# Patient Record
Sex: FEMALE | Race: WHITE | NOT HISPANIC OR LATINO | Employment: OTHER | URBAN - METROPOLITAN AREA
[De-identification: names, ages, dates, MRNs, and addresses within clinical notes are randomized per-mention and may not be internally consistent; named-entity substitution may affect disease eponyms.]

---

## 2017-04-11 ENCOUNTER — CONVERSION ENCOUNTER (OUTPATIENT)
Dept: GENERAL RADIOLOGY | Facility: HOSPITAL | Age: 52
End: 2017-04-11

## 2018-04-13 ENCOUNTER — CONVERSION ENCOUNTER (OUTPATIENT)
Dept: GENERAL RADIOLOGY | Facility: HOSPITAL | Age: 53
End: 2018-04-13

## 2019-06-04 ENCOUNTER — OFFICE VISIT CONVERTED (OUTPATIENT)
Dept: SURGERY | Facility: CLINIC | Age: 54
End: 2019-06-04
Attending: NURSE PRACTITIONER

## 2019-07-01 ENCOUNTER — HOSPITAL ENCOUNTER (OUTPATIENT)
Dept: GENERAL RADIOLOGY | Facility: HOSPITAL | Age: 54
Discharge: HOME | End: 2019-07-01
Attending: OBSTETRICS & GYNECOLOGY

## 2019-09-13 ENCOUNTER — HOSPITAL ENCOUNTER (OUTPATIENT)
Dept: SURGERY | Facility: HOSPITAL | Age: 54
Setting detail: HOSPITAL OUTPATIENT SURGERY
Discharge: HOME | End: 2019-09-13
Attending: SURGERY

## 2019-10-03 ENCOUNTER — OFFICE VISIT CONVERTED (OUTPATIENT)
Dept: FAMILY MEDICINE CLINIC | Facility: CLINIC | Age: 54
End: 2019-10-03
Attending: NURSE PRACTITIONER

## 2019-10-03 ENCOUNTER — HOSPITAL ENCOUNTER (OUTPATIENT)
Dept: LAB | Facility: HOSPITAL | Age: 54
Discharge: HOME | End: 2019-10-03
Attending: NURSE PRACTITIONER

## 2019-10-03 LAB
25(OH)D3 SERPL-MCNC: 103.6 NG/ML (ref 30–100)
ALBUMIN SERPL-MCNC: 4.6 G/DL (ref 3.5–5)
ALBUMIN/GLOB SERPL: 1.7 {RATIO} (ref 1.4–2.6)
ALP SERPL-CCNC: 70 U/L (ref 53–141)
ALT SERPL-CCNC: 16 U/L (ref 10–40)
ANION GAP SERPL CALC-SCNC: 20 MMOL/L (ref 8–19)
AST SERPL-CCNC: 21 U/L (ref 15–50)
BASOPHILS # BLD AUTO: 0.06 10*3/UL (ref 0–0.2)
BASOPHILS NFR BLD AUTO: 1 % (ref 0–3)
BILIRUB SERPL-MCNC: 0.6 MG/DL (ref 0.2–1.3)
BUN SERPL-MCNC: 13 MG/DL (ref 5–25)
BUN/CREAT SERPL: 17 {RATIO} (ref 6–20)
CALCIUM SERPL-MCNC: 9.8 MG/DL (ref 8.7–10.4)
CHLORIDE SERPL-SCNC: 102 MMOL/L (ref 99–111)
CHOLEST SERPL-MCNC: 227 MG/DL (ref 107–200)
CHOLEST/HDLC SERPL: 4.1 {RATIO} (ref 3–6)
CONV ABS IMM GRAN: 0.01 10*3/UL (ref 0–0.2)
CONV CO2: 24 MMOL/L (ref 22–32)
CONV IMMATURE GRAN: 0.2 % (ref 0–1.8)
CONV TOTAL PROTEIN: 7.3 G/DL (ref 6.3–8.2)
CREAT UR-MCNC: 0.75 MG/DL (ref 0.5–0.9)
DEPRECATED RDW RBC AUTO: 46.5 FL (ref 36.4–46.3)
EOSINOPHIL # BLD AUTO: 0.16 10*3/UL (ref 0–0.7)
EOSINOPHIL # BLD AUTO: 2.8 % (ref 0–7)
ERYTHROCYTE [DISTWIDTH] IN BLOOD BY AUTOMATED COUNT: 12.7 % (ref 11.7–14.4)
GFR SERPLBLD BASED ON 1.73 SQ M-ARVRAT: >60 ML/MIN/{1.73_M2}
GLOBULIN UR ELPH-MCNC: 2.7 G/DL (ref 2–3.5)
GLUCOSE SERPL-MCNC: 104 MG/DL (ref 65–99)
HCT VFR BLD AUTO: 47.3 % (ref 37–47)
HDLC SERPL-MCNC: 56 MG/DL (ref 40–60)
HGB BLD-MCNC: 14.9 G/DL (ref 12–16)
LDLC SERPL CALC-MCNC: 149 MG/DL (ref 70–100)
LYMPHOCYTES # BLD AUTO: 1.45 10*3/UL (ref 1–5)
LYMPHOCYTES NFR BLD AUTO: 25.2 % (ref 20–45)
MCH RBC QN AUTO: 31.2 PG (ref 27–31)
MCHC RBC AUTO-ENTMCNC: 31.5 G/DL (ref 33–37)
MCV RBC AUTO: 99 FL (ref 81–99)
MONOCYTES # BLD AUTO: 0.57 10*3/UL (ref 0.2–1.2)
MONOCYTES NFR BLD AUTO: 9.9 % (ref 3–10)
NEUTROPHILS # BLD AUTO: 3.51 10*3/UL (ref 2–8)
NEUTROPHILS NFR BLD AUTO: 60.9 % (ref 30–85)
NRBC CBCN: 0 % (ref 0–0.7)
OSMOLALITY SERPL CALC.SUM OF ELEC: 294 MOSM/KG (ref 273–304)
PLATELET # BLD AUTO: 250 10*3/UL (ref 130–400)
PMV BLD AUTO: 10.8 FL (ref 9.4–12.3)
POTASSIUM SERPL-SCNC: 4.2 MMOL/L (ref 3.5–5.3)
RBC # BLD AUTO: 4.78 10*6/UL (ref 4.2–5.4)
SODIUM SERPL-SCNC: 142 MMOL/L (ref 135–147)
T4 FREE SERPL-MCNC: 1.1 NG/DL (ref 0.9–1.8)
TRIGL SERPL-MCNC: 109 MG/DL (ref 40–150)
TSH SERPL-ACNC: 2.89 M[IU]/L (ref 0.27–4.2)
VLDLC SERPL-MCNC: 22 MG/DL (ref 5–37)
WBC # BLD AUTO: 5.76 10*3/UL (ref 4.8–10.8)

## 2020-04-16 ENCOUNTER — TELEMEDICINE CONVERTED (OUTPATIENT)
Dept: FAMILY MEDICINE CLINIC | Facility: CLINIC | Age: 55
End: 2020-04-16
Attending: NURSE PRACTITIONER

## 2020-05-12 ENCOUNTER — HOSPITAL ENCOUNTER (OUTPATIENT)
Dept: LAB | Facility: HOSPITAL | Age: 55
Discharge: HOME | End: 2020-05-12
Attending: NURSE PRACTITIONER

## 2020-05-12 LAB
25(OH)D3 SERPL-MCNC: 84.1 NG/ML (ref 30–100)
ALBUMIN SERPL-MCNC: 4.1 G/DL (ref 3.5–5)
ALBUMIN/GLOB SERPL: 1.6 {RATIO} (ref 1.4–2.6)
ALP SERPL-CCNC: 68 U/L (ref 53–141)
ALT SERPL-CCNC: 16 U/L (ref 10–40)
ANION GAP SERPL CALC-SCNC: 18 MMOL/L (ref 8–19)
AST SERPL-CCNC: 22 U/L (ref 15–50)
BASOPHILS # BLD AUTO: 0.06 10*3/UL (ref 0–0.2)
BASOPHILS NFR BLD AUTO: 1 % (ref 0–3)
BILIRUB SERPL-MCNC: 0.66 MG/DL (ref 0.2–1.3)
BUN SERPL-MCNC: 14 MG/DL (ref 5–25)
BUN/CREAT SERPL: 17 {RATIO} (ref 6–20)
CALCIUM SERPL-MCNC: 9.3 MG/DL (ref 8.7–10.4)
CHLORIDE SERPL-SCNC: 106 MMOL/L (ref 99–111)
CHOLEST SERPL-MCNC: 187 MG/DL (ref 107–200)
CHOLEST/HDLC SERPL: 2.9 {RATIO} (ref 3–6)
CONV ABS IMM GRAN: 0.01 10*3/UL (ref 0–0.2)
CONV CO2: 25 MMOL/L (ref 22–32)
CONV IMMATURE GRAN: 0.2 % (ref 0–1.8)
CONV TOTAL PROTEIN: 6.7 G/DL (ref 6.3–8.2)
CREAT UR-MCNC: 0.83 MG/DL (ref 0.5–0.9)
DEPRECATED RDW RBC AUTO: 46 FL (ref 36.4–46.3)
EOSINOPHIL # BLD AUTO: 0.32 10*3/UL (ref 0–0.7)
EOSINOPHIL # BLD AUTO: 5.6 % (ref 0–7)
ERYTHROCYTE [DISTWIDTH] IN BLOOD BY AUTOMATED COUNT: 12.8 % (ref 11.7–14.4)
GFR SERPLBLD BASED ON 1.73 SQ M-ARVRAT: >60 ML/MIN/{1.73_M2}
GLOBULIN UR ELPH-MCNC: 2.6 G/DL (ref 2–3.5)
GLUCOSE SERPL-MCNC: 98 MG/DL (ref 65–99)
HCT VFR BLD AUTO: 43.3 % (ref 37–47)
HDLC SERPL-MCNC: 64 MG/DL (ref 40–60)
HGB BLD-MCNC: 14.1 G/DL (ref 12–16)
LDLC SERPL CALC-MCNC: 107 MG/DL (ref 70–100)
LYMPHOCYTES # BLD AUTO: 1.76 10*3/UL (ref 1–5)
LYMPHOCYTES NFR BLD AUTO: 30.7 % (ref 20–45)
MCH RBC QN AUTO: 32.1 PG (ref 27–31)
MCHC RBC AUTO-ENTMCNC: 32.6 G/DL (ref 33–37)
MCV RBC AUTO: 98.6 FL (ref 81–99)
MONOCYTES # BLD AUTO: 0.57 10*3/UL (ref 0.2–1.2)
MONOCYTES NFR BLD AUTO: 9.9 % (ref 3–10)
NEUTROPHILS # BLD AUTO: 3.01 10*3/UL (ref 2–8)
NEUTROPHILS NFR BLD AUTO: 52.6 % (ref 30–85)
NRBC CBCN: 0 % (ref 0–0.7)
OSMOLALITY SERPL CALC.SUM OF ELEC: 300 MOSM/KG (ref 273–304)
PLATELET # BLD AUTO: 219 10*3/UL (ref 130–400)
PMV BLD AUTO: 10.3 FL (ref 9.4–12.3)
POTASSIUM SERPL-SCNC: 4.2 MMOL/L (ref 3.5–5.3)
RBC # BLD AUTO: 4.39 10*6/UL (ref 4.2–5.4)
SODIUM SERPL-SCNC: 145 MMOL/L (ref 135–147)
T4 FREE SERPL-MCNC: 1.1 NG/DL (ref 0.9–1.8)
TRIGL SERPL-MCNC: 79 MG/DL (ref 40–150)
TSH SERPL-ACNC: 4.35 M[IU]/L (ref 0.27–4.2)
VLDLC SERPL-MCNC: 16 MG/DL (ref 5–37)
WBC # BLD AUTO: 5.73 10*3/UL (ref 4.8–10.8)

## 2020-07-23 ENCOUNTER — HOSPITAL ENCOUNTER (OUTPATIENT)
Dept: LAB | Facility: HOSPITAL | Age: 55
Discharge: HOME | End: 2020-07-23
Attending: NURSE PRACTITIONER

## 2020-07-23 LAB
T4 FREE SERPL-MCNC: 1.2 NG/DL (ref 0.9–1.8)
TSH SERPL-ACNC: 2.49 M[IU]/L (ref 0.27–4.2)

## 2020-09-02 ENCOUNTER — TELEMEDICINE CONVERTED (OUTPATIENT)
Dept: FAMILY MEDICINE CLINIC | Facility: CLINIC | Age: 55
End: 2020-09-02
Attending: NURSE PRACTITIONER

## 2021-04-15 ENCOUNTER — TELEMEDICINE CONVERTED (OUTPATIENT)
Dept: FAMILY MEDICINE CLINIC | Facility: CLINIC | Age: 56
End: 2021-04-15
Attending: NURSE PRACTITIONER

## 2021-05-12 NOTE — PROGRESS NOTES
Progress Note      Patient Name: Jenn James   Patient ID: [id number]   Sex: Female   YOB: 1965    Primary Care Provider: Mariana GAGNON   Referring Provider: Mariana GAGNON    Visit Date: April 16, 2020    Provider: CHELSI Cruz   Location: [facility]   Location Address: [address] [place] [address], [address]  [place]  [id number]   Location Phone: [phone]          Chief Complaint  · rash      History Of Present Illness  Video Conferencing Visit  Jenn James is a 54 year old /White female who is presenting for evaluation via video conferencing. Verbal consent obtained before beginning visit.   The following staff were present during this visit: CHELSI Cruz and MIGEL Bryant   Jenn James is a 54 year old /White female who presents for evaluation and treatment of:      Pt is present via facetime. Pt has had a rash on left breast for 2 months. She states it was spotty at first. SHe has tried using Clobetasol pronate that Dr. Jeffery gave her. She says that didn't help much and stopped using it. The past 3-4 days the rash has become solid red with raised bumps and itches. Pt has been using Benadryl cream and states since using that, seems to have gotten worse.     She has been working out a lot and wearing a wet sports bra.    Pt due labs.    PAP in chart.       Past Medical History  Disease Name Date Onset Notes   Allergic rhinitis --  --    Allergic rhinitis, chronic --  --    Cervical cancer screening 5/30/2019 --    Hyperlipemia 03/15/2017 --    Hypothyroidism 03/15/2017 --    Kidney stones --  --    Limb Pain --  --    Limb Swelling --  --          Past Surgical History  Procedure Name Date Notes   *Metal Implant --  --    Colon --  --    Kidney --  --    Sling operation 2008 --    Tubal ligation 1999 --          Medication List  Name Date Started Instructions   D3 + K2 DOTS 1000 IU/200mcg oral tablet,disintegrating 03/14/2017 Take 3 Drops  PO QD   magnesium oral  650 mg qd   milk thistle 150 mg oral capsule 2017 take 2 capsules by oral route daily   Nasal Spray Bottle miscellaneous bottle  use as directed   Singulair 10 mg oral tablet  take 1 tablet (10 mg) by oral route once daily in the evening for 30 days   Zyrtec 10 mg oral tablet  take 1 tablet (10 mg) by oral route once daily         Allergy List  Allergen Name Date Reaction Notes   SULFA (SULFONAMIDES) --  --  --        Allergies Reconciled  Family Medical History  Disease Name Relative/Age Notes   DM Type II Mother/   --    Cancer, Unspecified Mother/   kidney   Diabetes, unspecified type Grandmother (paternal)/  Mother/   Mother; Grandmother (paternal)   Prostate Cancer Father/   Father   Renal Calculus Grandmother (maternal)/   Grandmother (maternal); Child   Family history of colon cancer Grandmother (maternal)/70s   Grandmother (maternal)/70s   Renal cancer Mother/70s   Mother/70s         Reproductive History  Menstrual   Age Menarche: 11   Pregnancy Summary   Total Pregnancies: 2 Full Term: 2 Premature: 0   Ab Induced: 0 Ab Spontaneous: 0 Ectopics: 0   Multiples: 0 Livin         Social History  Finding Status Start/Stop Quantity Notes   Alcohol Current every day --/-- --  drinks daily; wine, beer and liquor   Caffeine Current every day --/-- --  drinks regularly; coffee; 1-2 times per day    --  --/-- --  --    Minimal Amount of Exercise (Once weekly or less) --  --/-- --  --    No known infection risk --  --/-- --  --    Second hand smoke exposure Never --/-- --  no   Tobacco Never --/-- --  never a smoker         Immunizations  NameDate Admin Mfg Trade Name Lot Number Route Inj VIS Given VIS Publication   Hepatitis A1 SKB HAVRIX-ADULT [id number] IM LD 10/03/2019    Comments: Pt tolerated injection well   Rbzffayip04/2019 [facility] Fluzone Quadrivalent [id number] IM RD 10/03/2019    Comments: Pt tolerated injection well         Review of  Systems  · Constitutional  o Denies  o : fever, fatigue, weight loss, weight gain  · Cardiovascular  o Denies  o : lower extremity edema, claudication, chest pressure, palpitations  · Respiratory  o Denies  o : shortness of breath, wheezing, cough, hemoptysis, dyspnea on exertion  · Gastrointestinal  o Denies  o : nausea, vomiting, diarrhea, constipation, abdominal pain  · Integument  o Admits  o : rash, itching      Physical Examination  · Constitutional  o Appearance  o : well-nourished, well developed, alert, in no acute distress  · Skin and Subcutaneous Tissue  o General Inspection  o : erythematous raised rash to side of left breast, no pustules noted  · Neurologic  o Mental Status Examination  o :   § Orientation  § : grossly oriented to person, place and time  o Cranial Nerves  o : cranial nerves intact and symmetric throughout  · Psychiatric  o Mood and Affect  o : mood normal, affect appropriate, denies any SI/HI          Assessment  · Allergic rhinitis due to allergen     477.9/J30.9  · Dermatitis     692.9/L30.9  · Hyperlipidemia     272.4/E78.5  · Hypothyroidism     244.9/E03.9  · Vitamin D deficiency     268.9/E55.9  · Routine lab draw     V72.60/Z01.89      Plan  · Orders  o CBC with Auto Diff [facility] (55036) - - 04/16/2020  o CMP [facility] (46545) - - 04/16/2020  o Lipid Panel [facility] (34376) - - 04/16/2020  o Thyroid Profile (40157, 41291, THYII) - - 04/16/2020  o Vitamin D (25-Hydroxy) Level (75057) - - 04/16/2020  o ACO-39: Current medications updated and reviewed () - - 04/16/2020  o Cervical cancer screening (Pap) results IN CHART and reviewed [facility] (3015F) - - 04/16/2020  · Medications  o nystatin 100,000 unit/gram topical cream   SIG: apply to the affected area(s) by topical route 3 times per day   DISP: (1) 15 gm tube with 0 refills  Prescribed on 04/16/2020     o nystatin 100,000 unit/gram topical powder   SIG: apply to the affected area(s) by topical route 3 times per day   DISP: (1) 15 gm bottle with  0 refills  Discontinued on 04/16/2020     o Medications have been Reconciled  o Transition of Care or Provider Policy  · Instructions  o Advised that cheeses and other sources of dairy fats, animal fats, fast food, and the extras (candy, pastries, pies, doughnuts and cookies) all contain LDL raising nutrients. Advised to increase fruits, vegetables, whole grains, and to monitor portion sizes.   o Patient instructed to seek medical attention urgently for new or worsening symptoms.  o Call the office with any concerns or questions.  o if rash not improving with nystatin will need to come in for culture  o advised to keep area as dry as possible  · Disposition  o Return Visit Request in/on 6 months +/- 2 weeks (15298).            Electronically Signed by: CHELSI Cruz -Author on April 16, 2020 10:42:57 AM

## 2021-05-13 NOTE — PROGRESS NOTES
Progress Note      Patient Name: Jenn James   Patient ID: [id number]   Sex: Female   YOB: 1965    Primary Care Provider: Mariana GAGNON   Referring Provider: Mariana GAGNON    Visit Date: September 2, 2020    Provider: CHELSI Cruz   Location: [facility]   Location Address: [address] [place] [address], [address]  [place]  [id number]   Location Phone: [phone]          Chief Complaint  · dizzy  · off balance      History Of Present Illness  Video Conferencing Visit  Jenn James is a 55 year old /White female who is presenting for evaluation via video conferencing via [employer]. Verbal consent obtained before beginning visit.   The following staff were present during this visit: Geovanna Razo MA   Jenn James is a 55 year old /White female who presents for evaluation and treatment of:      Pt is C/O of dizziness, lightheaded, nausea, off balance since yesterday.  No chest pain, discomfort, fever, or brain fogginess. She just feels off and weird even while sitting down.  She states she feels like her body is pulling her to the right side.       Past Medical History  Disease Name Date Onset Notes   Allergic rhinitis --  --    Allergic rhinitis, chronic --  --    Cervical cancer screening 5/30/2019 --    Hyperlipemia 03/15/2017 --    Hypothyroidism 03/15/2017 --    Kidney stones --  --    Limb Pain --  --    Limb Swelling --  --          Past Surgical History  Procedure Name Date Notes   *Metal Implant --  --    Colon --  --    Kidney --  --    Sling operation 2008 --    Tubal ligation 1999 --          Medication List  Name Date Started Instructions   D3 + K2 DOTS 1000 IU/200mcg oral tablet,disintegrating 03/14/2017 Take 3 Drops PO QD   levothyroxine 25 mcg oral tablet 08/06/2020 TAKE 1 TABLET(25 MCG) BY MOUTH EVERY DAY   magnesium oral  650 mg qd   milk thistle 150 mg oral capsule 03/14/2017 take 2 capsules by oral route daily   Nasal Spray Bottle  miscellaneous bottle  use as directed   nystatin 100,000 unit/gram topical cream 2020 apply to the affected area(s) by topical route 3 times per day   Singulair 10 mg oral tablet  take 1 tablet (10 mg) by oral route once daily in the evening for 30 days   Synthroid 25 mcg oral tablet 2020 take 1 tablet (25 mcg) by oral route once daily for 30 days   Zyrtec 10 mg oral tablet  take 1 tablet (10 mg) by oral route once daily         Allergy List  Allergen Name Date Reaction Notes   SULFA (SULFONAMIDES) --  --  --          Family Medical History  Disease Name Relative/Age Notes   DM Type II Mother/   --    Cancer, Unspecified Mother/   kidney   Diabetes, unspecified type Grandmother (paternal)/  Mother/   Mother; Grandmother (paternal)   Prostate Cancer Father/   Father   Renal Calculus Grandmother (maternal)/   Grandmother (maternal); Child   Family history of colon cancer Grandmother (maternal)/70s   Grandmother (maternal)/70s   Renal Cancer Mother/70s   Mother/70s         Reproductive History  Menstrual   Age Menarche: 11   Pregnancy Summary   Total Pregnancies: 2 Full Term: 2 Premature: 0   Ab Induced: 0 Ab Spontaneous: 0 Ectopics: 0   Multiples: 0 Livin         Social History  Finding Status Start/Stop Quantity Notes   Alcohol Current every day --/-- --  drinks daily; wine, beer and liquor   Caffeine Current every day --/-- --  drinks regularly; coffee; 1-2 times per day    --  --/-- --  --    Minimal Amount of Exercise (Once weekly or less) --  --/-- --  --    No known infection risk --  --/-- --  --    Second hand smoke exposure Never --/-- --  no   Tobacco Never --/-- --  never a smoker         Immunizations  NameDate Admin Mfg Trade Name Lot Number Route Inj VIS Given VIS Publication   Hepatitis [id number] SKB HAVRIX-ADULT  IM LD 2020    Comments: tolerated well   Hepatitis A1 SKB HAVRIX-ADULT [id number] IM LD 10/03/2019    Comments: Pt tolerated injection well    Icjozobcm22/03/2019 [facility] Fluzone Quadrivalent [id number] IM RD 10/03/2019    Comments: Pt tolerated injection well         Review of Systems  · Constitutional  o Denies  o : fever, fatigue, weight loss, weight gain  · HENT  o Admits  o : vertigo  · Cardiovascular  o Denies  o : lower extremity edema, claudication, chest pressure, palpitations  · Respiratory  o Denies  o : shortness of breath, wheezing, cough, hemoptysis, dyspnea on exertion  · Gastrointestinal  o Admits  o : nausea  o Denies  o : vomiting, diarrhea, constipation, abdominal pain      Physical Examination  · Constitutional  o Appearance  o : well-nourished, well developed, alert, in no acute distress  · Neurologic  o Mental Status Examination  o :   § Orientation  § : grossly oriented to person, place and time  · Psychiatric  o Mood and Affect  o : mood normal, affect appropriate, denies any SI/HI          Assessment  · Hypothyroidism     244.9/E03.9  · Dizzy     780.4/R42  · Nausea     787.02/R11.0  · Lightheaded     780.4/R42  · Hyperlipemia     272.4/E78.5  · Allergic rhinitis     477.9/J30.9  · Vertigo     780.4/R42  · Balance disorder     781.99/R26.89      Plan  · Orders  o ACO-39: Current medications updated and reviewed () - - 09/02/2020  · Medications  o meclizine 25 mg oral tablet   SIG: take 1 tablet (25 mg) by oral route 3 times per day as needed   DISP: (30) tablets with 0 refills  Discontinued on 09/02/2020     o Medrol (Endy) 4 mg oral tablets,dose pack   SIG: take by oral route as directed per package instructions   DISP: (1) 21 ct dose-pack with 0 refills  Discontinued on 09/02/2020     o nystatin 100,000 unit/gram topical cream   SIG: apply to the affected area(s) by topical route 3 times per day   DISP: (1) 15 gm tube with 0 refills  Discontinued on 09/02/2020     o Synthroid 25 mcg oral tablet   SIG: take 1 tablet (25 mcg) by oral route once daily for 30 days   DISP: (30) tablets with 2 refills  Discontinued on 09/02/2020      o Medications have been Reconciled  o Transition of Care or Provider Policy  · Instructions  o Patient was educated/instructed on their diagnosis, treatment and medications prior to discharge from the clinic today.  o Patient instructed to seek medical attention urgently for new or worsening symptoms.  o Call the office with any concerns or questions.  o advised Epley Maneuvers to help with vertigo  o advised pt that due to symptoms she should go to the ER to be envaulted for TIA r/u vertigo, she states she would go now  · Disposition  o Call or Return if symptoms worsen or persist.            Electronically Signed by: CHELSI Cruz -Author on September 2, 2020 10:50:32 AM

## 2021-05-14 NOTE — PROGRESS NOTES
Progress Note      Patient Name: Jenn James   Patient ID: [id number]   Sex: Female   YOB: 1965    Primary Care Provider: Mariana GAGNON   Referring Provider: Mariana GAGNON    Visit Date: April 15, 2021    Provider: CHELSI Cruz   Location: [facility]   Location Address: [address] [place] [address], [address]  [place], [place]  [id number]   Location Phone: [phone]          Chief Complaint  · sinus pressure  · sinus drainage  · sore throat      History Of Present Illness  Video Conferencing Visit  Jenn James is a 55 year old /White female who is presenting for evaluation via video conferencing via [employer]. Verbal consent obtained before beginning visit.   The following staff were present during this visit: CHELSI Cruz and MIGEL Bryant   Jenn James is a 55 year old /White female who presents for evaluation and treatment of:      Pt has c/o possible sinus inf. Pt states she has a sore throat, nasal congestion, sinus pressure, sinus drainage and her ears hurt. S/S have been present since Monday. She has a productive cough, yellow in color. Pt denies any fever. Pt feels better during the day, but worse at night. Pt has taken Oscillococcinum, an OTC natural homeopathic medication. Pt is currently in [place], [place] with her daughter. Pt had her first Covid vaccine last Monday.       Past Medical History  Disease Name Date Onset Notes   Allergic rhinitis --  --    Allergic rhinitis, chronic --  --    Cervical cancer screening 5/30/2019 --    Hyperlipemia 03/15/2017 --    Hypothyroidism 03/15/2017 --    Kidney Stones --  --    Limb Pain --  --    Limb Swelling --  --          Past Surgical History  Procedure Name Date Notes   *Metal Implant --  --    Colon --  --    Kidney --  --    Sling operation 2008 --    Tubal ligation 1999 --          Medication List  Name Date Started Instructions   levothyroxine 25 mcg oral tablet 08/06/2020  TAKE 1 TABLET(25 MCG) BY MOUTH EVERY DAY   magnesium oral  650 mg qd   milk thistle 150 mg oral capsule 2017 take 2 capsules by oral route daily   Nasal Spray Bottle miscellaneous bottle  use as directed   Singulair 10 mg oral tablet  take 1 tablet (10 mg) by oral route once daily in the evening for 30 days   Zyrtec 10 mg oral tablet  take 1 tablet (10 mg) by oral route once daily         Allergy List  Allergen Name Date Reaction Notes   SULFA (SULFONAMIDES) --  --  --          Family Medical History  Disease Name Relative/Age Notes   DM Type II Mother/   --    Cancer, Unspecified Mother/   kidney   Diabetes, unspecified type Grandmother (paternal)/  Mother/   Mother; Grandmother (paternal)   Prostate Cancer Father/   Father   Renal Calculus Grandmother (maternal)/   Grandmother (maternal); Child   Family history of colon cancer Grandmother (maternal)/70s   Grandmother (maternal)/70s   Renal Cancer Mother/70s   Mother/70s         Reproductive History  Menstrual   Age Menarche: 11   Pregnancy Summary   Total Pregnancies: 2 Full Term: 2 Premature: 0   Ab Induced: 0 Ab Spontaneous: 0 Ectopics: 0   Multiples: 0 Livin         Social History  Finding Status Start/Stop Quantity Notes   Alcohol Current every day --/-- --  drinks daily; wine, beer and liquor   Caffeine Current every day --/-- --  drinks regularly; coffee; 1-2 times per day    --  --/-- --  --    Minimal Amount of Exercise (Once weekly or less) --  --/-- --  --    No known infection risk --  --/-- --  --    Second hand smoke exposure Never --/-- --  no   Tobacco Never --/-- --  never a smoker         Immunizations  NameDate Admin Mfg Trade Name Lot Number Route Inj VIS Given VIS Publication   Hepatitis A02020 SKB HAVRIX-ADULT  IM LD 2020    Comments: tolerated well   Hepatitis A1 SKB HAVRIX-ADULT [id number] IM LD 10/03/2019    Comments: Pt tolerated injection well   Jaoqjygcj84/2019 [facility] Fluzone Quadrivalent [id number] IM  RD 10/03/2019    Comments: Pt tolerated injection well         Review of Systems  · Constitutional  o Denies  o : fever, fatigue, weight loss, weight gain  · HENT  o Admits  o : sinus pain, nasal congestion, nasal discharge, sore throat, ear pain  · Cardiovascular  o Denies  o : lower extremity edema, claudication, chest pressure, palpitations  · Respiratory  o Admits  o : cough  o Denies  o : shortness of breath, wheezing, hemoptysis, dyspnea on exertion  · Gastrointestinal  o Denies  o : nausea, vomiting, diarrhea, constipation, abdominal pain      Physical Examination  · Constitutional  o Appearance  o : well-nourished, well developed, alert, in no acute distress  · Neurologic  o Mental Status Examination  o :   § Orientation  § : grossly oriented to person, place and time  · Psychiatric  o Mood and Affect  o : mood normal, affect appropriate          Assessment  · Sinusitis, acute     461.9/J01.90  · Sinus pressure     478.19/J34.89  · Sinus drainage     478.19/J34.89  · Productive cough     786.2/R05  · Sore throat     462/J02.9  · Ear pain, bilateral     388.70/H92.03      Plan  · Orders  o ACO-39: Current medications updated and reviewed (, 1159F) - - 04/15/2021  · Medications  o Augmentin 875-125 mg oral tablet   SIG: take 1 tablet by oral route every 12 hours for 10 days   DISP: (20) Tablet with 0 refills  Prescribed on 04/15/2021     o Diflucan 150 mg oral tablet   SIG: take 1 tablet (150 mg) by oral route once may repeat in 7 days   DISP: (2) Tablet with 0 refills  Prescribed on 04/15/2021     o Medications have been Reconciled  o Transition of Care or Provider Policy  · Instructions  o Patient was educated/instructed on their diagnosis, treatment and medications prior to discharge from the clinic today.  o Patient instructed to seek medical attention urgently for new or worsening symptoms.  o Call the office with any concerns or questions.  · Disposition  o Call or Return if symptoms worsen or  persist.            Electronically Signed by: CHELSI Cruz -Author on April 15, 2021 01:58:24 PM

## 2021-05-15 VITALS — HEART RATE: 74 BPM | HEIGHT: 67 IN | WEIGHT: 187.25 LBS | BODY MASS INDEX: 29.39 KG/M2 | OXYGEN SATURATION: 98 %

## 2021-05-15 VITALS
WEIGHT: 174.25 LBS | BODY MASS INDEX: 27.35 KG/M2 | SYSTOLIC BLOOD PRESSURE: 125 MMHG | HEART RATE: 67 BPM | OXYGEN SATURATION: 95 % | DIASTOLIC BLOOD PRESSURE: 80 MMHG | HEIGHT: 67 IN

## 2021-05-19 ENCOUNTER — OFFICE VISIT CONVERTED (OUTPATIENT)
Dept: FAMILY MEDICINE CLINIC | Facility: CLINIC | Age: 56
End: 2021-05-19
Attending: NURSE PRACTITIONER

## 2021-06-05 NOTE — PROGRESS NOTES
"   Progress Note      Patient Name: Jenn James   Patient ID: [id number]   Sex: Female   YOB: 1965    Primary Care Provider: Mariana GAGNON   Referring Provider: Mariana GAGNON    Visit Date: May 19, 2021    Provider: CHELSI Cruz   Location: [facility]   Location Address: [address] [place] [address], [address]  [place], [place]  [id number]   Location Phone: [phone]          Chief Complaint  · Vertigo      History Of Present Illness  Jenn James is a 56 year old /White female who presents for evaluation and treatment of:      Pt has c/o vertigo and ear pain. Pt states it sounds like \"pop rocks\" in her ears. Pt has been having vertigo since 5/5/21. Pt did a telehealth visit through her insurance. Pt was prescribed Ipratropium Bromide 0.03% nasal spray to try to help. Pt is still having vertigo and ear pain. Pt discussed with her pharmacist about the covid vaccine. He states this is a possible s/e from the shot. Pt has also been taking her meclizine and diazepam, that was given to her at the hospital 10/2020.  Pt states the meclizine helps and needs a refill.             Past Medical History  Disease Name Date Onset Notes   Allergic rhinitis --  --    Allergic rhinitis, chronic --  --    Cervical cancer screening 5/30/2019 --    Hyperlipemia 03/15/2017 --    Hypothyroidism 03/15/2017 --    Kidney Stones --  --    Limb Pain --  --    Limb Swelling --  --          Past Surgical History  Procedure Name Date Notes   *Metal Implant --  --    Colon --  --    Kidney --  --    Sling operation 2008 --    Tubal ligation 1999 --          Medication List  Name Date Started Instructions   levothyroxine 25 mcg oral tablet 08/06/2020 TAKE 1 TABLET(25 MCG) BY MOUTH EVERY DAY   magnesium oral  650 mg qd   milk thistle 150 mg oral capsule 03/14/2017 take 2 capsules by oral route daily   Nasal Spray Bottle miscellaneous bottle  use as directed   Singulair 10 mg oral tablet  take 1 " tablet (10 mg) by oral route once daily in the evening for 30 days   Zyrtec 10 mg oral tablet  take 1 tablet (10 mg) by oral route once daily         Allergy List  Allergen Name Date Reaction Notes   SULFA (SULFONAMIDES) --  --  --        Allergies Reconciled  Family Medical History  Disease Name Relative/Age Notes   DM Type II Mother/   --    Cancer, Unspecified Mother/   kidney   Diabetes, unspecified type Grandmother (paternal)/  Mother/   Mother; Grandmother (paternal)   Prostate Cancer Father/   Father   Renal Calculus Grandmother (maternal)/   Grandmother (maternal); Child   Family history of colon cancer Grandmother (maternal)/70s   Grandmother (maternal)/70s   Renal Cancer Mother/70s   Mother/70s         Reproductive History  Menstrual   Age Menarche: 11   Pregnancy Summary   Total Pregnancies: 2 Full Term: 2 Premature: 0   Ab Induced: 0 Ab Spontaneous: 0 Ectopics: 0   Multiples: 0 Livin         Social History  Finding Status Start/Stop Quantity Notes   Alcohol Current every day --/-- --  drinks daily; wine, beer and liquor   Caffeine Current every day --/-- --  drinks regularly; coffee; 1-2 times per day    --  --/-- --  --    Minimal Amount of Exercise (Once weekly or less) --  --/-- --  --    No known infection risk --  --/-- --  --    Second hand smoke exposure Never --/-- --  no   Tobacco Never --/-- --  never a smoker         Immunizations  NameDate Admin Mfg Trade Name Lot Number Route Inj VIS Given VIS Publication   Hepatitis [id number] SKB HAVRIX-ADULT  IM LD 2020    Comments: tolerated well   Hepatitis A1 SKB HAVRIX-ADULT [id number] IM LD 10/03/2019    Comments: Pt tolerated injection well   Ogsutfvak03/2019 [facility] Fluzone Quadrivalent [id number] IM RD 10/03/2019    Comments: Pt tolerated injection well         Review of Systems  · Constitutional  o Denies  o : fever, fatigue, weight loss, weight gain  · HENT  o Admits  o : vertigo, ear  "fullness  · Cardiovascular  o Denies  o : lower extremity edema, claudication, chest pressure, palpitations  · Respiratory  o Denies  o : shortness of breath, wheezing, cough, hemoptysis, dyspnea on exertion  · Gastrointestinal  o Denies  o : nausea, vomiting, diarrhea, constipation, abdominal pain      Vitals  Date Time BP Position Site L\R Cuff Size HR RR TEMP (F) WT  HT  BMI kg/m2 BSA m2 O2 Sat FR L/min FiO2 HC       06/04/2019 09:10 AM      74 - R   187lbs 4oz 5'  7\" 29.33 2 98 %      10/03/2019 09:50 /80 Sitting    67 - R   174lbs 4oz 5'  7\" 27.29 1.93 95 %      05/19/2021 07:54 /62 Sitting    72 - R   200lbs 0oz 5'  7\" 31.32 2.07 96 %  21%          Physical Examination  · Constitutional  o Appearance  o : well-nourished, well developed, alert, in no acute distress  · Ears, Nose, Mouth and Throat  o Ears  o :   § External Ears  § : appearance within normal limits, no lesions present  § Otoscopic Examination  § : tympanic membrane appearance within normal limits bilaterally without perforations, mobility normal, small amount of fluid noted on right TM  o Nose  o :   § External Nose  § : normal stucture noted.  § Intranasal Exam  § : no swelling, reddness, turbs normal song.  o Oral Cavity  o :   § Oral Mucosa  § : oral mucosa normal without pallor or cyanosis  § Lips  § : lip appearance normal  § Teeth  § : normal dentition for age  § Gums  § : gums pink, non-swollen, no bleeding present  § Tongue  § : tongue appearance normal  § Palate  § : hard palate normal, soft palate appearance normal  o Throat  o :   § Oropharynx  § : no inflammation or lesions present, tonsils within normal limits  · Respiratory  o Respiratory Effort  o : breathing unlabored  o Auscultation of Lungs  o : normal breath sounds throughout  · Cardiovascular  o Heart  o :   § Auscultation of Heart  § : regular rate and rhythm, no murmurs, gallops or rubs  § Palpation of Heart  § : normal apical impulse, no cardiac thrill " present  o Peripheral Vascular System  o :   § Extremities  § : no cyanosis, clubbing or edema; less than 2 second refill noted  · Skin and Subcutaneous Tissue  o General Inspection  o : no rashes or lesions present, no areas of discoloration  · Neurologic  o Mental Status Examination  o :   § Orientation  § : grossly oriented to person, place and time  o Cranial Nerves  o : cranial nerves intact and symmetric throughout  · Psychiatric  o Mood and Affect  o : mood normal, affect appropriate, denies any SI/HI          Assessment  · Allergic rhinitis due to allergen     477.9/J30.9  · Hyperlipidemia     272.4/E78.5  · Hypothyroidism     244.9/E03.9  · Screening for depression     V79.0/Z13.89  · Vertigo     780.4/R42      Plan  · Orders  o HTN/Lipid Panel (CMP, Lipid) [facility] (16466, 66618) - 272.4/E78.5 - 05/19/2021  o Thyroid Profile (95411, 65345, THYII) - 244.9/E03.9 - 05/19/2021  o ACO-18: Negative screen for clinical depression using a standardized tool () - V79.0/Z13.89 - 05/19/2021  o CBC with Auto Diff [facility] (21853) - 244.9/E03.9 - 05/19/2021  o ACO-39: Current medications updated and reviewed (1159F, ) - - 05/19/2021  o ENT CONSULTATION (ENTCO) - 780.4/R42 - 05/19/2021  · Medications  o meclizine 25 mg oral tablet   SIG: take 1 tablet (25 mg) by oral route 3 times per day as needed   DISP: (90) Tablet with 0 refills  Prescribed on 05/19/2021     o Augmentin 875-125 mg oral tablet   SIG: take 1 tablet by oral route every 12 hours for 10 days   DISP: (20) Tablet with 0 refills  Discontinued on 05/19/2021     o Diflucan 150 mg oral tablet   SIG: take 1 tablet (150 mg) by oral route once may repeat in 7 days   DISP: (2) Tablet with 0 refills  Discontinued on 05/19/2021     o Medications have been Reconciled  o Transition of Care or Provider Policy  · Instructions  o Advised that cheeses and other sources of dairy fats, animal fats, fast food, and the extras (candy, pastries, pies, doughnuts and cookies) all  contain LDL raising nutrients. Advised to increase fruits, vegetables, whole grains, and to monitor portion sizes.   o Depression Screen completed and scanned into the EMR under the designated folder within the patient's documents.  o Today's PHQ-9 result is _0__  o The provider screening met the required time of 15 minutes.  o Patient was educated/instructed on their diagnosis, treatment and medications prior to discharge from the clinic today.  o Patient instructed to seek medical attention urgently for new or worsening symptoms.  o Bring all medicines with their bottles to each office visit.  o pt's brother is a PT and she is going to see him to help with vertigo so declines a referral  o advised if not resolving will consult ENT  · Disposition  o Call or Return if symptoms worsen or persist.  o Return Visit Request in/on 3 months +/- 2 weeks ([id number]).            Electronically Signed by: CHELSI Cruz -Author on May 19, 2021 08:18:29 AM

## 2021-06-14 DIAGNOSIS — E03.9 HYPOTHYROIDISM, UNSPECIFIED TYPE: Primary | ICD-10-CM

## 2021-06-15 RX ORDER — MONTELUKAST SODIUM 10 MG/1
10 TABLET ORAL NIGHTLY
COMMUNITY

## 2021-06-15 RX ORDER — CETIRIZINE HYDROCHLORIDE 10 MG/1
10 TABLET ORAL DAILY
COMMUNITY
End: 2021-12-23 | Stop reason: SDUPTHER

## 2021-06-15 RX ORDER — MECLIZINE HYDROCHLORIDE 25 MG/1
25 TABLET ORAL 3 TIMES DAILY PRN
COMMUNITY
End: 2022-07-14

## 2021-06-15 RX ORDER — MAGNESIUM CARB/ALUMINUM HYDROX 105-160MG
TABLET,CHEWABLE ORAL ONCE
COMMUNITY
End: 2022-04-21

## 2021-06-15 RX ORDER — LEVOTHYROXINE SODIUM 0.03 MG/1
TABLET ORAL
Qty: 90 TABLET | Refills: 1 | Status: SHIPPED | OUTPATIENT
Start: 2021-06-15 | End: 2021-06-22 | Stop reason: SDUPTHER

## 2021-06-15 RX ORDER — MILK THISTLE 150 MG
CAPSULE ORAL
COMMUNITY

## 2021-06-15 RX ORDER — LEVOTHYROXINE SODIUM 0.03 MG/1
25 TABLET ORAL DAILY
COMMUNITY
End: 2021-06-24 | Stop reason: SDUPTHER

## 2021-06-22 ENCOUNTER — TRANSCRIBE ORDERS (OUTPATIENT)
Dept: LAB | Facility: HOSPITAL | Age: 56
End: 2021-06-22

## 2021-06-22 ENCOUNTER — LAB (OUTPATIENT)
Dept: LAB | Facility: HOSPITAL | Age: 56
End: 2021-06-22

## 2021-06-22 DIAGNOSIS — Z01.89 ROUTINE LAB DRAW: ICD-10-CM

## 2021-06-22 DIAGNOSIS — E03.9 HYPOTHYROIDISM, UNSPECIFIED TYPE: ICD-10-CM

## 2021-06-22 DIAGNOSIS — E78.5 HYPERLIPIDEMIA, UNSPECIFIED HYPERLIPIDEMIA TYPE: Primary | ICD-10-CM

## 2021-06-22 DIAGNOSIS — E78.5 HYPERLIPIDEMIA, UNSPECIFIED HYPERLIPIDEMIA TYPE: ICD-10-CM

## 2021-06-22 PROBLEM — Z96.641 STATUS POST RIGHT HIP REPLACEMENT: Status: ACTIVE | Noted: 2017-01-17

## 2021-06-22 LAB
ALBUMIN SERPL-MCNC: 4.3 G/DL (ref 3.5–5.2)
ALBUMIN/GLOB SERPL: 2 G/DL
ALP SERPL-CCNC: 69 U/L (ref 39–117)
ALT SERPL W P-5'-P-CCNC: 16 U/L (ref 1–33)
ANION GAP SERPL CALCULATED.3IONS-SCNC: 9.1 MMOL/L (ref 5–15)
AST SERPL-CCNC: 17 U/L (ref 1–32)
BASOPHILS # BLD AUTO: 0.05 10*3/MM3 (ref 0–0.2)
BASOPHILS NFR BLD AUTO: 0.8 % (ref 0–1.5)
BILIRUB SERPL-MCNC: 0.5 MG/DL (ref 0–1.2)
BUN SERPL-MCNC: 11 MG/DL (ref 6–20)
BUN/CREAT SERPL: 14.1 (ref 7–25)
CALCIUM SPEC-SCNC: 9.1 MG/DL (ref 8.6–10.5)
CHLORIDE SERPL-SCNC: 106 MMOL/L (ref 98–107)
CHOLEST SERPL-MCNC: 206 MG/DL (ref 0–200)
CO2 SERPL-SCNC: 26.9 MMOL/L (ref 22–29)
CREAT SERPL-MCNC: 0.78 MG/DL (ref 0.57–1)
DEPRECATED RDW RBC AUTO: 45.2 FL (ref 37–54)
EOSINOPHIL # BLD AUTO: 0.19 10*3/MM3 (ref 0–0.4)
EOSINOPHIL NFR BLD AUTO: 3.2 % (ref 0.3–6.2)
ERYTHROCYTE [DISTWIDTH] IN BLOOD BY AUTOMATED COUNT: 12.5 % (ref 12.3–15.4)
GFR SERPL CREATININE-BSD FRML MDRD: 76 ML/MIN/1.73
GLOBULIN UR ELPH-MCNC: 2.2 GM/DL
GLUCOSE SERPL-MCNC: 100 MG/DL (ref 65–99)
HCT VFR BLD AUTO: 46.2 % (ref 34–46.6)
HDLC SERPL-MCNC: 57 MG/DL (ref 40–60)
HGB BLD-MCNC: 15.3 G/DL (ref 12–15.9)
IMM GRANULOCYTES # BLD AUTO: 0.03 10*3/MM3 (ref 0–0.05)
IMM GRANULOCYTES NFR BLD AUTO: 0.5 % (ref 0–0.5)
LDLC SERPL CALC-MCNC: 131 MG/DL (ref 0–100)
LDLC/HDLC SERPL: 2.25 {RATIO}
LYMPHOCYTES # BLD AUTO: 1.56 10*3/MM3 (ref 0.7–3.1)
LYMPHOCYTES NFR BLD AUTO: 26 % (ref 19.6–45.3)
MCH RBC QN AUTO: 32.2 PG (ref 26.6–33)
MCHC RBC AUTO-ENTMCNC: 33.1 G/DL (ref 31.5–35.7)
MCV RBC AUTO: 97.3 FL (ref 79–97)
MONOCYTES # BLD AUTO: 0.57 10*3/MM3 (ref 0.1–0.9)
MONOCYTES NFR BLD AUTO: 9.5 % (ref 5–12)
NEUTROPHILS NFR BLD AUTO: 3.59 10*3/MM3 (ref 1.7–7)
NEUTROPHILS NFR BLD AUTO: 60 % (ref 42.7–76)
NRBC BLD AUTO-RTO: 0 /100 WBC (ref 0–0.2)
PLATELET # BLD AUTO: 236 10*3/MM3 (ref 140–450)
PMV BLD AUTO: 10.1 FL (ref 6–12)
POTASSIUM SERPL-SCNC: 4.2 MMOL/L (ref 3.5–5.2)
PROT SERPL-MCNC: 6.5 G/DL (ref 6–8.5)
RBC # BLD AUTO: 4.75 10*6/MM3 (ref 3.77–5.28)
SODIUM SERPL-SCNC: 142 MMOL/L (ref 136–145)
T4 FREE SERPL-MCNC: 0.98 NG/DL (ref 0.93–1.7)
TRIGL SERPL-MCNC: 103 MG/DL (ref 0–150)
TSH SERPL DL<=0.05 MIU/L-ACNC: 3.77 UIU/ML (ref 0.27–4.2)
VLDLC SERPL-MCNC: 18 MG/DL (ref 5–40)
WBC # BLD AUTO: 5.99 10*3/MM3 (ref 3.4–10.8)

## 2021-06-22 PROCEDURE — 84443 ASSAY THYROID STIM HORMONE: CPT

## 2021-06-22 PROCEDURE — 80061 LIPID PANEL: CPT

## 2021-06-22 PROCEDURE — 80053 COMPREHEN METABOLIC PANEL: CPT

## 2021-06-22 PROCEDURE — 36415 COLL VENOUS BLD VENIPUNCTURE: CPT

## 2021-06-22 PROCEDURE — 85025 COMPLETE CBC W/AUTO DIFF WBC: CPT

## 2021-06-22 PROCEDURE — 84439 ASSAY OF FREE THYROXINE: CPT

## 2021-06-22 RX ORDER — CETIRIZINE HYDROCHLORIDE 10 MG/1
10 TABLET ORAL DAILY
COMMUNITY

## 2021-06-22 RX ORDER — AZELASTINE 1 MG/ML
SPRAY, METERED NASAL
COMMUNITY
Start: 2021-04-08 | End: 2023-01-04

## 2021-06-23 ENCOUNTER — TELEPHONE (OUTPATIENT)
Dept: FAMILY MEDICINE CLINIC | Facility: CLINIC | Age: 56
End: 2021-06-23

## 2021-06-23 NOTE — TELEPHONE ENCOUNTER
----- Message from CHELSI Cruz sent at 6/23/2021  6:29 AM EDT -----  Bad chol has increased since last check encourage low chol diet and daily cardio recheck in 6 months

## 2021-06-23 NOTE — TELEPHONE ENCOUNTER
Pt aware. Pt had not been working out or dieting as she had in the past. Pt has started back recently and back on her diet.

## 2021-06-24 ENCOUNTER — OFFICE VISIT (OUTPATIENT)
Dept: FAMILY MEDICINE CLINIC | Facility: CLINIC | Age: 56
End: 2021-06-24

## 2021-06-24 VITALS
HEART RATE: 72 BPM | BODY MASS INDEX: 31.55 KG/M2 | HEIGHT: 67 IN | WEIGHT: 201 LBS | DIASTOLIC BLOOD PRESSURE: 59 MMHG | OXYGEN SATURATION: 97 % | SYSTOLIC BLOOD PRESSURE: 121 MMHG

## 2021-06-24 DIAGNOSIS — Z12.4 SCREENING FOR CERVICAL CANCER: ICD-10-CM

## 2021-06-24 DIAGNOSIS — Z13.820 SPECIAL SCREENING FOR OSTEOPOROSIS: ICD-10-CM

## 2021-06-24 DIAGNOSIS — E03.9 ACQUIRED HYPOTHYROIDISM: ICD-10-CM

## 2021-06-24 DIAGNOSIS — Z12.31 SCREENING MAMMOGRAM, ENCOUNTER FOR: ICD-10-CM

## 2021-06-24 DIAGNOSIS — Z00.00 ANNUAL PHYSICAL EXAM: Primary | ICD-10-CM

## 2021-06-24 DIAGNOSIS — Z78.0 POSTMENOPAUSAL: ICD-10-CM

## 2021-06-24 LAB
BILIRUB BLD-MCNC: NEGATIVE MG/DL
CLARITY, POC: CLEAR
COLOR UR: YELLOW
GLUCOSE UR STRIP-MCNC: NEGATIVE MG/DL
KETONES UR QL: NEGATIVE
LEUKOCYTE EST, POC: NEGATIVE
NITRITE UR-MCNC: NEGATIVE MG/ML
PH UR: 7 [PH] (ref 5–8)
PROT UR STRIP-MCNC: NEGATIVE MG/DL
RBC # UR STRIP: NEGATIVE /UL
SP GR UR: 1.02 (ref 1–1.03)
UROBILINOGEN UR QL: NORMAL

## 2021-06-24 PROCEDURE — 81003 URINALYSIS AUTO W/O SCOPE: CPT | Performed by: NURSE PRACTITIONER

## 2021-06-24 PROCEDURE — 88175 CYTOPATH C/V AUTO FLUID REDO: CPT | Performed by: NURSE PRACTITIONER

## 2021-06-24 PROCEDURE — 99396 PREV VISIT EST AGE 40-64: CPT | Performed by: NURSE PRACTITIONER

## 2021-06-24 RX ORDER — CEPHRADINE 500 MG
CAPSULE ORAL DAILY
COMMUNITY

## 2021-06-24 RX ORDER — LEVOTHYROXINE SODIUM 0.03 MG/1
25 TABLET ORAL DAILY
Qty: 90 TABLET | Refills: 1 | Status: SHIPPED | OUTPATIENT
Start: 2021-06-24 | End: 2022-03-18

## 2021-06-24 NOTE — PROGRESS NOTES
Chief Complaint  Annual Exam    Subjective            Jenn James presents to [facility]  Pt is here for a CPE/PAP. Pt would like for her ears to be looked at. She has been seeing her allergist and a visit with [facility]. Pt has been taking augemntin since Friday. This is her 3rd time taking an antibiotic for fluid/inflammation in her ears, since April. Pt thinks this is from her Covid vaccines. Pt states she has had complications since receiving the vaccine.             PMH  Past Medical History:   Diagnosis Date   • Cervical cancer screening 5/30/2109   • Chronic allergic rhinitis    • Hyperlipemia 03/15/2017   • Hypothyroidism 03/15/2017   • Kidney stones    • Limb pain    • Limb swelling        ALLERGY  Allergies   Allergen Reactions   • Sulfa Antibiotics Rash        SURGICALHX  Past Surgical History:   Procedure Laterality Date   • BLADDER SUSPENSION  2008   • COLON SURGERY     • ESSURE TUBAL LIGATION  1999   • KIDNEY SURGERY     • OTHER SURGICAL HISTORY      metal implant        SOCX  Social History     Tobacco Use   • Smoking status: Never Smoker   Substance Use Topics   • Alcohol use: Yes     Comment: drinks daily, wine, beer and lquor       FAMHX  Family History   Problem Relation Age of Onset   • Diabetes type II Mother    • Cancer Mother 70        renal   • Prostate cancer Father    • Other Maternal Grandmother         renal calculus   • Colon cancer Maternal Grandmother 70   • Diabetes Paternal Grandmother         MEDSIGONLY  Current Outpatient Medications on File Prior to Visit   Medication Sig   • azelastine (ASTELIN) 0.1 % nasal spray USE 1 TO 2 SPRAYS IN EACH NOSTRIL TWICE DAILY   • CBD (cannabidiol) oral oil Take  by mouth.   • cetirizine (zyrTEC) 10 MG tablet Take 10 mg by mouth Daily.   • magnesium citrate 1.745 GM/30ML solution solution Take  by mouth 1 (One) Time.   • Milk Thistle 150 MG capsule Take  by mouth.   • Misc. Devices (Nasal Spray Bottle)  "misc    • montelukast (SINGULAIR) 10 MG tablet Take 10 mg by mouth Every Night.   • Vitamin D-Vitamin K (K2 Plus D3) 100-1000 MCG-UNIT tablet Take  by mouth.   • [DISCONTINUED] levothyroxine (SYNTHROID, LEVOTHROID) 25 MCG tablet Take 25 mcg by mouth Daily.   • cetirizine (zyrTEC) 10 MG tablet Take 10 mg by mouth Daily.   • meclizine (ANTIVERT) 25 MG tablet Take 25 mg by mouth 3 (Three) Times a Day As Needed.     No current facility-administered medications on file prior to visit.       Health Maintenance Due   Topic Date Due   • COLORECTAL CANCER SCREENING  Never done   • ANNUAL PHYSICAL  Never done   • TDAP/TD VACCINES (1 - Tdap) Never done   • ZOSTER VACCINE (1 of 2) Never done   • HEPATITIS C SCREENING  Never done   • PAP SMEAR  Never done       Objective     /59   Pulse 72   Ht 170.2 cm (67\")   Wt 91.2 kg (201 lb)   SpO2 97%   BMI 31.48 kg/m²       Physical Exam  Constitutional:       General: She is awake. She is not in acute distress.     Appearance: Normal appearance. She is normal weight. She is not ill-appearing.   HENT:      Head: Normocephalic.      Right Ear: Tympanic membrane, ear canal and external ear normal.      Left Ear: Tympanic membrane, ear canal and external ear normal.      Nose: Nose normal.      Mouth/Throat:      Mouth: Mucous membranes are moist.      Pharynx: Oropharynx is clear.   Eyes:      Extraocular Movements: Extraocular movements intact.      Conjunctiva/sclera: Conjunctivae normal.      Pupils: Pupils are equal, round, and reactive to light.   Cardiovascular:      Rate and Rhythm: Normal rate and regular rhythm.      Pulses: Normal pulses.      Heart sounds: Normal heart sounds, S1 normal and S2 normal.   Pulmonary:      Effort: Pulmonary effort is normal.      Breath sounds: Normal breath sounds.   Chest:      Breasts:         Right: Normal. No swelling, mass, nipple discharge, skin change or tenderness.         Left: Normal. No swelling, mass, nipple discharge, skin " change or tenderness.   Abdominal:      General: Abdomen is flat. Bowel sounds are normal.      Palpations: Abdomen is soft.      Tenderness: There is no abdominal tenderness.   Genitourinary:     General: Normal vulva.      Pubic Area: No rash.       Labia:         Right: No rash, tenderness or lesion.         Left: No rash, tenderness or lesion.       Urethra: No urethral pain, urethral swelling or urethral lesion.      Vagina: Normal. No vaginal discharge.      Cervix: Normal.      Uterus: Normal.       Adnexa: Right adnexa normal.      Rectum: Normal.      Comments: Pap obtained via cytobrush  Musculoskeletal:         General: Normal range of motion.      Cervical back: Normal range of motion and neck supple.      Right lower leg: No edema.      Left lower leg: No edema.   Skin:     General: Skin is warm.      Findings: No lesion or rash.   Neurological:      General: No focal deficit present.      Mental Status: She is alert and oriented to person, place, and time. Mental status is at baseline.      Cranial Nerves: Cranial nerves are intact.      Motor: Motor function is intact.      Coordination: Coordination is intact.      Gait: Gait is intact.   Psychiatric:         Attention and Perception: Attention and perception normal.         Mood and Affect: Mood and affect normal.         Speech: Speech normal.         Behavior: Behavior normal. Behavior is cooperative.         Thought Content: Thought content normal.         Cognition and Memory: Cognition and memory normal.         Judgment: Judgment normal.           Result Review :                           Assessment and Plan        Diagnoses and all orders for this visit:    1. Annual physical exam (Primary)    2. Acquired hypothyroidism  -     levothyroxine (SYNTHROID, LEVOTHROID) 25 MCG tablet; Take 1 tablet by mouth Daily.  Dispense: 90 tablet; Refill: 1    3. Screening mammogram, encounter for  -     Mammo Screening Bilateral With CAD; Future    4.  Screening for cervical cancer  -     Liquid-based Pap Smear, Diagnostic              Follow Up     Return in about 6 months (around 12/24/2021).    Patient was given instructions and counseling regarding her condition or for health maintenance advice. Please see specific information pulled into the AVS if appropriate.     Jenn MATIAS James  reports that she has never smoked. She does not have any smokeless tobacco history on file.

## 2021-06-25 DIAGNOSIS — Z12.4 SCREENING FOR CERVICAL CANCER: Primary | ICD-10-CM

## 2021-06-28 LAB
CONV .: NORMAL
CYTOLOGIST CVX/VAG CYTO: NORMAL
CYTOLOGY CVX/VAG DOC CYTO: NORMAL
CYTOLOGY CVX/VAG DOC THIN PREP: NORMAL
DX ICD CODE: NORMAL
HIV 1 & 2 AB SER-IMP: NORMAL
OTHER STN SPEC: NORMAL
STAT OF ADQ CVX/VAG CYTO-IMP: NORMAL

## 2021-06-29 ENCOUNTER — TELEPHONE (OUTPATIENT)
Dept: FAMILY MEDICINE CLINIC | Facility: CLINIC | Age: 56
End: 2021-06-29

## 2021-07-13 ENCOUNTER — HOSPITAL ENCOUNTER (OUTPATIENT)
Dept: MAMMOGRAPHY | Facility: HOSPITAL | Age: 56
Discharge: HOME | End: 2021-07-13
Admitting: NURSE PRACTITIONER

## 2021-07-13 DIAGNOSIS — Z12.31 SCREENING MAMMOGRAM, ENCOUNTER FOR: ICD-10-CM

## 2021-07-13 PROCEDURE — 77067 SCR MAMMO BI INCL CAD: CPT

## 2021-07-13 PROCEDURE — 77063 BREAST TOMOSYNTHESIS BI: CPT

## 2021-07-14 ENCOUNTER — TELEPHONE (OUTPATIENT)
Dept: FAMILY MEDICINE CLINIC | Facility: CLINIC | Age: 56
End: 2021-07-14

## 2021-07-14 NOTE — TELEPHONE ENCOUNTER
----- Message from CHELSI Cruz sent at 7/13/2021  4:35 PM EDT -----  Normal mammo repeat in 1 year

## 2021-07-15 VITALS
BODY MASS INDEX: 31.39 KG/M2 | HEART RATE: 72 BPM | OXYGEN SATURATION: 96 % | HEIGHT: 67 IN | WEIGHT: 200 LBS | SYSTOLIC BLOOD PRESSURE: 113 MMHG | DIASTOLIC BLOOD PRESSURE: 62 MMHG

## 2021-10-01 ENCOUNTER — APPOINTMENT (OUTPATIENT)
Dept: BONE DENSITY | Facility: HOSPITAL | Age: 56
End: 2021-10-01

## 2021-10-01 ENCOUNTER — HOSPITAL ENCOUNTER (OUTPATIENT)
Dept: BONE DENSITY | Facility: HOSPITAL | Age: 56
Discharge: HOME | End: 2021-10-01
Admitting: NURSE PRACTITIONER

## 2021-10-01 ENCOUNTER — TELEPHONE (OUTPATIENT)
Dept: FAMILY MEDICINE CLINIC | Facility: CLINIC | Age: 56
End: 2021-10-01

## 2021-10-01 DIAGNOSIS — Z78.0 POSTMENOPAUSAL: ICD-10-CM

## 2021-10-01 PROCEDURE — 77080 DXA BONE DENSITY AXIAL: CPT

## 2022-03-18 DIAGNOSIS — E03.9 ACQUIRED HYPOTHYROIDISM: ICD-10-CM

## 2022-03-18 RX ORDER — LEVOTHYROXINE SODIUM 0.03 MG/1
25 TABLET ORAL DAILY
Qty: 90 TABLET | Refills: 1 | Status: SHIPPED | OUTPATIENT
Start: 2022-03-18 | End: 2022-06-29 | Stop reason: SDUPTHER

## 2022-04-21 ENCOUNTER — OFFICE VISIT (OUTPATIENT)
Dept: FAMILY MEDICINE CLINIC | Facility: CLINIC | Age: 57
End: 2022-04-21

## 2022-04-21 VITALS
DIASTOLIC BLOOD PRESSURE: 61 MMHG | SYSTOLIC BLOOD PRESSURE: 110 MMHG | WEIGHT: 197 LBS | HEIGHT: 67 IN | OXYGEN SATURATION: 99 % | BODY MASS INDEX: 30.92 KG/M2

## 2022-04-21 DIAGNOSIS — Z12.31 SCREENING MAMMOGRAM FOR BREAST CANCER: ICD-10-CM

## 2022-04-21 DIAGNOSIS — L98.9 SKIN PROBLEM: ICD-10-CM

## 2022-04-21 DIAGNOSIS — Z11.59 NEED FOR HEPATITIS C SCREENING TEST: ICD-10-CM

## 2022-04-21 DIAGNOSIS — I49.9 IRREGULAR HEART RATE: ICD-10-CM

## 2022-04-21 DIAGNOSIS — E55.9 VITAMIN D DEFICIENCY: ICD-10-CM

## 2022-04-21 DIAGNOSIS — E78.00 ELEVATED CHOLESTEROL: ICD-10-CM

## 2022-04-21 DIAGNOSIS — E03.9 HYPOTHYROIDISM, UNSPECIFIED TYPE: Primary | ICD-10-CM

## 2022-04-21 PROCEDURE — 99214 OFFICE O/P EST MOD 30 MIN: CPT | Performed by: NURSE PRACTITIONER

## 2022-04-21 NOTE — PROGRESS NOTES
Chief Complaint  Hypothyroidism and Rash (Bump on face), labwork, derm referral    Subjective            Jenn James presents to [facility]  Pt here today to follwol up on Hypothyroidism.     Labs due.     Mammo due after 07/13/22.   Dexa 10/01/21  Pap 06/24/21    Pt would like referral to Derm for red bumps on face and around her eye.    Pt reports on 4/16/2022 her apple watch read a-fib for about an hour she has never had this happen before and has no palpitations or other symptoms but wanted to see cardiology to get this worked up.  Ever since this her apple watch has read Normal sinus rhythm.        Past Medical History:   Diagnosis Date   • Allergic Whole life   • Cervical cancer screening 5/30/2109   • Chronic allergic rhinitis    • Deep vein thrombosis (HCC) 2017    Caused after my hip replacement.   • Hyperlipemia 03/15/2017   • Hypothyroidism 03/15/2017   • Kidney stones    • Limb pain    • Limb swelling        Allergies   Allergen Reactions   • Sulfa Antibiotics Rash        Past Surgical History:   Procedure Laterality Date   • BLADDER SUSPENSION  2008   • COLON SURGERY     • COLONOSCOPY     • ESSURE TUBAL LIGATION  1999   • JOINT REPLACEMENT  Jan 2017    Right hip replacement   • KIDNEY SURGERY     • OTHER SURGICAL HISTORY      metal implant   • TUBAL ABDOMINAL LIGATION          Social History     Tobacco Use   • Smoking status: Never Smoker   • Smokeless tobacco: Not on file   Substance Use Topics   • Alcohol use: Yes     Comment: drinks daily, wine, beer and lquor       Family History   Problem Relation Age of Onset   • Diabetes type II Mother    • Cancer Mother 70        Kidney cancer (left kidney removed) and endometrial cancer (hysterectomy)   • Prostate cancer Father    • Other Maternal Grandmother         Colon cancer   • Colon cancer Maternal Grandmother 70   • Diabetes Paternal Grandmother         Current Outpatient Medications on File Prior to Visit  "  Medication Sig   • azelastine (ASTELIN) 0.1 % nasal spray USE 1 TO 2 SPRAYS IN EACH NOSTRIL TWICE DAILY   • CBD (cannabidiol) oral oil Take  by mouth.   • cetirizine (zyrTEC) 10 MG tablet Take 10 mg by mouth Daily.   • levothyroxine (SYNTHROID, LEVOTHROID) 25 MCG tablet TAKE 1 TABLET BY MOUTH DAILY   • MAGNESIUM MALATE PO Take  by mouth.   • meclizine (ANTIVERT) 25 MG tablet Take 25 mg by mouth 3 (Three) Times a Day As Needed.   • Milk Thistle 150 MG capsule Take  by mouth.   • Misc. Devices (Nasal Spray Bottle) misc    • montelukast (SINGULAIR) 10 MG tablet Take 10 mg by mouth Every Night.   • Vitamin D-Vitamin K (K2 Plus D3) 100-1000 MCG-UNIT tablet Take  by mouth.   • [DISCONTINUED] magnesium citrate 1.745 GM/30ML solution solution Take  by mouth 1 (One) Time.     No current facility-administered medications on file prior to visit.       Health Maintenance Due   Topic Date Due   • TDAP/TD VACCINES (1 - Tdap) Never done   • ZOSTER VACCINE (1 of 2) Never done   • HEPATITIS C SCREENING  Never done   • COVID-19 Vaccine (3 - Booster for Moderna series) 10/03/2021       Objective     /61   Ht 170.2 cm (67\")   Wt 89.4 kg (197 lb)   SpO2 99%   BMI 30.85 kg/m²       Physical Exam  Constitutional:       General: She is not in acute distress.     Appearance: Normal appearance. She is not ill-appearing.   HENT:      Head: Normocephalic and atraumatic.   Cardiovascular:      Rate and Rhythm: Normal rate and regular rhythm.      Heart sounds: Normal heart sounds. No murmur heard.  Pulmonary:      Effort: Pulmonary effort is normal. No respiratory distress.      Breath sounds: Normal breath sounds.   Chest:      Chest wall: No tenderness.   Abdominal:      General: There is no distension.      Palpations: There is no mass.      Tenderness: There is no abdominal tenderness. There is no guarding.   Musculoskeletal:         General: No swelling or tenderness. Normal range of motion.      Cervical back: Normal range of " motion and neck supple.   Skin:     General: Skin is warm and dry.      Findings: No rash.   Neurological:      General: No focal deficit present.      Mental Status: She is alert and oriented to person, place, and time. Mental status is at baseline.      Gait: Gait normal.   Psychiatric:         Mood and Affect: Mood normal.         Behavior: Behavior normal.         Thought Content: Thought content normal.         Judgment: Judgment normal.           Result Review :                           Assessment and Plan        Diagnoses and all orders for this visit:    1. Hypothyroidism, unspecified type (Primary)  Comments:  stable on synthroid 20mcg, continue  Orders:  -     CBC w AUTO Differential; Future  -     TSH; Future  -     T4, free; Future    2. Screening mammogram for breast cancer  -     Mammo Screening Digital Tomosynthesis Bilateral With CAD; Future    3. Need for hepatitis C screening test  -     Hepatitis panel, acute; Future    4. Vitamin D deficiency  -     Vitamin D 25 hydroxy; Future    5. Skin problem  -     Ambulatory Referral to Dermatology    6. Elevated cholesterol  -     CBC w AUTO Differential; Future  -     Comprehensive metabolic panel; Future  -     Lipid panel; Future    7. Irregular heart rate  -     Ambulatory Referral to Cardiology              Follow Up     Return if symptoms worsen or fail to improve.    Patient was given instructions and counseling regarding her condition or for health maintenance advice. Please see specific information pulled into the AVS if appropriate.     Jenn MATIAS James  reports that she has never smoked. She does not have any smokeless tobacco history on file.

## 2022-05-02 ENCOUNTER — OFFICE VISIT (OUTPATIENT)
Dept: CARDIOLOGY | Facility: CLINIC | Age: 57
End: 2022-05-02

## 2022-05-02 VITALS
BODY MASS INDEX: 30.92 KG/M2 | SYSTOLIC BLOOD PRESSURE: 108 MMHG | DIASTOLIC BLOOD PRESSURE: 74 MMHG | HEIGHT: 67 IN | HEART RATE: 66 BPM | WEIGHT: 197 LBS

## 2022-05-02 DIAGNOSIS — I48.0 PAROXYSMAL ATRIAL FIBRILLATION: Primary | ICD-10-CM

## 2022-05-02 PROCEDURE — 93000 ELECTROCARDIOGRAM COMPLETE: CPT | Performed by: INTERNAL MEDICINE

## 2022-05-02 PROCEDURE — 99203 OFFICE O/P NEW LOW 30 MIN: CPT | Performed by: INTERNAL MEDICINE

## 2022-05-02 NOTE — PROGRESS NOTES
CARDIOLOGY INITIAL CONSULT       Chief Complaint  Other (Irregular heart rate)    Subjective            Jenn James presents to [facility] CARDIOLOGY  History of Present Illness    This is a 56-year-old female with hypothyroidism who was referred for cardiac evaluation because of irregular heart rate.  On April 16 she woke up in the middle of the night with heart racing and pounding she felt weak and tired.  Heart rate was 20s 110 on Apple Watch and the EKG from the Apple Watch was suggestive of atrial fibrillation.  The symptoms lasted for 1 whole hour and subsided and she went back to sleep. she has not had any similar symptoms since then.  She currently denies any dizziness, chest pain, shortness of breath.  No previous cardiac work-up available.       Past History:    Medical History:  Past Medical History:   Diagnosis Date   • Allergic Whole life   • Cervical cancer screening 5/30/2109   • Chronic allergic rhinitis    • Deep vein thrombosis (HCC) 2017    Caused after my hip replacement.   • Hyperlipemia 03/15/2017   • Hypothyroidism 03/15/2017   • Kidney stones    • Limb pain    • Limb swelling        Surgical History: has a past surgical history that includes Other surgical history; Colon surgery; Kidney surgery; Bladder suspension (2008); Essure tubal ligation (1999); Joint replacement (Jan 2017); Tubal ligation; and Colonoscopy.     Family History: family history includes Cancer (age of onset: 70) in her mother; Colon cancer (age of onset: 70) in her maternal grandmother; Diabetes in her paternal grandmother; Diabetes type II in her mother; Other in her maternal grandmother; Prostate cancer in her father.     Social History: reports that she has never smoked. She does not have any smokeless tobacco history on file. She reports current alcohol use. She reports that she does not use drugs.    Allergies: Sulfa antibiotics    Current Outpatient Medications on File Prior to Visit  "  Medication Sig   • azelastine (ASTELIN) 0.1 % nasal spray USE 1 TO 2 SPRAYS IN EACH NOSTRIL TWICE DAILY   • CBD (cannabidiol) oral oil Take  by mouth.   • cetirizine (zyrTEC) 10 MG tablet Take 10 mg by mouth Daily.   • levothyroxine (SYNTHROID, LEVOTHROID) 25 MCG tablet TAKE 1 TABLET BY MOUTH DAILY   • MAGNESIUM MALATE PO Take  by mouth Daily.   • meclizine (ANTIVERT) 25 MG tablet Take 25 mg by mouth 3 (Three) Times a Day As Needed.   • Milk Thistle 150 MG capsule Take  by mouth.   • Misc. Devices (Nasal Spray Bottle) misc    • montelukast (SINGULAIR) 10 MG tablet Take 10 mg by mouth Every Night.   • Vitamin D-Vitamin K (K2 Plus D3) 100-1000 MCG-UNIT tablet Take  by mouth Daily.     No current facility-administered medications on file prior to visit.          Review of Systems   Constitutional: Negative for fatigue, unexpected weight gain and unexpected weight loss.   Eyes: Negative for double vision.   Respiratory: Negative for cough, shortness of breath and wheezing.    Cardiovascular: Positive for palpitations. Negative for chest pain and leg swelling.   Gastrointestinal: Negative for abdominal pain, nausea and vomiting.   Endocrine: Negative for cold intolerance, heat intolerance, polydipsia and polyuria.   Musculoskeletal: Negative for arthralgias and back pain.   Skin: Negative for color change.   Neurological: Negative for dizziness, syncope, weakness and headache.   Hematological: Does not bruise/bleed easily.        Objective     /74   Pulse 66   Ht 170.2 cm (67\")   Wt 89.4 kg (197 lb)   BMI 30.85 kg/m²       Physical Exam  Constitutional:       General: She is awake. She is not in acute distress.     Appearance: Normal appearance.   Eyes:      Extraocular Movements: Extraocular movements intact.      Pupils: Pupils are equal, round, and reactive to light.   Neck:      Thyroid: No thyromegaly.      Vascular: No carotid bruit or JVD.   Cardiovascular:      Rate and Rhythm: Normal rate and regular " rhythm.      Chest Wall: PMI is not displaced.      Heart sounds: Normal heart sounds, S1 normal and S2 normal. No murmur heard.    No friction rub. No gallop. No S3 or S4 sounds.   Pulmonary:      Effort: Pulmonary effort is normal. No respiratory distress.      Breath sounds: Normal breath sounds. No wheezing, rhonchi or rales.   Abdominal:      General: Bowel sounds are normal.      Palpations: Abdomen is soft.      Tenderness: There is no abdominal tenderness.   Musculoskeletal:      Cervical back: Neck supple.      Right lower leg: No edema.      Left lower leg: No edema.   Skin:     Nails: There is no clubbing.   Neurological:      General: No focal deficit present.      Mental Status: She is alert and oriented to person, place, and time.           Result Review :     The following data was reviewed by: Sean Parks MD on 05/02/2022:    CMP    CMP 6/22/21   Glucose 100 (A)   BUN 11   Creatinine 0.78   eGFR Non African Am 76   Sodium 142   Potassium 4.2   Chloride 106   Calcium 9.1   Albumin 4.30   Total Bilirubin 0.5   Alkaline Phosphatase 69   AST (SGOT) 17   ALT (SGPT) 16   (A) Abnormal value            CBC    CBC 6/22/21   WBC 5.99   RBC 4.75   Hemoglobin 15.3   Hematocrit 46.2   MCV 97.3 (A)   MCH 32.2   MCHC 33.1   RDW 12.5   Platelets 236   (A) Abnormal value            TSH    TSH 6/22/21   TSH 3.770           Lipid Panel    Lipid Panel 6/22/21   Total Cholesterol 206 (A)   Triglycerides 103   HDL Cholesterol 57   VLDL Cholesterol 18   LDL Cholesterol  131 (A)   LDL/HDL Ratio 2.25   (A) Abnormal value               Data reviewed: Cardiology studies              ECG 12 Lead    Date/Time: 5/2/2022 10:02 AM  Performed by: Sean Parks MD  Authorized by: Sean Parks MD   Comparison: compared with previous ECG from 9/2/2020  Similar to previous ECG  Rhythm: sinus rhythm  Rate: normal  Conduction: conduction normal  ST Segments: ST segments normal  T Waves: T waves normal  QRS axis:  normal  Other: no other findings    Clinical impression: normal ECG                Assessment and Plan        Diagnoses and all orders for this visit:    1. Paroxysmal atrial fibrillation (HCC) (Primary)  Assessment & Plan:  EKG recorded by Apple Watch at the time of symptoms reviewed.  The EKG strip is highly suggestive of atrial fibrillation with a heart rate between 100 to 120.  She currently has no significant symptoms.  The nature of the condition, management options, and potential complications discussed in detail with the patient.  [id number]-[id number] 2 score is 1, hence anticoagulation is optional.  We will hold off on starting beta-blockers as well since she has no active symptoms now.  Recent labs showed normal TSH.  We will proceed with a 48-hour Holter monitor study to fully assess rhythm and to evaluate for any recurrent A. fib episodes.  Patient will be scheduled for an echocardiogram to assess the LV function and rule out any significant valvular abnormalities.    Orders:  -     Adult Transthoracic Echo Complete W/ Cont if Necessary Per Protocol; Future  -     Holter Monitor - 48 Hour; Future  -     ECG 12 Lead          I spent 25 minutes caring for Jenn on this date of service. This time includes time spent by me in the following activities:reviewing tests, obtaining and/or reviewing a separately obtained history, performing a medically appropriate examination and/or evaluation , ordering medications, tests, or procedures, and documenting information in the medical record    Follow Up     Return for Will schedule f/u after reviewing test results.    Patient was given instructions and counseling regarding her condition or for health maintenance advice. Please see specific information pulled into the AVS if appropriate.

## 2022-05-02 NOTE — ASSESSMENT & PLAN NOTE
EKG recorded by Apple Watch at the time of symptoms reviewed.  The EKG strip is highly suggestive of atrial fibrillation with a heart rate between 100 to 120.  She currently has no significant symptoms.  The nature of the condition, management options, and potential complications discussed in detail with the patient.  [id number] 2 score is 1, hence anticoagulation is optional.  We will hold off on starting beta-blockers as well since she has no active symptoms now.  Recent labs showed normal TSH.  We will proceed with a 48-hour Holter monitor study to fully assess rhythm and to evaluate for any recurrent A. fib episodes.  Patient will be scheduled for an echocardiogram to assess the LV function and rule out any significant valvular abnormalities.

## 2022-05-11 ENCOUNTER — TELEPHONE (OUTPATIENT)
Dept: FAMILY MEDICINE CLINIC | Facility: CLINIC | Age: 57
End: 2022-05-11

## 2022-05-18 ENCOUNTER — LAB (OUTPATIENT)
Dept: LAB | Facility: HOSPITAL | Age: 57
End: 2022-05-18

## 2022-05-18 DIAGNOSIS — Z11.59 NEED FOR HEPATITIS C SCREENING TEST: ICD-10-CM

## 2022-05-18 DIAGNOSIS — E03.9 HYPOTHYROIDISM, UNSPECIFIED TYPE: ICD-10-CM

## 2022-05-18 DIAGNOSIS — E55.9 VITAMIN D DEFICIENCY: ICD-10-CM

## 2022-05-18 DIAGNOSIS — E78.00 ELEVATED CHOLESTEROL: ICD-10-CM

## 2022-05-18 LAB
25(OH)D3 SERPL-MCNC: 88.3 NG/ML (ref 30–100)
ALBUMIN SERPL-MCNC: 4.4 G/DL (ref 3.5–5.2)
ALBUMIN/GLOB SERPL: 1.9 G/DL
ALP SERPL-CCNC: 60 U/L (ref 39–117)
ALT SERPL W P-5'-P-CCNC: 18 U/L (ref 1–33)
ANION GAP SERPL CALCULATED.3IONS-SCNC: 10.5 MMOL/L (ref 5–15)
AST SERPL-CCNC: 23 U/L (ref 1–32)
BASOPHILS # BLD AUTO: 0.06 10*3/MM3 (ref 0–0.2)
BASOPHILS NFR BLD AUTO: 1 % (ref 0–1.5)
BILIRUB SERPL-MCNC: 0.3 MG/DL (ref 0–1.2)
BUN SERPL-MCNC: 12 MG/DL (ref 6–20)
BUN/CREAT SERPL: 15.8 (ref 7–25)
CALCIUM SPEC-SCNC: 9.6 MG/DL (ref 8.6–10.5)
CHLORIDE SERPL-SCNC: 105 MMOL/L (ref 98–107)
CHOLEST SERPL-MCNC: 175 MG/DL (ref 0–200)
CO2 SERPL-SCNC: 24.5 MMOL/L (ref 22–29)
CREAT SERPL-MCNC: 0.76 MG/DL (ref 0.57–1)
DEPRECATED RDW RBC AUTO: 43.1 FL (ref 37–54)
EGFRCR SERPLBLD CKD-EPI 2021: 91 ML/MIN/1.73
EOSINOPHIL # BLD AUTO: 0.13 10*3/MM3 (ref 0–0.4)
EOSINOPHIL NFR BLD AUTO: 2.1 % (ref 0.3–6.2)
ERYTHROCYTE [DISTWIDTH] IN BLOOD BY AUTOMATED COUNT: 12.5 % (ref 12.3–15.4)
GLOBULIN UR ELPH-MCNC: 2.3 GM/DL
GLUCOSE SERPL-MCNC: 95 MG/DL (ref 65–99)
HAV IGM SERPL QL IA: NORMAL
HBV CORE IGM SERPL QL IA: NORMAL
HBV SURFACE AG SERPL QL IA: NORMAL
HCT VFR BLD AUTO: 44 % (ref 34–46.6)
HCV AB SER DONR QL: NORMAL
HDLC SERPL-MCNC: 49 MG/DL (ref 40–60)
HGB BLD-MCNC: 14.5 G/DL (ref 12–15.9)
IMM GRANULOCYTES # BLD AUTO: 0.02 10*3/MM3 (ref 0–0.05)
IMM GRANULOCYTES NFR BLD AUTO: 0.3 % (ref 0–0.5)
LDLC SERPL CALC-MCNC: 110 MG/DL (ref 0–100)
LDLC/HDLC SERPL: 2.23 {RATIO}
LYMPHOCYTES # BLD AUTO: 2.09 10*3/MM3 (ref 0.7–3.1)
LYMPHOCYTES NFR BLD AUTO: 33.9 % (ref 19.6–45.3)
MCH RBC QN AUTO: 31.1 PG (ref 26.6–33)
MCHC RBC AUTO-ENTMCNC: 33 G/DL (ref 31.5–35.7)
MCV RBC AUTO: 94.4 FL (ref 79–97)
MONOCYTES # BLD AUTO: 0.49 10*3/MM3 (ref 0.1–0.9)
MONOCYTES NFR BLD AUTO: 7.9 % (ref 5–12)
NEUTROPHILS NFR BLD AUTO: 3.38 10*3/MM3 (ref 1.7–7)
NEUTROPHILS NFR BLD AUTO: 54.8 % (ref 42.7–76)
NRBC BLD AUTO-RTO: 0 /100 WBC (ref 0–0.2)
PLATELET # BLD AUTO: 283 10*3/MM3 (ref 140–450)
PMV BLD AUTO: 9.9 FL (ref 6–12)
POTASSIUM SERPL-SCNC: 4.3 MMOL/L (ref 3.5–5.2)
PROT SERPL-MCNC: 6.7 G/DL (ref 6–8.5)
RBC # BLD AUTO: 4.66 10*6/MM3 (ref 3.77–5.28)
SODIUM SERPL-SCNC: 140 MMOL/L (ref 136–145)
T4 FREE SERPL-MCNC: 1.21 NG/DL (ref 0.93–1.7)
TRIGL SERPL-MCNC: 84 MG/DL (ref 0–150)
TSH SERPL DL<=0.05 MIU/L-ACNC: 3.74 UIU/ML (ref 0.27–4.2)
VLDLC SERPL-MCNC: 16 MG/DL (ref 5–40)
WBC NRBC COR # BLD: 6.17 10*3/MM3 (ref 3.4–10.8)

## 2022-05-18 PROCEDURE — 80074 ACUTE HEPATITIS PANEL: CPT

## 2022-05-18 PROCEDURE — 36415 COLL VENOUS BLD VENIPUNCTURE: CPT

## 2022-05-18 PROCEDURE — 80061 LIPID PANEL: CPT

## 2022-05-18 PROCEDURE — 84439 ASSAY OF FREE THYROXINE: CPT

## 2022-05-18 PROCEDURE — 80050 GENERAL HEALTH PANEL: CPT

## 2022-05-18 PROCEDURE — 82306 VITAMIN D 25 HYDROXY: CPT

## 2022-05-19 ENCOUNTER — TELEPHONE (OUTPATIENT)
Dept: FAMILY MEDICINE CLINIC | Facility: CLINIC | Age: 57
End: 2022-05-19

## 2022-06-29 ENCOUNTER — TELEPHONE (OUTPATIENT)
Dept: FAMILY MEDICINE CLINIC | Facility: CLINIC | Age: 57
End: 2022-06-29

## 2022-06-29 DIAGNOSIS — E03.9 ACQUIRED HYPOTHYROIDISM: ICD-10-CM

## 2022-06-29 DIAGNOSIS — R41.840 CONCENTRATION DEFICIT: Primary | ICD-10-CM

## 2022-06-29 RX ORDER — LEVOTHYROXINE SODIUM 0.03 MG/1
25 TABLET ORAL DAILY
Qty: 90 TABLET | Refills: 1 | Status: SHIPPED | OUTPATIENT
Start: 2022-06-29 | End: 2023-01-10 | Stop reason: SDUPTHER

## 2022-07-07 ENCOUNTER — TELEPHONE (OUTPATIENT)
Dept: CARDIOLOGY | Facility: CLINIC | Age: 57
End: 2022-07-07

## 2022-07-07 NOTE — TELEPHONE ENCOUNTER
CONCHITA patient regarding results. Patient states at that time on 6/24 she was doing outdoor walking patient walked 4 miles.     Patient denies any further symptoms. 6M F/U made.

## 2022-07-07 NOTE — TELEPHONE ENCOUNTER
----- Message from Sean Parks MD sent at 7/6/2022  9:07 AM EDT -----  The Holter monitor study reviewed.  There were no episodes of atrial fibrillation during the study.    There were few episodes of high heart rate in normal rhythm.  This can happen with exercise or activity.  Was she doing any exercise between 6 30-7 00 a.m. on 6/24/2022 ?     Previous echocardiogram was noted to be normal as well.  If she has no significant symptoms, no further testing is needed at this time.    Recommend routine 6-month follow-up visit.  Please call us back for any recurrent or worsening symptoms in between      Electronically signed by Sean Parks MD, 07/06/22, 9:07 AM EDT.

## 2022-07-14 ENCOUNTER — TELEMEDICINE (OUTPATIENT)
Dept: BEHAVIORAL HEALTH | Facility: CLINIC | Age: 57
End: 2022-07-14

## 2022-07-14 DIAGNOSIS — R41.840 ATTENTION AND CONCENTRATION DEFICIT: Primary | ICD-10-CM

## 2022-07-14 PROCEDURE — 90792 PSYCH DIAG EVAL W/MED SRVCS: CPT | Performed by: NURSE PRACTITIONER

## 2022-07-14 RX ORDER — LOTEPREDNOL ETABONATE 5 MG/G
OINTMENT OPHTHALMIC
COMMUNITY
Start: 2022-06-27 | End: 2022-07-14

## 2022-07-14 NOTE — PATIENT INSTRUCTIONS
1.  Please return to clinic at your next scheduled visit.  Contact the clinic ([phone]) at least 24 hours prior in the event you need to cancel.  2.  Do no harm to yourself or others.    3.  Avoid alcohol and drugs.    4.  Take all medications as prescribed.  Please contact the clinic with any concerns. If you are in need of medication refills, please call the clinic at [phone].    5. Should you want to get in touch with your provider, CHELSI Burnett, please utilize [employer] or contact the office ([phone]), and staff will be able to page the provider on call directly.  6.  In the event you have personal crisis, contact the following crisis numbers: Suicide Prevention Hotline 1-803.790.1861; [facility] Helpline 6-364-441-BONB; [facility] [phone]; text HELLO to 146459; or 553.  7.  Please feel free to contact my Medical Assistant, Hanh, directly at [phone], please leave a voice mail if you do not get an answer, and she will return your call within 24 hrs.      SPECIFIC RECOMMENDATIONS:     1.      Medications discussed at this encounter:                   - none     2.      Psychotherapy recommendations: n/a     3.     Return to clinic: 4 weeks    4.   Referral for ADHD testing    Call and let me know     Dr. Greg Bennett, Psychologist, MS, LPP  [address], [address]  [place] [address] [phone]   Currently only accepting referrals for psychological testing services.  Accepts Most insurance plans except Medicare Plans     Please arrive at least 15 minutes before your scheduled appointment time to complete check in process.

## 2022-07-14 NOTE — PROGRESS NOTES
"This provider is located at [address] [place] [address]. [address], [place] [address]. The Patient is seen remotely using [facility]. Patient is being seen via telehealth and confirm that they are in a secure environment for this session. The patient's condition being diagnosed/treated is appropriate for telemedicine. The provider identified himself/herself: herself as well as her credentials.   The patient gave consent to be seen remotely, and when consent is given they understand that the consent allows for patient identifiable information to be sent to a third party as needed.   They may refuse to be seen remotely at any time. The electronic data is encrypted and password protected, and the patient has been advised of the potential risks to privacy not withstanding such measures.    You have chosen to receive care through a telehealth visit.  Do you consent to use a video/audio connection for your medical care today? Yes     Subjective   Jenn James is a 57 y.o. female who presents today for initial evaluation     Referring Provider:  Mariana Jalloh, CHELSI  [address]  [place] [id number]    Chief Complaint:  ADHD evaluation    History of Present Illness: Patient presents today via MyChart Video visit from home with a history of depression and anxiety for which treatment was started in late 1995 with therapy, and medications from 4252-7908 due to divorce.     Patient is currently not taking any psychotropic medications nor has had any since 2005.     \"My issue is I can't concentrate, I start something and cant finish something, forgets things often\".  Patient recalls while in school always \"fidgety\" crossing legs, moving often in seat.  Admits to interrupting people, gets distracted easily, daughter noticing.    Admits to over the past 2 yrs began to feel ADHD may be a possibility, \"I am tired of being like this, forgetting stuff, going in and out of the house, tired of interrupting people, it seems " "to be bothering me more.\"  Patient uses reminders, lists which was started while kids in high school and has continued to have  self add items needed to List,\" I walk out without my list from the fridge, and I have to have him take a picture of it and send it to me\".  Difficulty sitting still when you should be sitting still in [employer] or meetings. Impulsive with shopping tends to purchase items that may be on sale or those that she may not need with the idea of \"I can always return it\".     Symptoms include fidgeting, difficulty remaining seated, easy distractability, blurting out answers prematurely, inability to complete tasks, difficulty sustaining attention, shifting from one uncompleted activity to another, talking excessively, interrupting others, ineffective listening, frequently losing items, and impulsivity.    1. ADHD:   a. Elementary school:   i. Grades:A's and B's  ii. Special classes or failures:no  iii. Got in trouble:no  iv. Referral for ADHD testing:no  b. Fhx:son, diagnosed officially in 7th grade, took medications for 1-2 yrs, restarted while in college only, \"he probably should be taking something, I have suggested it to him\"  c. Presently:  i. Problems with attention to detail:yes  ii. Problems with sustained attention:Yes  iii. Problems listening when spoken to directly:Yes, unable to concentrate on what others are saying, \"I have to get my point across, I know I have to wait for the break, but I can't wait for the break.\"  iv. Failure to finish tasks:yes, \"I will lay my husbandsTshirts on couch intending to hang up and they will be there for 2-3 days\" house hold tasks-dusting, mopping, find need to focus on floor boards  v. Avoids tasks that require sustained mental effort:yes, \"I excelled at work,  I could multi-task, 5-6 projects at desk, however, would wait until the last week to update credentials, I put off stuff and procrastinate all the time\"  vi. Easily " "distracted:yes  vii. Forgetting things:yes  viii. Losing things:yes  ix. Hard to organize:yes  x. Talks a lot and cutting people off:yes  xi. Drifts off during conversations:yes, \"I have to concentrate what I need to say to not forget what I have say, then I blurt it out, I am trying really hard, it's even hard with you to not stop and say stuff\"  xii. Difficulty with Reading:yes, \"I used to read a lot, has been using Audio books to listen in car or while out with friends or walking, has to rewind at times because my mind drifts off.\"  xiii. Difficulty watching TV/Movies:\"not really, we hardly ever have the TV on anymore.\"       PHQ-9 Depression Screening  PHQ-9 Total Score:   7/14/22 0 , reassess /2022    Little interest or pleasure in doing things?     Feeling down, depressed, or hopeless?     Trouble falling or staying asleep, or sleeping too much?     Feeling tired or having little energy?     Poor appetite or overeating?     Feeling bad about yourself - or that you are a failure or have let yourself or your family down?     Trouble concentrating on things, such as reading the newspaper or watching television?     Moving or speaking so slowly that other people could have noticed? Or the opposite - being so fidgety or restless that you have been moving around a lot more than usual?     Thoughts that you would be better off dead, or of hurting yourself in some way?     PHQ-9 Total Score       SUZANNE-7  Feeling nervous, anxious or on edge: (P) Not at all  Not being able to stop or control worrying: (P) Not at all  Worrying too much about different things: (P) Not at all  Trouble Relaxing: (P) Not at all  Being so restless that it is hard to sit still: (P) Not at all  Feeling afraid as if something awful might happen: (P) Not at all  Becoming easily annoyed or irritable: (P) Not at all  SUZANNE 7 Total Score: (P) 0  If you checked any problems, how difficult have these problems made it for you to do your work, take care " of things at home, or get along with other people: (P) Not difficult at all    Past Surgical History:  Past Surgical History:   Procedure Laterality Date   • BLADDER SUSPENSION  2008   • COLON SURGERY     • COLONOSCOPY     • ESSURE TUBAL LIGATION  1999   • JOINT REPLACEMENT  Jan 2017    Right hip replacement   • KIDNEY SURGERY     • OTHER SURGICAL HISTORY      metal implant   • TUBAL ABDOMINAL LIGATION         Problem List:  Patient Active Problem List   Diagnosis   • Primary osteoarthritis of right hip   • Status post right hip replacement   • Hypothyroidism (acquired)   • Paroxysmal atrial fibrillation (HCC)       Allergy:   Allergies   Allergen Reactions   • Sulfa Antibiotics Rash        Discontinued Medications:  Medications Discontinued During This Encounter   Medication Reason   • Lotemax 0.5 % ointment *Therapy completed       Current Medications:   Current Outpatient Medications   Medication Sig Dispense Refill   • Auvi-Q 0.3 MG/0.3ML solution auto-injector injection INJECT AS NEEDED FOR SEVERE ALLERGIC REACTION INCLUDING ANAPHYLAXIS AS DIRECTED     • azelastine (ASTELIN) 0.1 % nasal spray USE 1 TO 2 SPRAYS IN EACH NOSTRIL TWICE DAILY     • CBD (cannabidiol) oral oil Take  by mouth.     • cetirizine (zyrTEC) 10 MG tablet Take 10 mg by mouth Daily.     • levothyroxine (SYNTHROID, LEVOTHROID) 25 MCG tablet Take 1 tablet by mouth Daily. 90 tablet 1   • loteprednol etabonate (LOTEMAX) 0.5 % gel ophthalmic gel      • MAGNESIUM MALATE PO Take  by mouth Daily.     • Milk Thistle 150 MG capsule Take  by mouth.     • Misc. Devices (Nasal Spray Bottle) misc      • montelukast (SINGULAIR) 10 MG tablet Take 10 mg by mouth Every Night.     • Vitamin D-Vitamin K (K2 Plus D3) 100-1000 MCG-UNIT tablet Take  by mouth Daily. Patient takes drops (not tablets) due to GI upset with tablet       No current facility-administered medications for this visit.       Past Medical History:  Past Medical History:   Diagnosis Date   •  "Allergic Whole life   • Anxiety    • Cervical cancer screening 2109   • Chronic allergic rhinitis    • Deep vein thrombosis (HCC) 2017    Caused after my hip replacement.   • Depression    • Hyperlipemia 03/15/2017   • Hypothyroidism 03/15/2017   • Kidney stones    • Limb pain    • Limb swelling        Past Psychiatric History:  Began Treatment:started in  with therapy due to spouse having an affair  Diagnoses:Depression and Anxiety  Psychiatrist:last seen from 9220-8825  Therapist:last seen from 9043-2363  Admission History:Denies  Medication Trials:Prozac--for one year \"felt like i was floating;\" Zoloft for 1 yr -, then started Effexor with divorce in  for1 yr-during time having increased stress with divorce as pt had lost hair and stomach problems and asked to be placed on meds; tolerated well  Self Harm: Denies  Suicide Attempts:Denies   Psychosis, Anxiety, Depression: Denies    Substance Abuse History:   Types:Alcohol daily in the evening to relax, 1-2 glasses of wine, or 1 beer or gin and tonic  Withdrawal Symptoms:Denies  Longest Period Sober:Not Applicable   AA: Not applicable     Social History:  Martial Status:  Employed:No 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  Kids:Yes or If so, how many 2 children and 2 step children  House:Lives in a house   History: Denies  Access to Guns: no    Social History     Socioeconomic History   • Marital status:    Tobacco Use   • Smoking status: Never Smoker   • Smokeless tobacco: Never Used   Vaping Use   • Vaping Use: Never used   Substance and Sexual Activity   • Alcohol use: Yes     Comment: drinks daily, wine, beer and lquor   • Drug use: Not Currently     Types: Marijuana     Comment: In teenage years   • Sexual activity: Yes       Family History:   Suicide Attempts: Denies  Suicide Completions:Denies      Family History   Problem Relation Age of Onset   • Dementia Mother    • Diabetes type II " Mother    • Cancer Mother 70        Kidney cancer (left kidney removed) and endometrial cancer (hysterectomy)   • Prostate cancer Father    • Other Maternal Grandmother         Colon cancer   • Colon cancer Maternal Grandmother 70   • Diabetes Paternal Grandmother    • ADD / ADHD Son        Developmental History:   Born: [place], lived in [place] since age 1  Siblings:1 sister, 1 brother-both younger  Childhood: Denies Abuse  High School:Completed  College:2 yrs, no degree    Mental Status Exam:   Hygiene:   good  Cooperation:  Cooperative  Eye Contact:  Good  Psychomotor Behavior:  Appropriate  Affect:  Appropriate  Mood: euthymic  Speech:  Normal  Thought Process:  Goal directed and Tangential  Thought Content:  Mood congruent  Suicidal:  None  Homicidal:  None  Hallucinations:  None  Delusion:  None  Memory:  Intact  Orientation:  Grossly intact  Reliability:  good  Insight:  Good  Judgement:  Good  Impulse Control:  Good  Physical/Medical Issues:  Yes Hypothyroidism,OA rt hip,paroxysmal afib, HLD     Review of Systems:  Review of Systems   Constitutional: Negative for diaphoresis and fatigue.   HENT: Negative for drooling.    Eyes: Negative for visual disturbance.   Respiratory: Negative for cough and shortness of breath.    Cardiovascular: Negative for chest pain, palpitations and leg swelling.   Gastrointestinal: Negative for nausea and vomiting.   Endocrine: Negative for cold intolerance and heat intolerance.   Genitourinary: Negative for difficulty urinating.   Musculoskeletal: Negative for joint swelling.   Allergic/Immunologic: Negative for immunocompromised state.   Neurological: Negative for dizziness, seizures, speech difficulty and numbness.         Physical Exam:  Physical Exam    Vital Signs:   There were no vitals taken for this visit.     Lab Results:   Ancillary Procedure on 06/23/2022   Component Date Value Ref Range Status   • Target HR (85%) 06/23/2022 137  bpm Final   • Max. Pred. HR (100%) 06/23/2022  161  bpm Final   Ancillary Procedure on 06/23/2022   Component Date Value Ref Range Status   • Target HR (85%) 06/23/2022 137  bpm Final   • Max. Pred. HR (100%) 06/23/2022 161  bpm Final   • AI Jet height 06/23/2022 0.30  cm Final   • AI Jet index 06/23/2022 14.00  % Final   • IVRT 06/23/2022 92.0  msec Final   • LA ESV Index (BP) 06/23/2022 22.0  ml/m2 Final   • Avg E/e' ratio 06/23/2022 6.64   Final   • Ao root diam 06/23/2022 3.5  cm Final   • EF(MOD-bp) 06/23/2022 59.0  % Final   • Lat Peak E' [place]aud 06/23/2022 13.0  cm/sec Final   • LVPWd 06/23/2022 0.9  cm Final   • Med Peak E[place]aud 06/23/2022 9.00  cm/sec Final   • MV dec time 06/23/2022 173  msec Final   • IVSd 06/23/2022 1.1  cm Final   • LA dimension (2D)  06/23/2022 2.9  cm Final   • LVIDd 06/23/2022 4.7  cm Final   • LVIDs 06/23/2022 3.1  cm Final   • MV E/A 06/23/2022 1.2   Final   • MV E max saud 06/23/2022 73.0  cm/sec Final   • TAPSE (>1.6) 06/23/2022 2.00  cm Final   Lab on 05/18/2022   Component Date Value Ref Range Status   • Glucose 05/18/2022 95  65 - 99 mg/dL Final   • BUN 05/18/2022 12  6 - 20 mg/dL Final   • Creatinine 05/18/2022 0.76  0.57 - 1.00 mg/dL Final   • Sodium 05/18/2022 140  136 - 145 mmol/L Final   • Potassium 05/18/2022 4.3  3.5 - 5.2 mmol/L Final   • Chloride 05/18/2022 105  98 - 107 mmol/L Final   • CO2 05/18/2022 24.5  22.0 - 29.0 mmol/L Final   • Calcium 05/18/2022 9.6  8.6 - 10.5 mg/dL Final   • Total Protein 05/18/2022 6.7  6.0 - 8.5 g/dL Final   • Albumin 05/18/2022 4.40  3.50 - 5.20 g/dL Final   • ALT (SGPT) 05/18/2022 18  1 - 33 U/L Final   • AST (SGOT) 05/18/2022 23  1 - 32 U/L Final   • Alkaline Phosphatase 05/18/2022 60  39 - 117 U/L Final   • Total Bilirubin 05/18/2022 0.3  0.0 - 1.2 mg/dL Final   • Globulin 05/18/2022 2.3  gm/dL Final   • A/G Ratio 05/18/2022 1.9  g/dL Final   • BUN/Creatinine Ratio 05/18/2022 15.8  7.0 - 25.0 Final   • Anion Gap 05/18/2022 10.5  5.0 - 15.0 mmol/L Final   • eGFR 05/18/2022 91.0  >60.0  mL/min/1.73 Final    National Kidney Foundation and American Society of Nephrology (ASN) Task Force recommended calculation based on the Chronic Kidney Disease Epidemiology Collaboration (CKD-EPI) equation refit without adjustment for race.   • Total Cholesterol 05/18/2022 175  0 - 200 mg/dL Final   • Triglycerides 05/18/2022 84  0 - 150 mg/dL Final   • HDL Cholesterol 05/18/2022 49  40 - 60 mg/dL Final   • LDL Cholesterol  05/18/2022 110 (A) 0 - 100 mg/dL Final   • VLDL Cholesterol 05/18/2022 16  5 - 40 mg/dL Final   • LDL/HDL Ratio 05/18/2022 2.23   Final   • TSH 05/18/2022 3.740  0.270 - 4.200 uIU/mL Final   • Free T4 05/18/2022 1.21  0.93 - 1.70 ng/dL Final   • Hepatitis B Surface Ag 05/18/2022 Non-Reactive  Non-Reactive Final   • Hep A IgM 05/18/2022 Non-Reactive  Non-Reactive Final   • Hep B C IgM 05/18/2022 Non-Reactive  Non-Reactive Final   • Hepatitis C Ab 05/18/2022 Non-Reactive  Non-Reactive Final   • 25 Hydroxy, Vitamin D 05/18/2022 88.3  30.0 - 100.0 ng/ml Final   • WBC 05/18/2022 6.17  3.40 - 10.80 10*3/mm3 Final   • RBC 05/18/2022 4.66  3.77 - 5.28 10*6/mm3 Final   • Hemoglobin 05/18/2022 14.5  12.0 - 15.9 g/dL Final   • Hematocrit 05/18/2022 44.0  34.0 - 46.6 % Final   • MCV 05/18/2022 94.4  79.0 - 97.0 fL Final   • MCH 05/18/2022 31.1  26.6 - 33.0 pg Final   • MCHC 05/18/2022 33.0  31.5 - 35.7 g/dL Final   • RDW 05/18/2022 12.5  12.3 - 15.4 % Final   • RDW-SD 05/18/2022 43.1  37.0 - 54.0 fl Final   • MPV 05/18/2022 9.9  6.0 - 12.0 fL Final   • Platelets 05/18/2022 283  140 - 450 10*3/mm3 Final   • Neutrophil % 05/18/2022 54.8  42.7 - 76.0 % Final   • Lymphocyte % 05/18/2022 33.9  19.6 - 45.3 % Final   • Monocyte % 05/18/2022 7.9  5.0 - 12.0 % Final   • Eosinophil % 05/18/2022 2.1  0.3 - 6.2 % Final   • Basophil % 05/18/2022 1.0  0.0 - 1.5 % Final   • Immature Grans % 05/18/2022 0.3  0.0 - 0.5 % Final   • Neutrophils, Absolute 05/18/2022 3.38  1.70 - 7.00 10*3/mm3 Final   • Lymphocytes, Absolute  05/18/2022 2.09  0.70 - 3.10 10*3/mm3 Final   • Monocytes, Absolute 05/18/2022 0.49  0.10 - 0.90 10*3/mm3 Final   • Eosinophils, Absolute 05/18/2022 0.13  0.00 - 0.40 10*3/mm3 Final   • Basophils, Absolute 05/18/2022 0.06  0.00 - 0.20 10*3/mm3 Final   • Immature Grans, Absolute 05/18/2022 0.02  0.00 - 0.05 10*3/mm3 Final   • nRBC 05/18/2022 0.0  0.0 - 0.2 /100 WBC Final       EKG Results:  No orders to display       Imaging Results:  No Images in the past 120 days found..      Assessment & Plan   Diagnoses and all orders for this visit:    1. Attention and concentration deficit (Primary)  -     Ambulatory Referral to Neuropsychology        Visit Diagnoses:    ICD-10-CM ICD-9-CM   1. Attention and concentration deficit  R41.840 799.51       PLAN:  1.   Safety: No acute safety concerns  2. Therapy: None  3. Risk Assessment: Risk of self-harm acutely is low.  Risk factors include anxiety disorder, mood disorder, and recent psychosocial stressors (pandemic). Protective factors include no family history, denies access to guns/weapons, no present SI, no history of suicide attempts or self-harm in the past, minimal AODA, healthcare seeking, future orientation, willingness to engage in care.  Risk of self-harm chronically is also low, but could be further elevated in the event of treatment noncompliance and/or AODA.  4. Meds:  5. Labs: n/a  6.  Referral for ADHD testing with   Dr. Greg Bennett, Psychologist, MS, LPP  [address], [address]  [place] [id number] [phone]   Currently only accepting referrals for psychological testing services.  Patient to contact provider and set up appointment.  And to contact office if unable to get an appointment scheduled.   -Attached list of several other sites for ADHD testing. Patient instructed to contact providers on list to determine availability of appointments, instructed patient to take notes while calling places indicating first available appointment, and to  ask to be placed on waiting list.  If an office is chosen and requests the referral order please contact my Medical Assistant, Hanh, directly at [phone] informing of chosen office and the information will be sent.    Patient with high suspicion of having ADHD, will send for formal testing and have patient return in 2 weeks to discuss medication options as if patient is able to be tested in the next 1-2 months, may wait on starting a non-stimulant medication.     Patient screened positive for depression based on a PHQ-9 score of 0 on 7/14/22 . Follow-up recommendations include: Suicide Risk Assessment performed.             TREATMENT PLAN/GOALS: Continue supportive psychotherapy efforts and medications as indicated. Treatment and medication options discussed during today's visit. Patient ackowledged and verbally consented to continue with current treatment plan and was educated on the importance of compliance with treatment and follow-up appointments.    MEDICATION ISSUES:  MARLYS reviewed as expected.  Discussed medication options and treatment plan of prescribed medication as well as the risks, benefits, and side effects including potential falls, possible impaired driving and metabolic adversities among others. Patient is agreeable to call the office with any worsening of symptoms or onset of side effects. Patient is agreeable to call 911 or go to the nearest ER should he/she begin having SI/HI. No medication side effects or related complaints today.     MEDS ORDERED DURING VISIT:  No orders of the defined types were placed in this encounter.      Return in about 2 weeks (around 7/28/2022) for Video visit.         I spent 74 minutes caring for Jenn on this date of service. This time includes time spent by me in the following activities: preparing for the visit, reviewing tests, obtaining and/or reviewing a separately obtained history, performing a medically appropriate examination and/or evaluation,  counseling and educating the patient/family/caregiver, referring and communicating with other health care professionals, documenting information in the medical record and care coordination.      This document has been electronically signed by CHELSI Burnett  July 14, 2022 12:51 EDT      Part of this note may be an electronic transcription/translation of spoken language to printed text using the Dragon Dictation System.

## 2022-07-15 ENCOUNTER — HOSPITAL ENCOUNTER (OUTPATIENT)
Dept: MAMMOGRAPHY | Facility: HOSPITAL | Age: 57
Discharge: HOME | End: 2022-07-15
Admitting: NURSE PRACTITIONER

## 2022-07-15 DIAGNOSIS — Z12.31 SCREENING MAMMOGRAM FOR BREAST CANCER: ICD-10-CM

## 2022-07-15 PROCEDURE — 77063 BREAST TOMOSYNTHESIS BI: CPT

## 2022-07-15 PROCEDURE — 77067 SCR MAMMO BI INCL CAD: CPT

## 2022-07-19 ENCOUNTER — TELEPHONE (OUTPATIENT)
Dept: BEHAVIORAL HEALTH | Facility: CLINIC | Age: 57
End: 2022-07-19

## 2022-07-19 NOTE — TELEPHONE ENCOUNTER
Patient called to let us know that she now has an appointment with Greg Bennett in [place] for September 1,2022.  Wanted to let you know

## 2022-07-25 ENCOUNTER — TELEPHONE (OUTPATIENT)
Dept: BEHAVIORAL HEALTH | Facility: CLINIC | Age: 57
End: 2022-07-25

## 2022-07-25 NOTE — TELEPHONE ENCOUNTER
Patient called to tell us that she has discovered she has a Dentist appt 1 hour prior to her appt with you on Thursday 08/27/22 Patient is afraid it may run over.  Patient does have an appt for ADHD testing in September is this appt Thursday really necessary or should she wait until testing is completed.  Please advise.

## 2022-07-25 NOTE — TELEPHONE ENCOUNTER
She may schedule with me approximately 2 weeks after scheduled testing for ADHD as she is not on any medications currently.

## 2022-08-16 ENCOUNTER — TELEPHONE (OUTPATIENT)
Dept: FAMILY MEDICINE CLINIC | Facility: CLINIC | Age: 57
End: 2022-08-16

## 2022-08-16 DIAGNOSIS — B37.9 YEAST INFECTION: Primary | ICD-10-CM

## 2022-08-16 RX ORDER — FLUCONAZOLE 150 MG/1
150 TABLET ORAL ONCE
Qty: 1 TABLET | Refills: 0 | Status: SHIPPED | OUTPATIENT
Start: 2022-08-16 | End: 2022-08-16

## 2022-08-16 NOTE — TELEPHONE ENCOUNTER
Pt called requesting medication for yeast infection. Pt had dental work and took amoxicillin.     Please advise.

## 2022-09-15 ENCOUNTER — TELEMEDICINE (OUTPATIENT)
Dept: BEHAVIORAL HEALTH | Facility: CLINIC | Age: 57
End: 2022-09-15

## 2022-09-15 DIAGNOSIS — F90.0 ADHD (ATTENTION DEFICIT HYPERACTIVITY DISORDER), INATTENTIVE TYPE: Primary | ICD-10-CM

## 2022-09-15 PROCEDURE — 99214 OFFICE O/P EST MOD 30 MIN: CPT | Performed by: NURSE PRACTITIONER

## 2022-09-15 NOTE — PATIENT INSTRUCTIONS
"1.  Please return to clinic at your next scheduled visit.  Contact the [facility] ([phone]) or **Hanh, Medical Assistant at [place] Office directly at [phone] at least 24 hours prior in the event you need to cancel.**  2.  Do no harm to yourself or others.    3.  Avoid alcohol and drugs.    4.  Take all medications as prescribed.  Please contact the clinic with any concerns. **If you are in need of medication refills, please contact your pharmacy.**     5. Should you want to get in touch with your provider, CHELSI Burnett, please contact the office ([phone]), and staff will be able to page the provider on call directly.  (After hours & weekends only- otherwise please contact my Medical Assistant, Hanh, directly at [phone], please leave a voice mail if you do not get an answer and she will return your call within 24 hrs.  Recommend saving Hanh's direct number in phone as this is the PREFERRED & EASIEST way to get in contact with your provider.   [phone])    6.  In the event you have personal crisis, contact the following crisis numbers: Suicide Prevention Hotline 1-474.324.3111 or *988, [facility] Helpline 1-805-340-TEIW; [facility] Emergency Room [phone]; text HELLO to 532441; or 883.    7.  You will NOT be able to contact provider [facility], as Behavioral Health Providers are restricted. YOU MUST CALL [phone]    8.  We would appreciate your feedback, please scan the QRS code on the back of your appointment card (or see below) and complete a brief survey.  [place] location is still not available, so please click \"[place]\" location.  Thank you      SPECIFIC RECOMMENDATIONS:     1.      Medications discussed at this encounter:                   - n/a     2.      Psychotherapy recommendations: Declined     3.     Return to clinic: 1 day, Friday morning 8 am     Please arrive at least 15 minutes before your scheduled appointment time to complete check in " "process.      If you would like to log on to [employer] and complete the \"E-Check IN\" prior to your visit, please do so, this will speed up the check in process.  If you are due for questionnaires, you will find those on [employer] as well, please try to complete prior to your scheduled appointment.          "

## 2022-09-15 NOTE — PROGRESS NOTES
This provider is located at [address] [place] [address]. [address], [place] [address]. The Patient is seen remotely using [employer]. Patient is being seen via telehealth and confirm that they are in a secure environment for this session. The patient's condition being diagnosed/treated is appropriate for telemedicine. The provider identified himself/herself: herself as well as her credentials.   The patient gave consent to be seen remotely, and when consent is given they understand that the consent allows for patient identifiable information to be sent to a third party as needed.   They may refuse to be seen remotely at any time. The electronic data is encrypted and password protected, and the patient has been advised of the potential risks to privacy not withstanding such measures.    You have chosen to receive care through a telehealth visit.  Do you consent to use a video/audio connection for your medical care today? Yes     Subjective   Jenn James is a 57 y.o. female who presents today for follow up    Referring Provider:  Mariana Jalloh, CHELSI  [address] [place] [address]    Chief Complaint:  Review ADHD evaluation and treatment options    History of Present Illness:   9/15/22:  Patient presents today via MyChart Video visit from home, reports received ADHD test results though has not discussed.  Patient continues to have symptoms of inttentiveness, concentration difficulty, difficulty completing tasks, and switching from one uncompleted task to another.     Patient does take 3 drops of CBD oil, hemp based, at night for sleep, relaxes muscles as patient started taking while going through menopause.   Patient reports  is allergic to Wellbutrin and daughter tried Wellbutrin and did not do well, as patient would prefer to try Adderall first rather than a non-stimulant medication.  Patient will be out of town for 2 weeks in October visiting daughter in [place], likely early October.  "      7/14/22:  INITIAL EVAL  Patient presents today via MyChart Video visit from home with a history of depression and anxiety for which treatment was started in late 1995 with therapy, and medications from 4625-4257 due to divorce.     Patient is currently not taking any psychotropic medications nor has had any since 2005.     \"My issue is I can't concentrate, I start something and cant finish something, forgets things often\".  Patient recalls while in school always \"fidgety\" crossing legs, moving often in seat.  Admits to interrupting people, gets distracted easily, daughter noticing.    Admits to over the past 2 yrs began to feel ADHD may be a possibility, \"I am tired of being like this, forgetting stuff, going in and out of the house, tired of interrupting people, it seems to be bothering me more.\"  Patient uses reminders, lists which was started while kids in high school and has continued to have  self add items needed to List,\" I walk out without my list from the fridge, and I have to have him take a picture of it and send it to me\".  Difficulty sitting still when you should be sitting still in Restoration[employer] or meetings. Impulsive with shopping tends to purchase items that may be on sale or those that she may not need with the idea of \"I can always return it\".     Symptoms include fidgeting, difficulty remaining seated, easy distractability, blurting out answers prematurely, inability to complete tasks, difficulty sustaining attention, shifting from one uncompleted activity to another, talking excessively, interrupting others, ineffective listening, frequently losing items, and impulsivity.    1. ADHD:   a. Elementary school:   i. Grades:A's and B's  ii. Special classes or failures:no  iii. Got in trouble:no  iv. Referral for ADHD testing:no  b. Fhx:son, diagnosed officially in 7th grade, took medications for 1-2 yrs, restarted while in college only, \"he probably should be taking something, I have suggested " "it to him\"  c. Presently:  i. Problems with attention to detail:yes  ii. Problems with sustained attention:Yes  iii. Problems listening when spoken to directly:Yes, unable to concentrate on what others are saying, \"I have to get my point across, I know I have to wait for the break, but I can't wait for the break.\"  iv. Failure to finish tasks:yes, \"I will lay my husbandsTshirts on couch intending to hang up and they will be there for 2-3 days\" house hold tasks-dusting, mopping, find need to focus on floor boards  v. Avoids tasks that require sustained mental effort:yes, \"I excelled at work,  I could multi-task, 5-6 projects at desk, however, would wait until the last week to update credentials, I put off stuff and procrastinate all the time\"  vi. Easily distracted:yes  vii. Forgetting things:yes  viii. Losing things:yes  ix. Hard to organize:yes  x. Talks a lot and cutting people off:yes  xi. Drifts off during conversations:yes, \"I have to concentrate what I need to say to not forget what I have say, then I blurt it out, I am trying really hard, it's even hard with you to not stop and say stuff\"  xii. Difficulty with Reading:yes, \"I used to read a lot, has been using Audio books to listen in car or while out with friends or walking, has to rewind at times because my mind drifts off.\"  xiii. Difficulty watching TV/Movies:\"not really, we hardly ever have the TV on anymore.\"       PHQ-9 Depression Screening  PHQ-9 Total Score:   7/14/22 0 , reassess  10/2022    Little interest or pleasure in doing things?     Feeling down, depressed, or hopeless?     Trouble falling or staying asleep, or sleeping too much?     Feeling tired or having little energy?     Poor appetite or overeating?     Feeling bad about yourself - or that you are a failure or have let yourself or your family down?     Trouble concentrating on things, such as reading the newspaper or watching television?     Moving or speaking so slowly that other people " could have noticed? Or the opposite - being so fidgety or restless that you have been moving around a lot more than usual?     Thoughts that you would be better off dead, or of hurting yourself in some way?     PHQ-9 Total Score       SUZANNE-7    7/14/22 0, reassess 10/2022    Past Surgical History:  Past Surgical History:   Procedure Laterality Date   • BLADDER SUSPENSION  2008   • COLON SURGERY     • COLONOSCOPY     • ESSURE TUBAL LIGATION  1999   • JOINT REPLACEMENT  Jan 2017    Right hip replacement   • KIDNEY SURGERY     • OTHER SURGICAL HISTORY      metal implant   • TUBAL ABDOMINAL LIGATION         Problem List:  Patient Active Problem List   Diagnosis   • Primary osteoarthritis of right hip   • Status post right hip replacement   • Hypothyroidism (acquired)   • Paroxysmal atrial fibrillation (HCC)       Allergy:   Allergies   Allergen Reactions   • Sulfa Antibiotics Rash        Discontinued Medications:  There are no discontinued medications.    Current Medications:   Current Outpatient Medications   Medication Sig Dispense Refill   • Auvi-Q 0.3 MG/0.3ML solution auto-injector injection INJECT AS NEEDED FOR SEVERE ALLERGIC REACTION INCLUDING ANAPHYLAXIS AS DIRECTED     • azelastine (ASTELIN) 0.1 % nasal spray USE 1 TO 2 SPRAYS IN EACH NOSTRIL TWICE DAILY     • CBD (cannabidiol) oral oil Take  by mouth.     • cetirizine (zyrTEC) 10 MG tablet Take 10 mg by mouth Daily.     • levothyroxine (SYNTHROID, LEVOTHROID) 25 MCG tablet Take 1 tablet by mouth Daily. 90 tablet 1   • MAGNESIUM MALATE PO Take  by mouth Daily.     • Milk Thistle 150 MG capsule Take  by mouth.     • Misc. Devices (Nasal Spray Bottle) misc      • montelukast (SINGULAIR) 10 MG tablet Take 10 mg by mouth Every Night.     • Vitamin D-Vitamin K (K2 Plus D3) 100-1000 MCG-UNIT tablet Take  by mouth Daily. Patient takes drops (not tablets) due to GI upset with tablet     • loteprednol etabonate (LOTEMAX) 0.5 % gel ophthalmic gel      • triamcinolone  "(KENALOG) 0.1 % ointment APPLY TOPICALLY TO INSECT BITES TWICE DAILY AS NEEDED FOR ITCHING       No current facility-administered medications for this visit.       Past Medical History:  Past Medical History:   Diagnosis Date   • Allergic Whole life   • Anxiety    • Cervical cancer screening 2109   • Chronic allergic rhinitis    • Deep vein thrombosis (HCC) 2017    Caused after my hip replacement.   • Depression    • Hyperlipemia 03/15/2017   • Hypothyroidism 03/15/2017   • Kidney stones    • Limb pain    • Limb swelling        Past Psychiatric History:  Began Treatment:started in  with therapy due to spouse having an affair  Diagnoses:Depression and Anxiety  Psychiatrist:last seen from [id number]  Therapist:last seen from [id number]  Admission History:Denies  Medication Trials:Prozac--for one year \"felt like i was floating;\" Zoloft for 1 yr -, then started Effexor with divorce in  for1 yr-during time having increased stress with divorce as pt had lost hair and stomach problems and asked to be placed on meds; tolerated well  Self Harm: Denies  Suicide Attempts:Denies   Psychosis, Anxiety, Depression: Denies    Substance Abuse History:   Types:Alcohol daily in the evening to relax, 1-2 glasses of wine, or 1 beer or gin and tonic  Withdrawal Symptoms:Denies  Longest Period Sober:Not Applicable   AA: Not applicable     Social History:  Martial Status:  Employed:No 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  Kids:Yes or If so, how many 2 children and 2 step children  House:Lives in a house   History: Denies  Access to Guns: no    Social History     Socioeconomic History   • Marital status:    Tobacco Use   • Smoking status: Never Smoker   • Smokeless tobacco: Never Used   Vaping Use   • Vaping Use: Never used   Substance and Sexual Activity   • Alcohol use: Yes     Comment: drinks daily, wine, beer and lquor   • Drug use: Not Currently     Types: " Marijuana     Comment: In teenage years   • Sexual activity: Yes       Family History:   Suicide Attempts: Denies  Suicide Completions:Denies      Family History   Problem Relation Age of Onset   • Dementia Mother    • Diabetes type II Mother    • Cancer Mother 70        Kidney cancer (left kidney removed) and endometrial cancer (hysterectomy)   • Prostate cancer Father    • Other Maternal Grandmother         Colon cancer   • Colon cancer Maternal Grandmother 70   • Diabetes Paternal Grandmother    • ADD / ADHD Son        Developmental History:   Born: [place], lived in [place] since age 1  Siblings:1 sister, 1 brother-both younger  Childhood: Denies Abuse  High School:Completed  College:2 yrs, no degree    Mental Status Exam:   Hygiene:   good  Cooperation:  Cooperative  Eye Contact:  Good  Psychomotor Behavior:  Appropriate  Affect:  Appropriate  Mood: euthymic  Speech:  Normal  Thought Process:  Goal directed  Thought Content:  Mood congruent  Suicidal:  None  Homicidal:  None  Hallucinations:  None  Delusion:  None  Memory:  Intact  Orientation:  Grossly intact  Reliability:  good  Insight:  Good  Judgement:  Good  Impulse Control:  Good  Physical/Medical Issues:  Yes Hypothyroidism,OA rt hip,paroxysmal afib, HLD     Review of Systems:  Review of Systems   Constitutional: Negative for diaphoresis and fatigue.   HENT: Negative for drooling.    Eyes: Negative for visual disturbance.   Respiratory: Negative for cough and shortness of breath.    Cardiovascular: Negative for chest pain, palpitations and leg swelling.   Gastrointestinal: Negative for nausea and vomiting.   Endocrine: Negative for cold intolerance and heat intolerance.   Genitourinary: Negative for difficulty urinating.   Musculoskeletal: Negative for joint swelling.   Allergic/Immunologic: Negative for immunocompromised state.   Neurological: Negative for dizziness, seizures, speech difficulty and numbness.   Psychiatric/Behavioral: Positive for decreased  concentration. Negative for self-injury, sleep disturbance and suicidal ideas. The patient is not nervous/anxious and is not hyperactive.          Physical Exam:  Physical Exam  Psychiatric:         Attention and Perception: Attention and perception normal.         Mood and Affect: Mood and affect normal.         Speech: Speech normal.         Behavior: Behavior normal. Behavior is cooperative.         Thought Content: Thought content normal. Thought content does not include suicidal ideation. Thought content does not include suicidal plan.         Cognition and Memory: Cognition and memory normal.         Judgment: Judgment normal.         Vital Signs:   There were no vitals taken for this visit.     Lab Results:   Ancillary Procedure on 06/23/2022   Component Date Value Ref Range Status   • Target HR (85%) 06/23/2022 137  bpm Final   • Max. Pred. HR (100%) 06/23/2022 161  bpm Final   Ancillary Procedure on 06/23/2022   Component Date Value Ref Range Status   • Target HR (85%) 06/23/2022 137  bpm Final   • Max. Pred. HR (100%) 06/23/2022 161  bpm Final   • AI Jet height 06/23/2022 0.30  cm Final   • AI Jet index 06/23/2022 14.00  % Final   • IVRT 06/23/2022 92.0  msec Final   • LA ESV Index (BP) 06/23/2022 22.0  ml/m2 Final   • Avg E/e' ratio 06/23/2022 6.64   Final   • Ao root diam 06/23/2022 3.5  cm Final   • EF(MOD-bp) 06/23/2022 59.0  % Final   • Lat Peak [place] 06/23/2022 13.0  cm/sec Final   • LVPWd 06/23/2022 0.9  cm Final   • Med Peak E[place]aud 06/23/2022 9.00  cm/sec Final   • MV dec time 06/23/2022 173  msec Final   • IVSd 06/23/2022 1.1  cm Final   • LA dimension (2D)  06/23/2022 2.9  cm Final   • LVIDd 06/23/2022 4.7  cm Final   • LVIDs 06/23/2022 3.1  cm Final   • MV E/A 06/23/2022 1.2   Final   • MV E max saud 06/23/2022 73.0  cm/sec Final   • TAPSE (>1.6) 06/23/2022 2.00  cm Final   Lab on 05/18/2022   Component Date Value Ref Range Status   • Glucose 05/18/2022 95  65 - 99 mg/dL Final   • BUN 05/18/2022  12  6 - 20 mg/dL Final   • Creatinine 05/18/2022 0.76  0.57 - 1.00 mg/dL Final   • Sodium 05/18/2022 140  136 - 145 mmol/L Final   • Potassium 05/18/2022 4.3  3.5 - 5.2 mmol/L Final   • Chloride 05/18/2022 105  98 - 107 mmol/L Final   • CO2 05/18/2022 24.5  22.0 - 29.0 mmol/L Final   • Calcium 05/18/2022 9.6  8.6 - 10.5 mg/dL Final   • Total Protein 05/18/2022 6.7  6.0 - 8.5 g/dL Final   • Albumin 05/18/2022 4.40  3.50 - 5.20 g/dL Final   • ALT (SGPT) 05/18/2022 18  1 - 33 U/L Final   • AST (SGOT) 05/18/2022 23  1 - 32 U/L Final   • Alkaline Phosphatase 05/18/2022 60  39 - 117 U/L Final   • Total Bilirubin 05/18/2022 0.3  0.0 - 1.2 mg/dL Final   • Globulin 05/18/2022 2.3  gm/dL Final   • A/G Ratio 05/18/2022 1.9  g/dL Final   • BUN/Creatinine Ratio 05/18/2022 15.8  7.0 - 25.0 Final   • Anion Gap 05/18/2022 10.5  5.0 - 15.0 mmol/L Final   • eGFR 05/18/2022 91.0  >60.0 mL/min/1.73 Final    National Kidney Foundation and American Society of Nephrology (ASN) Task Force recommended calculation based on the Chronic Kidney Disease Epidemiology Collaboration (CKD-EPI) equation refit without adjustment for race.   • Total Cholesterol 05/18/2022 175  0 - 200 mg/dL Final   • Triglycerides 05/18/2022 84  0 - 150 mg/dL Final   • HDL Cholesterol 05/18/2022 49  40 - 60 mg/dL Final   • LDL Cholesterol  05/18/2022 110 (A) 0 - 100 mg/dL Final   • VLDL Cholesterol 05/18/2022 16  5 - 40 mg/dL Final   • LDL/HDL Ratio 05/18/2022 2.23   Final   • TSH 05/18/2022 3.740  0.270 - 4.200 uIU/mL Final   • Free T4 05/18/2022 1.21  0.93 - 1.70 ng/dL Final   • Hepatitis B Surface Ag 05/18/2022 Non-Reactive  Non-Reactive Final   • Hep A IgM 05/18/2022 Non-Reactive  Non-Reactive Final   • Hep B C IgM 05/18/2022 Non-Reactive  Non-Reactive Final   • Hepatitis C Ab 05/18/2022 Non-Reactive  Non-Reactive Final   • 25 Hydroxy, Vitamin D 05/18/2022 88.3  30.0 - 100.0 ng/ml Final   • WBC 05/18/2022 6.17  3.40 - 10.80 10*3/mm3 Final   • RBC 05/18/2022 4.66   3.77 - 5.28 10*6/mm3 Final   • Hemoglobin 05/18/2022 14.5  12.0 - 15.9 g/dL Final   • Hematocrit 05/18/2022 44.0  34.0 - 46.6 % Final   • MCV 05/18/2022 94.4  79.0 - 97.0 fL Final   • MCH 05/18/2022 31.1  26.6 - 33.0 pg Final   • MCHC 05/18/2022 33.0  31.5 - 35.7 g/dL Final   • RDW 05/18/2022 12.5  12.3 - 15.4 % Final   • RDW-SD 05/18/2022 43.1  37.0 - 54.0 fl Final   • MPV 05/18/2022 9.9  6.0 - 12.0 fL Final   • Platelets 05/18/2022 283  140 - 450 10*3/mm3 Final   • Neutrophil % 05/18/2022 54.8  42.7 - 76.0 % Final   • Lymphocyte % 05/18/2022 33.9  19.6 - 45.3 % Final   • Monocyte % 05/18/2022 7.9  5.0 - 12.0 % Final   • Eosinophil % 05/18/2022 2.1  0.3 - 6.2 % Final   • Basophil % 05/18/2022 1.0  0.0 - 1.5 % Final   • Immature Grans % 05/18/2022 0.3  0.0 - 0.5 % Final   • Neutrophils, Absolute 05/18/2022 3.38  1.70 - 7.00 10*3/mm3 Final   • Lymphocytes, Absolute 05/18/2022 2.09  0.70 - 3.10 10*3/mm3 Final   • Monocytes, Absolute 05/18/2022 0.49  0.10 - 0.90 10*3/mm3 Final   • Eosinophils, Absolute 05/18/2022 0.13  0.00 - 0.40 10*3/mm3 Final   • Basophils, Absolute 05/18/2022 0.06  0.00 - 0.20 10*3/mm3 Final   • Immature Grans, Absolute 05/18/2022 0.02  0.00 - 0.05 10*3/mm3 Final   • nRBC 05/18/2022 0.0  0.0 - 0.2 /100 WBC Final       EKG Results:  No orders to display       Imaging Results:  No Images in the past 120 days found..      Assessment & Plan   Diagnoses and all orders for this visit:    1. ADHD (attention deficit hyperactivity disorder), inattentive type (Primary)        Visit Diagnoses:    ICD-10-CM ICD-9-CM   1. ADHD (attention deficit hyperactivity disorder), inattentive type  F90.0 314.00       PLAN:  1.   Safety: No acute safety concerns  2. Therapy: None  3. Risk Assessment: Risk of self-harm acutely is low.  Risk factors include anxiety disorder, mood disorder, and recent psychosocial stressors (pandemic). Protective factors include no family history, denies access to guns/weapons, no present SI,  no history of suicide attempts or self-harm in the past, minimal AODA, healthcare seeking, future orientation, willingness to engage in care.  Risk of self-harm chronically is also low, but could be further elevated in the event of treatment noncompliance and/or AODA.  4. Meds: n/a  5. Labs: n/a  6. ADHD testing completed by Dr. Greg Bennett on 9/1/22 confirming a diagnosis of ADHD. (total time of review 9 mins)    Discussed ADHD treatment options of non-stimulants vs stimulant medications, after discussion patient wishes to proceed with Adderall.  Informed patient of policy requirements prior to starting Adderall, patient will plan on coming in tomorrow due to available appointment at 8 am.  Discussed current CBD oil which patient reports is hemp based as the UDS may show positive for THC. Patient takes CBD oil for sleep and muscle aches, which has been effective.  IF UDS positive patient was informed Adderall would not be prescribed, and will have to send for a confirmation.  Patient verbalized understanding.       7/14/22:   No Medications, Declines therapy at this time.  Referral for ADHD testing with   Dr. Greg Bennett, Psychologist, MS, LPP  [address] [facility], [address]  [place] [id number] [phone]   Currently only accepting referrals for psychological testing services.  Patient to contact provider and set up appointment.  And to contact office if unable to get an appointment scheduled.   -Attached list of several other sites for ADHD testing. Patient instructed to contact providers on list to determine availability of appointments, instructed patient to take notes while calling places indicating first available appointment, and to ask to be placed on waiting list.  If an office is chosen and requests the referral order please contact my Medical Assistant, Hanh, directly at [phone] informing of chosen office and the information will be sent.    Patient with high suspicion of having ADHD,  will send for formal testing and have patient return in 2 weeks to discuss medication options as if patient is able to be tested in the next 1-2 months, may wait on starting a non-stimulant medication.     Patient screened positive for depression based on a PHQ-9 score of 0 on 7/14/22 . Follow-up recommendations include: Suicide Risk Assessment performed.             TREATMENT PLAN/GOALS: Continue supportive psychotherapy efforts and medications as indicated. Treatment and medication options discussed during today's visit. Patient ackowledged and verbally consented to continue with current treatment plan and was educated on the importance of compliance with treatment and follow-up appointments.    MEDICATION ISSUES:  MARLYS reviewed as expected.  Discussed medication options and treatment plan of prescribed medication as well as the risks, benefits, and side effects including potential falls, possible impaired driving and metabolic adversities among others. Patient is agreeable to call the office with any worsening of symptoms or onset of side effects. Patient is agreeable to call 911 or go to the nearest ER should he/she begin having SI/HI. No medication side effects or related complaints today.     MEDS ORDERED DURING VISIT:  No orders of the defined types were placed in this encounter.      Return in about 1 day (around 9/16/2022) for Next scheduled follow up.         I spent 34 minutes caring for Jenn on this date of service. This time includes time spent by me in the following activities: preparing for the visit, obtaining and/or reviewing a separately obtained history, performing a medically appropriate examination and/or evaluation, counseling and educating the patient/family/caregiver, referring and communicating with other health care professionals, documenting information in the medical record and care coordination.      This document has been electronically signed by CHELSI Burnett  September 15, 2022  09:08 EDT      Part of this note may be an electronic transcription/translation of spoken language to printed text using the Dragon Dictation System.

## 2022-09-16 ENCOUNTER — DOCUMENTATION (OUTPATIENT)
Dept: BEHAVIORAL HEALTH | Facility: CLINIC | Age: 57
End: 2022-09-16

## 2022-09-16 ENCOUNTER — OFFICE VISIT (OUTPATIENT)
Dept: BEHAVIORAL HEALTH | Facility: CLINIC | Age: 57
End: 2022-09-16

## 2022-09-16 ENCOUNTER — CLINICAL SUPPORT (OUTPATIENT)
Dept: FAMILY MEDICINE CLINIC | Age: 57
End: 2022-09-16

## 2022-09-16 VITALS
HEIGHT: 67 IN | WEIGHT: 201.4 LBS | SYSTOLIC BLOOD PRESSURE: 116 MMHG | HEART RATE: 65 BPM | DIASTOLIC BLOOD PRESSURE: 68 MMHG | BODY MASS INDEX: 31.61 KG/M2

## 2022-09-16 DIAGNOSIS — Z79.899 HIGH RISK MEDICATION USE: ICD-10-CM

## 2022-09-16 DIAGNOSIS — F90.0 ADHD (ATTENTION DEFICIT HYPERACTIVITY DISORDER), INATTENTIVE TYPE: Primary | ICD-10-CM

## 2022-09-16 DIAGNOSIS — F90.0 ADHD (ATTENTION DEFICIT HYPERACTIVITY DISORDER), INATTENTIVE TYPE: ICD-10-CM

## 2022-09-16 DIAGNOSIS — Z02.83 ENCOUNTER FOR DRUG SCREENING: Primary | ICD-10-CM

## 2022-09-16 LAB
AMPHET+METHAMPHET UR QL: NEGATIVE
AMPHETAMINES UR QL: NEGATIVE
BARBITURATES UR QL SCN: NEGATIVE
BENZODIAZ UR QL SCN: NEGATIVE
BUPRENORPHINE SERPL-MCNC: NEGATIVE NG/ML
CANNABINOIDS SERPL QL: NEGATIVE
COCAINE UR QL: NEGATIVE
EXPIRATION DATE: NORMAL
Lab: NORMAL
MDMA UR QL SCN: NEGATIVE
METHADONE UR QL SCN: NEGATIVE
OPIATES UR QL: NEGATIVE
OXYCODONE UR QL SCN: NEGATIVE
PCP UR QL SCN: NEGATIVE
THC INTERNAL CONTROL: NORMAL

## 2022-09-16 PROCEDURE — 99215 OFFICE O/P EST HI 40 MIN: CPT | Performed by: NURSE PRACTITIONER

## 2022-09-16 RX ORDER — DEXTROAMPHETAMINE SACCHARATE, AMPHETAMINE ASPARTATE MONOHYDRATE, DEXTROAMPHETAMINE SULFATE AND AMPHETAMINE SULFATE 2.5; 2.5; 2.5; 2.5 MG/1; MG/1; MG/1; MG/1
10 CAPSULE, EXTENDED RELEASE ORAL EVERY MORNING
Qty: 42 CAPSULE | Refills: 0 | Status: SHIPPED | OUTPATIENT
Start: 2022-09-16 | End: 2022-10-24 | Stop reason: SDUPTHER

## 2022-09-16 NOTE — PROGRESS NOTES
Submitted Drug PA for Adderall XR 10mg through [employer].  APPROVED until 09/16/2023.  (scanned in chart)

## 2022-09-16 NOTE — PATIENT INSTRUCTIONS
"1.  Please return to clinic at your next scheduled visit.  Contact the [facility] ([phone]) or **Hanh, Medical Assistant at [place] Office directly at [phone] at least 24 hours prior in the event you need to cancel.**    2. Should you want to get in touch with your provider, CHELSI Burnett, please contact MY Medical Assistant, Hanh, directly at [phone].  Recommend saving Hanh's direct number in phone as this is the PREFERRED & EASIEST way to get in contact with your provider.  Please leave a voice mail if you do not get an answer and she will return your call within 24 hrs. You will NOT be able to contact provider on [facility], as Behavioral Health Providers are restricted. YOU MUST CALL [phone]    If you need to speak with the on call provider after hours or on weekends, please Contact the [facility] ([phone]) and staff will be able to page the provider on call directly.        3, MEDICATION REFILLS:  PLEASE CALL THE PHARMACY TO REQUEST ALL MEDICATION REFILLS TO ENSURE YOU ARE RECEIVING YOUR MEDICATIONS IN A TIMELY MANNER.    IF YOU USE AN AUTOMATED SERVICE AT THE PHARMACY FOR REFILLS AND ARE TOLD THERE ARE \"NO REFILLS REMAINING\"   PLEASE CALL THE PHARMACY & SPEAK TO A LIVE PERSON TO VERIFY IT IS THE MOST UP TO DATE PRESCRIPTION ON FILE.    All new prescriptions will have a different number, therefore, if you were given refills for a medication today or at last visit it will not have the same number as the previous prescription.       4.  In the event you have personal crisis, contact the following crisis numbers: Suicide Prevention Hotline 1-145.373.7142 or *988, [facility] Helpline 2-719-034-ISHMAEL; [facility] Emergency Room [phone]; text HELLO to 497247; or 627.      5. We would appreciate your feedback, please scan the QRS code on the back of your appointment card (or see below) and complete a brief survey.  [place] location is still not available, so " "please click \"[place]\" location.  Thank you      SPECIFIC RECOMMENDATIONS:     1.      Medications discussed at this encounter:                   - Will potentially start Adderall XR 10 mg by mouth daily in the morning      2.      Psychotherapy recommendations: Declined    [employer] and [facility] [employer] channel- look for the pink and blue cartoon brain, she usually starts her intro as \"Hello Brains\" very helpful      3.     Return to clinic: 4 weeks    Please arrive at least 15 minutes before your scheduled appointment time to complete check in process.      IF you are scheduled for a [facility] visit, PLEASE ANSWER YOUR PHONE WHEN OFFICE CALLS PRIOR TO VISIT TO COMPLETE THE CHECK IN PROCESS, EVEN IF THE E-CHECK IN WAS COMPLETED.     If you would like to log on to [facility] and complete the \"E-Check IN\" prior to your visit, please do so, this will speed up the check in process.  If you are due for questionnaires, you will find those on [facility] as well, please try to complete prior to your scheduled appointment.          "

## 2022-09-16 NOTE — PROGRESS NOTES
"Answers for HPI/ROS submitted by the patient on 9/15/2022  Please describe your symptoms.: ADHD  checkup to take controlled med  Have you had these symptoms before?: Yes  How long have you been having these symptoms?: Greater than 2 weeks  Please list any medications you are currently taking for this condition.: Na  What is the primary reason for your visit?: Other      Subjective   Jenn James is a 57 y.o. female who presents today for follow up    Referring Provider:  No referring provider defined for this encounter.    Chief Complaint:  ADHD treatment     History of Present Illness:   9/16/22:  Patient presents today in office today to further discuss ADHD treatment with a controlled substance and to complete CSA and obtain POC UDS today per policy.     Patient brought in bottle of Hemp CBD oil 3200mg/2 oz, 1-3 drops 3 times daily on bottle, however, patient only takes in the evening for post menopausal symptoms, muscle aches, and vocal cord dysfunction, \"my neck tenses up , had to do physical therapy in past, and oil helps, I found out I am Allergic to mold.\"    9/15/22:  Patient presents today via MyChart Video visit from home, reports received ADHD test results though has not discussed.  Patient continues to have symptoms of inttentiveness, concentration difficulty, difficulty completing tasks, and switching from one uncompleted task to another.     Patient does take 3 drops of CBD oil, hemp based, at night for sleep, relaxes muscles as patient started taking while going through menopause.   Patient reports  is allergic to Wellbutrin and daughter tried Wellbutrin and did not do well, as patient would prefer to try Adderall first rather than a non-stimulant medication.  Patient will be out of town for 2 weeks in October visiting daughter in [place], likely early October.       7/14/22:  INITIAL EVAL  Patient presents today via MyChart Video visit from home with a history of depression and anxiety " "for which treatment was started in late 1995 with therapy, and medications from [id number] due to divorce.     Patient is currently not taking any psychotropic medications nor has had any since 2005.     \"My issue is I can't concentrate, I start something and cant finish something, forgets things often\".  Patient recalls while in school always \"fidgety\" crossing legs, moving often in seat.  Admits to interrupting people, gets distracted easily, daughter noticing.    Admits to over the past 2 yrs began to feel ADHD may be a possibility, \"I am tired of being like this, forgetting stuff, going in and out of the house, tired of interrupting people, it seems to be bothering me more.\"  Patient uses reminders, lists which was started while kids in high school and has continued to have  self add items needed to List,\" I walk out without my list from the fridge, and I have to have him take a picture of it and send it to me\".  Difficulty sitting still when you should be sitting still in Mosque or meetings. Impulsive with shopping tends to purchase items that may be on sale or those that she may not need with the idea of \"I can always return it\".     Symptoms include fidgeting, difficulty remaining seated, easy distractability, blurting out answers prematurely, inability to complete tasks, difficulty sustaining attention, shifting from one uncompleted activity to another, talking excessively, interrupting others, ineffective listening, frequently losing items, and impulsivity.    1. ADHD:   a. Elementary school:   i. Grades:A's and B's  ii. Special classes or failures:no  iii. Got in trouble:no  iv. Referral for ADHD testing:no  b. Fhx:son, diagnosed officially in 7th grade, took medications for 1-2 yrs, restarted while in college only, \"he probably should be taking something, I have suggested it to him\"  c. Presently:  i. Problems with attention to detail:yes  ii. Problems with sustained attention:Yes  iii. Problems " "listening when spoken to directly:Yes, unable to concentrate on what others are saying, \"I have to get my point across, I know I have to wait for the break, but I can't wait for the break.\"  iv. Failure to finish tasks:yes, \"I will lay my husbandsTshirts on couch intending to hang up and they will be there for 2-3 days\" house hold tasks-dusting, mopping, find need to focus on floor boards  v. Avoids tasks that require sustained mental effort:yes, \"I excelled at work,  I could multi-task, 5-6 projects at desk, however, would wait until the last week to update credentials, I put off stuff and procrastinate all the time\"  vi. Easily distracted:yes  vii. Forgetting things:yes  viii. Losing things:yes  ix. Hard to organize:yes  x. Talks a lot and cutting people off:yes  xi. Drifts off during conversations:yes, \"I have to concentrate what I need to say to not forget what I have say, then I blurt it out, I am trying really hard, it's even hard with you to not stop and say stuff\"  xii. Difficulty with Reading:yes, \"I used to read a lot, has been using Audio books to listen in car or while out with friends or walking, has to rewind at times because my mind drifts off.\"  xiii. Difficulty watching TV/Movies:\"not really, we hardly ever have the TV on anymore.\"       PHQ-9 Depression Screening  PHQ-9 Total Score:   7/14/22 0 , reassess  10/2022    Little interest or pleasure in doing things?     Feeling down, depressed, or hopeless?     Trouble falling or staying asleep, or sleeping too much?     Feeling tired or having little energy?     Poor appetite or overeating?     Feeling bad about yourself - or that you are a failure or have let yourself or your family down?     Trouble concentrating on things, such as reading the newspaper or watching television?     Moving or speaking so slowly that other people could have noticed? Or the opposite - being so fidgety or restless that you have been moving around a lot more than usual?   "   Thoughts that you would be better off dead, or of hurting yourself in some way?     PHQ-9 Total Score       SUZANNE-7    7/14/22 0, reassess 10/2022    Past Surgical History:  Past Surgical History:   Procedure Laterality Date   • BLADDER SUSPENSION  2008   • COLON SURGERY     • COLONOSCOPY     • ESSURE TUBAL LIGATION  1999   • JOINT REPLACEMENT  Jan 2017    Right hip replacement   • KIDNEY SURGERY     • OTHER SURGICAL HISTORY      metal implant   • TUBAL ABDOMINAL LIGATION         Problem List:  Patient Active Problem List   Diagnosis   • Primary osteoarthritis of right hip   • Status post right hip replacement   • Hypothyroidism (acquired)   • Paroxysmal atrial fibrillation (HCC)       Allergy:   Allergies   Allergen Reactions   • Sulfa Antibiotics Rash        Discontinued Medications:  There are no discontinued medications.    Current Medications:   Current Outpatient Medications   Medication Sig Dispense Refill   • Auvi-Q 0.3 MG/0.3ML solution auto-injector injection INJECT AS NEEDED FOR SEVERE ALLERGIC REACTION INCLUDING ANAPHYLAXIS AS DIRECTED     • azelastine (ASTELIN) 0.1 % nasal spray USE 1 TO 2 SPRAYS IN EACH NOSTRIL TWICE DAILY     • CBD (cannabidiol) oral oil Take  by mouth.     • cetirizine (zyrTEC) 10 MG tablet Take 10 mg by mouth Daily.     • levothyroxine (SYNTHROID, LEVOTHROID) 25 MCG tablet Take 1 tablet by mouth Daily. 90 tablet 1   • loteprednol etabonate (LOTEMAX) 0.5 % gel ophthalmic gel      • MAGNESIUM MALATE PO Take  by mouth Daily.     • Milk Thistle 150 MG capsule Take  by mouth.     • Misc. Devices (Nasal Spray Bottle) misc      • montelukast (SINGULAIR) 10 MG tablet Take 10 mg by mouth Every Night.     • triamcinolone (KENALOG) 0.1 % ointment APPLY TOPICALLY TO INSECT BITES TWICE DAILY AS NEEDED FOR ITCHING     • Vitamin D-Vitamin K (K2 Plus D3) 100-1000 MCG-UNIT tablet Take  by mouth Daily. Patient takes drops (not tablets) due to GI upset with tablet       No current  "facility-administered medications for this visit.       Past Medical History:  Past Medical History:   Diagnosis Date   • Allergic Whole life   • Anxiety    • Cervical cancer screening 2109   • Chronic allergic rhinitis    • Deep vein thrombosis (HCC) 2017    Caused after my hip replacement.   • Depression    • Hyperlipemia 03/15/2017   • Hypothyroidism 03/15/2017   • Kidney stones    • Limb pain    • Limb swelling        Past Psychiatric History:  Began Treatment:started in  with therapy due to spouse having an affair  Diagnoses:Depression and Anxiety  Psychiatrist:last seen from [id number]  Therapist:last seen from 7546-3964  Admission History:Denies  Medication Trials:Prozac--for one year \"felt like i was floating;\" Zoloft for 1 yr -, then started Effexor with divorce in  for1 yr-during time having increased stress with divorce as pt had lost hair and stomach problems and asked to be placed on meds; tolerated well  Self Harm: Denies  Suicide Attempts:Denies   Psychosis, Anxiety, Depression: Denies    Substance Abuse History:   Types:Alcohol daily in the evening to relax, 1-2 glasses of wine, or 1 beer or gin and tonic  Withdrawal Symptoms:Denies  Longest Period Sober:Not Applicable   AA: Not applicable     Social History:  Martial Status:  Employed:No 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  Kids:Yes or If so, how many 2 children and 2 step children  House:Lives in a house   History: Denies  Access to Guns: no    Social History     Socioeconomic History   • Marital status:    Tobacco Use   • Smoking status: Never Smoker   • Smokeless tobacco: Never Used   Vaping Use   • Vaping Use: Never used   Substance and Sexual Activity   • Alcohol use: Yes     Comment: drinks daily, wine, beer and lquor   • Drug use: Not Currently     Types: Marijuana     Comment: In teenage years   • Sexual activity: Yes       Family History:   Suicide Attempts: " Denies  Suicide Completions:Denies      Family History   Problem Relation Age of Onset   • Dementia Mother    • Diabetes type II Mother    • Cancer Mother 70        Kidney cancer (left kidney removed) and endometrial cancer (hysterectomy)   • Prostate cancer Father    • Other Maternal Grandmother         Colon cancer   • Colon cancer Maternal Grandmother 70   • Diabetes Paternal Grandmother    • ADD / ADHD Son        Developmental History:   Born: [place], lived in [place] since age 1  Siblings:1 sister, 1 brother-both younger  Childhood: Denies Abuse  High School:Completed  College:2 yrs, no degree    Mental Status Exam:   Hygiene:   good  Cooperation:  Cooperative  Eye Contact:  Good  Psychomotor Behavior:  Appropriate  Affect:  Appropriate  Mood: euthymic  Speech:  Normal  Thought Process:  Goal directed  Thought Content:  Mood congruent  Suicidal:  None  Homicidal:  None  Hallucinations:  None  Delusion:  None  Memory:  Intact  Orientation:  Grossly intact  Reliability:  good  Insight:  Good  Judgement:  Good  Impulse Control:  Good  Physical/Medical Issues:  Yes Hypothyroidism,OA rt hip,paroxysmal afib, HLD     Review of Systems:  Review of Systems   Constitutional: Negative for diaphoresis and fatigue.   HENT: Negative for drooling.    Eyes: Negative for visual disturbance.   Respiratory: Negative for cough and shortness of breath.    Cardiovascular: Negative for chest pain, palpitations and leg swelling.   Gastrointestinal: Negative for nausea and vomiting.   Endocrine: Negative for cold intolerance and heat intolerance.   Genitourinary: Negative for difficulty urinating.   Musculoskeletal: Negative for joint swelling.   Allergic/Immunologic: Negative for immunocompromised state.   Neurological: Negative for dizziness, seizures, speech difficulty and numbness.   Psychiatric/Behavioral: Positive for decreased concentration. Negative for self-injury, sleep disturbance and suicidal ideas. The patient is not  "nervous/anxious and is not hyperactive.          Physical Exam:  Physical Exam  Psychiatric:         Attention and Perception: Attention and perception normal.         Mood and Affect: Mood and affect normal.         Speech: Speech normal.         Behavior: Behavior normal. Behavior is cooperative.         Thought Content: Thought content normal. Thought content does not include suicidal ideation. Thought content does not include suicidal plan.         Cognition and Memory: Cognition and memory normal.         Judgment: Judgment normal.         Vital Signs:   /68   Pulse 65   Ht 170.2 cm (67\")   Wt 91.4 kg (201 lb 6.4 oz)   BMI 31.54 kg/m²      Lab Results:   Ancillary Procedure on 06/23/2022   Component Date Value Ref Range Status   • Target HR (85%) 06/23/2022 137  bpm Final   • Max. Pred. HR (100%) 06/23/2022 161  bpm Final   Ancillary Procedure on 06/23/2022   Component Date Value Ref Range Status   • Target HR (85%) 06/23/2022 137  bpm Final   • Max. Pred. HR (100%) 06/23/2022 161  bpm Final   • AI Jet height 06/23/2022 0.30  cm Final   • AI Jet index 06/23/2022 14.00  % Final   • IVRT 06/23/2022 92.0  msec Final   • LA ESV Index (BP) 06/23/2022 22.0  ml/m2 Final   • Avg E/e' ratio 06/23/2022 6.64   Final   • Ao root diam 06/23/2022 3.5  cm Final   • EF(MOD-bp) 06/23/2022 59.0  % Final   • Lat Peak [place] 06/23/2022 13.0  cm/sec Final   • LVPWd 06/23/2022 0.9  cm Final   • Med Peak [place] 06/23/2022 9.00  cm/sec Final   • MV dec time 06/23/2022 173  msec Final   • IVSd 06/23/2022 1.1  cm Final   • LA dimension (2D)  06/23/2022 2.9  cm Final   • LVIDd 06/23/2022 4.7  cm Final   • LVIDs 06/23/2022 3.1  cm Final   • MV E/A 06/23/2022 1.2   Final   • MV E max saud 06/23/2022 73.0  cm/sec Final   • TAPSE (>1.6) 06/23/2022 2.00  cm Final   Lab on 05/18/2022   Component Date Value Ref Range Status   • Glucose 05/18/2022 95  65 - 99 mg/dL Final   • BUN 05/18/2022 12  6 - 20 mg/dL Final   • Creatinine " 05/18/2022 0.76  0.57 - 1.00 mg/dL Final   • Sodium 05/18/2022 140  136 - 145 mmol/L Final   • Potassium 05/18/2022 4.3  3.5 - 5.2 mmol/L Final   • Chloride 05/18/2022 105  98 - 107 mmol/L Final   • CO2 05/18/2022 24.5  22.0 - 29.0 mmol/L Final   • Calcium 05/18/2022 9.6  8.6 - 10.5 mg/dL Final   • Total Protein 05/18/2022 6.7  6.0 - 8.5 g/dL Final   • Albumin 05/18/2022 4.40  3.50 - 5.20 g/dL Final   • ALT (SGPT) 05/18/2022 18  1 - 33 U/L Final   • AST (SGOT) 05/18/2022 23  1 - 32 U/L Final   • Alkaline Phosphatase 05/18/2022 60  39 - 117 U/L Final   • Total Bilirubin 05/18/2022 0.3  0.0 - 1.2 mg/dL Final   • Globulin 05/18/2022 2.3  gm/dL Final   • A/G Ratio 05/18/2022 1.9  g/dL Final   • BUN/Creatinine Ratio 05/18/2022 15.8  7.0 - 25.0 Final   • Anion Gap 05/18/2022 10.5  5.0 - 15.0 mmol/L Final   • eGFR 05/18/2022 91.0  >60.0 mL/min/1.73 Final    National Kidney Foundation and American Society of Nephrology (ASN) Task Force recommended calculation based on the Chronic Kidney Disease Epidemiology Collaboration (CKD-EPI) equation refit without adjustment for race.   • Total Cholesterol 05/18/2022 175  0 - 200 mg/dL Final   • Triglycerides 05/18/2022 84  0 - 150 mg/dL Final   • HDL Cholesterol 05/18/2022 49  40 - 60 mg/dL Final   • LDL Cholesterol  05/18/2022 110 (A) 0 - 100 mg/dL Final   • VLDL Cholesterol 05/18/2022 16  5 - 40 mg/dL Final   • LDL/HDL Ratio 05/18/2022 2.23   Final   • TSH 05/18/2022 3.740  0.270 - 4.200 uIU/mL Final   • Free T4 05/18/2022 1.21  0.93 - 1.70 ng/dL Final   • Hepatitis B Surface Ag 05/18/2022 Non-Reactive  Non-Reactive Final   • Hep A IgM 05/18/2022 Non-Reactive  Non-Reactive Final   • Hep B C IgM 05/18/2022 Non-Reactive  Non-Reactive Final   • Hepatitis C Ab 05/18/2022 Non-Reactive  Non-Reactive Final   • 25 Hydroxy, Vitamin D 05/18/2022 88.3  30.0 - 100.0 ng/ml Final   • WBC 05/18/2022 6.17  3.40 - 10.80 10*3/mm3 Final   • RBC 05/18/2022 4.66  3.77 - 5.28 10*6/mm3 Final   •  Hemoglobin 05/18/2022 14.5  12.0 - 15.9 g/dL Final   • Hematocrit 05/18/2022 44.0  34.0 - 46.6 % Final   • MCV 05/18/2022 94.4  79.0 - 97.0 fL Final   • MCH 05/18/2022 31.1  26.6 - 33.0 pg Final   • MCHC 05/18/2022 33.0  31.5 - 35.7 g/dL Final   • RDW 05/18/2022 12.5  12.3 - 15.4 % Final   • RDW-SD 05/18/2022 43.1  37.0 - 54.0 fl Final   • MPV 05/18/2022 9.9  6.0 - 12.0 fL Final   • Platelets 05/18/2022 283  140 - 450 10*3/mm3 Final   • Neutrophil % 05/18/2022 54.8  42.7 - 76.0 % Final   • Lymphocyte % 05/18/2022 33.9  19.6 - 45.3 % Final   • Monocyte % 05/18/2022 7.9  5.0 - 12.0 % Final   • Eosinophil % 05/18/2022 2.1  0.3 - 6.2 % Final   • Basophil % 05/18/2022 1.0  0.0 - 1.5 % Final   • Immature Grans % 05/18/2022 0.3  0.0 - 0.5 % Final   • Neutrophils, Absolute 05/18/2022 3.38  1.70 - 7.00 10*3/mm3 Final   • Lymphocytes, Absolute 05/18/2022 2.09  0.70 - 3.10 10*3/mm3 Final   • Monocytes, Absolute 05/18/2022 0.49  0.10 - 0.90 10*3/mm3 Final   • Eosinophils, Absolute 05/18/2022 0.13  0.00 - 0.40 10*3/mm3 Final   • Basophils, Absolute 05/18/2022 0.06  0.00 - 0.20 10*3/mm3 Final   • Immature Grans, Absolute 05/18/2022 0.02  0.00 - 0.05 10*3/mm3 Final   • nRBC 05/18/2022 0.0  0.0 - 0.2 /100 WBC Final       EKG Results:  No orders to display       Imaging Results:  No Images in the past 120 days found..      Assessment & Plan   Diagnoses and all orders for this visit:    1. ADHD (attention deficit hyperactivity disorder), inattentive type    2. High risk medication use  -     POC Urine Drug Screen [facility]; Future        Visit Diagnoses:    ICD-10-CM ICD-9-CM   1. ADHD (attention deficit hyperactivity disorder), inattentive type  F90.0 314.00   2. High risk medication use  Z79.899 V58.69       PLAN:  1.   Safety: No acute safety concerns  2. Therapy: None  3. Risk Assessment: Risk of self-harm acutely is low.  Risk factors include anxiety disorder, mood disorder, and recent psychosocial stressors (pandemic).  Protective factors include no family history, denies access to guns/weapons, no present SI, no history of suicide attempts or self-harm in the past, minimal AODA, healthcare seeking, future orientation, willingness to engage in care.  Risk of self-harm chronically is also low, but could be further elevated in the event of treatment noncompliance and/or AODA.  4. Meds: Will potentially start Adderall XR 10 mg by mouth daily in the morning to target symptoms of ADHD including:  Inattentiveness & impaired concentration.  Risks, benefits, side effects discussed with patient including elevated heart rate, elevated blood pressure, irritability, insomnia, sexual dysfunction, appetite suppressing properties, psychosis.  After discussion of these risks and benefits, the patient voiced understanding and agreed to proceed. [place] reviewed, UDS ordered, and controlled substance agreement signed & witnessed. Patient informed this medication would not be ordered until UDS resulted and was negative, at that time a Prescription will be sent to  for signature.  5. Labs: POC UDS today in clinic    Lengthy discussion held with patient regarding CSA and medication education.   Bottle of Hemp oil had a QRS code to scan, which was done per patient request, which indicated percentages of ingredients as each bottle varies.  Certificate of Analysis, [facility], Cannabinoid Results: Total THC 0.231%, Total CBD 6.101%, Total Cannabinoids 6.534%. Will scan certificate to EHR.   Patient will be in [place] in October, will check with  in regards to prescribing CS sending to [place].   Will contact patient if UDS needs to be sent for confirmation. Otherwise will proceed with ordering Adderall XR.         9/15/22:   ADHD testing completed by Dr. Greg Bennett on 9/1/22 confirming a diagnosis of ADHD. (total time of review 9 mins)    Discussed ADHD treatment options of non-stimulants vs stimulant medications, after discussion patient  wishes to proceed with Adderall.  Informed patient of policy requirements prior to starting Adderall, patient will plan on coming in tomorrow due to available appointment at 8 am.  Discussed current CBD oil which patient reports is hemp based as the UDS may show positive for THC. Patient takes CBD oil for sleep and muscle aches, which has been effective.  IF UDS positive patient was informed Adderall would not be prescribed, and will have to send for a confirmation.  Patient verbalized understanding.       7/14/22:   No Medications, Declines therapy at this time.  Referral for ADHD testing with   Dr. Greg Bennett, Psychologist, MS, LPP  [address], [address]  [place] [id number] [phone]   Currently only accepting referrals for psychological testing services.  Patient to contact provider and set up appointment.  And to contact office if unable to get an appointment scheduled.   -Attached list of several other sites for ADHD testing. Patient instructed to contact providers on list to determine availability of appointments, instructed patient to take notes while calling places indicating first available appointment, and to ask to be placed on waiting list.  If an office is chosen and requests the referral order please contact my Medical Assistant, Hanh, directly at [phone] informing of chosen office and the information will be sent.    Patient with high suspicion of having ADHD, will send for formal testing and have patient return in 2 weeks to discuss medication options as if patient is able to be tested in the next 1-2 months, may wait on starting a non-stimulant medication.     Patient screened positive for depression based on a PHQ-9 score of 0 on 7/14/22 . Follow-up recommendations include: Suicide Risk Assessment performed.         TREATMENT PLAN/GOALS: Continue supportive psychotherapy efforts and medications as indicated. Treatment and medication options discussed during today's  visit. Patient ackowledged and verbally consented to continue with current treatment plan and was educated on the importance of compliance with treatment and follow-up appointments.    MEDICATION ISSUES:  MARLYS reviewed as expected.  Discussed medication options and treatment plan of prescribed medication as well as the risks, benefits, and side effects including potential falls, possible impaired driving and metabolic adversities among others. Patient is agreeable to call the office with any worsening of symptoms or onset of side effects. Patient is agreeable to call 911 or go to the nearest ER should he/she begin having SI/HI. No medication side effects or related complaints today.     MEDS ORDERED DURING VISIT:  No orders of the defined types were placed in this encounter.      Return in about 4 weeks (around 10/14/2022) for Next scheduled follow up.         I spent 43 minutes caring for Jenn on this date of service. This time includes time spent by me in the following activities: preparing for the visit, obtaining and/or reviewing a separately obtained history, performing a medically appropriate examination and/or evaluation, counseling and educating the patient/family/caregiver, ordering medications, tests, or procedures, referring and communicating with other health care professionals, documenting information in the medical record and care coordination.      This document has been electronically signed by CHELSI Burnett  September 16, 2022 08:35 EDT      Part of this note may be an electronic transcription/translation of spoken language to printed text using the Dragon Dictation System.

## 2022-10-20 ENCOUNTER — TELEMEDICINE (OUTPATIENT)
Dept: BEHAVIORAL HEALTH | Facility: CLINIC | Age: 57
End: 2022-10-20

## 2022-10-20 DIAGNOSIS — F90.0 ADHD (ATTENTION DEFICIT HYPERACTIVITY DISORDER), INATTENTIVE TYPE: Primary | ICD-10-CM

## 2022-10-20 RX ORDER — AZELASTINE HYDROCHLORIDE, FLUTICASONE PROPIONATE 137; 50 UG/1; UG/1
1 SPRAY, METERED NASAL 2 TIMES DAILY
COMMUNITY
Start: 2022-09-19

## 2022-10-20 NOTE — PATIENT INSTRUCTIONS
"1.  Please return to clinic at your next scheduled visit.  Contact the [facility] ([phone]) or **Hanh, Medical Assistant at [place] [facility] directly at [phone] at least 24 hours prior in the event you need to cancel.**    2. Should you want to get in touch with your provider, CHELSI Burnett, please contact MY Medical Assistant, Hanh, directly at [phone].  Recommend saving Hanh's direct number in phone as this is the PREFERRED & EASIEST way to get in contact with your provider.  Please leave a voice mail if you do not get an answer and she will return your call within 24 hrs. You will NOT be able to contact provider on [facility], as Behavioral Health Providers are restricted. YOU MUST CALL [phone]    If you need to speak with the on call provider after hours or on weekends, please Contact the [facility] ([phone]) and staff will be able to page the provider on call directly.        3, MEDICATION REFILLS:  PLEASE CALL THE PHARMACY TO REQUEST ALL MEDICATION REFILLS TO ENSURE YOU ARE RECEIVING YOUR MEDICATIONS IN A TIMELY MANNER.    IF YOU USE AN AUTOMATED SERVICE AT THE PHARMACY FOR REFILLS AND ARE TOLD THERE ARE \"NO REFILLS REMAINING\"   PLEASE CALL THE PHARMACY & SPEAK TO A LIVE PERSON TO VERIFY IT IS THE MOST UP TO DATE PRESCRIPTION ON FILE.    All new prescriptions will have a different number, therefore, if you were given refills for a medication today or at last visit it will not have the same number as the previous prescription.       4.  In the event you have personal crisis, contact the following crisis numbers: Suicide Prevention Hotline 1-523.775.3826 or *988, [facility] Helpline 8-644-102-ISHMAEL; [facility] [phone]; text HELLO to 878587; or 809.      5. We would appreciate your feedback, please scan the QRS code on the back of your appointment card (or see below) and complete a brief survey.  [place] location is still not available, so " "please click \"[place]\" location.  Thank you      SPECIFIC RECOMMENDATIONS:     1.      Medications discussed at this encounter:                   - no changes, request refill 10/25/22      2.      Psychotherapy recommendations:  Declined     3.     Return to clinic: Tues 11/22/22 at 840 am     Please arrive at least 15 minutes before your scheduled appointment time to complete check in process.      IF you are scheduled for a [facility] visit, PLEASE ANSWER YOUR PHONE WHEN OFFICE CALLS PRIOR TO VISIT TO COMPLETE THE CHECK IN PROCESS, EVEN IF THE E-CHECK IN WAS COMPLETED.     If you would like to log on to [facility] and complete the \"E-Check IN\" prior to your visit, please do so, this will speed up the check in process.  If you are due for questionnaires, you will find those on [facility] as well, please try to complete prior to your scheduled appointment.          "

## 2022-10-20 NOTE — PROGRESS NOTES
"This provider is located at [address] [place] [address], [address], [place] [address]. The Patient is seen remotely using [employer]. Patient is being seen via telehealth and confirm that they are in a secure environment for this session. The patient's condition being diagnosed/treated is appropriate for telemedicine. The provider identified himself/herself: herself as well as her credentials.   The patient gave consent to be seen remotely, and when consent is given they understand that the consent allows for patient identifiable information to be sent to a third party as needed.   They may refuse to be seen remotely at any time. The electronic data is encrypted and password protected, and the patient has been advised of the potential risks to privacy not withstanding such measures.    You have chosen to receive care through a telehealth visit.  Do you consent to use a video/audio connection for your medical care today? Yes      Subjective   Jenn James is a 57 y.o. female who presents today for follow up    Referring Provider:  Mariana Jalloh, CHELSI  [id number]  [place] [id number]    Chief Complaint:  ADHD medication check    History of Present Illness:   10/20/22:  Patient presents today via MyChart Video visit from daughter's home in [place].   Patient was started on Adderall XR 10 mg at last visit for which patient reports the first few days had increased energy, which had company, had difficulty sleeping the first 2 nights, however, no longer having difficulty.  Patient reports taking medication at 7 am, one day she took it later at 930 am and had difficulty sleeping that night. Patient has been taking thyroid medication upon awakening to use the bathroom around 5-530 am and upon waking up around 7 takes the Adderall.      \"I think it's a lot better, my daughter said she seems to notice, I still have a tendency to interrupt but that's better, I don't seem to repeat questions, as it was an issue " "before because I was not really listening. My mind was somewhere else.\"   Denies side effects, patient was surprised she was able to sit still after a few days, now able to complete tasks as less distraction, able to continue with task at hand before switching to another task.     Patient will be returning from [place] 10/25/22.     9/16/22:  Patient presents today in office today to further discuss ADHD treatment with a controlled substance and to complete CSA and obtain POC UDS today per policy.     Patient brought in bottle of Hemp CBD oil 3200mg/2 oz, 1-3 drops 3 times daily on bottle, however, patient only takes in the evening for post menopausal symptoms, muscle aches, and vocal cord dysfunction, \"my neck tenses up , had to do physical therapy in past, and oil helps, I found out I am Allergic to mold.\"    9/15/22:  Patient presents today via [employer] Video visit from home, reports received ADHD test results though has not discussed.  Patient continues to have symptoms of inttentiveness, concentration difficulty, difficulty completing tasks, and switching from one uncompleted task to another.     Patient does take 3 drops of CBD oil, hemp based, at night for sleep, relaxes muscles as patient started taking while going through menopause.   Patient reports  is allergic to Wellbutrin and daughter tried Wellbutrin and did not do well, as patient would prefer to try Adderall first rather than a non-stimulant medication.  Patient will be out of town for 2 weeks in October visiting daughter in [place], likely early October.       7/14/22:  INITIAL EVAL  Patient presents today via MyChart Video visit from home with a history of depression and anxiety for which treatment was started in late 1995 with therapy, and medications from [id number] due to divorce.     Patient is currently not taking any psychotropic medications nor has had any since 2005.     \"My issue is I can't concentrate, I start something and " "cant finish something, forgets things often\".  Patient recalls while in school always \"fidgety\" crossing legs, moving often in seat.  Admits to interrupting people, gets distracted easily, daughter noticing.    Admits to over the past 2 yrs began to feel ADHD may be a possibility, \"I am tired of being like this, forgetting stuff, going in and out of the house, tired of interrupting people, it seems to be bothering me more.\"  Patient uses reminders, lists which was started while kids in high school and has continued to have  self add items needed to List,\" I walk out without my list from the fridge, and I have to have him take a picture of it and send it to me\".  Difficulty sitting still when you should be sitting still in Yazidi or meetings. Impulsive with shopping tends to purchase items that may be on sale or those that she may not need with the idea of \"I can always return it\".     Symptoms include fidgeting, difficulty remaining seated, easy distractability, blurting out answers prematurely, inability to complete tasks, difficulty sustaining attention, shifting from one uncompleted activity to another, talking excessively, interrupting others, ineffective listening, frequently losing items, and impulsivity.    1. ADHD:   a. Elementary school:   i. Grades:A's and B's  ii. Special classes or failures:no  iii. Got in trouble:no  iv. Referral for ADHD testing:no  b. Fhx:son, diagnosed officially in 7th grade, took medications for 1-2 yrs, restarted while in college only, \"he probably should be taking something, I have suggested it to him\"  c. Presently:  i. Problems with attention to detail:yes  ii. Problems with sustained attention:Yes  iii. Problems listening when spoken to directly:Yes, unable to concentrate on what others are saying, \"I have to get my point across, I know I have to wait for the break, but I can't wait for the break.\"  iv. Failure to finish tasks:yes, \"I will lay my husbandsTshirts on " "couch intending to hang up and they will be there for 2-3 days\" house hold tasks-dusting, mopping, find need to focus on floor boards  v. Avoids tasks that require sustained mental effort:yes, \"I excelled at work,  I could multi-task, 5-6 projects at desk, however, would wait until the last week to update credentials, I put off stuff and procrastinate all the time\"  vi. Easily distracted:yes  vii. Forgetting things:yes  viii. Losing things:yes  ix. Hard to organize:yes  x. Talks a lot and cutting people off:yes  xi. Drifts off during conversations:yes, \"I have to concentrate what I need to say to not forget what I have say, then I blurt it out, I am trying really hard, it's even hard with you to not stop and say stuff\"  xii. Difficulty with Reading:yes, \"I used to read a lot, has been using Audio books to listen in car or while out with friends or walking, has to rewind at times because my mind drifts off.\"  xiii. Difficulty watching TV/Movies:\"not really, we hardly ever have the TV on anymore.\"       PHQ-9 Depression Screening  PHQ-9 Total Score:   7/14/22 0 , reassess 12/2022    Little interest or pleasure in doing things?     Feeling down, depressed, or hopeless?     Trouble falling or staying asleep, or sleeping too much?     Feeling tired or having little energy?     Poor appetite or overeating?     Feeling bad about yourself - or that you are a failure or have let yourself or your family down?     Trouble concentrating on things, such as reading the newspaper or watching television?     Moving or speaking so slowly that other people could have noticed? Or the opposite - being so fidgety or restless that you have been moving around a lot more than usual?     Thoughts that you would be better off dead, or of hurting yourself in some way?     PHQ-9 Total Score       SUZANNE-7    7/14/22 0, reassess 12/2022    Past Surgical History:  Past Surgical History:   Procedure Laterality Date   • BLADDER SUSPENSION  2008   • " COLON SURGERY     • COLONOSCOPY     • ESSURE TUBAL LIGATION  1999   • JOINT REPLACEMENT  Jan 2017    Right hip replacement   • KIDNEY SURGERY     • OTHER SURGICAL HISTORY      metal implant   • TUBAL ABDOMINAL LIGATION         Problem List:  Patient Active Problem List   Diagnosis   • Primary osteoarthritis of right hip   • Status post right hip replacement   • Hypothyroidism (acquired)   • Paroxysmal atrial fibrillation (HCC)       Allergy:   Allergies   Allergen Reactions   • Sulfa Antibiotics Rash        Discontinued Medications:  Medications Discontinued During This Encounter   Medication Reason   • loteprednol etabonate (LOTEMAX) 0.5 % gel ophthalmic gel *Therapy completed   • triamcinolone (KENALOG) 0.1 % ointment *Therapy completed       Current Medications:   Current Outpatient Medications   Medication Sig Dispense Refill   • amphetamine-dextroamphetamine XR (Adderall XR) 10 MG 24 hr capsule Take 1 capsule by mouth Every Morning for 42 days Indications: Attention Deficit Hyperactivity Disorder 42 capsule 0   • Auvi-Q 0.3 MG/0.3ML solution auto-injector injection INJECT AS NEEDED FOR SEVERE ALLERGIC REACTION INCLUDING ANAPHYLAXIS AS DIRECTED     • azelastine (ASTELIN) 0.1 % nasal spray USE 1 TO 2 SPRAYS IN EACH NOSTRIL TWICE DAILY     • Azelastine-Fluticasone 137-50 MCG/ACT suspension 1 spray by Alternating Nares route 2 (Two) Times a Day. shake liquid     • CBD (cannabidiol) oral oil Take  by mouth.     • cetirizine (zyrTEC) 10 MG tablet Take 10 mg by mouth Daily.     • ciclopirox (PENLAC) 8 % solution APPLY UNDER AND ON AFFECTED NAILS EVERY DAY AS DIRECTED     • levothyroxine (SYNTHROID, LEVOTHROID) 25 MCG tablet Take 1 tablet by mouth Daily. 90 tablet 1   • MAGNESIUM MALATE PO Take  by mouth Daily.     • Milk Thistle 150 MG capsule Take  by mouth.     • Misc. Devices (Nasal Spray Bottle) misc      • montelukast (SINGULAIR) 10 MG tablet Take 10 mg by mouth Every Night.     • Vitamin D-Vitamin K (K2 Plus D3)  "100-1000 MCG-UNIT tablet Take  by mouth Daily. Patient takes drops (not tablets) due to GI upset with tablet       No current facility-administered medications for this visit.       Past Medical History:  Past Medical History:   Diagnosis Date   • Allergic Whole life   • Anxiety    • Cervical cancer screening 2109   • Chronic allergic rhinitis    • Deep vein thrombosis (HCC) 2017    Caused after my hip replacement.   • Depression    • Hyperlipemia 03/15/2017   • Hypothyroidism 03/15/2017   • Kidney stones    • Limb pain    • Limb swelling        Past Psychiatric History:  Began Treatment:started in  with therapy due to spouse having an affair  Diagnoses:Depression and Anxiety  Psychiatrist:last seen from [id number]  Therapist:last seen from [id number]  Admission History:Denies  Medication Trials:Prozac--for one year \"felt like i was floating;\" Zoloft for 1 yr -, then started Effexor with divorce in  for1 yr-during time having increased stress with divorce as pt had lost hair and stomach problems and asked to be placed on meds; tolerated well  Self Harm: Denies  Suicide Attempts:Denies   Psychosis, Anxiety, Depression: Denies    Substance Abuse History:   Types:Alcohol daily in the evening to relax, 1-2 glasses of wine, or 1 beer or gin and tonic  Withdrawal Symptoms:Denies  Longest Period Sober:Not Applicable   AA: Not applicable     Social History:  Martial Status:  Employed:No 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  Kids:Yes or If so, how many 2 children and 2 step children  House:Lives in a house   History: Denies  Access to Guns: no    Social History     Socioeconomic History   • Marital status:    Tobacco Use   • Smoking status: Never   • Smokeless tobacco: Never   Vaping Use   • Vaping Use: Never used   Substance and Sexual Activity   • Alcohol use: Yes     Comment: drinks daily, wine, beer and lquor   • Drug use: Not Currently     Types: " Marijuana     Comment: In teenage years   • Sexual activity: Yes       Family History:   Suicide Attempts: Denies  Suicide Completions:Denies      Family History   Problem Relation Age of Onset   • Dementia Mother    • Diabetes type II Mother    • Cancer Mother 70        Kidney cancer (left kidney removed) and endometrial cancer (hysterectomy)   • Prostate cancer Father    • Other Maternal Grandmother         Colon cancer   • Colon cancer Maternal Grandmother 70   • Diabetes Paternal Grandmother    • ADD / ADHD Son        Developmental History:   Born: [place], lived in [place] since age 1  Siblings:1 sister, 1 brother-both younger  Childhood: Denies Abuse  High School:Completed  College:2 yrs, no degree    Mental Status Exam:   Hygiene:   good  Cooperation:  Cooperative  Eye Contact:  Good  Psychomotor Behavior:  Appropriate  Affect:  Appropriate  Mood: euthymic  Speech:  Normal  Thought Process:  Goal directed  Thought Content:  Mood congruent  Suicidal:  None  Homicidal:  None  Hallucinations:  None  Delusion:  None  Memory:  Intact  Orientation:  Grossly intact  Reliability:  good  Insight:  Good  Judgement:  Good  Impulse Control:  Good  Physical/Medical Issues:  Yes Hypothyroidism,OA rt hip,paroxysmal afib, HLD     Review of Systems:  Review of Systems   Constitutional: Negative for diaphoresis and fatigue.   HENT: Negative for drooling.    Eyes: Negative for visual disturbance.   Respiratory: Negative for cough and shortness of breath.    Cardiovascular: Negative for chest pain, palpitations and leg swelling.   Gastrointestinal: Negative for nausea and vomiting.   Endocrine: Negative for cold intolerance and heat intolerance.   Genitourinary: Negative for difficulty urinating.   Musculoskeletal: Negative for joint swelling.   Allergic/Immunologic: Negative for immunocompromised state.   Neurological: Negative for dizziness, seizures, speech difficulty and numbness.   Psychiatric/Behavioral: Negative for decreased  concentration, self-injury, sleep disturbance and suicidal ideas. The patient is not nervous/anxious and is not hyperactive.          Physical Exam:  Physical Exam  Psychiatric:         Attention and Perception: Attention and perception normal.         Mood and Affect: Mood and affect normal.         Speech: Speech normal.         Behavior: Behavior normal. Behavior is cooperative.         Thought Content: Thought content normal. Thought content does not include suicidal ideation. Thought content does not include suicidal plan.         Cognition and Memory: Cognition and memory normal.         Judgment: Judgment normal.         Vital Signs:   There were no vitals taken for this visit.     Lab Results:   Clinical Support on 09/16/2022   Component Date Value Ref Range Status   • Amphetamine Screen, Urine 09/16/2022 Negative  Negative Final   • Barbiturates Screen, Urine 09/16/2022 Negative  Negative Final   • Buprenorphine, Screen, Urine 09/16/2022 Negative  Negative Final   • Benzodiazepine Screen, Urine 09/16/2022 Negative  Negative Final   • Cocaine Screen, Urine 09/16/2022 Negative  Negative Final   • MDMA (ECSTASY) 09/16/2022 Negative  Negative Final   • Methamphetamine, Ur 09/16/2022 Negative  Negative Final   • Methadone Screen, Urine 09/16/2022 Negative  Negative Final   • Opiate Screen 09/16/2022 Negative  Negative Final   • Oxycodone Screen, Urine 09/16/2022 Negative  Negative Final   • Phencyclidine (PCP), Urine 09/16/2022 Negative  Negative Final   • THC, Screen, Urine 09/16/2022 Negative  Negative Final   • THC INTERNAL CONTROL 09/16/2022 Passed  Passed Final   • Lot Number 09/16/2022 [id number]   Final   • Expiration Date 09/16/2022 02/29/24   Final   Ancillary Procedure on 06/23/2022   Component Date Value Ref Range Status   • Target HR (85%) 06/23/2022 137  bpm Final   • Max. Pred. HR (100%) 06/23/2022 161  bpm Final   Ancillary Procedure on 06/23/2022   Component Date Value Ref Range Status   • Target  HR (85%) 06/23/2022 137  bpm Final   • Max. Pred. HR (100%) 06/23/2022 161  bpm Final   • AI Jet height 06/23/2022 0.30  cm Final   • AI Jet index 06/23/2022 14.00  % Final   • IVRT 06/23/2022 92.0  msec Final   • LA ESV Index (BP) 06/23/2022 22.0  ml/m2 Final   • Avg E/e' ratio 06/23/2022 6.64   Final   • Ao root diam 06/23/2022 3.5  cm Final   • EF(MOD-bp) 06/23/2022 59.0  % Final   • Lat Peak [place]aud 06/23/2022 13.0  cm/sec Final   • LVPWd 06/23/2022 0.9  cm Final   • Med Peak E[place]aud 06/23/2022 9.00  cm/sec Final   • MV dec time 06/23/2022 173  msec Final   • IVSd 06/23/2022 1.1  cm Final   • LA dimension (2D)  06/23/2022 2.9  cm Final   • LVIDd 06/23/2022 4.7  cm Final   • LVIDs 06/23/2022 3.1  cm Final   • MV E/A 06/23/2022 1.2   Final   • MV E max saud 06/23/2022 73.0  cm/sec Final   • TAPSE (>1.6) 06/23/2022 2.00  cm Final   Lab on 05/18/2022   Component Date Value Ref Range Status   • Glucose 05/18/2022 95  65 - 99 mg/dL Final   • BUN 05/18/2022 12  6 - 20 mg/dL Final   • Creatinine 05/18/2022 0.76  0.57 - 1.00 mg/dL Final   • Sodium 05/18/2022 140  136 - 145 mmol/L Final   • Potassium 05/18/2022 4.3  3.5 - 5.2 mmol/L Final   • Chloride 05/18/2022 105  98 - 107 mmol/L Final   • CO2 05/18/2022 24.5  22.0 - 29.0 mmol/L Final   • Calcium 05/18/2022 9.6  8.6 - 10.5 mg/dL Final   • Total Protein 05/18/2022 6.7  6.0 - 8.5 g/dL Final   • Albumin 05/18/2022 4.40  3.50 - 5.20 g/dL Final   • ALT (SGPT) 05/18/2022 18  1 - 33 U/L Final   • AST (SGOT) 05/18/2022 23  1 - 32 U/L Final   • Alkaline Phosphatase 05/18/2022 60  39 - 117 U/L Final   • Total Bilirubin 05/18/2022 0.3  0.0 - 1.2 mg/dL Final   • Globulin 05/18/2022 2.3  gm/dL Final   • A/G Ratio 05/18/2022 1.9  g/dL Final   • BUN/Creatinine Ratio 05/18/2022 15.8  7.0 - 25.0 Final   • Anion Gap 05/18/2022 10.5  5.0 - 15.0 mmol/L Final   • eGFR 05/18/2022 91.0  >60.0 mL/min/1.73 Final    National Kidney Foundation and American Society of Nephrology (ASN) Task Force  recommended calculation based on the Chronic Kidney Disease Epidemiology Collaboration (CKD-EPI) equation refit without adjustment for race.   • Total Cholesterol 05/18/2022 175  0 - 200 mg/dL Final   • Triglycerides 05/18/2022 84  0 - 150 mg/dL Final   • HDL Cholesterol 05/18/2022 49  40 - 60 mg/dL Final   • LDL Cholesterol  05/18/2022 110 (H)  0 - 100 mg/dL Final   • VLDL Cholesterol 05/18/2022 16  5 - 40 mg/dL Final   • LDL/HDL Ratio 05/18/2022 2.23   Final   • TSH 05/18/2022 3.740  0.270 - 4.200 uIU/mL Final   • Free T4 05/18/2022 1.21  0.93 - 1.70 ng/dL Final   • Hepatitis B Surface Ag 05/18/2022 Non-Reactive  Non-Reactive Final   • Hep A IgM 05/18/2022 Non-Reactive  Non-Reactive Final   • Hep B C IgM 05/18/2022 Non-Reactive  Non-Reactive Final   • Hepatitis C Ab 05/18/2022 Non-Reactive  Non-Reactive Final   • 25 Hydroxy, Vitamin D 05/18/2022 88.3  30.0 - 100.0 ng/ml Final   • WBC 05/18/2022 6.17  3.40 - 10.80 10*3/mm3 Final   • RBC 05/18/2022 4.66  3.77 - 5.28 10*6/mm3 Final   • Hemoglobin 05/18/2022 14.5  12.0 - 15.9 g/dL Final   • Hematocrit 05/18/2022 44.0  34.0 - 46.6 % Final   • MCV 05/18/2022 94.4  79.0 - 97.0 fL Final   • MCH 05/18/2022 31.1  26.6 - 33.0 pg Final   • MCHC 05/18/2022 33.0  31.5 - 35.7 g/dL Final   • RDW 05/18/2022 12.5  12.3 - 15.4 % Final   • RDW-SD 05/18/2022 43.1  37.0 - 54.0 fl Final   • MPV 05/18/2022 9.9  6.0 - 12.0 fL Final   • Platelets 05/18/2022 283  140 - 450 10*3/mm3 Final   • Neutrophil % 05/18/2022 54.8  42.7 - 76.0 % Final   • Lymphocyte % 05/18/2022 33.9  19.6 - 45.3 % Final   • Monocyte % 05/18/2022 7.9  5.0 - 12.0 % Final   • Eosinophil % 05/18/2022 2.1  0.3 - 6.2 % Final   • Basophil % 05/18/2022 1.0  0.0 - 1.5 % Final   • Immature Grans % 05/18/2022 0.3  0.0 - 0.5 % Final   • Neutrophils, Absolute 05/18/2022 3.38  1.70 - 7.00 10*3/mm3 Final   • Lymphocytes, Absolute 05/18/2022 2.09  0.70 - 3.10 10*3/mm3 Final   • Monocytes, Absolute 05/18/2022 0.49  0.10 - 0.90  10*3/mm3 Final   • Eosinophils, Absolute 05/18/2022 0.13  0.00 - 0.40 10*3/mm3 Final   • Basophils, Absolute 05/18/2022 0.06  0.00 - 0.20 10*3/mm3 Final   • Immature Grans, Absolute 05/18/2022 0.02  0.00 - 0.05 10*3/mm3 Final   • nRBC 05/18/2022 0.0  0.0 - 0.2 /100 WBC Final       EKG Results:  No orders to display       Imaging Results:  No Images in the past 120 days found..      Assessment & Plan   Diagnoses and all orders for this visit:    1. ADHD (attention deficit hyperactivity disorder), inattentive type (Primary)        Visit Diagnoses:    ICD-10-CM ICD-9-CM   1. ADHD (attention deficit hyperactivity disorder), inattentive type  F90.0 314.00       PLAN:  1.   Safety: No acute safety concerns  2. Therapy: None  3. Risk Assessment: Risk of self-harm acutely is low.  Risk factors include anxiety disorder, mood disorder, and recent psychosocial stressors (pandemic). Protective factors include no family history, denies access to guns/weapons, no present SI, no history of suicide attempts or self-harm in the past, minimal AODA, healthcare seeking, future orientation, willingness to engage in care.  Risk of self-harm chronically is also low, but could be further elevated in the event of treatment noncompliance and/or AODA.  4. Meds:  Continue Adderall XR 10 mg by mouth daily in the morning to target symptoms of ADHD including:  Inattentiveness & impaired concentration.  Risks, benefits, side effects discussed with patient including elevated heart rate, elevated blood pressure, irritability, insomnia, sexual dysfunction, appetite suppressing properties, psychosis.  After discussion of these risks and benefits, the patient voiced understanding and agreed to proceed. [place] reviewed, LAST DISPENSED 9/16/22 #42/42 DAYS  Patient instructed to request refill 10/25/22 upon return from [place]. Explained process for refills.   Controlled substance documentation: [place] reviewed; prior urine drug screen consistent obtained  9/16/22; consent is up to date, signed, witnessed and in EHR, dated 9/16/22, which will be updated annually per policy. Patient is aware of risk of addiction on this medication, understands need to follow up for a review every 3 months and medications will be adjusted or decreased as deemed appropriate at each visit.  No history of drug or alcohol abuse.  No concerns about diversion or abuse. Patient denies side effects related to the medication.  Patient is aware of random urine drug screens and pill counts. The dosing of this medication will be reviewed on a regular basis and reduced if possible..  Ongoing use of a controlled substance is necessary for this patient to have a normal quality of life.    5. Labs: n/a    Symptoms of ADHD are managed well with current dose of Adderall XR 10 mg without side effects.      9/16/22:   Declines therapy  Will potentially start Adderall XR 10 mg by mouth daily in the morning to target symptoms of ADHD including:  Inattentiveness & impaired concentration.  Risks, benefits, side effects discussed with patient including elevated heart rate, elevated blood pressure, irritability, insomnia, sexual dysfunction, appetite suppressing properties, psychosis.  After discussion of these risks and benefits, the patient voiced understanding and agreed to proceed. [place] reviewed, UDS ordered, and controlled substance agreement signed & witnessed. Patient informed this medication would not be ordered until UDS resulted and was negative, at that time a Prescription will be sent to  for signature.  Labs: POC UDS today in clinic    Lengthy discussion held with patient regarding CSA and medication education.   Bottle of Hemp oil had a QRS code to scan, which was done per patient request, which indicated percentages of ingredients as each bottle varies.  Certificate of Analysis, [facility], Cannabinoid Results: Total THC 0.231%, Total CBD 6.101%, Total Cannabinoids 6.534%. Will scan  certificate to EHR.   Patient will be in [place] in October, will check with  in regards to prescribing CS sending to [place].   Will contact patient if UDS needs to be sent for confirmation. Otherwise will proceed with ordering Adderall XR.         9/15/22:   ADHD testing completed by Dr. Greg Bennett on 9/1/22 confirming a diagnosis of ADHD. (total time of review 9 mins)    Discussed ADHD treatment options of non-stimulants vs stimulant medications, after discussion patient wishes to proceed with Adderall.  Informed patient of policy requirements prior to starting Adderall, patient will plan on coming in tomorrow due to available appointment at 8 am.  Discussed current CBD oil which patient reports is hemp based as the UDS may show positive for THC. Patient takes CBD oil for sleep and muscle aches, which has been effective.  IF UDS positive patient was informed Adderall would not be prescribed, and will have to send for a confirmation.  Patient verbalized understanding.       7/14/22:   No Medications, Declines therapy at this time.  Referral for ADHD testing with   Dr. Greg Bennett, Psychologist, MS, LPP  [address], [address]  [place] [id number] [phone]   Currently only accepting referrals for psychological testing services.  Patient to contact provider and set up appointment.  And to contact office if unable to get an appointment scheduled.   -Attached list of several other sites for ADHD testing. Patient instructed to contact providers on list to determine availability of appointments, instructed patient to take notes while calling places indicating first available appointment, and to ask to be placed on waiting list.  If an office is chosen and requests the referral order please contact my Medical Assistant, Hanh, directly at [phone] informing of chosen office and the information will be sent.    Patient with high suspicion of having ADHD, will send for formal testing and  have patient return in 2 weeks to discuss medication options as if patient is able to be tested in the next 1-2 months, may wait on starting a non-stimulant medication.     Patient screened positive for depression based on a PHQ-9 score of 0 on 7/14/22 . Follow-up recommendations include: Suicide Risk Assessment performed.       TREATMENT PLAN/GOALS: Continue supportive psychotherapy efforts and medications as indicated. Treatment and medication options discussed during today's visit. Patient ackowledged and verbally consented to continue with current treatment plan and was educated on the importance of compliance with treatment and follow-up appointments.    MEDICATION ISSUES:  MARLYS reviewed as expected.  Discussed medication options and treatment plan of prescribed medication as well as the risks, benefits, and side effects including potential falls, possible impaired driving and metabolic adversities among others. Patient is agreeable to call the office with any worsening of symptoms or onset of side effects. Patient is agreeable to call 911 or go to the nearest ER should he/she begin having SI/HI. No medication side effects or related complaints today.     MEDS ORDERED DURING VISIT:  No orders of the defined types were placed in this encounter.      Return in about 1 month (around 11/22/2022) for Video visit.         I spent 32 minutes caring for Jenn on this date of service. This time includes time spent by me in the following activities: preparing for the visit, performing a medically appropriate examination and/or evaluation, counseling and educating the patient/family/caregiver, referring and communicating with other health care professionals, documenting information in the medical record and care coordination.      This document has been electronically signed by CHELSI Burnett  October 20, 2022 12:12 EDT      Part of this note may be an electronic transcription/translation of spoken language to  printed text using the Dragon Dictation System.

## 2022-10-24 DIAGNOSIS — F90.0 ADHD (ATTENTION DEFICIT HYPERACTIVITY DISORDER), INATTENTIVE TYPE: ICD-10-CM

## 2022-10-24 RX ORDER — DEXTROAMPHETAMINE SACCHARATE, AMPHETAMINE ASPARTATE MONOHYDRATE, DEXTROAMPHETAMINE SULFATE AND AMPHETAMINE SULFATE 2.5; 2.5; 2.5; 2.5 MG/1; MG/1; MG/1; MG/1
10 CAPSULE, EXTENDED RELEASE ORAL EVERY MORNING
Qty: 42 CAPSULE | Refills: 0 | Status: SHIPPED | OUTPATIENT
Start: 2022-10-24 | End: 2022-12-05

## 2022-10-24 NOTE — TELEPHONE ENCOUNTER
PATIENT IS CURRENTLY IN [place], VISITING A  DAUGHTER AND THE TRIP HAS BEEN UNEXPECTEDLY EXTENDED.  THE PATIENT'S CURRENT ADDERALL PRESCRIPTION WILL BE OUT ON Friday THE 28TH.  THE PATIENT ISN'T COMING BACK UNTIL Wednesday 11/2.  SHE WANTS TO KNOW IF SHE COULD HAVE HER   MEDS REFILLED AT THE [facility] IN [facility].  IF NOT SHE WANTS TO KNOW THE BEST TACTIC TO STRETCH HER MEDICATION UNTIL WEDNESDAY

## 2022-10-28 ENCOUNTER — TELEPHONE (OUTPATIENT)
Dept: BEHAVIORAL HEALTH | Facility: CLINIC | Age: 57
End: 2022-10-28

## 2022-10-28 NOTE — TELEPHONE ENCOUNTER
I will forward message to  so he is updated on status, Since she will be returning next Wed. 11/2/22, a new order will not be sent into local pharmacy until confirmed dispense from [place] pharmacy, as [place] was not an option to add with MARLYS report attempted today.  We will have to contact the pharmacy to verify dispense, and patient needs to be reminded to not wait until down to 1 pill remaining to request refill or problem with refill, as this refill was sent in per patient request Monday 10/24/22, and this information was relayed to office today.

## 2022-10-28 NOTE — TELEPHONE ENCOUNTER
"Patient called to report still not home from [place] and when Dr. Harkins sent last Rx to [facility] in [place] #42 tablets on 10/24/2022 patient reports that the [facility] in [place] is temp Closed.  Patient says the [facility] in [place] will not transfer prescription from [place].  Patient would like for her refill to be sent to the [facility] in [place] on [address] and patient says \"My  can send me the 4 pills I will need until I can get back home. Please advise  "

## 2022-10-28 NOTE — TELEPHONE ENCOUNTER
Please determine when she will return to [place], as she was supposed to return 10/25/22 and was originally given adequate supply through 10/28/22.

## 2022-10-28 NOTE — TELEPHONE ENCOUNTER
Called and spoke with patient, she states that she will be back on Wednesday Nov.2,2022 and will take her last pill tomorrow.  Patient does say that the [facility] in [place] has now reopened but they say they are very backed up and someone even told her that they will probably not fill it because Dr. Harkins is not licensed in [place].  Patient says she will wait to see if they do fill it over the weekend and she will call me back on Monday and let me know.  If they don't fill it she will just have it sent to the [facility] in [place], [place].

## 2022-10-31 NOTE — TELEPHONE ENCOUNTER
"Patient called and [facility] that the [facility] in [place] did fill the Rx for her Adderall XR 10mg.  Patient just wanted to let Adelaida know.  Patient said she was \"happy about it\"  "

## 2022-11-22 ENCOUNTER — TELEMEDICINE (OUTPATIENT)
Dept: BEHAVIORAL HEALTH | Facility: CLINIC | Age: 57
End: 2022-11-22

## 2022-11-22 DIAGNOSIS — F90.0 ADHD (ATTENTION DEFICIT HYPERACTIVITY DISORDER), INATTENTIVE TYPE: Primary | ICD-10-CM

## 2022-11-22 PROCEDURE — 99214 OFFICE O/P EST MOD 30 MIN: CPT | Performed by: NURSE PRACTITIONER

## 2022-11-22 RX ORDER — DEXTROAMPHETAMINE SACCHARATE, AMPHETAMINE ASPARTATE MONOHYDRATE, DEXTROAMPHETAMINE SULFATE AND AMPHETAMINE SULFATE 1.25; 1.25; 1.25; 1.25 MG/1; MG/1; MG/1; MG/1
5 CAPSULE, EXTENDED RELEASE ORAL EVERY MORNING
Qty: 20 CAPSULE | Refills: 0 | Status: SHIPPED | OUTPATIENT
Start: 2022-11-22 | End: 2022-12-08 | Stop reason: DRUGHIGH

## 2022-11-22 NOTE — PATIENT INSTRUCTIONS
"1.  Please return to clinic at your next scheduled visit.  Contact the [facility] ([phone]) or **Hanh, Medical Assistant at [place] Office directly at [phone] at least 24 hours prior in the event you need to cancel.**    2. Should you want to get in touch with your provider, CHELSI Burnett, please contact MY Medical Assistant, Hanh, directly at [phone].  Recommend saving Hanh's direct number in phone as this is the PREFERRED & EASIEST way to get in contact with your provider.  Please leave a voice mail if you do not get an answer and she will return your call within 24 hrs. You will NOT be able to contact provider on [facility], as Behavioral Health Providers are restricted. YOU MUST CALL [phone]    If you need to speak with the on call provider after hours or on weekends, please Contact the [facility] ([phone]) and staff will be able to page the provider on call directly.        3, MEDICATION REFILLS:  PLEASE CALL THE PHARMACY TO REQUEST ALL MEDICATION REFILLS TO ENSURE YOU ARE RECEIVING YOUR MEDICATIONS IN A TIMELY MANNER.    IF YOU USE AN AUTOMATED SERVICE AT THE PHARMACY FOR REFILLS AND ARE TOLD THERE ARE \"NO REFILLS REMAINING\"   PLEASE CALL THE PHARMACY & SPEAK TO A LIVE PERSON TO VERIFY IT IS THE MOST UP TO DATE PRESCRIPTION ON FILE.    All new prescriptions will have a different number, therefore, if you were given refills for a medication today or at last visit it will not have the same number as the previous prescription.       4.  In the event you have personal crisis, contact the following crisis numbers: Suicide Prevention Hotline 1-276.936.2845 or *988, [facility] Helpline 0-529-829-ISHMAEL; [facility] Emergency Room [phone]; text HELLO to 033738; or 729.      5. We would appreciate your feedback, please scan the QRS code on the back of your appointment card (or see below) and complete a brief survey.  [place] location is still not available, so " "please click \"[place]\" location.  Thank you      SPECIFIC RECOMMENDATIONS:     1.      Medications discussed at this encounter:                   - Add Adderall XR 5 mg to 10 mg XR you have on hand, I will send in order for #20 of the 5 mg today.  Call provider office on 12/8/22 to request refill for 15 mg dose     2.      Psychotherapy recommendations:  Declined     3.     Return to clinic: 6 weeks    Please arrive at least 15 minutes before your scheduled appointment time to complete check in process.      IF you are scheduled for a [employer] VIDEO visit, PLEASE ANSWER YOUR PHONE WHEN OFFICE CALLS PRIOR TO VISIT TO COMPLETE THE CHECK IN PROCESS, EVEN IF THE E-CHECK IN WAS COMPLETED.     If you would like to log on to [employer] and complete the \"E-Check IN\" prior to your visit, please do so, this will speed up the check in process.  If you are due for questionnaires, you will find those on [employer] as well, please try to complete prior to your scheduled appointment.          "

## 2022-11-22 NOTE — PROGRESS NOTES
"This provider is located at [address] [place] [address], [address], [place] [address]. The Patient is seen remotely using MTPVhart. Patient is being seen via telehealth and confirm that they are in a secure environment for this session. The patient's condition being diagnosed/treated is appropriate for telemedicine. The provider identified himself/herself: herself as well as her credentials.   The patient gave consent to be seen remotely, and when consent is given they understand that the consent allows for patient identifiable information to be sent to a third party as needed.   They may refuse to be seen remotely at any time. The electronic data is encrypted and password protected, and the patient has been advised of the potential risks to privacy not withstanding such measures.    You have chosen to receive care through a telehealth visit.  Do you consent to use a video/audio connection for your medical care today? Yes      Subjective   Jenn James is a 57 y.o. female who presents today for follow up    Referring Provider:  Mariana Jalloh, CHELSI  [address]  [place] [id number]    Chief Complaint:  ADHD medication check    History of Present Illness:   11/22/22:  Patient presents today via MTPVhart Video visit from patient home.  Patient reports would like to increase dose possibly, reports spouse notices \"I am drifting some.\" Patient further explains will frequently jump to other topics during conversation, though improved with Adderall XR 10 mg.  Patient reports daughter was able to notice improvement, though now that patient has returned to home from travels, she is noticing elevation in symptoms.      Patient reports some difficulty with sleeping, as last night had minimal sleep as patient is worried about mother whom has dementia and father is caring for mother, has had  involved.  Believes temporary, situational anxiety is reason for sleep difficulty.  Patient reports mother's " "dementia symptoms are worsening and difficult to deal with, though hopeful as  has set up for assistant to come to home several days a week to help with house cleaning, meal prep, and care.         10/20/22:  Patient presents today via MyChart Video visit from daughter's home in [place].   Patient was started on Adderall XR 10 mg at last visit for which patient reports the first few days had increased energy, which had company, had difficulty sleeping the first 2 nights, however, no longer having difficulty.  Patient reports taking medication at 7 am, one day she took it later at 930 am and had difficulty sleeping that night. Patient has been taking thyroid medication upon awakening to use the bathroom around 5-530 am and upon waking up around 7 takes the Adderall.      \"I think it's a lot better, my daughter said she seems to notice, I still have a tendency to interrupt but that's better, I don't seem to repeat questions, as it was an issue before because I was not really listening. My mind was somewhere else.\"   Denies side effects, patient was surprised she was able to sit still after a few days, now able to complete tasks as less distraction, able to continue with task at hand before switching to another task.     Patient will be returning from [place] 10/25/22.     9/16/22:  Patient presents today in office today to further discuss ADHD treatment with a controlled substance and to complete CSA and obtain POC UDS today per policy.     Patient brought in bottle of Hemp CBD oil 3200mg/2 oz, 1-3 drops 3 times daily on bottle, however, patient only takes in the evening for post menopausal symptoms, muscle aches, and vocal cord dysfunction, \"my neck tenses up , had to do physical therapy in past, and oil helps, I found out I am Allergic to mold.\"    9/15/22:  Patient presents today via MyChart Video visit from home, reports received ADHD test results though has not discussed.  Patient continues to " "have symptoms of inttentiveness, concentration difficulty, difficulty completing tasks, and switching from one uncompleted task to another.     Patient does take 3 drops of CBD oil, hemp based, at night for sleep, relaxes muscles as patient started taking while going through menopause.   Patient reports  is allergic to Wellbutrin and daughter tried Wellbutrin and did not do well, as patient would prefer to try Adderall first rather than a non-stimulant medication.  Patient will be out of town for 2 weeks in October visiting daughter in [place], likely early October.       7/14/22:  INITIAL EVAL  Patient presents today via MyChart Video visit from home with a history of depression and anxiety for which treatment was started in late 1995 with therapy, and medications from [id number] due to divorce.     Patient is currently not taking any psychotropic medications nor has had any since 2005.     \"My issue is I can't concentrate, I start something and cant finish something, forgets things often\".  Patient recalls while in school always \"fidgety\" crossing legs, moving often in seat.  Admits to interrupting people, gets distracted easily, daughter noticing.    Admits to over the past 2 yrs began to feel ADHD may be a possibility, \"I am tired of being like this, forgetting stuff, going in and out of the house, tired of interrupting people, it seems to be bothering me more.\"  Patient uses reminders, lists which was started while kids in high school and has continued to have  self add items needed to List,\" I walk out without my list from the fridge, and I have to have him take a picture of it and send it to me\".  Difficulty sitting still when you should be sitting still in [place] or meetings. Impulsive with shopping tends to purchase items that may be on sale or those that she may not need with the idea of \"I can always return it\".     Symptoms include fidgeting, difficulty remaining seated, easy " "distractability, blurting out answers prematurely, inability to complete tasks, difficulty sustaining attention, shifting from one uncompleted activity to another, talking excessively, interrupting others, ineffective listening, frequently losing items, and impulsivity.    1. ADHD:   a. Elementary school:   i. Grades:A's and B's  ii. Special classes or failures:no  iii. Got in trouble:no  iv. Referral for ADHD testing:no  b. Fhx:son, diagnosed officially in 7th grade, took medications for 1-2 yrs, restarted while in college only, \"he probably should be taking something, I have suggested it to him\"  c. Presently:  i. Problems with attention to detail:yes  ii. Problems with sustained attention:Yes  iii. Problems listening when spoken to directly:Yes, unable to concentrate on what others are saying, \"I have to get my point across, I know I have to wait for the break, but I can't wait for the break.\"  iv. Failure to finish tasks:yes, \"I will lay my husbandsTshirts on couch intending to hang up and they will be there for 2-3 days\" house hold tasks-dusting, mopping, find need to focus on floor boards  v. Avoids tasks that require sustained mental effort:yes, \"I excelled at work,  I could multi-task, 5-6 projects at desk, however, would wait until the last week to update credentials, I put off stuff and procrastinate all the time\"  vi. Easily distracted:yes  vii. Forgetting things:yes  viii. Losing things:yes  ix. Hard to organize:yes  x. Talks a lot and cutting people off:yes  xi. Drifts off during conversations:yes, \"I have to concentrate what I need to say to not forget what I have say, then I blurt it out, I am trying really hard, it's even hard with you to not stop and say stuff\"  xii. Difficulty with Reading:yes, \"I used to read a lot, has been using Audio books to listen in car or while out with friends or walking, has to rewind at times because my mind drifts off.\"  xiii. Difficulty watching TV/Movies:\"not really, " "we hardly ever have the TV on anymore.\"       PHQ-9 Depression Screening  PHQ-9 Total Score:   11/22/22-1    Little interest or pleasure in doing things?     Feeling down, depressed, or hopeless?     Trouble falling or staying asleep, or sleeping too much?  1   Feeling tired or having little energy?     Poor appetite or overeating?     Feeling bad about yourself - or that you are a failure or have let yourself or your family down?     Trouble concentrating on things, such as reading the newspaper or watching television?     Moving or speaking so slowly that other people could have noticed? Or the opposite - being so fidgety or restless that you have been moving around a lot more than usual?     Thoughts that you would be better off dead, or of hurting yourself in some way?     PHQ-9 Total Score       SUZANNE-7  Feeling nervous, anxious or on edge: (P) Not at all  Not being able to stop or control worrying: (P) Several days  Worrying too much about different things: (P) Several days  Trouble Relaxing: (P) Not at all  Being so restless that it is hard to sit still: (P) Not at all  Feeling afraid as if something awful might happen: (P) Not at all  Becoming easily annoyed or irritable: (P) Not at all  SUZANNE 7 Total Score: (P) 2  If you checked any problems, how difficult have these problems made it for you to do your work, take care of things at home, or get along with other people: (P) Not difficult at all     Past Surgical History:  Past Surgical History:   Procedure Laterality Date   • BLADDER SUSPENSION  2008   • COLON SURGERY     • COLONOSCOPY     • ESSURE TUBAL LIGATION  1999   • JOINT REPLACEMENT  Jan 2017    Right hip replacement   • KIDNEY SURGERY     • OTHER SURGICAL HISTORY      metal implant   • TUBAL ABDOMINAL LIGATION         Problem List:  Patient Active Problem List   Diagnosis   • Primary osteoarthritis of right hip   • Status post right hip replacement   • Hypothyroidism (acquired)   • Paroxysmal atrial " fibrillation (HCC)       Allergy:   Allergies   Allergen Reactions   • Sulfa Antibiotics Rash        Discontinued Medications:  There are no discontinued medications.    Current Medications:   Current Outpatient Medications   Medication Sig Dispense Refill   • amphetamine-dextroamphetamine XR (Adderall XR) 10 MG 24 hr capsule Take 1 capsule by mouth Every Morning for 42 days Indications: Attention Deficit Hyperactivity Disorder 42 capsule 0   • Auvi-Q 0.3 MG/0.3ML solution auto-injector injection INJECT AS NEEDED FOR SEVERE ALLERGIC REACTION INCLUDING ANAPHYLAXIS AS DIRECTED     • azelastine (ASTELIN) 0.1 % nasal spray USE 1 TO 2 SPRAYS IN EACH NOSTRIL TWICE DAILY     • Azelastine-Fluticasone 137-50 MCG/ACT suspension 1 spray by Alternating Nares route 2 (Two) Times a Day. shake liquid     • CBD (cannabidiol) oral oil Take  by mouth.     • cetirizine (zyrTEC) 10 MG tablet Take 10 mg by mouth Daily.     • ciclopirox (PENLAC) 8 % solution APPLY UNDER AND ON AFFECTED NAILS EVERY DAY AS DIRECTED     • levothyroxine (SYNTHROID, LEVOTHROID) 25 MCG tablet Take 1 tablet by mouth Daily. 90 tablet 1   • MAGNESIUM MALATE PO Take  by mouth Daily.     • Milk Thistle 150 MG capsule Take  by mouth.     • Misc. Devices (Nasal Spray Bottle) misc      • montelukast (SINGULAIR) 10 MG tablet Take 10 mg by mouth Every Night.     • Vitamin D-Vitamin K (K2 Plus D3) 100-1000 MCG-UNIT tablet Take  by mouth Daily. Patient takes drops (not tablets) due to GI upset with tablet       No current facility-administered medications for this visit.       Past Medical History:  Past Medical History:   Diagnosis Date   • Allergic Whole life   • Anxiety    • Cervical cancer screening 5/30/2109   • Chronic allergic rhinitis    • Deep vein thrombosis (HCC) 2017    Caused after my hip replacement.   • Depression    • Hyperlipemia 03/15/2017   • Hypothyroidism 03/15/2017   • Kidney stones    • Limb pain    • Limb swelling        Past Psychiatric  "History:  Began Treatment:started in  with therapy due to spouse having an affair  Diagnoses:Depression and Anxiety  Psychiatrist:last seen from [id number]  Therapist:last seen from [id number]  Admission History:Denies  Medication Trials:Prozac--for one year \"felt like i was floating;\" Zoloft for 1 yr 95-, then started Effexor with divorce in  for1 yr-during time having increased stress with divorce as pt had lost hair and stomach problems and asked to be placed on meds; tolerated well  Self Harm: Denies  Suicide Attempts:Denies   Psychosis, Anxiety, Depression: Denies    Substance Abuse History:   Types:Alcohol daily in the evening to relax, 1-2 glasses of wine, or 1 beer or gin and tonic  Withdrawal Symptoms:Denies  Longest Period Sober:Not Applicable   AA: Not applicable     Social History:  Martial Status:  Employed:No 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  Kids:Yes or If so, how many 2 children and 2 step children  House:Lives in a house   History: Denies  Access to Guns: no    Social History     Socioeconomic History   • Marital status:    Tobacco Use   • Smoking status: Never   • Smokeless tobacco: Never   Vaping Use   • Vaping Use: Never used   Substance and Sexual Activity   • Alcohol use: Yes     Comment: drinks daily, wine, beer and lquor   • Drug use: Not Currently     Types: Marijuana     Comment: In teenage years   • Sexual activity: Yes       Family History:   Suicide Attempts: Denies  Suicide Completions:Denies      Family History   Problem Relation Age of Onset   • Dementia Mother    • Diabetes type II Mother    • Cancer Mother 70        Kidney cancer (left kidney removed) and endometrial cancer (hysterectomy)   • Prostate cancer Father    • Other Maternal Grandmother         Colon cancer   • Colon cancer Maternal Grandmother 70   • Diabetes Paternal Grandmother    • ADD / ADHD Son        Developmental History:   Born: [place], lived in [place] " since age 1  Siblings:1 sister, 1 brother-both younger  Childhood: Denies Abuse  High School:Completed  College:2 yrs, no degree    Mental Status Exam:   Hygiene:   good  Cooperation:  Cooperative  Eye Contact:  Good  Psychomotor Behavior:  Appropriate  Affect:  Appropriate  Mood: euthymic  Speech:  Normal  Thought Process:  Goal directed  Thought Content:  Mood congruent  Suicidal:  None  Homicidal:  None  Hallucinations:  None  Delusion:  None  Memory:  Intact  Orientation:  Grossly intact  Reliability:  good  Insight:  Good  Judgement:  Good  Impulse Control:  Good  Physical/Medical Issues:  Yes Hypothyroidism,OA rt hip,paroxysmal afib, HLD     Review of Systems:  Review of Systems   Constitutional: Negative for diaphoresis and fatigue.   HENT: Negative for drooling.    Eyes: Negative for visual disturbance.   Respiratory: Negative for cough and shortness of breath.    Cardiovascular: Negative for chest pain, palpitations and leg swelling.   Gastrointestinal: Negative for nausea and vomiting.   Endocrine: Negative for cold intolerance and heat intolerance.   Genitourinary: Negative for difficulty urinating.   Musculoskeletal: Negative for joint swelling.   Allergic/Immunologic: Negative for immunocompromised state.   Neurological: Negative for dizziness, seizures, speech difficulty and numbness.   Psychiatric/Behavioral: Positive for sleep disturbance. Negative for decreased concentration, self-injury and suicidal ideas. The patient is not nervous/anxious and is not hyperactive.         New due to stressors about mother's decline in dementia and care needs         Physical Exam:  Physical Exam  Psychiatric:         Attention and Perception: Attention and perception normal.         Mood and Affect: Mood and affect normal.         Speech: Speech normal.         Behavior: Behavior normal. Behavior is cooperative.         Thought Content: Thought content normal. Thought content does not include suicidal ideation.  Thought content does not include suicidal plan.         Cognition and Memory: Cognition and memory normal.         Judgment: Judgment normal.         Vital Signs:   There were no vitals taken for this visit.     Lab Results:   Clinical Support on 09/16/2022   Component Date Value Ref Range Status   • Amphetamine Screen, Urine 09/16/2022 Negative  Negative Final   • Barbiturates Screen, Urine 09/16/2022 Negative  Negative Final   • Buprenorphine, Screen, Urine 09/16/2022 Negative  Negative Final   • Benzodiazepine Screen, Urine 09/16/2022 Negative  Negative Final   • Cocaine Screen, Urine 09/16/2022 Negative  Negative Final   • MDMA (ECSTASY) 09/16/2022 Negative  Negative Final   • Methamphetamine, Ur 09/16/2022 Negative  Negative Final   • Methadone Screen, Urine 09/16/2022 Negative  Negative Final   • Opiate Screen 09/16/2022 Negative  Negative Final   • Oxycodone Screen, Urine 09/16/2022 Negative  Negative Final   • Phencyclidine (PCP), Urine 09/16/2022 Negative  Negative Final   • THC, Screen, Urine 09/16/2022 Negative  Negative Final   • THC INTERNAL CONTROL 09/16/2022 Passed  Passed Final   • Lot Number 09/16/2022 [id number]   Final   • Expiration Date 09/16/2022 02/29/24   Final   Ancillary Procedure on 06/23/2022   Component Date Value Ref Range Status   • Target HR (85%) 06/23/2022 137  bpm Final   • Max. Pred. HR (100%) 06/23/2022 161  bpm Final   Ancillary Procedure on 06/23/2022   Component Date Value Ref Range Status   • Target HR (85%) 06/23/2022 137  bpm Final   • Max. Pred. HR (100%) 06/23/2022 161  bpm Final   • AI Jet height 06/23/2022 0.30  cm Final   • AI Jet index 06/23/2022 14.00  % Final   • IVRT 06/23/2022 92.0  msec Final   • LA ESV Index (BP) 06/23/2022 22.0  ml/m2 Final   • Avg E/e' ratio 06/23/2022 6.64   Final   • Ao root diam 06/23/2022 3.5  cm Final   • EF(MOD-bp) 06/23/2022 59.0  % Final   • Lat Peak E' Saud 06/23/2022 13.0  cm/sec Final   • LVPWd 06/23/2022 0.9  cm Final   • Med Peak E'  Saud 06/23/2022 9.00  cm/sec Final   • MV dec time 06/23/2022 173  msec Final   • IVSd 06/23/2022 1.1  cm Final   • LA dimension (2D)  06/23/2022 2.9  cm Final   • LVIDd 06/23/2022 4.7  cm Final   • LVIDs 06/23/2022 3.1  cm Final   • MV E/A 06/23/2022 1.2   Final   • MV E max saud 06/23/2022 73.0  cm/sec Final   • TAPSE (>1.6) 06/23/2022 2.00  cm Final       EKG Results:  No orders to display       Imaging Results:  No Images in the past 120 days found..      Assessment & Plan   Diagnoses and all orders for this visit:    1. ADHD (attention deficit hyperactivity disorder), inattentive type (Primary)        Visit Diagnoses:    ICD-10-CM ICD-9-CM   1. ADHD (attention deficit hyperactivity disorder), inattentive type  F90.0 314.00       PLAN:  1.   Safety: No acute safety concerns  2. Therapy: None  3. Risk Assessment: Risk of self-harm acutely is low.  Risk factors include anxiety disorder, mood disorder, and recent psychosocial stressors (pandemic). Protective factors include no family history, denies access to guns/weapons, no present SI, no history of suicide attempts or self-harm in the past, minimal AODA, healthcare seeking, future orientation, willingness to engage in care.  Risk of self-harm chronically is also low, but could be further elevated in the event of treatment noncompliance and/or AODA.  4. Meds:  Continue Adderall XR 10 mg by mouth daily in the morning to target symptoms of ADHD including:  Inattentiveness & impaired concentration.  Risks, benefits, side effects discussed with patient including elevated heart rate, elevated blood pressure, irritability, insomnia, sexual dysfunction, appetite suppressing properties, psychosis.  After discussion of these risks and benefits, the patient voiced understanding and agreed to proceed. [place]i reviewed, LAST DISPENSED 10/28/22 #42/42 DAYS, reported #20 remain in bottle.    Will send in order for Adderall XR 5 mg by mouth daily in the morning for 20 days  "(through 12/12/22), patient instructed to add the 5 mg dose to the 10 mg to equal 15 mg total daily in the morning.  Patient instructed to contact provider in office when down to 3-5 days remaining of supply. Call 12/8/22.  Informed patient order would be sent to Dr. Harkins in separate entry for signature due to EMR system, and will not see as refilled on AVS today.    Controlled substance documentation: [place] reviewed; prior urine drug screen consistent obtained 9/16/22; consent is up to date, signed, witnessed and in EHR, dated 9/16/22, which will be updated annually per policy. Patient is aware of risk of addiction on this medication, understands need to follow up for a review every 3 months and medications will be adjusted or decreased as deemed appropriate at each visit.  No history of drug or alcohol abuse.  No concerns about diversion or abuse. Patient denies side effects related to the medication.  Patient is aware of random urine drug screens and pill counts. The dosing of this medication will be reviewed on a regular basis and reduced if possible..  Ongoing use of a controlled substance is necessary for this patient to have a normal quality of life.    5. Labs: n/a    ADHD symptoms are under fair control with Adderall XR 10 mg, will increase to 15 mg.   Patient to contact provider if symptoms worsen or fail to improve.       10/31/22:  TELEPHONE  Patient called and LMVM that the [facility] in [place] did fill the Rx for her Adderall XR 10mg.  Patient just wanted to let Adelaida know.  Patient said she was \"happy about it\"   Prescription was verified as dispensed with pharmacy in [place].     10/28/22:  TELEPHONE  Please determine when she will return to [place], as she was supposed to return 10/25/22 and was originally given adequate supply through 10/28/22.    Called and spoke with patient, she states that she will be back on Wednesday Nov.2,2022 and will take her last pill tomorrow.  Patient does say that " "the [facility] in [place] has now reopened but they say they are very backed up and someone even told her that they will probably not fill it because Dr. Harkins is not licensed in [place].  Patient says she will wait to see if they do fill it over the weekend and she will call me back on Monday and let me know.  If they don't fill it she will just have it sent to the [facility] in [place], [place].   I will forward message to  so he is updated on status, Since she will be returning next Wed. 11/2/22, a new order will not be sent into local pharmacy until confirmed dispense from [place] pharmacy, as [place] was not an option to add with [place]I report attempted today.  We will have to contact the pharmacy to verify dispense, and patient needs to be reminded to not wait until down to 1 pill remaining to request refill or problem with refill, as this refill was sent in per patient request Monday 10/24/22, and this information was relayed to office today.   Patient called and LMVM that the [facility] in [place] did fill the Rx for her Adderall XR 10mg.  Patient just wanted to let Adelaida know.  Patient said she was \"happy about it\"       10/20/22:   Continue Adderall XR 10 mg by mouth daily in the morning to target symptoms of ADHD including:  Inattentiveness & impaired concentration.  Risks, benefits, side effects discussed with patient including elevated heart rate, elevated blood pressure, irritability, insomnia, sexual dysfunction, appetite suppressing properties, psychosis.  After discussion of these risks and benefits, the patient voiced understanding and agreed to proceed. [place]i reviewed, LAST DISPENSED 9/16/22 #42/42 DAYS  Patient instructed to request refill 10/25/22 upon return from [place]. Explained process for refills.   Controlled substance documentation: [place] reviewed; prior urine drug screen consistent obtained 9/16/22; consent is up to date, signed, witnessed and in EHR, dated 9/16/22, which " will be updated annually per policy. Patient is aware of risk of addiction on this medication, understands need to follow up for a review every 3 months and medications will be adjusted or decreased as deemed appropriate at each visit.  No history of drug or alcohol abuse.  No concerns about diversion or abuse. Patient denies side effects related to the medication.  Patient is aware of random urine drug screens and pill counts. The dosing of this medication will be reviewed on a regular basis and reduced if possible..  Ongoing use of a controlled substance is necessary for this patient to have a normal quality of life.  Symptoms of ADHD are managed well with current dose of Adderall XR 10 mg without side effects.      9/16/22:   Declines therapy  Will potentially start Adderall XR 10 mg by mouth daily in the morning to target symptoms of ADHD including:  Inattentiveness & impaired concentration.  Risks, benefits, side effects discussed with patient including elevated heart rate, elevated blood pressure, irritability, insomnia, sexual dysfunction, appetite suppressing properties, psychosis.  After discussion of these risks and benefits, the patient voiced understanding and agreed to proceed. [place] reviewed, UDS ordered, and controlled substance agreement signed & witnessed. Patient informed this medication would not be ordered until UDS resulted and was negative, at that time a Prescription will be sent to  for signature.  Labs: POC UDS today in clinic    Lengthy discussion held with patient regarding CSA and medication education.   Bottle of Hemp oil had a QRS code to scan, which was done per patient request, which indicated percentages of ingredients as each bottle varies.  Certificate of Analysis, [facility], Cannabinoid Results: Total THC 0.231%, Total CBD 6.101%, Total Cannabinoids 6.534%. Will scan certificate to EHR.   Patient will be in [place] in October, will check with  in regards to  prescribing CS sending to [place].   Will contact patient if UDS needs to be sent for confirmation. Otherwise will proceed with ordering Adderall XR.         9/15/22:   ADHD testing completed by Dr. Greg Bennett on 9/1/22 confirming a diagnosis of ADHD. (total time of review 9 mins)    Discussed ADHD treatment options of non-stimulants vs stimulant medications, after discussion patient wishes to proceed with Adderall.  Informed patient of policy requirements prior to starting Adderall, patient will plan on coming in tomorrow due to available appointment at 8 am.  Discussed current CBD oil which patient reports is hemp based as the UDS may show positive for THC. Patient takes CBD oil for sleep and muscle aches, which has been effective.  IF UDS positive patient was informed Adderall would not be prescribed, and will have to send for a confirmation.  Patient verbalized understanding.       7/14/22:   No Medications, Declines therapy at this time.  Referral for ADHD testing with   Dr. Greg Bennett, Psychologist, MS, LPP  [address], [address]  [place] [id number] [phone]   Currently only accepting referrals for psychological testing services.  Patient to contact provider and set up appointment.  And to contact office if unable to get an appointment scheduled.   -Attached list of several other sites for ADHD testing. Patient instructed to contact providers on list to determine availability of appointments, instructed patient to take notes while calling places indicating first available appointment, and to ask to be placed on waiting list.  If an office is chosen and requests the referral order please contact my Medical Assistant, Hanh, directly at [phone] informing of chosen office and the information will be sent.    Patient with high suspicion of having ADHD, will send for formal testing and have patient return in 2 weeks to discuss medication options as if patient is able to be tested in the  next 1-2 months, may wait on starting a non-stimulant medication.       Patient screened positive for depression based on a PHQ-9 score of 1 on 11/22/22 . Follow-up recommendations include: Suicide Risk Assessment performed.       TREATMENT PLAN/GOALS: Continue supportive psychotherapy efforts and medications as indicated. Treatment and medication options discussed during today's visit. Patient ackowledged and verbally consented to continue with current treatment plan and was educated on the importance of compliance with treatment and follow-up appointments.    MEDICATION ISSUES:  MARLYS reviewed as expected.  MANUAL MARLYS 11/22/22 AT 0652 #[id number], UNABLE TO INCLUDE [place] WHERE PATIENT HAD LAST DISPENSE OF ADDERALL XR.  Discussed medication options and treatment plan of prescribed medication as well as the risks, benefits, and side effects including potential falls, possible impaired driving and metabolic adversities among others. Patient is agreeable to call the office with any worsening of symptoms or onset of side effects. Patient is agreeable to call 911 or go to the nearest ER should he/she begin having SI/HI. No medication side effects or related complaints today.     MEDS ORDERED DURING VISIT:  No orders of the defined types were placed in this encounter.      Return in about 6 weeks (around 1/3/2023) for medication check, Video visit.         I spent 31 minutes caring for Jenn on this date of service. This time includes time spent by me in the following activities: preparing for the visit, performing a medically appropriate examination and/or evaluation, counseling and educating the patient/family/caregiver, referring and communicating with other health care professionals, documenting information in the medical record and care coordination.      This document has been electronically signed by CHELSI Burnett  November 22, 2022 09:03 EST      Part of this note may be an electronic  transcription/translation of spoken language to printed text using the Dragon Dictation System.

## 2022-12-08 DIAGNOSIS — F90.0 ADHD (ATTENTION DEFICIT HYPERACTIVITY DISORDER), INATTENTIVE TYPE: ICD-10-CM

## 2022-12-08 RX ORDER — DEXTROAMPHETAMINE SACCHARATE, AMPHETAMINE ASPARTATE MONOHYDRATE, DEXTROAMPHETAMINE SULFATE AND AMPHETAMINE SULFATE 3.75; 3.75; 3.75; 3.75 MG/1; MG/1; MG/1; MG/1
15 CAPSULE, EXTENDED RELEASE ORAL EVERY MORNING
Qty: 30 CAPSULE | Refills: 0 | Status: SHIPPED | OUTPATIENT
Start: 2022-12-12 | End: 2023-01-09 | Stop reason: SDUPTHER

## 2022-12-08 NOTE — TELEPHONE ENCOUNTER
Patient called to request the new RX for Adderall XR 15mg.  Patient has checked around in [place] and found some of that strength at [facility] on [address],  Please submit new Rx there please.

## 2022-12-08 NOTE — TELEPHONE ENCOUNTER
Due to nation wide shortage of Adderall including XR formulation, patient pharmacy changed to [facility] from [facility].

## 2023-01-04 ENCOUNTER — TELEMEDICINE (OUTPATIENT)
Dept: PSYCHIATRY | Facility: CLINIC | Age: 58
End: 2023-01-04
Payer: COMMERCIAL

## 2023-01-04 DIAGNOSIS — F90.0 ADHD (ATTENTION DEFICIT HYPERACTIVITY DISORDER), INATTENTIVE TYPE: Primary | ICD-10-CM

## 2023-01-04 DIAGNOSIS — R63.4 WEIGHT LOSS: ICD-10-CM

## 2023-01-04 PROBLEM — K21.9 LARYNGITIS DUE TO GASTROESOPHAGEAL REFLUX: Status: ACTIVE | Noted: 2017-07-14

## 2023-01-04 PROBLEM — I83.90 VARICOSE VEINS OF LOWER EXTREMITY: Status: ACTIVE | Noted: 2018-04-26

## 2023-01-04 PROBLEM — M25.551 PAIN OF RIGHT HIP JOINT: Status: ACTIVE | Noted: 2017-07-14

## 2023-01-04 PROBLEM — F41.9 ANXIETY: Status: ACTIVE | Noted: 2017-07-14

## 2023-01-04 PROBLEM — R60.0 EDEMA OF LOWER EXTREMITY: Status: ACTIVE | Noted: 2018-04-26

## 2023-01-04 PROBLEM — E05.90 HYPERTHYROIDISM: Status: ACTIVE | Noted: 2017-07-14

## 2023-01-04 PROBLEM — B35.1 ONYCHOMYCOSIS: Status: ACTIVE | Noted: 2018-02-21

## 2023-01-04 PROBLEM — E56.9 VITAMIN DEFICIENCY: Status: ACTIVE | Noted: 2017-07-14

## 2023-01-04 PROBLEM — E78.5 HYPERLIPIDEMIA: Status: ACTIVE | Noted: 2017-07-14

## 2023-01-04 PROBLEM — J04.0 LARYNGITIS DUE TO GASTROESOPHAGEAL REFLUX: Status: ACTIVE | Noted: 2017-07-14

## 2023-01-04 PROBLEM — I82.409 DEEP VENOUS THROMBOSIS: Status: ACTIVE | Noted: 2017-07-14

## 2023-01-04 PROBLEM — R29.898: Status: ACTIVE | Noted: 2018-04-26

## 2023-01-04 PROBLEM — J38.3 DISORDER OF VOCAL CORD: Status: ACTIVE | Noted: 2017-07-14

## 2023-01-04 PROCEDURE — 99215 OFFICE O/P EST HI 40 MIN: CPT | Performed by: NURSE PRACTITIONER

## 2023-01-04 NOTE — PROGRESS NOTES
This provider is located [place] [address]. The Patient is seen remotely using [employer]. Patient is being seen via telehealth and confirm that they are in a secure environment for this session. The patient's condition being diagnosed/treated is appropriate for telemedicine. The provider identified himself/herself: herself as well as her credentials.   The patient gave consent to be seen remotely, and when consent is given they understand that the consent allows for patient identifiable information to be sent to a third party as needed.   They may refuse to be seen remotely at any time. The electronic data is encrypted and password protected, and the patient has been advised of the potential risks to privacy not withstanding such measures.    You have chosen to receive care through a telehealth visit.  Do you consent to use a video/audio connection for your medical care today? Yes      Subjective   Jenn James is a 57 y.o. female who presents today for follow up    Referring Provider:  Mariana Jalloh, CHELSI  [address] [facility] [address]  [place] [id number]    Chief Complaint:  ADHD medication check    History of Present Illness:   1/4/23:  Patient presents today via MyChart Video visit from home, Adderall XR was increased from 10 to 15 mg at last visit, for which patient reports the first few days of the increase felt \"I don't know if I can handle this, just a little spaced out, I don't know, but it was fine in a few days.\"  Denies increased heart rate, difficulty sleeping.   Patient feels she may be decreased weight of 15 lb, though patient has been busy with mother in the hospital and getting things together for the holidays.  Reports mother was admitted to [facility] and had medication adjustments and plan to get mother into an adult day care.  Tomorrow patient is taking father to see a specialist at [facility] to help him cope with mother's mental illness- \"alzheimers and delusional  dementia\" per the neurologist.  Reports mother can get argumentative and paranoid.      Wt Readings from Last 3 Encounters:   09/16/22 91.4 kg (201 lb 6.4 oz)   05/24/22 89.4 kg (197 lb)   05/02/22 89.4 kg (197 lb)     Reports home scale weight on 1/1/3 of 183.6 lbs.  Patient reports increased walking with friends, not eating as much in the morning, \"constantly on the go.\"  Had wanted to do some fasting and weight loss.  Denies having to purchase new clothing, though noticed face was slimmer and clothes were looser.  Patient had been on weight watchers in the past before COVID and was down to 167 lbs, as patient was working out in the gym, and gym's were closed during COVID and weight returned.  Reports eating at 1130-12 noon for lunch, and 7 pm for dinner as spouse likes to eat at 8 pm.  Will also snack during the day on skinny pop popcorn.  Reports at last office visit recalled telling MA to not tell her what the weight was, however, felt \"good\" seeing the scale weight a few days ago.  Plans to start going back to the gym with friend as they would go every morning, \"that was my life.\"     ADHD symptoms are improving, as patient reports ability to listen more effectively, more patient with others is noticed the most since dose increase.  Upon feeling organized with holiday decorations, noticed was able to stay on task, only bring up one box at a time to decorate instead of having all boxes out and not being able to organize.    Patient reports having enough supply of Adderall XR 15 mg through next Wed. 1/11/23.  Reports after speaking with pharmacist at [facility] was told all medications would have to be transferred from [facility] and [facility] will not only fill the Adderall prescription. Patient had used [facility] due to [facility] not having Adderall XR available.  Patient is also concerned due to frequent travels of having adequate supply and ability to fill medication at outlying pharmacies.       11/22/22:   Patient presents today via MyChart Video visit from patient home.  Patient reports would like to increase dose possibly, reports spouse notices \"I am drifting some.\" Patient further explains will frequently jump to other topics during conversation, though improved with Adderall XR 10 mg.  Patient reports daughter was able to notice improvement, though now that patient has returned to home from travels, she is noticing elevation in symptoms.      Patient reports some difficulty with sleeping, as last night had minimal sleep as patient is worried about mother whom has dementia and father is caring for mother, has had  involved.  Believes temporary, situational anxiety is reason for sleep difficulty.  Patient reports mother's dementia symptoms are worsening and difficult to deal with, though hopeful as  has set up for assistant to come to home several days a week to help with house cleaning, meal prep, and care.         10/20/22:  Patient presents today via MyChart Video visit from daughter's home in [place].   Patient was started on Adderall XR 10 mg at last visit for which patient reports the first few days had increased energy, which had company, had difficulty sleeping the first 2 nights, however, no longer having difficulty.  Patient reports taking medication at 7 am, one day she took it later at 930 am and had difficulty sleeping that night. Patient has been taking thyroid medication upon awakening to use the bathroom around 5-530 am and upon waking up around 7 takes the Adderall.      \"I think it's a lot better, my daughter said she seems to notice, I still have a tendency to interrupt but that's better, I don't seem to repeat questions, as it was an issue before because I was not really listening. My mind was somewhere else.\"   Denies side effects, patient was surprised she was able to sit still after a few days, now able to complete tasks as less distraction, able to continue with  task at hand before switching to another task.     Patient will be returning from [place] 10/25/22.     9/16/22:  Patient presents today in office today to further discuss ADHD treatment with a controlled substance and to complete CSA and obtain POC UDS today per policy.     Patient brought in bottle of Hemp CBD oil 3200mg/2 oz, 1-3 drops 3 times daily on bottle, however, patient only takes in the evening for post menopausal symptoms, muscle aches, and vocal cord dysfunction, \"my neck tenses up , had to do physical therapy in past, and oil helps, I found out I am Allergic to mold.\"    9/15/22:  Patient presents today via MyChart Video visit from home, reports received ADHD test results though has not discussed.  Patient continues to have symptoms of inttentiveness, concentration difficulty, difficulty completing tasks, and switching from one uncompleted task to another.     Patient does take 3 drops of CBD oil, hemp based, at night for sleep, relaxes muscles as patient started taking while going through menopause.   Patient reports  is allergic to Wellbutrin and daughter tried Wellbutrin and did not do well, as patient would prefer to try Adderall first rather than a non-stimulant medication.  Patient will be out of town for 2 weeks in October visiting daughter in [place], likely early October.       7/14/22:  INITIAL EVAL  Patient presents today via MyChart Video visit from home with a history of depression and anxiety for which treatment was started in late 1995 with therapy, and medications from [id number] due to divorce.     Patient is currently not taking any psychotropic medications nor has had any since 2005.     \"My issue is I can't concentrate, I start something and cant finish something, forgets things often\".  Patient recalls while in school always \"fidgety\" crossing legs, moving often in seat.  Admits to interrupting people, gets distracted easily, daughter noticing.    Admits to over the  past 2 yrs began to feel ADHD may be a possibility, \"I am tired of being like this, forgetting stuff, going in and out of the house, tired of interrupting people, it seems to be bothering me more.\"  Patient uses reminders, lists which was started while kids in high school and has continued to have  self add items needed to List,\" I walk out without my list from the fridge, and I have to have him take a picture of it and send it to me\".  Difficulty sitting still when you should be sitting still in [employer] or meetings. Impulsive with shopping tends to purchase items that may be on sale or those that she may not need with the idea of \"I can always return it\".     Symptoms include fidgeting, difficulty remaining seated, easy distractability, blurting out answers prematurely, inability to complete tasks, difficulty sustaining attention, shifting from one uncompleted activity to another, talking excessively, interrupting others, ineffective listening, frequently losing items, and impulsivity.    1. ADHD:   a. Elementary school:   i. Grades:A's and B's  ii. Special classes or failures:no  iii. Got in trouble:no  iv. Referral for ADHD testing:no  b. Fhx:son, diagnosed officially in 7th grade, took medications for 1-2 yrs, restarted while in college only, \"he probably should be taking something, I have suggested it to him\"  c. Presently:  i. Problems with attention to detail:yes  ii. Problems with sustained attention:Yes  iii. Problems listening when spoken to directly:Yes, unable to concentrate on what others are saying, \"I have to get my point across, I know I have to wait for the break, but I can't wait for the break.\"  iv. Failure to finish tasks:yes, \"I will lay my husbandsTshirts on couch intending to hang up and they will be there for 2-3 days\" house hold tasks-dusting, mopping, find need to focus on floor boards  v. Avoids tasks that require sustained mental effort:yes, \"I excelled at work,  I could multi-task,  5-6 projects at desk, however, would wait until the last week to update credentials, I put off stuff and procrastinate all the time\"  vi. Easily distracted:yes  vii. Forgetting things:yes  viii. Losing things:yes  ix. Hard to organize:yes  x. Talks a lot and cutting people off:yes  xi. Drifts off during conversations:yes, \"I have to concentrate what I need to say to not forget what I have say, then I blurt it out, I am trying really hard, it's even hard with you to not stop and say stuff\"  xii. Difficulty with Reading:yes, \"I used to read a lot, has been using Audio books to listen in car or while out with friends or walking, has to rewind at times because my mind drifts off.\"  xiii. Difficulty watching TV/Movies:\"not really, we hardly ever have the TV on anymore.\"       PHQ-9 Depression Screening  PHQ-9 Total Score:   11/22/22 1, reassess 3/2023    Little interest or pleasure in doing things?     Feeling down, depressed, or hopeless?     Trouble falling or staying asleep, or sleeping too much?  1   Feeling tired or having little energy?     Poor appetite or overeating?     Feeling bad about yourself - or that you are a failure or have let yourself or your family down?     Trouble concentrating on things, such as reading the newspaper or watching television?     Moving or speaking so slowly that other people could have noticed? Or the opposite - being so fidgety or restless that you have been moving around a lot more than usual?     Thoughts that you would be better off dead, or of hurting yourself in some way?     PHQ-9 Total Score       SUZANNE-7    11/22/22 2, reassess 3/2023    Past Surgical History:  Past Surgical History:   Procedure Laterality Date   • BLADDER SUSPENSION  2008   • COLON SURGERY     • COLONOSCOPY     • ESSURE TUBAL LIGATION  1999   • JOINT REPLACEMENT  Jan 2017    Right hip replacement   • KIDNEY SURGERY     • OTHER SURGICAL HISTORY      metal implant   • TUBAL ABDOMINAL LIGATION         Problem  List:  Patient Active Problem List   Diagnosis   • Primary osteoarthritis of right hip   • Status post right hip replacement   • Hypothyroidism (acquired)   • Paroxysmal atrial fibrillation (HCC)   • Anxiety   • Deep venous thrombosis (HCC)   • Disorder of vocal cord   • Edema of lower extremity   • Hyperlipidemia   • Hyperthyroidism   • Onychomycosis   • Pain of right hip joint   • Laryngitis due to gastroesophageal reflux   • Sensation of heaviness in extremities   • Varicose veins of lower extremity   • Vitamin deficiency       Allergy:   Allergies   Allergen Reactions   • Sulfa Antibiotics Rash        Discontinued Medications:  Medications Discontinued During This Encounter   Medication Reason   • azelastine (ASTELIN) 0.1 % nasal spray *Therapy completed       Current Medications:   Current Outpatient Medications   Medication Sig Dispense Refill   • amphetamine-dextroamphetamine XR (Adderall XR) 15 MG 24 hr capsule Take 1 capsule by mouth Every Morning for 30 days Indications: Attention Deficit Hyperactivity Disorder 30 capsule 0   • Auvi-Q 0.3 MG/0.3ML solution auto-injector injection INJECT AS NEEDED FOR SEVERE ALLERGIC REACTION INCLUDING ANAPHYLAXIS AS DIRECTED     • Azelastine-Fluticasone 137-50 MCG/ACT suspension 1 spray by Alternating Nares route 2 (Two) Times a Day. shake liquid     • CBD (cannabidiol) oral oil Take  by mouth.     • cetirizine (zyrTEC) 10 MG tablet Take 10 mg by mouth Daily.     • ciclopirox (PENLAC) 8 % solution APPLY UNDER AND ON AFFECTED NAILS EVERY DAY AS DIRECTED     • levothyroxine (SYNTHROID, LEVOTHROID) 25 MCG tablet Take 1 tablet by mouth Daily. 90 tablet 1   • MAGNESIUM MALATE PO Take  by mouth Daily.     • Milk Thistle 150 MG capsule Take  by mouth.     • Misc. Devices (Nasal Spray Bottle) misc      • montelukast (SINGULAIR) 10 MG tablet Take 10 mg by mouth Every Night.     • Vitamin D-Vitamin K (K2 Plus D3) 100-1000 MCG-UNIT tablet Take  by mouth Daily. Patient takes drops  (not tablets) due to GI upset with tablet       No current facility-administered medications for this visit.       Past Medical History:  Past Medical History:   Diagnosis Date   • Allergic Whole life   • Anxiety    • Cervical cancer screening 2109   • Chronic allergic rhinitis    • Deep vein thrombosis (HCC) 2017    Caused after my hip replacement.   • Depression    • Hyperlipemia 03/15/2017   • Hypothyroidism 03/15/2017   • Kidney stones    • Limb pain    • Limb swelling        Past Psychiatric History:  Began Treatment:started in  with therapy due to spouse having an affair  Diagnoses:Depression and Anxiety  Psychiatrist:last seen from [id number]  Therapist:last seen from [id number]  Admission History:Denies  Medication Trials:Prozac--for one year \"felt like i was floating;\" Zoloft for 1 yr -, then started Effexor with divorce in  for1 yr-during time having increased stress with divorce as pt had lost hair and stomach problems and asked to be placed on meds; tolerated well  Self Harm: Denies  Suicide Attempts:Denies   Psychosis, Anxiety, Depression: Denies    Substance Abuse History:   Types:Alcohol daily in the evening to relax, 1-2 glasses of wine, or 1 beer or gin and tonic  Withdrawal Symptoms:Denies  Longest Period Sober:Not Applicable   AA: Not applicable     Social History:  Martial Status:  Employed:No 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  Kids:Yes or If so, how many 2 children and 2 step children  House:Lives in a house   History: Denies  Access to Guns: no    Social History     Socioeconomic History   • Marital status:    Tobacco Use   • Smoking status: Never   • Smokeless tobacco: Never   Vaping Use   • Vaping Use: Never used   Substance and Sexual Activity   • Alcohol use: Yes     Comment: drinks daily, wine, beer and lquor   • Drug use: Not Currently     Types: Marijuana     Comment: In teenage years   • Sexual activity: Yes        Family History:   Suicide Attempts: Denies  Suicide Completions:Denies      Family History   Problem Relation Age of Onset   • Dementia Mother    • Diabetes type II Mother    • Cancer Mother 70        Kidney cancer (left kidney removed) and endometrial cancer (hysterectomy)   • Prostate cancer Father    • Other Maternal Grandmother         Colon cancer   • Colon cancer Maternal Grandmother 70   • Diabetes Paternal Grandmother    • ADD / ADHD Son        Developmental History:   Born: [place], lived in [place] since age 1  Siblings:1 sister, 1 brother-both younger  Childhood: Denies Abuse  High School:Completed  College:2 yrs, no degree    Mental Status Exam:   Hygiene:   good  Cooperation:  Cooperative  Eye Contact:  Good  Psychomotor Behavior:  Appropriate  Affect:  Appropriate  Mood: euthymic  Speech:  Normal  Thought Process:  Goal directed  Thought Content:  Mood congruent  Suicidal:  None  Homicidal:  None  Hallucinations:  None  Delusion:  None  Memory:  Intact  Orientation:  Grossly intact  Reliability:  good  Insight:  Good  Judgement:  Good  Impulse Control:  Good  Physical/Medical Issues:  Yes Hypothyroidism,OA rt hip,paroxysmal afib, HLD     Review of Systems:  Review of Systems   Constitutional: Positive for appetite change. Negative for diaphoresis and fatigue.   HENT: Negative for drooling.    Eyes: Negative for visual disturbance.   Respiratory: Negative for cough and shortness of breath.    Cardiovascular: Negative for chest pain, palpitations and leg swelling.   Gastrointestinal: Negative for nausea and vomiting.   Endocrine: Negative for cold intolerance and heat intolerance.   Genitourinary: Negative for difficulty urinating.   Musculoskeletal: Negative for joint swelling.   Allergic/Immunologic: Negative for immunocompromised state.   Neurological: Negative for dizziness, seizures, speech difficulty and numbness.   Psychiatric/Behavioral: Negative for decreased concentration, self-injury, sleep  disturbance and suicidal ideas. The patient is not nervous/anxious and is not hyperactive.          Physical Exam:  Physical Exam  Psychiatric:         Attention and Perception: Attention and perception normal.         Mood and Affect: Mood and affect normal.         Speech: Speech normal.         Behavior: Behavior normal. Behavior is cooperative.         Thought Content: Thought content normal. Thought content does not include suicidal ideation. Thought content does not include suicidal plan.         Cognition and Memory: Cognition and memory normal.         Judgment: Judgment normal.         Vital Signs:   There were no vitals taken for this visit.     Lab Results:   Clinical Support on 09/16/2022   Component Date Value Ref Range Status   • Amphetamine Screen, Urine 09/16/2022 Negative  Negative Final   • Barbiturates Screen, Urine 09/16/2022 Negative  Negative Final   • Buprenorphine, Screen, Urine 09/16/2022 Negative  Negative Final   • Benzodiazepine Screen, Urine 09/16/2022 Negative  Negative Final   • Cocaine Screen, Urine 09/16/2022 Negative  Negative Final   • MDMA (ECSTASY) 09/16/2022 Negative  Negative Final   • Methamphetamine, Ur 09/16/2022 Negative  Negative Final   • Methadone Screen, Urine 09/16/2022 Negative  Negative Final   • Opiate Screen 09/16/2022 Negative  Negative Final   • Oxycodone Screen, Urine 09/16/2022 Negative  Negative Final   • Phencyclidine (PCP), Urine 09/16/2022 Negative  Negative Final   • THC, Screen, Urine 09/16/2022 Negative  Negative Final   • THC INTERNAL CONTROL 09/16/2022 Passed  Passed Final   • Lot Number 09/16/2022 [id number]   Final   • Expiration Date 09/16/2022 02/29/24   Final       EKG Results:  No orders to display       Imaging Results:  No Images in the past 120 days found..      Assessment & Plan   Diagnoses and all orders for this visit:    1. ADHD (attention deficit hyperactivity disorder), inattentive type (Primary)    2. Weight loss        Visit Diagnoses:     ICD-10-CM ICD-9-CM   1. ADHD (attention deficit hyperactivity disorder), inattentive type  F90.0 314.00   2. Weight loss  R63.4 783.21       PLAN:  1.   Safety: No acute safety concerns  2. Therapy: None  3. Risk Assessment: Risk of self-harm acutely is low.  Risk factors include anxiety disorder, mood disorder, and recent psychosocial stressors (pandemic). Protective factors include no family history, denies access to guns/weapons, no present SI, no history of suicide attempts or self-harm in the past, minimal AODA, healthcare seeking, future orientation, willingness to engage in care.  Risk of self-harm chronically is also low, but could be further elevated in the event of treatment noncompliance and/or AODA.  4. Meds:  Continue Adderall XR 15 mg by mouth daily in the morning to target symptoms of ADHD including:  Inattentiveness & impaired concentration.  Risks, benefits, side effects discussed with patient including elevated heart rate, elevated blood pressure, irritability, insomnia, sexual dysfunction, appetite suppressing properties, psychosis.  After discussion of these risks and benefits, the patient voiced understanding and agreed to proceed. Cali reviewed, LAST DISPENSED 12/12/22 #30/30 DAYS  Patient instructed to call [facility] Monday 1/9/23 to determine medication availability due to nationwide shortage of Adderall, and to then contact provider MA directly to inform as patient will have to transfer all medications to [facility] if continued to use that pharmacy.  Patient also instructed to inquire with pharmacy of earliest dispense date as most pharmacies are 24-48 hrs for controlled substances.     Controlled substance documentation: Cali reviewed; prior urine drug screen consistent obtained 9/16/22; consent is up to date, signed, witnessed and in EHR, dated 9/16/22, which will be updated annually per policy. Patient is aware of risk of addiction on this medication, understands need to  follow up for a review every 3 months and medications will be adjusted or decreased as deemed appropriate at each visit.  No history of drug or alcohol abuse.  No concerns about diversion or abuse. Patient denies side effects related to the medication.  Patient is aware of random urine drug screens and pill counts. The dosing of this medication will be reviewed on a regular basis and reduced if possible..  Ongoing use of a controlled substance is necessary for this patient to have a normal quality of life.    5. Labs: n/a    Symptoms of ADHD are under good control with Adderall XR 15 mg, denies side effects, though has noted decreased appetite with weight loss, which patient has also increased daily activity, exercising, and has been under some increased stress with parents care.  Will continue to check in with patient regarding weight loss.  Lengthy discussion with patient today regarding telehealth services as patient and provider must both be located in the same state [facility], and would not be able to provide telehealth services while visiting daughter in [place].  Patient verbalized understanding.  Patient to contact provider if symptoms worsen or fail to improve.         11/22/22:  Continue Adderall XR 10 mg by mouth daily in the morning to target symptoms of ADHD including:  Inattentiveness & impaired concentration.  Risks, benefits, side effects discussed with patient including elevated heart rate, elevated blood pressure, irritability, insomnia, sexual dysfunction, appetite suppressing properties, psychosis.  After discussion of these risks and benefits, the patient voiced understanding and agreed to proceed. Cali reviewed, LAST DISPENSED 10/28/22 #42/42 DAYS, reported #20 remain in bottle.    Will send in order for Adderall XR 5 mg by mouth daily in the morning for 20 days (through 12/12/22), patient instructed to add the 5 mg dose to the 10 mg to equal 15 mg total daily in the morning.  Patient instructed to  contact provider in office when down to 3-5 days remaining of supply. Call 12/8/22.  Informed patient order would be sent to Dr. Harkins in separate entry for signature due to EMR system, and will not see as refilled on AVS today.    Controlled substance documentation: Cali reviewed; prior urine drug screen consistent obtained 9/16/22; consent is up to date, signed, witnessed and in EHR, dated 9/16/22, which will be updated annually per policy. Patient is aware of risk of addiction on this medication, understands need to follow up for a review every 3 months and medications will be adjusted or decreased as deemed appropriate at each visit.  No history of drug or alcohol abuse.  No concerns about diversion or abuse. Patient denies side effects related to the medication.  Patient is aware of random urine drug screens and pill counts. The dosing of this medication will be reviewed on a regular basis and reduced if possible..  Ongoing use of a controlled substance is necessary for this patient to have a normal quality of life.    ADHD symptoms are under fair control with Adderall XR 10 mg, will increase to 15 mg.   Patient to contact provider if symptoms worsen or fail to improve.       10/31/22:  TELEPHONE  Patient called and LMVM that the [facility] in [place] did fill the Rx for her Adderall XR 10mg.  Patient just wanted to let Adelaida know.  Patient said she was \"happy about it\"   Prescription was verified as dispensed with pharmacy in [place].     10/28/22:  TELEPHONE  Please determine when she will return to [place], as she was supposed to return 10/25/22 and was originally given adequate supply through 10/28/22.    Called and spoke with patient, she states that she will be back on Wednesday Nov.2,2022 and will take her last pill tomorrow.  Patient does say that the [facility] in [place] has now reopened but they say they are very backed up and someone even told her that they will probably not fill it because   [facility] is not licensed in [place].  Patient says she will wait to see if they do fill it over the weekend and she will call me back on Monday and let me know.  If they don't fill it she will just have it sent to the [facility] in [place], [place].   I will forward message to  so he is updated on status, Since she will be returning next Wed. 11/2/22, a new order will not be sent into local pharmacy until confirmed dispense from [place] pharmacy, as [place] was not an option to [place] with [place]I report attempted today.  We will have to contact the pharmacy to verify dispense, and patient needs to be reminded to not wait until down to 1 pill remaining to request refill or problem with refill, as this refill was sent in per patient request Monday 10/24/22, and this information was relayed to office today.   Patient called and LMVM that the [facility] in [place] did fill the Rx for her Adderall XR 10mg.  Patient just wanted to let Adelaida know.  Patient said she was \"happy about it\"       10/20/22:   Continue Adderall XR 10 mg by mouth daily in the morning to target symptoms of ADHD including:  Inattentiveness & impaired concentration.  Risks, benefits, side effects discussed with patient including elevated heart rate, elevated blood pressure, irritability, insomnia, sexual dysfunction, appetite suppressing properties, psychosis.  After discussion of these risks and benefits, the patient voiced understanding and agreed to proceed. Cali reviewed, LAST DISPENSED 9/16/22 #42/42 DAYS  Patient instructed to request refill 10/25/22 upon return from [place]. Explained process for refills.   Controlled substance documentation: Cali reviewed; prior urine drug screen consistent obtained 9/16/22; consent is up to date, signed, witnessed and in EHR, dated 9/16/22, which will be updated annually per policy. Patient is aware of risk of addiction on this medication, understands need to follow up for a review every 3 months and  medications will be adjusted or decreased as deemed appropriate at each visit.  No history of drug or alcohol abuse.  No concerns about diversion or abuse. Patient denies side effects related to the medication.  Patient is aware of random urine drug screens and pill counts. The dosing of this medication will be reviewed on a regular basis and reduced if possible..  Ongoing use of a controlled substance is necessary for this patient to have a normal quality of life.  Symptoms of ADHD are managed well with current dose of Adderall XR 10 mg without side effects.      9/16/22:   Declines therapy  Will potentially start Adderall XR 10 mg by mouth daily in the morning to target symptoms of ADHD including:  Inattentiveness & impaired concentration.  Risks, benefits, side effects discussed with patient including elevated heart rate, elevated blood pressure, irritability, insomnia, sexual dysfunction, appetite suppressing properties, psychosis.  After discussion of these risks and benefits, the patient voiced understanding and agreed to proceed. Cali reviewed, UDS ordered, and controlled substance agreement signed & witnessed. Patient informed this medication would not be ordered until UDS resulted and was negative, at that time a Prescription will be sent to  for signature.  Labs: POC UDS today in clinic    Lengthy discussion held with patient regarding CSA and medication education.   Bottle of Hemp oil had a QRS code to scan, which was done per patient request, which indicated percentages of ingredients as each bottle varies.  Certificate of Analysis, [facility], Cannabinoid Results: Total THC 0.231%, Total CBD 6.101%, Total Cannabinoids 6.534%. Will scan certificate to EHR.   Patient will be in [place] in October, will check with  in regards to prescribing CS sending to [place].   Will contact patient if UDS needs to be sent for confirmation. Otherwise will proceed with ordering Adderall XR.          9/15/22:   ADHD testing completed by Dr. Greg Bennett on 9/1/22 confirming a diagnosis of ADHD. (total time of review 9 mins)    Discussed ADHD treatment options of non-stimulants vs stimulant medications, after discussion patient wishes to proceed with Adderall.  Informed patient of policy requirements prior to starting Adderall, patient will plan on coming in tomorrow due to available appointment at 8 am.  Discussed current CBD oil which patient reports is hemp based as the UDS may show positive for THC. Patient takes CBD oil for sleep and muscle aches, which has been effective.  IF UDS positive patient was informed Adderall would not be prescribed, and will have to send for a confirmation.  Patient verbalized understanding.       7/14/22:   No Medications, Declines therapy at this time.  Referral for ADHD testing with   Dr. Greg Bennett, Psychologist, MS, LPP  [address], [address]  [place] [id number] [phone]   Currently only accepting referrals for psychological testing services.  Patient to contact provider and set up appointment.  And to contact office if unable to get an appointment scheduled.   -Attached list of several other sites for ADHD testing. Patient instructed to contact providers on list to determine availability of appointments, instructed patient to take notes while calling places indicating first available appointment, and to ask to be placed on waiting list.  If an office is chosen and requests the referral order please contact my Medical Assistant, Hanh, directly at [phone] informing of chosen office and the information will be sent.    Patient with high suspicion of having ADHD, will send for formal testing and have patient return in 2 weeks to discuss medication options as if patient is able to be tested in the next 1-2 months, may wait on starting a non-stimulant medication.       Patient screened positive for depression based on a PHQ-9 score of 1 on  11/22/22 . Follow-up recommendations include: Suicide Risk Assessment performed.       TREATMENT PLAN/GOALS: Continue supportive psychotherapy efforts and medications as indicated. Treatment and medication options discussed during today's visit. Patient ackowledged and verbally consented to continue with current treatment plan and was educated on the importance of compliance with treatment and follow-up appointments.    MEDICATION ISSUES:  MARLYS reviewed as expected.    Discussed medication options and treatment plan of prescribed medication as well as the risks, benefits, and side effects including potential falls, possible impaired driving and metabolic adversities among others. Patient is agreeable to call the office with any worsening of symptoms or onset of side effects. Patient is agreeable to call 911 or go to the nearest ER should he/she begin having SI/HI. No medication side effects or related complaints today.     MEDS ORDERED DURING VISIT:  No orders of the defined types were placed in this encounter.      Return in about 5 weeks (around 2/8/2023) for Video visit, medication check.         I spent 47 minutes caring for Jenn on this date of service. This time includes time spent by me in the following activities: preparing for the visit, performing a medically appropriate examination and/or evaluation, counseling and educating the patient/family/caregiver, referring and communicating with other health care professionals, documenting information in the medical record, care coordination and discussing telehealth guidelines and pharmacy coordination in regards to controlled substance. .      This document has been electronically signed by CHELSI Burnett  January 4, 2023 09:21 EST      Part of this note may be an electronic transcription/translation of spoken language to printed text using the Dragon Dictation System.

## 2023-01-04 NOTE — PATIENT INSTRUCTIONS
1.  Please return to clinic at your next scheduled visit.  Contact the [facility] ([phone]) or **Hanh, Medical Assistant at [place] Office directly at [phone] at least 24 hours prior in the event you need to cancel.**    2. Should you want to get in touch with your provider, CHELSI Burnett, please contact MY Medical Assistant, Hanh, directly at [phone].  Recommend saving Hanh's direct number in phone as this is the PREFERRED & EASIEST way to get in contact with your provider.  Please leave a voice mail if you do not get an answer and she will return your call within 24 hrs. You will NOT be able to contact provider on [facility], as Behavioral Health Providers are restricted. YOU MUST CALL [phone]    If you need to speak with the on call provider after hours or on weekends, please Contact the [facility] ([phone]) and staff will be able to page the provider on call directly.        3, MEDICATION REFILLS:  PLEASE CALL THE PHARMACY TO REQUEST ALL MEDICATION REFILLS TO ENSURE YOU ARE RECEIVING YOUR MEDICATIONS IN A TIMELY MANNER.    IF YOU USE AN AUTOMATED SERVICE AT THE PHARMACY FOR REFILLS AND ARE TOLD THERE ARE \"NO REFILLS REMAINING\"   PLEASE CALL THE PHARMACY & SPEAK TO A LIVE PERSON TO VERIFY IT IS THE MOST UP TO DATE PRESCRIPTION ON FILE.    All new prescriptions will have a different number, therefore, if you were given refills for a medication today or at last visit it will not have the same number as the previous prescription.       4.  In the event you have personal crisis, contact the following crisis numbers: Suicide Prevention Hotline [phone] or *988, [facility] Helpline 7-570-353-ISHMAEL; [facility] Emergency Room [phone]; text HELLO to 817253; or 926.      5. We would appreciate your feedback, please scan the QRS code on the back of your appointment card (or see below) and complete a brief survey.  [place] location is still not available, so  please click \"[place]\" location.  Thank you      SPECIFIC RECOMMENDATIONS:     1.      Medications discussed at this encounter:                   - call [facility] Monday morning and make sure they have Adderall XR 15 mg in stock, also ask how many days early can you fill is it 24 hrs or 48 hrs.  Then call Hanh to request refill and if still able to fill at [facility].     2.      Psychotherapy recommendations: Declined     3.     Return to clinic: 5 weeks    Please arrive at least 15 minutes before your scheduled appointment time to complete check in process.      IF you are scheduled for a [facility] VIDEO visit, PLEASE ANSWER YOUR PHONE WHEN OFFICE CALLS PRIOR TO VISIT TO COMPLETE THE CHECK IN PROCESS, EVEN IF THE E-CHECK IN WAS COMPLETED.     If you would like to log on to [facility] and complete the \"E-Check IN\" prior to your visit, please do so, this will speed up the check in process.  If you are due for questionnaires, you will find those on [facility] as well, please try to complete prior to your scheduled appointment.

## 2023-01-09 ENCOUNTER — LAB (OUTPATIENT)
Dept: LAB | Facility: HOSPITAL | Age: 58
End: 2023-01-09
Payer: COMMERCIAL

## 2023-01-09 ENCOUNTER — OFFICE VISIT (OUTPATIENT)
Dept: FAMILY MEDICINE CLINIC | Facility: CLINIC | Age: 58
End: 2023-01-09
Payer: COMMERCIAL

## 2023-01-09 VITALS
HEIGHT: 67 IN | DIASTOLIC BLOOD PRESSURE: 71 MMHG | WEIGHT: 186 LBS | HEART RATE: 85 BPM | BODY MASS INDEX: 29.19 KG/M2 | OXYGEN SATURATION: 95 % | SYSTOLIC BLOOD PRESSURE: 115 MMHG

## 2023-01-09 DIAGNOSIS — F90.0 ADHD (ATTENTION DEFICIT HYPERACTIVITY DISORDER), INATTENTIVE TYPE: ICD-10-CM

## 2023-01-09 DIAGNOSIS — R09.81 NASAL CONGESTION: ICD-10-CM

## 2023-01-09 DIAGNOSIS — E03.9 HYPOTHYROIDISM, UNSPECIFIED TYPE: Primary | ICD-10-CM

## 2023-01-09 DIAGNOSIS — E03.9 HYPOTHYROIDISM, UNSPECIFIED TYPE: ICD-10-CM

## 2023-01-09 PROCEDURE — 84439 ASSAY OF FREE THYROXINE: CPT

## 2023-01-09 PROCEDURE — 99213 OFFICE O/P EST LOW 20 MIN: CPT | Performed by: NURSE PRACTITIONER

## 2023-01-09 PROCEDURE — 84443 ASSAY THYROID STIM HORMONE: CPT

## 2023-01-09 PROCEDURE — 36415 COLL VENOUS BLD VENIPUNCTURE: CPT

## 2023-01-09 RX ORDER — DEXTROAMPHETAMINE SACCHARATE, AMPHETAMINE ASPARTATE MONOHYDRATE, DEXTROAMPHETAMINE SULFATE AND AMPHETAMINE SULFATE 3.75; 3.75; 3.75; 3.75 MG/1; MG/1; MG/1; MG/1
15 CAPSULE, EXTENDED RELEASE ORAL EVERY MORNING
Qty: 30 CAPSULE | Refills: 0 | Status: SHIPPED | OUTPATIENT
Start: 2023-01-10 | End: 2023-02-10 | Stop reason: SDUPTHER

## 2023-01-09 RX ORDER — ASCORBIC ACID
2500 CRYSTALS ORAL
COMMUNITY

## 2023-01-09 NOTE — TELEPHONE ENCOUNTER
Patient LMVM that the [facility] on [address] in [place] they currently have her medication Adderall XR 15mg (right now).she said you had told her to check around and call.

## 2023-01-09 NOTE — PROGRESS NOTES
Chief Complaint  Hypothyroidism, Cough, and Nasal Congestion    Subjective            Jenn James presents to [facility]  History of Present Illness  Pt is a 6 mo f/u for hypothyroidism. Pt has c/o possible sinus infection. Pt has been having productive cough and nasal congestion since 12/23. Pt states she has not taken anything OTC for this. Pt has been using a neti-pot and humidifier and states this helps some.     Pt is due TSH, T4 labs.     Pt would like to schedule CPE/PAP 6/2023        Past Medical History:   Diagnosis Date   • ADHD (attention deficit hyperactivity disorder) Sep/22    Now on adderrall   • Allergic Whole life   • Anxiety    • Cervical cancer screening 5/30/2109   • Chronic allergic rhinitis    • Deep vein thrombosis (HCC) 2017    Caused after my hip replacement.   • Depression    • Hyperlipemia 03/15/2017   • Hypothyroidism 03/15/2017   • Kidney stones    • Limb pain    • Limb swelling        Allergies   Allergen Reactions   • Sulfa Antibiotics Rash        Past Surgical History:   Procedure Laterality Date   • BLADDER SUSPENSION  2008   • COLON SURGERY     • COLONOSCOPY     • ESSURE TUBAL LIGATION  1999   • JOINT REPLACEMENT  Jan 2017    Right hip replacement   • KIDNEY SURGERY     • OTHER SURGICAL HISTORY      metal implant   • TUBAL ABDOMINAL LIGATION          Social History     Tobacco Use   • Smoking status: Never   • Smokeless tobacco: Never   Substance Use Topics   • Alcohol use: Yes     Alcohol/week: 3.0 - 4.0 standard drinks     Types: 3 - 4 Glasses of wine per week     Comment: drinks daily, wine, beer and lquor       Family History   Problem Relation Age of Onset   • Dementia Mother    • Diabetes type II Mother    • Cancer Mother         Kidney cancer (left kidney removed) and endometrial cancer (hysterectomy)   • Diabetes Mother         Type 2   • Prostate cancer Father    • Other Maternal Grandmother         Colon cancer   • Colon cancer Maternal  Grandmother 70   • Diabetes Paternal Grandmother         Type 2   • ADD / ADHD Son         Current Outpatient Medications on File Prior to Visit   Medication Sig   • amphetamine-dextroamphetamine XR (Adderall XR) 15 MG 24 hr capsule Take 1 capsule by mouth Every Morning for 30 days Indications: Attention Deficit Hyperactivity Disorder   • Auvi-Q 0.3 MG/0.3ML solution auto-injector injection INJECT AS NEEDED FOR SEVERE ALLERGIC REACTION INCLUDING ANAPHYLAXIS AS DIRECTED   • Azelastine-Fluticasone 137-50 MCG/ACT suspension 1 spray by Alternating Nares route 2 (Two) Times a Day. shake liquid   • CBD (cannabidiol) oral oil Take  by mouth.   • cetirizine (zyrTEC) 10 MG tablet Take 10 mg by mouth Daily.   • ciclopirox (PENLAC) 8 % solution APPLY UNDER AND ON AFFECTED NAILS EVERY DAY AS DIRECTED   • Cyanocobalamin (Vitamin B 12) 250 MCG lozenge Take 2,500 mcg by mouth.   • levothyroxine (SYNTHROID, LEVOTHROID) 25 MCG tablet Take 1 tablet by mouth Daily.   • MAGNESIUM MALATE PO Take  by mouth Daily.   • Milk Thistle 150 MG capsule Take  by mouth.   • Misc. Devices (Nasal Spray Bottle) misc    • montelukast (SINGULAIR) 10 MG tablet Take 10 mg by mouth Every Night.   • Vitamin D-Vitamin K (K2 Plus D3) 100-1000 MCG-UNIT tablet Take  by mouth Daily. Patient takes drops (not tablets) due to GI upset with tablet     No current facility-administered medications on file prior to visit.       There are no preventive care reminders to display for this patient.    Objective     /71   Pulse 85   Ht 170.2 cm (67\")   Wt 84.4 kg (186 lb)   SpO2 95%   BMI 29.13 kg/m²       Physical Exam  Constitutional:       General: She is not in acute distress.     Appearance: Normal appearance. She is not ill-appearing.   HENT:      Head: Normocephalic and atraumatic.      Right Ear: Tympanic membrane, ear canal and external ear normal.      Left Ear: Tympanic membrane, ear canal and external ear normal.      Nose: Nose normal.   Neck:       Thyroid: No thyroid mass, thyromegaly or thyroid tenderness.   Cardiovascular:      Rate and Rhythm: Normal rate and regular rhythm.      Heart sounds: Normal heart sounds. No murmur heard.  Pulmonary:      Effort: Pulmonary effort is normal. No respiratory distress.      Breath sounds: Normal breath sounds.   Chest:      Chest wall: No tenderness.   Abdominal:      General: There is no distension.      Palpations: There is no mass.      Tenderness: There is no abdominal tenderness. There is no guarding.   Musculoskeletal:         General: No swelling or tenderness. Normal range of motion.      Cervical back: Normal range of motion and neck supple.   Skin:     General: Skin is warm and dry.      Findings: No rash.   Neurological:      General: No focal deficit present.      Mental Status: She is alert and oriented to person, place, and time. Mental status is at baseline.      Gait: Gait normal.   Psychiatric:         Mood and Affect: Mood normal.         Behavior: Behavior normal.         Thought Content: Thought content normal.         Judgment: Judgment normal.           Result Review :                           Assessment and Plan        Diagnoses and all orders for this visit:    1. Hypothyroidism, unspecified type (Primary)  Comments:  stable on synthroid 25mcg, continue  Orders:  -     TSH; Future  -     T4, free; Future    2. Nasal congestion  Comments:  continue daily antihistamines, singlair and neti pot, f/u if symtpoms persisits              Follow Up     Return in about 6 months (around 7/9/2023) for PAP.    Patient was given instructions and counseling regarding her condition or for health maintenance advice. Please see specific information pulled into the AVS if appropriate.     Jenn MATIAS James  reports that she has never smoked. She has never used smokeless tobacco..

## 2023-01-10 ENCOUNTER — TELEPHONE (OUTPATIENT)
Dept: FAMILY MEDICINE CLINIC | Facility: CLINIC | Age: 58
End: 2023-01-10
Payer: COMMERCIAL

## 2023-01-10 DIAGNOSIS — E05.90 HYPERTHYROIDISM: Primary | ICD-10-CM

## 2023-01-10 DIAGNOSIS — E03.9 ACQUIRED HYPOTHYROIDISM: ICD-10-CM

## 2023-01-10 LAB
T4 FREE SERPL-MCNC: 1.33 NG/DL (ref 0.93–1.7)
TSH SERPL DL<=0.05 MIU/L-ACNC: 1.88 UIU/ML (ref 0.27–4.2)

## 2023-01-10 RX ORDER — LEVOTHYROXINE SODIUM 0.03 MG/1
25 TABLET ORAL DAILY
Qty: 90 TABLET | Refills: 1 | Status: SHIPPED | OUTPATIENT
Start: 2023-01-10 | End: 2023-03-13

## 2023-01-10 NOTE — TELEPHONE ENCOUNTER
Pt requested a levothryoxine refll    ----- Message from CHELSI Cruz sent at 1/10/2023  6:34 AM EST -----  chrisl

## 2023-02-08 ENCOUNTER — TELEPHONE (OUTPATIENT)
Dept: PSYCHIATRY | Facility: CLINIC | Age: 58
End: 2023-02-08
Payer: COMMERCIAL

## 2023-02-08 ENCOUNTER — TELEMEDICINE (OUTPATIENT)
Dept: PSYCHIATRY | Facility: CLINIC | Age: 58
End: 2023-02-08
Payer: COMMERCIAL

## 2023-02-08 DIAGNOSIS — F90.0 ADHD (ATTENTION DEFICIT HYPERACTIVITY DISORDER), INATTENTIVE TYPE: Primary | ICD-10-CM

## 2023-02-08 PROCEDURE — 99213 OFFICE O/P EST LOW 20 MIN: CPT | Performed by: NURSE PRACTITIONER

## 2023-02-08 NOTE — PATIENT INSTRUCTIONS
"1.  Please return to clinic at your next scheduled visit.  Contact the [facility] ([phone]) or **Hanh, Medical Assistant at [place] Office directly at [phone] at least 24 hours prior in the event you need to cancel.**    2. Should you want to get in touch with your provider, CHELSI Burnett, please contact MY Medical Assistant, Hanh, directly at [phone].  Recommend saving Hanh's direct number in phone as this is the PREFERRED & EASIEST way to get in contact with your provider.  Please leave a voice mail if you do not get an answer and she will return your call within 24 hrs. You will NOT be able to contact provider on [facility], as Behavioral Health Providers are restricted. YOU MUST CALL [phone]    If you need to speak with the on call provider after hours or on weekends, please Contact the [facility] ([phone]) and staff will be able to page the provider on call directly.        3, MEDICATION REFILLS:  PLEASE CALL THE PHARMACY TO REQUEST ALL MEDICATION REFILLS TO ENSURE YOU ARE RECEIVING YOUR MEDICATIONS IN A TIMELY MANNER.    IF YOU USE AN AUTOMATED SERVICE AT THE PHARMACY FOR REFILLS AND ARE TOLD THERE ARE \"NO REFILLS REMAINING\"   PLEASE CALL THE PHARMACY & SPEAK TO A LIVE PERSON TO VERIFY IT IS THE MOST UP TO DATE PRESCRIPTION ON FILE.    All new prescriptions will have a different number, therefore, if you were given refills for a medication today or at last visit it will not have the same number as the previous prescription.       4.  In the event you have personal crisis, contact the following crisis numbers: Suicide Prevention Hotline 1-237.989.6657 or *988, [facility] Helpline 0-236-073-ISHMAEL; [facility] Emergency Room [phone]; text HELLO to 496833; or 268.      5. We would appreciate your feedback, please scan the QRS code on the back of your appointment card (or see below) and complete a brief survey.  [place] location is still not available, so " "please click \"[place]\" location.  Thank you      SPECIFIC RECOMMENDATIONS:     1.      Medications discussed at this encounter:                   - no changes, Adderall XR 15 mg will be sent to Dr. Harkins in separate entry for signature due to EMR system, and will not see as refilled on AVS today.  Refill when appropriate through your pharmacy      2.      Psychotherapy recommendations: Declined     3.     Return to clinic: 2 months    Please arrive at least 15 minutes before your scheduled appointment time to complete check in process.      IF you are scheduled for a [facility] VIDEO visit, PLEASE ANSWER YOUR PHONE WHEN OFFICE CALLS PRIOR TO VISIT TO COMPLETE THE CHECK IN PROCESS, EVEN IF THE E-CHECK IN WAS COMPLETED.     If you would like to log on to [facility] and complete the \"E-Check IN\" prior to your visit, please do so, this will speed up the check in process.  If you are due for questionnaires, you will find those on [facility] as well, please try to complete prior to your scheduled appointment.          "

## 2023-02-08 NOTE — PROGRESS NOTES
This provider [place] [address]. The Patient is seen remotely using [employer]. Patient is being seen via telehealth and confirm that they are in a secure environment for this session. The patient's condition being diagnosed/treated is appropriate for telemedicine. The provider identified himself/herself: herself as well as her credentials.   The patient gave consent to be seen remotely, and when consent is given they understand that the consent allows for patient identifiable information to be sent to a third party as needed.   They may refuse to be seen remotely at any time. The electronic data is encrypted and password protected, and the patient has been advised of the potential risks to privacy not withstanding such measures.    You have chosen to receive care through a telehealth visit.  Do you consent to use a video/audio connection for your medical care today? Yes      Subjective   Jenn James is a 57 y.o. female who presents today for follow up    Referring Provider:  Mariana Jalloh, CHELSI  [address] [facility]  [place] [address]    Chief Complaint:  ADHD medication check    History of Present Illness:   2/8/23:  Patient presents today via [employer] Video visit from home in [place], [place].  Patient reports feeling good, busy with family birthdays, and going to [place] to see grand kids to do some crafts. Symptoms of inattentiveness, shifting from one uncompleted activity or topic to another, impaired concentration are controlled well with current dose of Adderall XR 10 mg. Patient frequently freezing on video and had to go outside of home for better service, though continued to have connection problems intermittently.     Patient continues to be conscious of eating, denies decreased appetite, patient is walking with friends, eats a breakfast bar normally and then eats lunch around 1130, however, yesterday got caught up shopping and realized she had not eaten.   "When patient does eat she eats good quantity.  Feels she may have lost 1-2 lbs, otherwise weight has been steady.      Patient has checked with [facility] pharmacy and Adderall dose is in stock.  Patient plans to travel to [place] in November, no other trips planned for out of state at this time.          1/4/23:  Patient presents today via MyChart Video visit from home, Adderall XR was increased from 10 to 15 mg at last visit, for which patient reports the first few days of the increase felt \"I don't know if I can handle this, just a little spaced out, I don't know, but it was fine in a few days.\"  Denies increased heart rate, difficulty sleeping.   Patient feels she may be decreased weight of 15 lb, though patient has been busy with mother in the hospital and getting things together for the holidays.  Reports mother was admitted to [facility] and had medication adjustments and plan to get mother into an adult day care.  Tomorrow patient is taking father to see a specialist at [facility] to help him cope with mother's mental illness- \"alzheimers and delusional dementia\" per the neurologist.  Reports mother can get argumentative and paranoid.       Wt Readings from Last 3 Encounters:   01/09/23 84.4 kg (186 lb)   09/16/22 91.4 kg (201 lb 6.4 oz)   05/24/22 89.4 kg (197 lb)     Reports home scale weight on 1/1/3 of 183.6 lbs.  Patient reports increased walking with friends, not eating as much in the morning, \"constantly on the go.\"  Had wanted to do some fasting and weight loss.  Denies having to purchase new clothing, though noticed face was slimmer and clothes were looser.  Patient had been on weight watchers in the past before COVID and was down to 167 lbs, as patient was working out in the gym, and gym's were closed during COVID and weight returned.  Reports eating at 1130-12 noon for lunch, and 7 pm for dinner as spouse likes to eat at 8 pm.  Will also snack during the day on skinny pop popcorn.  Reports at last " "office visit recalled telling MA to not tell her what the weight was, however, felt \"good\" seeing the scale weight a few days ago.  Plans to start going back to the gym with friend as they would go every morning, \"that was my life.\"     ADHD symptoms are improving, as patient reports ability to listen more effectively, more patient with others is noticed the most since dose increase.  Upon feeling organized with holiday decorations, noticed was able to stay on task, only bring up one box at a time to decorate instead of having all boxes out and not being able to organize.    Patient reports having enough supply of Adderall XR 15 mg through next Wed. 1/11/23.  Reports after speaking with pharmacist at [facility] was told all medications would have to be transferred from [facility] and [facility] will not only fill the Adderall prescription. Patient had used [facility] due to [facility] not having Adderall XR available.  Patient is also concerned due to frequent travels of having adequate supply and ability to fill medication at outlying pharmacies.       11/22/22:  Patient presents today via MyChart Video visit from patient home.  Patient reports would like to increase dose possibly, reports spouse notices \"I am drifting some.\" Patient further explains will frequently jump to other topics during conversation, though improved with Adderall XR 10 mg.  Patient reports daughter was able to notice improvement, though now that patient has returned to home from travels, she is noticing elevation in symptoms.      Patient reports some difficulty with sleeping, as last night had minimal sleep as patient is worried about mother whom has dementia and father is caring for mother, has had  involved.  Believes temporary, situational anxiety is reason for sleep difficulty.  Patient reports mother's dementia symptoms are worsening and difficult to deal with, though hopeful as  has set up for assistant to " "come to home several days a week to help with house cleaning, meal prep, and care.         10/20/22:  Patient presents today via MyChart Video visit from daughter's home in [place].   Patient was started on Adderall XR 10 mg at last visit for which patient reports the first few days had increased energy, which had company, had difficulty sleeping the first 2 nights, however, no longer having difficulty.  Patient reports taking medication at 7 am, one day she took it later at 930 am and had difficulty sleeping that night. Patient has been taking thyroid medication upon awakening to use the bathroom around 5-530 am and upon waking up around 7 takes the Adderall.      \"I think it's a lot better, my daughter said she seems to notice, I still have a tendency to interrupt but that's better, I don't seem to repeat questions, as it was an issue before because I was not really listening. My mind was somewhere else.\"   Denies side effects, patient was surprised she was able to sit still after a few days, now able to complete tasks as less distraction, able to continue with task at hand before switching to another task.     Patient will be returning from [place] 10/25/22.     9/16/22:  Patient presents today in office today to further discuss ADHD treatment with a controlled substance and to complete CSA and obtain POC UDS today per policy.     Patient brought in bottle of Hemp CBD oil 3200mg/2 oz, 1-3 drops 3 times daily on bottle, however, patient only takes in the evening for post menopausal symptoms, muscle aches, and vocal cord dysfunction, \"my neck tenses up , had to do physical therapy in past, and oil helps, I found out I am Allergic to mold.\"    9/15/22:  Patient presents today via MyChart Video visit from home, reports received ADHD test results though has not discussed.  Patient continues to have symptoms of inttentiveness, concentration difficulty, difficulty completing tasks, and switching from one " "uncompleted task to another.     Patient does take 3 drops of CBD oil, hemp based, at night for sleep, relaxes muscles as patient started taking while going through menopause.   Patient reports  is allergic to Wellbutrin and daughter tried Wellbutrin and did not do well, as patient would prefer to try Adderall first rather than a non-stimulant medication.  Patient will be out of town for 2 weeks in October visiting daughter in [place], likely early October.       7/14/22:  INITIAL EVAL  Patient presents today via MyChart Video visit from home with a history of depression and anxiety for which treatment was started in late 1995 with therapy, and medications from 2251-6966 due to divorce.     Patient is currently not taking any psychotropic medications nor has had any since 2005.     \"My issue is I can't concentrate, I start something and cant finish something, forgets things often\".  Patient recalls while in school always \"fidgety\" crossing legs, moving often in seat.  Admits to interrupting people, gets distracted easily, daughter noticing.    Admits to over the past 2 yrs began to feel ADHD may be a possibility, \"I am tired of being like this, forgetting stuff, going in and out of the house, tired of interrupting people, it seems to be bothering me more.\"  Patient uses reminders, lists which was started while kids in high school and has continued to have  self add items needed to List,\" I walk out without my list from the fridge, and I have to have him take a picture of it and send it to me\".  Difficulty sitting still when you should be sitting still in Mu-ism or meetings. Impulsive with shopping tends to purchase items that may be on sale or those that she may not need with the idea of \"I can always return it\".     Symptoms include fidgeting, difficulty remaining seated, easy distractability, blurting out answers prematurely, inability to complete tasks, difficulty sustaining attention, shifting " "from one uncompleted activity to another, talking excessively, interrupting others, ineffective listening, frequently losing items, and impulsivity.    1. ADHD:   a. Elementary school:   i. Grades:A's and B's  ii. Special classes or failures:no  iii. Got in trouble:no  iv. Referral for ADHD testing:no  b. Fhx:son, diagnosed officially in 7th grade, took medications for 1-2 yrs, restarted while in college only, \"he probably should be taking something, I have suggested it to him\"  c. Presently:  i. Problems with attention to detail:yes  ii. Problems with sustained attention:Yes  iii. Problems listening when spoken to directly:Yes, unable to concentrate on what others are saying, \"I have to get my point across, I know I have to wait for the break, but I can't wait for the break.\"  iv. Failure to finish tasks:yes, \"I will lay my husbandsTshirts on couch intending to hang up and they will be there for 2-3 days\" house hold tasks-dusting, mopping, find need to focus on floor boards  v. Avoids tasks that require sustained mental effort:yes, \"I excelled at work,  I could multi-task, 5-6 projects at desk, however, would wait until the last week to update credentials, I put off stuff and procrastinate all the time\"  vi. Easily distracted:yes  vii. Forgetting things:yes  viii. Losing things:yes  ix. Hard to organize:yes  x. Talks a lot and cutting people off:yes  xi. Drifts off during conversations:yes, \"I have to concentrate what I need to say to not forget what I have say, then I blurt it out, I am trying really hard, it's even hard with you to not stop and say stuff\"  xii. Difficulty with Reading:yes, \"I used to read a lot, has been using Audio books to listen in car or while out with friends or walking, has to rewind at times because my mind drifts off.\"  xiii. Difficulty watching TV/Movies:\"not really, we hardly ever have the TV on anymore.\"       PHQ-9 Depression Screening  PHQ-9 Total Score:   11/22/22 1, reassess " 4/2023    Little interest or pleasure in doing things?     Feeling down, depressed, or hopeless?     Trouble falling or staying asleep, or sleeping too much?  1   Feeling tired or having little energy?     Poor appetite or overeating?     Feeling bad about yourself - or that you are a failure or have let yourself or your family down?     Trouble concentrating on things, such as reading the newspaper or watching television?     Moving or speaking so slowly that other people could have noticed? Or the opposite - being so fidgety or restless that you have been moving around a lot more than usual?     Thoughts that you would be better off dead, or of hurting yourself in some way?     PHQ-9 Total Score       SUZANNE-7  Feeling nervous, anxious or on edge: Not at all  Not being able to stop or control worrying: Not at all  Worrying too much about different things: Not at all  Trouble Relaxing: Not at all  Being so restless that it is hard to sit still: Not at all  Feeling afraid as if something awful might happen: Not at all  Becoming easily annoyed or irritable: Not at all  SUZANNE 7 Total Score: 0  If you checked any problems, how difficult have these problems made it for you to do your work, take care of things at home, or get along with other people: Not difficult at all 11/22/22 2, reassess 4/2023    Past Surgical History:  Past Surgical History:   Procedure Laterality Date   • BLADDER SUSPENSION  2008   • COLON SURGERY     • COLONOSCOPY     • ESSURE TUBAL LIGATION  1999   • JOINT REPLACEMENT  Jan 2017    Right hip replacement   • KIDNEY SURGERY     • OTHER SURGICAL HISTORY      metal implant   • TUBAL ABDOMINAL LIGATION         Problem List:  Patient Active Problem List   Diagnosis   • Primary osteoarthritis of right hip   • Status post right hip replacement   • Hypothyroidism (acquired)   • Paroxysmal atrial fibrillation (HCC)   • Anxiety   • Deep venous thrombosis (HCC)   • Disorder of vocal cord   • Edema of lower  extremity   • Hyperlipidemia   • Hyperthyroidism   • Onychomycosis   • Pain of right hip joint   • Laryngitis due to gastroesophageal reflux   • Sensation of heaviness in extremities   • Varicose veins of lower extremity   • Vitamin deficiency       Allergy:   Allergies   Allergen Reactions   • Sulfa Antibiotics Rash        Discontinued Medications:  There are no discontinued medications.    Current Medications:   Current Outpatient Medications   Medication Sig Dispense Refill   • amphetamine-dextroamphetamine XR (Adderall XR) 15 MG 24 hr capsule Take 1 capsule by mouth Every Morning for 30 days Indications: Attention Deficit Hyperactivity Disorder 30 capsule 0   • Auvi-Q 0.3 MG/0.3ML solution auto-injector injection INJECT AS NEEDED FOR SEVERE ALLERGIC REACTION INCLUDING ANAPHYLAXIS AS DIRECTED     • Azelastine-Fluticasone 137-50 MCG/ACT suspension 1 spray by Alternating Nares route 2 (Two) Times a Day. shake liquid     • CBD (cannabidiol) oral oil Take  by mouth.     • cetirizine (zyrTEC) 10 MG tablet Take 10 mg by mouth Daily.     • ciclopirox (PENLAC) 8 % solution APPLY UNDER AND ON AFFECTED NAILS EVERY DAY AS DIRECTED     • Cyanocobalamin (Vitamin B 12) 250 MCG lozenge Take 2,500 mcg by mouth.     • levothyroxine (SYNTHROID, LEVOTHROID) 25 MCG tablet Take 1 tablet by mouth Daily. 90 tablet 1   • MAGNESIUM MALATE PO Take  by mouth Daily.     • Milk Thistle 150 MG capsule Take  by mouth.     • Misc. Devices (Nasal Spray Bottle) misc      • montelukast (SINGULAIR) 10 MG tablet Take 10 mg by mouth Every Night.     • Vitamin D-Vitamin K (K2 Plus D3) 100-1000 MCG-UNIT tablet Take  by mouth Daily. Patient takes drops (not tablets) due to GI upset with tablet       No current facility-administered medications for this visit.       Past Medical History:  Past Medical History:   Diagnosis Date   • ADHD (attention deficit hyperactivity disorder) Sep/22    Now on adderrall   • Allergic Whole life   • Anxiety    • Cervical  "cancer screening 2109   • Chronic allergic rhinitis    • Deep vein thrombosis (HCC) 2017    Caused after my hip replacement.   • Depression    • Hyperlipemia 03/15/2017   • Hypothyroidism 03/15/2017   • Kidney stones    • Limb pain    • Limb swelling        Past Psychiatric History:  Began Treatment:started in  with therapy due to spouse having an affair  Diagnoses:Depression and Anxiety  Psychiatrist:last seen from 3753-2235  Therapist:last seen from [id number]  Admission History:Denies  Medication Trials:Prozac--for one year \"felt like i was floating;\" Zoloft for 1 yr -, then started Effexor with divorce in  for1 yr-during time having increased stress with divorce as pt had lost hair and stomach problems and asked to be placed on meds; tolerated well  Self Harm: Denies  Suicide Attempts:Denies   Psychosis, Anxiety, Depression: Denies    Substance Abuse History:   Types:Alcohol daily in the evening to relax, 1-2 glasses of wine, or 1 beer or gin and tonic  Withdrawal Symptoms:Denies  Longest Period Sober:Not Applicable   AA: Not applicable     Social History:  Martial Status:  Employed:No 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  Kids:Yes or If so, how many 2 children and 2 step children  House:Lives in a house   History: Denies  Access to Guns: no    Social History     Socioeconomic History   • Marital status:    Tobacco Use   • Smoking status: Never   • Smokeless tobacco: Never   Vaping Use   • Vaping Use: Never used   Substance and Sexual Activity   • Alcohol use: Yes     Alcohol/week: 3.0 - 4.0 standard drinks     Types: 3 - 4 Glasses of wine per week     Comment: drinks daily, wine, beer and lquor   • Drug use: Never     Types: Marijuana     Comment: In teenage years   • Sexual activity: Yes     Partners: Male     Birth control/protection: Surgical       Family History:   Suicide Attempts: Denies  Suicide Completions:Denies      Family " History   Problem Relation Age of Onset   • Dementia Mother    • Diabetes type II Mother    • Cancer Mother         Kidney cancer (left kidney removed) and endometrial cancer (hysterectomy)   • Diabetes Mother         Type 2   • Prostate cancer Father    • Other Maternal Grandmother         Colon cancer   • Colon cancer Maternal Grandmother 70   • Diabetes Paternal Grandmother         Type 2   • ADD / ADHD Son        Developmental History:   Born: [place], lived in [place] since age 1  Siblings:1 sister, 1 brother-both younger  Childhood: Denies Abuse  High School:Completed  College:2 yrs, no degree    Mental Status Exam:   Hygiene:   good  Cooperation:  Cooperative  Eye Contact:  Good  Psychomotor Behavior:  Appropriate  Affect:  Appropriate  Mood: euthymic  Speech:  Normal  Thought Process:  Goal directed  Thought Content:  Mood congruent  Suicidal:  None  Homicidal:  None  Hallucinations:  None  Delusion:  None  Memory:  Intact  Orientation:  Grossly intact  Reliability:  good  Insight:  Good  Judgement:  Good  Impulse Control:  Good  Physical/Medical Issues:  Yes Hypothyroidism,OA rt hip,paroxysmal afib, HLD     Review of Systems:  Review of Systems   Constitutional: Negative for appetite change, diaphoresis and fatigue.   HENT: Negative for drooling.    Eyes: Negative for visual disturbance.   Respiratory: Negative for cough and shortness of breath.    Cardiovascular: Negative for chest pain, palpitations and leg swelling.   Gastrointestinal: Negative for nausea and vomiting.   Endocrine: Negative for cold intolerance and heat intolerance.   Genitourinary: Negative for difficulty urinating.   Musculoskeletal: Negative for joint swelling.   Allergic/Immunologic: Negative for immunocompromised state.   Neurological: Negative for dizziness, seizures, speech difficulty and numbness.   Psychiatric/Behavioral: Negative for decreased concentration, self-injury, sleep disturbance and suicidal ideas. The patient is not  nervous/anxious and is not hyperactive.          Physical Exam:  Physical Exam  Psychiatric:         Attention and Perception: Attention and perception normal.         Mood and Affect: Mood and affect normal.         Speech: Speech normal.         Behavior: Behavior normal. Behavior is cooperative.         Thought Content: Thought content normal. Thought content does not include suicidal ideation. Thought content does not include suicidal plan.         Cognition and Memory: Cognition and memory normal.         Judgment: Judgment normal.         Vital Signs:   There were no vitals taken for this visit.     Lab Results:   Lab on 01/09/2023   Component Date Value Ref Range Status   • TSH 01/09/2023 1.880  0.270 - 4.200 uIU/mL Final   • Free T4 01/09/2023 1.33  0.93 - 1.70 ng/dL Final   Clinical Support on 09/16/2022   Component Date Value Ref Range Status   • Amphetamine Screen, Urine 09/16/2022 Negative  Negative Final   • Barbiturates Screen, Urine 09/16/2022 Negative  Negative Final   • Buprenorphine, Screen, Urine 09/16/2022 Negative  Negative Final   • Benzodiazepine Screen, Urine 09/16/2022 Negative  Negative Final   • Cocaine Screen, Urine 09/16/2022 Negative  Negative Final   • MDMA (ECSTASY) 09/16/2022 Negative  Negative Final   • Methamphetamine, Ur 09/16/2022 Negative  Negative Final   • Methadone Screen, Urine 09/16/2022 Negative  Negative Final   • Opiate Screen 09/16/2022 Negative  Negative Final   • Oxycodone Screen, Urine 09/16/2022 Negative  Negative Final   • Phencyclidine (PCP), Urine 09/16/2022 Negative  Negative Final   • THC, Screen, Urine 09/16/2022 Negative  Negative Final   • THC INTERNAL CONTROL 09/16/2022 Passed  Passed Final   • Lot Number 09/16/2022 [id number]   Final   • Expiration Date 09/16/2022 02/29/24   Final       EKG Results:  No orders to display       Imaging Results:  No Images in the past 120 days found..      Assessment & Plan   Diagnoses and all orders for this visit:    1. ADHD  (attention deficit hyperactivity disorder), inattentive type (Primary)        Visit Diagnoses:    ICD-10-CM ICD-9-CM   1. ADHD (attention deficit hyperactivity disorder), inattentive type  F90.0 314.00       PLAN:  1.   Safety: No acute safety concerns  2. Therapy: None  3. Risk Assessment: Risk of self-harm acutely is low.  Risk factors include anxiety disorder, mood disorder, and recent psychosocial stressors (pandemic). Protective factors include no family history, denies access to guns/weapons, no present SI, no history of suicide attempts or self-harm in the past, minimal AODA, healthcare seeking, future orientation, willingness to engage in care.  Risk of self-harm chronically is also low, but could be further elevated in the event of treatment noncompliance and/or AODA.  4. Meds:  Continue Adderall XR 15 mg by mouth daily in the morning to target symptoms of ADHD including:  Inattentiveness & impaired concentration.  Risks, benefits, side effects discussed with patient including elevated heart rate, elevated blood pressure, irritability, insomnia, sexual dysfunction, appetite suppressing properties, psychosis.  After discussion of these risks and benefits, the patient voiced understanding and agreed to proceed. [place] reviewed, LAST DISPENSED 1/11/23 #30/30 DAYS  Informed patient order would be sent to Dr. Harkins in separate entry for signature due to EMR system, and will not see as refilled on AVS today.    Controlled substance documentation: [place] reviewed; prior urine drug screen consistent obtained 9/16/22; consent is up to date, signed, witnessed and in EHR, dated 9/16/22, which will be updated annually per policy. Patient is aware of risk of addiction on this medication, understands need to follow up for a review every 3 months and medications will be adjusted or decreased as deemed appropriate at each visit.  No history of drug or alcohol abuse.  No concerns about diversion or abuse. Patient denies  side effects related to the medication.  Patient is aware of random urine drug screens and pill counts. The dosing of this medication will be reviewed on a regular basis and reduced if possible..  Ongoing use of a controlled substance is necessary for this patient to have a normal quality of life.    5. Labs: n/a    Symptoms of ADHD are under good control with Adderall XR 15 mg daily. Instructed to request refill via pharmacy when appropriate.  Will coordinate with staff to ensure refill requests are sent to this provider.  Due to pandemic state of emergency  ending 5/11/23, discussed upcoming travel as patient is aware telehealth cannot be provided across state lines nor can prescription of controlled substances, patient has plans to go to [place] for 2 weeks in November 2023 at this time, and will update provider for any other travels to ensure patient has adequate supply of medication.  Patient to contact provider if symptoms worsen or fail to improve.           1/4/23:  Continue Adderall XR 15 mg by mouth daily in the morning to target symptoms of ADHD including:  Inattentiveness & impaired concentration.  Risks, benefits, side effects discussed with patient including elevated heart rate, elevated blood pressure, irritability, insomnia, sexual dysfunction, appetite suppressing properties, psychosis.  After discussion of these risks and benefits, the patient voiced understanding and agreed to proceed. Cali reviewed, LAST DISPENSED 12/12/22 #30/30 DAYS  Patient instructed to call [facility] Monday 1/9/23 to determine medication availability due to nationwide shortage of Adderall, and to then contact provider MA directly to inform as patient will have to transfer all medications to [facility] if continued to use that pharmacy.  Patient also instructed to inquire with pharmacy of earliest dispense date as most pharmacies are 24-48 hrs for controlled substances.   Symptoms of ADHD are under good control with  Adderall XR 15 mg, denies side effects, though has noted decreased appetite with weight loss, which patient has also increased daily activity, exercising, and has been under some increased stress with parents care.  Will continue to check in with patient regarding weight loss.  Lengthy discussion with patient today regarding telehealth services as patient and provider must both be located in the same state of [place], and would not be able to provide telehealth services while visiting daughter in [place].  Patient verbalized understanding.  Patient to contact provider if symptoms worsen or fail to improve.         11/22/22:  Continue Adderall XR 10 mg by mouth daily in the morning to target symptoms of ADHD including:  Inattentiveness & impaired concentration.  Risks, benefits, side effects discussed with patient including elevated heart rate, elevated blood pressure, irritability, insomnia, sexual dysfunction, appetite suppressing properties, psychosis.  After discussion of these risks and benefits, the patient voiced understanding and agreed to proceed. Cali reviewed, LAST DISPENSED 10/28/22 #42/42 DAYS, reported #20 remain in bottle.    Will send in order for Adderall XR 5 mg by mouth daily in the morning for 20 days (through 12/12/22), patient instructed to add the 5 mg dose to the 10 mg to equal 15 mg total daily in the morning.  Patient instructed to contact provider in office when down to 3-5 days remaining of supply. Call 12/8/22.  Informed patient order would be sent to Dr. Harkins in separate entry for signature due to EMR system, and will not see as refilled on AVS today.    Controlled substance documentation: Cali reviewed; prior urine drug screen consistent obtained 9/16/22; consent is up to date, signed, witnessed and in EHR, dated 9/16/22, which will be updated annually per policy. Patient is aware of risk of addiction on this medication, understands need to follow up for a review every 3 months and  "medications will be adjusted or decreased as deemed appropriate at each visit.  No history of drug or alcohol abuse.  No concerns about diversion or abuse. Patient denies side effects related to the medication.  Patient is aware of random urine drug screens and pill counts. The dosing of this medication will be reviewed on a regular basis and reduced if possible..  Ongoing use of a controlled substance is necessary for this patient to have a normal quality of life.    ADHD symptoms are under fair control with Adderall XR 10 mg, will increase to 15 mg.   Patient to contact provider if symptoms worsen or fail to improve.       10/31/22:  TELEPHONE  Patient called and LMVM that the [facility] in [place] did fill the Rx for her Adderall XR 10mg.  Patient just wanted to let Adelaida know.  Patient said she was \"happy about it\"   Prescription was verified as dispensed with pharmacy in [place].     10/28/22:  TELEPHONE  Please determine when she will return to [place], as she was supposed to return 10/25/22 and was originally given adequate supply through 10/28/22.    Called and spoke with patient, she states that she will be back on Wednesday Nov.2,2022 and will take her last pill tomorrow.  Patient does say that the [facility] in [place] has now reopened but they say they are very backed up and someone even told her that they will probably not fill it because Dr. Harkins is not licensed in [place].  Patient says she will wait to see if they do fill it over the weekend and she will call me back on Monday and let me know.  If they don't fill it she will just have it sent to the [facility] in [place], [place].   I will forward message to  so he is updated on status, Since she will be returning next Wed. 11/2/22, a new order will not be sent into local pharmacy until confirmed dispense from [place] pharmacy, as [place] was not an option to add with MARLYS report attempted today.  We will have to contact the pharmacy " "to verify dispense, and patient needs to be reminded to not wait until down to 1 pill remaining to request refill or problem with refill, as this refill was sent in per patient request Monday 10/24/22, and this information was relayed to office today.   Patient called and VM that the [facility] [place] did fill the Rx for her Adderall XR 10mg.  Patient just wanted to let Adelaida know.  Patient said she was \"happy about it\"       10/20/22:   Continue Adderall XR 10 mg by mouth daily in the morning to target symptoms of ADHD including:  Inattentiveness & impaired concentration.  Risks, benefits, side effects discussed with patient including elevated heart rate, elevated blood pressure, irritability, insomnia, sexual dysfunction, appetite suppressing properties, psychosis.  After discussion of these risks and benefits, the patient voiced understanding and agreed to proceed. Cali reviewed, LAST DISPENSED 9/16/22 #42/42 DAYS  Patient instructed to request refill 10/25/22 upon return from [place]. Explained process for refills.   Controlled substance documentation: Cali reviewed; prior urine drug screen consistent obtained 9/16/22; consent is up to date, signed, witnessed and in EHR, dated 9/16/22, which will be updated annually per policy. Patient is aware of risk of addiction on this medication, understands need to follow up for a review every 3 months and medications will be adjusted or decreased as deemed appropriate at each visit.  No history of drug or alcohol abuse.  No concerns about diversion or abuse. Patient denies side effects related to the medication.  Patient is aware of random urine drug screens and pill counts. The dosing of this medication will be reviewed on a regular basis and reduced if possible..  Ongoing use of a controlled substance is necessary for this patient to have a normal quality of life.  Symptoms of ADHD are managed well with current dose of Adderall XR 10 mg without side effects.  "     9/16/22:   Declines therapy  Will potentially start Adderall XR 10 mg by mouth daily in the morning to target symptoms of ADHD including:  Inattentiveness & impaired concentration.  Risks, benefits, side effects discussed with patient including elevated heart rate, elevated blood pressure, irritability, insomnia, sexual dysfunction, appetite suppressing properties, psychosis.  After discussion of these risks and benefits, the patient voiced understanding and agreed to proceed. Cali reviewed, UDS ordered, and controlled substance agreement signed & witnessed. Patient informed this medication would not be ordered until UDS resulted and was negative, at that time a Prescription will be sent to  for signature.  Labs: POC UDS today in clinic    Lengthy discussion held with patient regarding CSA and medication education.   Bottle of Hemp oil had a QRS code to scan, which was done per patient request, which indicated percentages of ingredients as each bottle varies.  Certificate of Analysis, [facility], Cannabinoid Results: Total THC 0.231%, Total CBD 6.101%, Total Cannabinoids 6.534%. Will scan certificate to EHR.   Patient will be in [place] in October, will check with  in regards to prescribing CS sending to [place].   Will contact patient if UDS needs to be sent for confirmation. Otherwise will proceed with ordering Adderall XR.         9/15/22:   ADHD testing completed by Dr. Greg Bennett on 9/1/22 confirming a diagnosis of ADHD. (total time of review 9 mins)    Discussed ADHD treatment options of non-stimulants vs stimulant medications, after discussion patient wishes to proceed with Adderall.  Informed patient of policy requirements prior to starting Adderall, patient will plan on coming in tomorrow due to available appointment at 8 am.  Discussed current CBD oil which patient reports is hemp based as the UDS may show positive for THC. Patient takes CBD oil for sleep and muscle aches, which has  been effective.  IF UDS positive patient was informed Adderall would not be prescribed, and will have to send for a confirmation.  Patient verbalized understanding.       7/14/22:   No Medications, Declines therapy at this time.  Referral for ADHD testing with   Dr. Greg Bennett, Psychologist, MS, LPP  [address], [address]  [place] [id number] [phone]   Currently only accepting referrals for psychological testing services.  Patient to contact provider and set up appointment.  And to contact office if unable to get an appointment scheduled.   -Attached list of several other sites for ADHD testing. Patient instructed to contact providers on list to determine availability of appointments, instructed patient to take notes while calling places indicating first available appointment, and to ask to be placed on waiting list.  If an office is chosen and requests the referral order please contact my Medical Assistant, Hanh, directly at [phone] informing of chosen office and the information will be sent.    Patient with high suspicion of having ADHD, will send for formal testing and have patient return in 2 weeks to discuss medication options as if patient is able to be tested in the next 1-2 months, may wait on starting a non-stimulant medication.       Patient screened positive for depression based on a PHQ-9 score of 1 on 11/22/22 . Follow-up recommendations include: Suicide Risk Assessment performed.       TREATMENT PLAN/GOALS: Continue supportive psychotherapy efforts and medications as indicated. Treatment and medication options discussed during today's visit. Patient ackowledged and verbally consented to continue with current treatment plan and was educated on the importance of compliance with treatment and follow-up appointments.    MEDICATION ISSUES:  MARLYS reviewed as expected.    Discussed medication options and treatment plan of prescribed medication as well as the risks, benefits,  and side effects including potential falls, possible impaired driving and metabolic adversities among others. Patient is agreeable to call the office with any worsening of symptoms or onset of side effects. Patient is agreeable to call 911 or go to the nearest ER should he/she begin having SI/HI. No medication side effects or related complaints today.     MEDS ORDERED DURING VISIT:  No orders of the defined types were placed in this encounter.      Return in about 2 months (around 4/8/2023) for Video visit, medication check.         I spent 29 minutes caring for Jenn on this date of service. This time includes time spent by me in the following activities: preparing for the visit, reviewing tests, obtaining and/or reviewing a separately obtained history, performing a medically appropriate examination and/or evaluation, counseling and educating the patient/family/caregiver, referring and communicating with other health care professionals, documenting information in the medical record, care coordination and poor internet connection, frequently freezing during session.     This document has been electronically signed by CHELSI Burnett  February 8, 2023 09:03 EST      Part of this note may be an electronic transcription/translation of spoken language to printed text using the Dragon Dictation System.

## 2023-02-10 ENCOUNTER — TELEPHONE (OUTPATIENT)
Dept: BEHAVIORAL HEALTH | Facility: CLINIC | Age: 58
End: 2023-02-10
Payer: COMMERCIAL

## 2023-02-10 DIAGNOSIS — F90.0 ADHD (ATTENTION DEFICIT HYPERACTIVITY DISORDER), INATTENTIVE TYPE: ICD-10-CM

## 2023-02-10 RX ORDER — DEXTROAMPHETAMINE SACCHARATE, AMPHETAMINE ASPARTATE MONOHYDRATE, DEXTROAMPHETAMINE SULFATE AND AMPHETAMINE SULFATE 3.75; 3.75; 3.75; 3.75 MG/1; MG/1; MG/1; MG/1
15 CAPSULE, EXTENDED RELEASE ORAL EVERY MORNING
Qty: 30 CAPSULE | Refills: 0 | Status: CANCELLED | OUTPATIENT
Start: 2023-02-10 | End: 2023-03-12

## 2023-02-10 RX ORDER — DEXTROAMPHETAMINE SACCHARATE, AMPHETAMINE ASPARTATE MONOHYDRATE, DEXTROAMPHETAMINE SULFATE AND AMPHETAMINE SULFATE 3.75; 3.75; 3.75; 3.75 MG/1; MG/1; MG/1; MG/1
15 CAPSULE, EXTENDED RELEASE ORAL EVERY MORNING
Qty: 30 CAPSULE | Refills: 0 | Status: SHIPPED | OUTPATIENT
Start: 2023-02-10 | End: 2023-02-10 | Stop reason: RX

## 2023-02-10 RX ORDER — DEXTROAMPHETAMINE SACCHARATE, AMPHETAMINE ASPARTATE MONOHYDRATE, DEXTROAMPHETAMINE SULFATE AND AMPHETAMINE SULFATE 3.75; 3.75; 3.75; 3.75 MG/1; MG/1; MG/1; MG/1
15 CAPSULE, EXTENDED RELEASE ORAL EVERY MORNING
Qty: 30 CAPSULE | Refills: 0 | Status: SHIPPED | OUTPATIENT
Start: 2023-02-10 | End: 2023-03-10 | Stop reason: SDUPTHER

## 2023-02-10 NOTE — TELEPHONE ENCOUNTER
Patient called to report that the [facility] [place] [facility] are now out of the Adderall XR and she says she did [facility] [place] (I did change in this request.).  Please resubmit.  Med not pended

## 2023-02-10 NOTE — TELEPHONE ENCOUNTER
Patient called re:Adderall XR 15mg,according to the last Office visit from telehealth you said you would send in a Rx in a separate account but I don't see it.  Also patient says she said to change pharmacy back to [facility] (but apparently it was never changed back)  Please submit.

## 2023-02-13 ENCOUNTER — OFFICE VISIT (OUTPATIENT)
Dept: CARDIOLOGY | Facility: CLINIC | Age: 58
End: 2023-02-13
Payer: COMMERCIAL

## 2023-02-13 VITALS
SYSTOLIC BLOOD PRESSURE: 122 MMHG | BODY MASS INDEX: 29.03 KG/M2 | HEART RATE: 73 BPM | DIASTOLIC BLOOD PRESSURE: 64 MMHG | WEIGHT: 185 LBS | HEIGHT: 67 IN

## 2023-02-13 DIAGNOSIS — I48.0 PAROXYSMAL ATRIAL FIBRILLATION: Primary | ICD-10-CM

## 2023-02-13 PROCEDURE — 99212 OFFICE O/P EST SF 10 MIN: CPT | Performed by: INTERNAL MEDICINE

## 2023-02-26 NOTE — PROGRESS NOTES
CARDIOLOGY FOLLOW-UP PROGRESS NOTE        Chief Complaint  Follow-up, Atrial Fibrillation, and Hyperlipidemia    Subjective            Jenn James presents to [facility] CARDIOLOGY  History of Present Illness    Ms James is here for routine 6-month follow-up visit.  She was previously seen on May of this year for evaluation of irregular heartbeat.  The episode happened in April of last year when she woke up in the middle of the night with heart racing and pounding.  Her Apple Watch gave alerts for atrial fibrillation with heart rate in 110's.  Symptoms lasted for the whole hour and subsided and she eventually went back to sleep.  She had no symptoms since then.  Since last visit, she underwent echocardiogram and 48-hour Holter monitor study.    Today patient reports feeling fine.  She has not had any palpitations during the past 9 months.  Denies any chest pain, shortness of breath, dizziness.  She is very active at baseline.  She is currently on Adderall for ADHD.  She also takes multiple other over-the-counter medications along with low-dose levothyroxine.       Past History:    Medical History:  Past Medical History:   Diagnosis Date   • ADHD (attention deficit hyperactivity disorder) Sep/22    Now on adderrall   • Allergic Whole life   • Anxiety    • Cervical cancer screening 5/30/2109   • Chronic allergic rhinitis    • Deep vein thrombosis (HCC) 2017    Caused after my hip replacement.   • Depression    • Hyperlipemia 03/15/2017   • Hypothyroidism 03/15/2017   • Kidney stones    • Limb pain    • Limb swelling        Surgical History: has a past surgical history that includes Other surgical history; Colon surgery; Kidney surgery; Bladder suspension (2008); Essure tubal ligation (1999); Joint replacement (Jan 2017); Tubal ligation; and Colonoscopy.     Family History: family history includes ADD / ADHD in her son; Cancer in her mother; Colon cancer (age of onset: 70) in her maternal  grandmother; Dementia in her mother; Diabetes in her mother and paternal grandmother; Diabetes type II in her mother; Other in her maternal grandmother; Prostate cancer in her father.     Social History: reports that she has never smoked. She has never used smokeless tobacco. She reports current alcohol use of about 3.0 - 4.0 standard drinks per week. She reports that she does not use drugs.    Allergies: Sulfa antibiotics    Current Outpatient Medications on File Prior to Visit   Medication Sig   • amphetamine-dextroamphetamine XR (Adderall XR) 15 MG 24 hr capsule Take 1 capsule by mouth Every Morning for 30 days Indications: Attention Deficit Hyperactivity Disorder   • Auvi-Q 0.3 MG/0.3ML solution auto-injector injection INJECT AS NEEDED FOR SEVERE ALLERGIC REACTION INCLUDING ANAPHYLAXIS AS DIRECTED   • Azelastine-Fluticasone 137-50 MCG/ACT suspension 1 spray by Alternating Nares route 2 (Two) Times a Day. shake liquid   • CBD (cannabidiol) oral oil Take  by mouth.   • cetirizine (zyrTEC) 10 MG tablet Take 10 mg by mouth Daily.   • ciclopirox (PENLAC) 8 % solution APPLY UNDER AND ON AFFECTED NAILS EVERY DAY AS DIRECTED   • Cyanocobalamin (Vitamin B 12) 250 MCG lozenge Take 2,500 mcg by mouth.   • levothyroxine (SYNTHROID, LEVOTHROID) 25 MCG tablet Take 1 tablet by mouth Daily.   • MAGNESIUM MALATE PO Take  by mouth Daily.   • Milk Thistle 150 MG capsule Take  by mouth.   • Misc. Devices (Nasal Spray Bottle) misc    • montelukast (SINGULAIR) 10 MG tablet Take 10 mg by mouth Every Night.   • Vitamin D-Vitamin K (K2 Plus D3) 100-1000 MCG-UNIT tablet Take  by mouth Daily. Patient takes drops (not tablets) due to GI upset with tablet     No current facility-administered medications on file prior to visit.          Review of Systems   Respiratory: Negative for cough, shortness of breath and wheezing.    Cardiovascular: Negative for chest pain, palpitations and leg swelling.   Gastrointestinal: Negative for nausea and  "vomiting.   Neurological: Negative for dizziness and syncope.        Objective     /64   Pulse 73   Ht 170.2 cm (67\")   Wt 83.9 kg (185 lb)   BMI 28.98 kg/m²       Physical Exam    General : Alert, awake, no acute distress  Neck : Supple, no carotid bruit, no jugular venous distention  CVS : Regular rate and rhythm, no murmur, rubs or gallops  Lungs: Clear to auscultation bilaterally, no crackles or rhonchi  Abdomen: Soft, nontender, bowel sounds heard in all 4 quadrants  Extremities: Warm, well-perfused, no pedal edema    Result Review :     The following data was reviewed by: Sean Parks MD on 02/13/2023:    CMP    CMP 5/18/22   Glucose 95   BUN 12   Creatinine 0.76   eGFR 91.0   Sodium 140   Potassium 4.3   Chloride 105   Calcium 9.6   Total Protein 6.7   Albumin 4.40   Globulin 2.3   Total Bilirubin 0.3   Alkaline Phosphatase 60   AST (SGOT) 23   ALT (SGPT) 18   Albumin/Globulin Ratio 1.9   BUN/Creatinine Ratio 15.8   Anion Gap 10.5      Comments are available for some flowsheets but are not being displayed.           CBC    CBC 5/18/22   WBC 6.17   RBC 4.66   Hemoglobin 14.5   Hematocrit 44.0   MCV 94.4   MCH 31.1   MCHC 33.0   RDW 12.5   Platelets 283           TSH    TSH 5/18/22 1/9/23   TSH 3.740 1.880           Lipid Panel    Lipid Panel 5/18/22   Total Cholesterol 175   Triglycerides 84   HDL Cholesterol 49   VLDL Cholesterol 16   LDL Cholesterol  110 (A)   LDL/HDL Ratio 2.23   (A) Abnormal value                 Data reviewed: Cardiology studies        Results for orders placed in visit on 06/23/22    Adult Transthoracic Echo Complete W/ Cont if Necessary Per Protocol    Interpretation Summary  · Estimated left ventricular EF was in agreement with the calculated left ventricular EF. Left ventricular ejection fraction appears to be 56 - 60%. Left ventricular systolic function is normal.  · Left ventricular diastolic function was normal.  · Estimated right ventricular systolic pressure from " tricuspid regurgitation is normal (<35 mmHg).  · Trace aortic valve regurgitation of no hemodynamic consequence    48 Holter monitor study done on 6/23/2022 showed    • A relatively benign monitor study.  • 3 episodes of atrial tachycardia, longest episode 8 beats and 130 beats per minute on 6/23/2022 at 1:32 PM                 Assessment and Plan        Diagnoses and all orders for this visit:    1. Paroxysmal atrial fibrillation (HCC) (Primary)  Assessment & Plan:  Possible atrial fibrillation in April of 2022, which was documented by EKG tracing done through Apple Watch.  No symptoms during the past 9 months.  Echocardiogram unremarkable.  Thyroid panel is unremarkable.  Recent 48-hour Holter monitor study did not show any recurrence of A-fib.  We will continue to monitor.  Beta-blockers not indicated since she is asymptomatic.  Julio vas 2 score is 1, hence anticoagulation not required.  She will call us back for any recurrent symptoms.  Otherwise we will follow-up as needed.              Follow Up     Return if symptoms worsen or fail to improve.    Patient was given instructions and counseling regarding her condition or for health maintenance advice. Please see specific information pulled into the AVS if appropriate.

## 2023-02-26 NOTE — ASSESSMENT & PLAN NOTE
Possible atrial fibrillation in April of 2022, which was documented by EKG tracing done through Apple Watch.  No symptoms during the past 9 months.  Echocardiogram unremarkable.  Thyroid panel is unremarkable.  Recent 48-hour Holter monitor study did not show any recurrence of A-fib.  We will continue to monitor.  Beta-blockers not indicated since she is asymptomatic.  Julio vas 2 score is 1, hence anticoagulation not required.  She will call us back for any recurrent symptoms.  Otherwise we will follow-up as needed.

## 2023-03-10 DIAGNOSIS — F90.0 ADHD (ATTENTION DEFICIT HYPERACTIVITY DISORDER), INATTENTIVE TYPE: ICD-10-CM

## 2023-03-10 RX ORDER — DEXTROAMPHETAMINE SACCHARATE, AMPHETAMINE ASPARTATE MONOHYDRATE, DEXTROAMPHETAMINE SULFATE AND AMPHETAMINE SULFATE 3.75; 3.75; 3.75; 3.75 MG/1; MG/1; MG/1; MG/1
15 CAPSULE, EXTENDED RELEASE ORAL EVERY MORNING
Qty: 30 CAPSULE | Refills: 0 | Status: SHIPPED | OUTPATIENT
Start: 2023-03-14 | End: 2023-04-13

## 2023-03-10 NOTE — TELEPHONE ENCOUNTER
Patient called to check in re:her medication (per Adelaida's request)  Patient says she  Has 5 days worth of medication left.  Please send refill when appropriate and please advise

## 2023-03-13 DIAGNOSIS — E03.9 ACQUIRED HYPOTHYROIDISM: ICD-10-CM

## 2023-03-13 RX ORDER — LEVOTHYROXINE SODIUM 0.03 MG/1
25 TABLET ORAL DAILY
Qty: 90 TABLET | Refills: 1 | Status: SHIPPED | OUTPATIENT
Start: 2023-03-13

## 2023-04-05 ENCOUNTER — TELEMEDICINE (OUTPATIENT)
Dept: PSYCHIATRY | Facility: CLINIC | Age: 58
End: 2023-04-05
Payer: COMMERCIAL

## 2023-04-05 DIAGNOSIS — Z79.899 MEDICATION MANAGEMENT: ICD-10-CM

## 2023-04-05 DIAGNOSIS — F90.0 ADHD (ATTENTION DEFICIT HYPERACTIVITY DISORDER), INATTENTIVE TYPE: Primary | ICD-10-CM

## 2023-04-05 PROCEDURE — 99214 OFFICE O/P EST MOD 30 MIN: CPT | Performed by: NURSE PRACTITIONER

## 2023-04-05 NOTE — PATIENT INSTRUCTIONS
"1. Should you want to get in touch with your provider, CHELSI Burnett, please contact MY Medical Assistant, Hanh, directly at [phone].  Recommend saving Hanh's direct number in phone as this is the PREFERRED & EASIEST way to get in contact with your provider.  Please leave a voice mail if you do not get an answer and she will return your call within 24 hrs. You will NOT be able to contact provider on [facility], as Behavioral Health Providers are restricted. YOU MUST CALL [phone]    If you need to speak with the on call provider after hours or on weekends, please Contact the [facility] ([phone]) and staff will be able to page the provider on call directly.      2.  In the event you need to cancel an appointment, please notify the office at least 24 hrs prior:   Contact **Hanh Medical Assistant at [facility] directly at [phone] or the [facility] ([phone])       3, MEDICATION REFILLS:  PLEASE CALL THE PHARMACY TO REQUEST ALL MEDICATION REFILLS TO ENSURE YOU ARE RECEIVING YOUR MEDICATIONS IN A TIMELY MANNER. The pharmacy will send this request ELECTRONICALLY to the ordering provider.     IF YOU USE AN AUTOMATED SERVICE AT THE PHARMACY FOR REFILLS AND ARE TOLD THERE ARE \"NO REFILLS REMAINING\"   PLEASE CALL THE PHARMACY & SPEAK TO A LIVE PERSON TO VERIFY IT IS THE MOST UP TO DATE PRESCRIPTION ON FILE.    All new prescriptions will have a different number, therefore, if you were given refills for a medication today or at last visit it will not have the same number as the previous prescription.       4.  In the event you have personal crisis, contact the following crisis numbers: Suicide Prevention Hotline 1-374.354.1292 or *988, ISHMAEL Helpline 4-828-062-ISHMAEL; [facility] Emergency Room [phone]; text HELLO to 812420; or 674.      5. We would appreciate your feedback, please scan the QRS code on the back of your appointment card (or see below) and complete " "a brief survey.  [place] location is still not available, so please click \"[place]\" location.  Thank you      SPECIFIC RECOMMENDATIONS:     1.      Medications discussed at this encounter:                   - Request refill when appropriate, will plan on sending next Tues 4/11/23 with earliest fill date of 4/13/23.     Try to request refill via pharmacy [facility] or [facility] on Monday 4/10/23     2.      Psychotherapy recommendations:  Declined     3.     Return to clinic: 3 months    Please arrive at least 15 minutes before your scheduled appointment time to complete check in process.      IF you are scheduled for a [employer] VIDEO visit, YOU MUST COMPLETE THE \"E-CHECK IN\" PROCESS PRIOR TO BEGINNING THE VISIT, YOU WILL NO LONGER RECEIVE A PHONE CALL PRIOR TO ALL VIDEO VISITS          "

## 2023-04-05 NOTE — PROGRESS NOTES
"  This provider [place] [address]. The Patient is seen remotely using [employer]. Patient is being seen via telehealth and confirm that they are in a secure environment for this session. The patient's condition being diagnosed/treated is appropriate for telemedicine. The provider identified himself/herself: herself as well as her credentials.   The patient gave consent to be seen remotely, and when consent is given they understand that the consent allows for patient identifiable information to be sent to a third party as needed.   They may refuse to be seen remotely at any time. The electronic data is encrypted and password protected, and the patient has been advised of the potential risks to privacy not withstanding such measures.    You have chosen to receive care through a telehealth visit.  Do you consent to use a video/audio connection for your medical care today? Yes      Subjective   eJnn James is a 57 y.o. female who presents today for follow up    Referring Provider:  Mariana Jalloh, APRN  [address] [facility] [address]  [place] [address]    Chief Complaint:  ADHD, medication check    History of Present Illness:   4/5/23:  Patient presents today via MyChart Video visit from home, located in [place], [place].  Patient asking about redness to nose and lateral nose/face \"tiny white oil, and I push it and small amount of oil comes out.\" Patient suspected possible relation to medication, patient has started washing face at night as patient was only washing in the morning.  Denies changes to laundry detergent, face cleanser, though did change skin care moisturizer which may be a contributing factor.   Mild anxiety and worry noted per screening.  Patient reports Adderall XR dose remains effective, symptoms are well controlled, able to maintain focus, concentration, and complete tasks. Denies side effects.     Patient daughter, son in law, and grand kids are coming in town from " "[place] in May for patient 58 th birthday.  Which patient is looking forward to.     2/8/23:  Patient presents today via MyChart Video visit from home in [place], [place].  Patient reports feeling good, busy with family birthdays, and going to [place] to see grand kids to do some crafts. Symptoms of inattentiveness, shifting from one uncompleted activity or topic to another, impaired concentration are controlled well with current dose of Adderall XR 10 mg. Patient frequently freezing on video and had to go outside of home for better service, though continued to have connection problems intermittently.     Patient continues to be conscious of eating, denies decreased appetite, patient is walking with friends, eats a breakfast bar normally and then eats lunch around 1130, however, yesterday got caught up shopping and realized she had not eaten.  When patient does eat she eats good quantity.  Feels she may have lost 1-2 lbs, otherwise weight has been steady.      Patient has checked with [facility] and Adderall dose is in stock.  Patient plans to travel to [place] in November, no other trips planned for out of state at this time.          1/4/23:  Patient presents today via MyChart Video visit from home, Adderall XR was increased from 10 to 15 mg at last visit, for which patient reports the first few days of the increase felt \"I don't know if I can handle this, just a little spaced out, I don't know, but it was fine in a few days.\"  Denies increased heart rate, difficulty sleeping.   Patient feels she may be decreased weight of 15 lb, though patient has been busy with mother in the hospital and getting things together for the holidays.  Reports mother was admitted to [facility] and had medication adjustments and plan to get mother into an adult day care.  Tomorrow patient is taking father to see a specialist at [facility] to help him cope with mother's mental illness- \"alzheimers and delusional dementia\" per the " "neurologist.  Reports mother can get argumentative and paranoid.       Wt Readings from Last 3 Encounters:   02/13/23 83.9 kg (185 lb)   01/09/23 84.4 kg (186 lb)   09/16/22 91.4 kg (201 lb 6.4 oz)     Reports home scale weight on 1/1/3 of 183.6 lbs.  Patient reports increased walking with friends, not eating as much in the morning, \"constantly on the go.\"  Had wanted to do some fasting and weight loss.  Denies having to purchase new clothing, though noticed face was slimmer and clothes were looser.  Patient had been on weight watchers in the past before COVID and was down to 167 lbs, as patient was working out in the gym, and gym's were closed during COVID and weight returned.  Reports eating at 1130-12 noon for lunch, and 7 pm for dinner as spouse likes to eat at 8 pm.  Will also snack during the day on skinny pop popcorn.  Reports at last office visit recalled telling MA to not tell her what the weight was, however, felt \"good\" seeing the scale weight a few days ago.  Plans to start going back to the gym with friend as they would go every morning, \"that was my life.\"     ADHD symptoms are improving, as patient reports ability to listen more effectively, more patient with others is noticed the most since dose increase.  Upon feeling organized with holiday decorations, noticed was able to stay on task, only bring up one box at a time to decorate instead of having all boxes out and not being able to organize.    Patient reports having enough supply of Adderall XR 15 mg through next Wed. 1/11/23.  Reports after speaking with pharmacist at [facility] was told all medications would have to be transferred from [facility] and [facility] will not only fill the Adderall prescription. Patient had used [facility] due to [facility] not having Adderall XR available.  Patient is also concerned due to frequent travels of having adequate supply and ability to fill medication at outlying pharmacies.       11/22/22:  Patient " "presents today via MyChart Video visit from patient home.  Patient reports would like to increase dose possibly, reports spouse notices \"I am drifting some.\" Patient further explains will frequently jump to other topics during conversation, though improved with Adderall XR 10 mg.  Patient reports daughter was able to notice improvement, though now that patient has returned to home from travels, she is noticing elevation in symptoms.      Patient reports some difficulty with sleeping, as last night had minimal sleep as patient is worried about mother whom has dementia and father is caring for mother, has had  involved.  Believes temporary, situational anxiety is reason for sleep difficulty.  Patient reports mother's dementia symptoms are worsening and difficult to deal with, though hopeful as  has set up for assistant to come to home several days a week to help with house cleaning, meal prep, and care.         10/20/22:  Patient presents today via MyChart Video visit from daughter's home in [place].   Patient was started on Adderall XR 10 mg at last visit for which patient reports the first few days had increased energy, which had company, had difficulty sleeping the first 2 nights, however, no longer having difficulty.  Patient reports taking medication at 7 am, one day she took it later at 930 am and had difficulty sleeping that night. Patient has been taking thyroid medication upon awakening to use the bathroom around 5-530 am and upon waking up around 7 takes the Adderall.      \"I think it's a lot better, my daughter said she seems to notice, I still have a tendency to interrupt but that's better, I don't seem to repeat questions, as it was an issue before because I was not really listening. My mind was somewhere else.\"   Denies side effects, patient was surprised she was able to sit still after a few days, now able to complete tasks as less distraction, able to continue with task at " "hand before switching to another task.     Patient will be returning from [place] 10/25/22.     9/16/22:  Patient presents today in office today to further discuss ADHD treatment with a controlled substance and to complete CSA and obtain POC UDS today per policy.     Patient brought in bottle of Hemp CBD oil 3200mg/2 oz, 1-3 drops 3 times daily on bottle, however, patient only takes in the evening for post menopausal symptoms, muscle aches, and vocal cord dysfunction, \"my neck tenses up , had to do physical therapy in past, and oil helps, I found out I am Allergic to mold.\"    9/15/22:  Patient presents today via MyChart Video visit from home, reports received ADHD test results though has not discussed.  Patient continues to have symptoms of inttentiveness, concentration difficulty, difficulty completing tasks, and switching from one uncompleted task to another.     Patient does take 3 drops of CBD oil, hemp based, at night for sleep, relaxes muscles as patient started taking while going through menopause.   Patient reports  is allergic to Wellbutrin and daughter tried Wellbutrin and did not do well, as patient would prefer to try Adderall first rather than a non-stimulant medication.  Patient will be out of town for 2 weeks in October visiting daughter in [place], likely early October.       7/14/22:  INITIAL EVAL  Patient presents today via MyChart Video visit from home with a history of depression and anxiety for which treatment was started in late 1995 with therapy, and medications from [id number] due to divorce.     Patient is currently not taking any psychotropic medications nor has had any since 2005.     \"My issue is I can't concentrate, I start something and cant finish something, forgets things often\".  Patient recalls while in school always \"fidgety\" crossing legs, moving often in seat.  Admits to interrupting people, gets distracted easily, daughter noticing.    Admits to over the past 2 yrs " "began to feel ADHD may be a possibility, \"I am tired of being like this, forgetting stuff, going in and out of the house, tired of interrupting people, it seems to be bothering me more.\"  Patient uses reminders, lists which was started while kids in high school and has continued to have  self add items needed to List,\" I walk out without my list from the fridge, and I have to have him take a picture of it and send it to me\".  Difficulty sitting still when you should be sitting still in [employer] or meetings. Impulsive with shopping tends to purchase items that may be on sale or those that she may not need with the idea of \"I can always return it\".     Symptoms include fidgeting, difficulty remaining seated, easy distractability, blurting out answers prematurely, inability to complete tasks, difficulty sustaining attention, shifting from one uncompleted activity to another, talking excessively, interrupting others, ineffective listening, frequently losing items, and impulsivity.    1. ADHD:   a. Elementary school:   i. Grades:A's and B's  ii. Special classes or failures:no  iii. Got in trouble:no  iv. Referral for ADHD testing:no  b. Fhx:son, diagnosed officially in 7th grade, took medications for 1-2 yrs, restarted while in college only, \"he probably should be taking something, I have suggested it to him\"  c. Presently:  i. Problems with attention to detail:yes  ii. Problems with sustained attention:Yes  iii. Problems listening when spoken to directly:Yes, unable to concentrate on what others are saying, \"I have to get my point across, I know I have to wait for the break, but I can't wait for the break.\"  iv. Failure to finish tasks:yes, \"I will lay my husbandsTshirts on couch intending to hang up and they will be there for 2-3 days\" house hold tasks-dusting, mopping, find need to focus on floor boards  v. Avoids tasks that require sustained mental effort:yes, \"I excelled at work,  I could multi-task, 5-6 " "projects at desk, however, would wait until the last week to update credentials, I put off stuff and procrastinate all the time\"  vi. Easily distracted:yes  vii. Forgetting things:yes  viii. Losing things:yes  ix. Hard to organize:yes  x. Talks a lot and cutting people off:yes  xi. Drifts off during conversations:yes, \"I have to concentrate what I need to say to not forget what I have say, then I blurt it out, I am trying really hard, it's even hard with you to not stop and say stuff\"  xii. Difficulty with Reading:yes, \"I used to read a lot, has been using Audio books to listen in car or while out with friends or walking, has to rewind at times because my mind drifts off.\"  xiii. Difficulty watching TV/Movies:\"not really, we hardly ever have the TV on anymore.\"       PHQ-9 Depression Screening  PHQ-9 Total Score: (P) 0     Little interest or pleasure in doing things? (P) 0-->not at all   Feeling down, depressed, or hopeless? (P) 0-->not at all   Trouble falling or staying asleep, or sleeping too much? (P) 0-->not at all1   Feeling tired or having little energy? (P) 0-->not at all   Poor appetite or overeating? (P) 0-->not at all   Feeling bad about yourself - or that you are a failure or have let yourself or your family down? (P) 0-->not at all   Trouble concentrating on things, such as reading the newspaper or watching television? (P) 0-->not at all   Moving or speaking so slowly that other people could have noticed? Or the opposite - being so fidgety or restless that you have been moving around a lot more than usual? (P) 0-->not at all   Thoughts that you would be better off dead, or of hurting yourself in some way? (P) 0-->not at all   PHQ-9 Total Score (P) 0     SUZANNE-7  Feeling nervous, anxious or on edge: (P) Several days  Not being able to stop or control worrying: (P) Not at all  Worrying too much about different things: (P) Several days  Trouble Relaxing: (P) Not at all  Being so restless that it is hard to " sit still: (P) Not at all  Feeling afraid as if something awful might happen: (P) Not at all  Becoming easily annoyed or irritable: (P) Not at all  SUZANNE 7 Total Score: (P) 2  If you checked any problems, how difficult have these problems made it for you to do your work, take care of things at home, or get along with other people: (P) Not difficult at all     Past Surgical History:  Past Surgical History:   Procedure Laterality Date   • BLADDER SUSPENSION  2008   • COLON SURGERY     • COLONOSCOPY     • ESSURE TUBAL LIGATION  1999   • JOINT REPLACEMENT  Jan 2017    Right hip replacement   • KIDNEY SURGERY     • OTHER SURGICAL HISTORY      metal implant   • TUBAL ABDOMINAL LIGATION         Problem List:  Patient Active Problem List   Diagnosis   • Primary osteoarthritis of right hip   • Status post right hip replacement   • Hypothyroidism (acquired)   • Paroxysmal atrial fibrillation   • Anxiety   • Deep venous thrombosis   • Disorder of vocal cord   • Edema of lower extremity   • Hyperlipidemia   • Hyperthyroidism   • Onychomycosis   • Pain of right hip joint   • Laryngitis due to gastroesophageal reflux   • Sensation of heaviness in extremities   • Varicose veins of lower extremity   • Vitamin deficiency       Allergy:   Allergies   Allergen Reactions   • Sulfa Antibiotics Rash        Discontinued Medications:  There are no discontinued medications.    Current Medications:   Current Outpatient Medications   Medication Sig Dispense Refill   • amphetamine-dextroamphetamine XR (Adderall XR) 15 MG 24 hr capsule Take 1 capsule by mouth Every Morning for 30 days Indications: Attention Deficit Hyperactivity Disorder 30 capsule 0   • Auvi-Q 0.3 MG/0.3ML solution auto-injector injection INJECT AS NEEDED FOR SEVERE ALLERGIC REACTION INCLUDING ANAPHYLAXIS AS DIRECTED     • Azelastine-Fluticasone 137-50 MCG/ACT suspension 1 spray by Alternating Nares route 2 (Two) Times a Day. shake liquid     • CBD (cannabidiol) oral oil Take   "by mouth.     • cetirizine (zyrTEC) 10 MG tablet Take 10 mg by mouth Daily.     • ciclopirox (PENLAC) 8 % solution APPLY UNDER AND ON AFFECTED NAILS EVERY DAY AS DIRECTED     • Cyanocobalamin (Vitamin B 12) 250 MCG lozenge Take 2,500 mcg by mouth.     • levothyroxine (SYNTHROID, LEVOTHROID) 25 MCG tablet TAKE 1 TABLET BY MOUTH DAILY 90 tablet 1   • MAGNESIUM MALATE PO Take  by mouth Daily.     • Milk Thistle 150 MG capsule Take  by mouth.     • Misc. Devices (Nasal Spray Bottle) misc      • montelukast (SINGULAIR) 10 MG tablet Take 10 mg by mouth Every Night.     • Vitamin D-Vitamin K (K2 Plus D3) 100-1000 MCG-UNIT tablet Take  by mouth Daily. Patient takes drops (not tablets) due to GI upset with tablet       No current facility-administered medications for this visit.       Past Medical History:  Past Medical History:   Diagnosis Date   • ADHD (attention deficit hyperactivity disorder) Sep/22    Now on adderrall   • Allergic Whole life   • Anxiety    • Cervical cancer screening 2109   • Chronic allergic rhinitis    • Deep vein thrombosis 2017    Caused after my hip replacement.   • Depression    • Hyperlipemia 03/15/2017   • Hypothyroidism 03/15/2017   • Kidney stones    • Limb pain    • Limb swelling        Past Psychiatric History:  Began Treatment:started in  with therapy due to spouse having an affair  Diagnoses:Depression and Anxiety  Psychiatrist:last seen from [id number]  Therapist:last seen from [id number]  Admission History:Denies  Medication Trials:Prozac--for one year \"felt like i was floating;\" Zoloft for 1 yr -, then started Effexor with divorce in  for1 yr-during time having increased stress with divorce as pt had lost hair and stomach problems and asked to be placed on meds; tolerated well  Self Harm: Denies  Suicide Attempts:Denies   Psychosis, Anxiety, Depression: Denies    Substance Abuse History:   Types:Alcohol daily in the evening to relax, 1-2 glasses of wine, or 1 " beer or gin and tonic  Withdrawal Symptoms:Denies  Longest Period Sober:Not Applicable   AA: Not applicable     Social History:  Martial Status:  Employed:No 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  Kids:Yes or If so, how many 2 children and 2 step children  House:Lives in a house   History: Denies  Access to Guns: no    Social History     Socioeconomic History   • Marital status:    Tobacco Use   • Smoking status: Never   • Smokeless tobacco: Never   Vaping Use   • Vaping Use: Never used   Substance and Sexual Activity   • Alcohol use: Yes     Alcohol/week: 3.0 - 4.0 standard drinks     Types: 3 - 4 Glasses of wine per week     Comment: drinks daily, wine, beer and lquor   • Drug use: Never     Types: Marijuana     Comment: In teenage years   • Sexual activity: Yes     Partners: Male     Birth control/protection: Surgical       Family History:   Suicide Attempts: Denies  Suicide Completions:Denies      Family History   Problem Relation Age of Onset   • Dementia Mother    • Diabetes type II Mother    • Cancer Mother         Kidney cancer (left kidney removed) and endometrial cancer (hysterectomy)   • Diabetes Mother         Type 2   • Prostate cancer Father    • Other Maternal Grandmother         Colon cancer   • Colon cancer Maternal Grandmother 70   • Diabetes Paternal Grandmother         Type 2   • ADD / ADHD Son        Developmental History:   Born: [place], lived in [place] since age 1  Siblings:1 sister, 1 brother-both younger  Childhood: Denies Abuse  High School:Completed  College:2 yrs, no degree    Mental Status Exam:   Hygiene:   good  Cooperation:  Cooperative  Eye Contact:  Good  Psychomotor Behavior:  Appropriate  Affect:  Appropriate  Mood: euthymic  Speech:  Normal  Thought Process:  Goal directed  Thought Content:  Mood congruent  Suicidal:  None  Homicidal:  None  Hallucinations:  None  Delusion:  None  Memory:  Intact  Orientation:  Grossly intact  Reliability:   good  Insight:  Good  Judgement:  Good  Impulse Control:  Good  Physical/Medical Issues:  Yes Hypothyroidism,OA rt hip,paroxysmal afib, HLD     Review of Systems:  Review of Systems   Constitutional: Negative for appetite change, diaphoresis and fatigue.   HENT: Negative for drooling.    Eyes: Negative for visual disturbance.   Respiratory: Negative for cough and shortness of breath.    Cardiovascular: Negative for chest pain, palpitations and leg swelling.   Gastrointestinal: Negative for nausea and vomiting.   Endocrine: Negative for cold intolerance and heat intolerance.   Genitourinary: Negative for difficulty urinating.   Musculoskeletal: Negative for joint swelling.   Allergic/Immunologic: Negative for immunocompromised state.   Neurological: Negative for dizziness, seizures, speech difficulty and numbness.   Psychiatric/Behavioral: Negative for decreased concentration, self-injury, sleep disturbance and suicidal ideas. The patient is not nervous/anxious and is not hyperactive.          Physical Exam:  Physical Exam  Psychiatric:         Attention and Perception: Attention and perception normal.         Mood and Affect: Mood and affect normal.         Speech: Speech normal.         Behavior: Behavior normal. Behavior is cooperative.         Thought Content: Thought content normal. Thought content does not include suicidal ideation. Thought content does not include suicidal plan.         Cognition and Memory: Cognition and memory normal.         Judgment: Judgment normal.         Vital Signs:   There were no vitals taken for this visit.     Lab Results:   Lab on 01/09/2023   Component Date Value Ref Range Status   • TSH 01/09/2023 1.880  0.270 - 4.200 uIU/mL Final   • Free T4 01/09/2023 1.33  0.93 - 1.70 ng/dL Final       EKG Results:  No orders to display       Imaging Results:  No Images in the past 120 days found..      Assessment & Plan   Diagnoses and all orders for this visit:    1. ADHD (attention  deficit hyperactivity disorder), inattentive type (Primary)    2. Medication management        Visit Diagnoses:    ICD-10-CM ICD-9-CM   1. ADHD (attention deficit hyperactivity disorder), inattentive type  F90.0 314.00   2. Medication management  Z79.899 V58.69       PLAN:  1.   Safety: No acute safety concerns  2. Therapy: None  3. Risk Assessment: Risk of self-harm acutely is low.  Risk factors include anxiety disorder, mood disorder, and recent psychosocial stressors (pandemic). Protective factors include no family history, denies access to guns/weapons, no present SI, no history of suicide attempts or self-harm in the past, minimal AODA, healthcare seeking, future orientation, willingness to engage in care.  Risk of self-harm chronically is also low, but could be further elevated in the event of treatment noncompliance and/or AODA.  4. Meds:  Continue Adderall XR 15 mg by mouth daily in the morning to target symptoms of ADHD including:  Inattentiveness & impaired concentration.  Risks, benefits, side effects discussed with patient including elevated heart rate, elevated blood pressure, irritability, insomnia, sexual dysfunction, appetite suppressing properties, psychosis.  After discussion of these risks and benefits, the patient voiced understanding and agreed to proceed. [place] reviewed, LAST DISPENSED 3/15/23 #30/30 DAYS  Patient to request refill when appropriate. Patient has 9 pills remaining after taking this morning dose, instructed patient to try to refill [facility] on Monday 4/10/23, and provider will send refill in on Tues. 4/11/23 with first allowed fill date of Thurs 4/13/23.     Controlled substance documentation: [place] reviewed; prior urine drug screen consistent obtained 9/16/22; consent is up to date, signed, witnessed and in EHR, dated 9/16/22, which will be updated annually per policy. Patient is aware of risk of addiction on this medication, understands need to follow up  for a review every 3 months and medications will be adjusted or decreased as deemed appropriate at each visit.  No history of drug or alcohol abuse.  No concerns about diversion or abuse. Patient denies side effects related to the medication.  Patient is aware of random urine drug screens and pill counts. The dosing of this medication will be reviewed on a regular basis and reduced if possible..  Ongoing use of a controlled substance is necessary for this patient to have a normal quality of life.    5. Labs: n/a     Symptoms of ADHD are under good control with Adderall XR 15 mg daily. Patient to contact provider if symptoms worsen or fail to improve.       3/10/23:  TELEPHONE  Patient called to check in re:her medication (per Adelaida's request)  Patient says she  Has 5 days worth of medication left.  Please send refill when appropriate and please advise      2/10/23:  TELPHONE  Patient called to report that the [facility] [place] and [facility] are now out of the Adderall XR and she says she did call the [facility] at [facility] and [place] (I did change in this request.).  Please resubmit.  Med not pended      2/8/23:  Continue Adderall XR 15 mg by mouth daily in the morning to target symptoms of ADHD including:  Inattentiveness & impaired concentration.  Risks, benefits, side effects discussed with patient including elevated heart rate, elevated blood pressure, irritability, insomnia, sexual dysfunction, appetite suppressing properties, psychosis.  After discussion of these risks and benefits, the patient voiced understanding and agreed to proceed. Cali reviewed, LAST DISPENSED 1/11/23 #30/30 DAYS  Informed patient order would be sent to Dr. Harkins in separate entry for signature due to EMR system, and will not see as refilled on AVS today.    Symptoms of ADHD are under good control with Adderall XR 15 mg daily. Instructed to request refill via pharmacy when appropriate.  Will coordinate with staff to ensure refill  requests are sent to this provider.  Due to pandemic state of emergency  ending 5/11/23, discussed upcoming travel as patient is aware telehealth cannot be provided across state lines nor can prescription of controlled substances, patient has plans to go to [place] for 2 weeks in November 2023 at this time, and will update provider for any other travels to ensure patient has adequate supply of medication.  Patient to contact provider if symptoms worsen or fail to improve.       1/4/23:  Continue Adderall XR 15 mg by mouth daily in the morning to target symptoms of ADHD including:  Inattentiveness & impaired concentration.  Risks, benefits, side effects discussed with patient including elevated heart rate, elevated blood pressure, irritability, insomnia, sexual dysfunction, appetite suppressing properties, psychosis.  After discussion of these risks and benefits, the patient voiced understanding and agreed to proceed. Cali reviewed, LAST DISPENSED 12/12/22 #30/30 DAYS  Patient instructed to call [facility] Monday 1/9/23 to determine medication availability due to nationwide shortage of Adderall, and to then contact provider MA directly to inform as patient will have to transfer all medications to [facility] if continued to use that pharmacy.  Patient also instructed to inquire with pharmacy of earliest dispense date as most pharmacies are 24-48 hrs for controlled substances.   Symptoms of ADHD are under good control with Adderall XR 15 mg, denies side effects, though has noted decreased appetite with weight loss, which patient has also increased daily activity, exercising, and has been under some increased stress with parents care.  Will continue to check in with patient regarding weight loss.  Lengthy discussion with patient today regarding telehealth services as patient and provider must both be located in the same state of [place], and would not be able to provide telehealth services while visiting daughter in  [place].  Patient verbalized understanding.  Patient to contact provider if symptoms worsen or fail to improve.         11/22/22:  Continue Adderall XR 10 mg by mouth daily in the morning to target symptoms of ADHD including:  Inattentiveness & impaired concentration.  Risks, benefits, side effects discussed with patient including elevated heart rate, elevated blood pressure, irritability, insomnia, sexual dysfunction, appetite suppressing properties, psychosis.  After discussion of these risks and benefits, the patient voiced understanding and agreed to proceed. Cali reviewed, LAST DISPENSED 10/28/22 #42/42 DAYS, reported #20 remain in bottle.    Will send in order for Adderall XR 5 mg by mouth daily in the morning for 20 days (through 12/12/22), patient instructed to add the 5 mg dose to the 10 mg to equal 15 mg total daily in the morning.  Patient instructed to contact provider in office when down to 3-5 days remaining of supply. Call 12/8/22.  Informed patient order would be sent to Dr. Harkins in separate entry for signature due to EMR system, and will not see as refilled on AVS today.    Controlled substance documentation: Cali reviewed; prior urine drug screen consistent obtained 9/16/22; consent is up to date, signed, witnessed and in EHR, dated 9/16/22, which will be updated annually per policy. Patient is aware of risk of addiction on this medication, understands need to follow up for a review every 3 months and medications will be adjusted or decreased as deemed appropriate at each visit.  No history of drug or alcohol abuse.  No concerns about diversion or abuse. Patient denies side effects related to the medication.  Patient is aware of random urine drug screens and pill counts. The dosing of this medication will be reviewed on a regular basis and reduced if possible..  Ongoing use of a controlled substance is necessary for this patient to have a normal quality of life.    ADHD symptoms are under fair  "control with Adderall XR 10 mg, will increase to 15 mg.   Patient to contact provider if symptoms worsen or fail to improve.       10/31/22:  TELEPHONE  Patient called and LMVM that the [facility] in [place] did fill the Rx for her Adderall XR 10mg.  Patient just wanted to let Adelaida know.  Patient said she was \"happy about it\"   Prescription was verified as dispensed with pharmacy in [place].     10/28/22:  TELEPHONE  Please determine when she will return to [place], as she was supposed to return 10/25/22 and was originally given adequate supply through 10/28/22.    Called and spoke with patient, she states that she will be back on Wednesday Nov.2,2022 and will take her last pill tomorrow.  Patient does say that the [facility] in [place] has now reopened but they say they are very backed up and someone even told her that they will probably not fill it because Dr. Harkins is not licensed in [place].  Patient says she will wait to see if they do fill it over the weekend and she will call me back on Monday and let me know.  If they don't fill it she will just have it sent to the [facility] in [place], [place].   I will forward message to  so he is updated on status, Since she will be returning next Wed. 11/2/22, a new order will not be sent into local pharmacy until confirmed dispense from [place] pharmacy, as [place] was not an option to add with MARLYS report attempted today.  We will have to contact the pharmacy to verify dispense, and patient needs to be reminded to not wait until down to 1 pill remaining to request refill or problem with refill, as this refill was sent in per patient request Monday 10/24/22, and this information was relayed to office today.   Patient called and LMVM that the [facility] in [place] did fill the Rx for her Adderall XR 10mg.  Patient just wanted to let Adelaida know.  Patient said she was \"happy about it\"       10/20/22:   Continue Adderall XR 10 mg by mouth daily in the " morning to target symptoms of ADHD including:  Inattentiveness & impaired concentration.  Risks, benefits, side effects discussed with patient including elevated heart rate, elevated blood pressure, irritability, insomnia, sexual dysfunction, appetite suppressing properties, psychosis.  After discussion of these risks and benefits, the patient voiced understanding and agreed to proceed. Cali reviewed, LAST DISPENSED 9/16/22 #42/42 DAYS  Patient instructed to request refill 10/25/22 upon return from [place]. Explained process for refills.   Controlled substance documentation: Cali reviewed; prior urine drug screen consistent obtained 9/16/22; consent is up to date, signed, witnessed and in EHR, dated 9/16/22, which will be updated annually per policy. Patient is aware of risk of addiction on this medication, understands need to follow up for a review every 3 months and medications will be adjusted or decreased as deemed appropriate at each visit.  No history of drug or alcohol abuse.  No concerns about diversion or abuse. Patient denies side effects related to the medication.  Patient is aware of random urine drug screens and pill counts. The dosing of this medication will be reviewed on a regular basis and reduced if possible..  Ongoing use of a controlled substance is necessary for this patient to have a normal quality of life.  Symptoms of ADHD are managed well with current dose of Adderall XR 10 mg without side effects.      9/16/22:   Declines therapy  Will potentially start Adderall XR 10 mg by mouth daily in the morning to target symptoms of ADHD including:  Inattentiveness & impaired concentration.  Risks, benefits, side effects discussed with patient including elevated heart rate, elevated blood pressure, irritability, insomnia, sexual dysfunction, appetite suppressing properties, psychosis.  After discussion of these risks and benefits, the patient voiced understanding and agreed to proceed. Cali reviewed,  UDS ordered, and controlled substance agreement signed & witnessed. Patient informed this medication would not be ordered until UDS resulted and was negative, at that time a Prescription will be sent to  for signature.  Labs: POC UDS today in clinic    Lengthy discussion held with patient regarding CSA and medication education.   Bottle of Hemp oil had a QRS code to scan, which was done per patient request, which indicated percentages of ingredients as each bottle varies.  Certificate of Analysis, [facility], Cannabinoid Results: Total THC 0.231%, Total CBD 6.101%, Total Cannabinoids 6.534%. Will scan certificate to EHR.   Patient will be in [place] in October, will check with  in regards to prescribing CS sending to [place].   Will contact patient if UDS needs to be sent for confirmation. Otherwise will proceed with ordering Adderall XR.         9/15/22:   ADHD testing completed by Dr. Greg Bennett on 9/1/22 confirming a diagnosis of ADHD. (total time of review 9 mins)    Discussed ADHD treatment options of non-stimulants vs stimulant medications, after discussion patient wishes to proceed with Adderall.  Informed patient of policy requirements prior to starting Adderall, patient will plan on coming in tomorrow due to available appointment at 8 am.  Discussed current CBD oil which patient reports is hemp based as the UDS may show positive for THC. Patient takes CBD oil for sleep and muscle aches, which has been effective.  IF UDS positive patient was informed Adderall would not be prescribed, and will have to send for a confirmation.  Patient verbalized understanding.       7/14/22:   No Medications, Declines therapy at this time.  Referral for ADHD testing with   Dr. Greg Bennett, Psychologist, MS, LPP  [address], [address]  [place] [address] [phone]   Currently only accepting referrals for psychological testing services.  Patient to contact provider and set up appointment.   And to contact office if unable to get an appointment scheduled.   -Attached list of several other sites for ADHD testing. Patient instructed to contact providers on list to determine availability of appointments, instructed patient to take notes while calling places indicating first available appointment, and to ask to be placed on waiting list.  If an office is chosen and requests the referral order please contact my Medical Assistant, Hanh, directly at [phone] informing of chosen office and the information will be sent.    Patient with high suspicion of having ADHD, will send for formal testing and have patient return in 2 weeks to discuss medication options as if patient is able to be tested in the next 1-2 months, may wait on starting a non-stimulant medication.     Patient screened positive for depression based on a PHQ-9 score of 0 on 4/4/2023. Follow-up recommendations include: Suicide Risk Assessment performed.    TREATMENT PLAN/GOALS: Continue supportive psychotherapy efforts and medications as indicated. Treatment and medication options discussed during today's visit. Patient ackowledged and verbally consented to continue with current treatment plan and was educated on the importance of compliance with treatment and follow-up appointments.    MEDICATION ISSUES:  MARLYS reviewed as expected.    Discussed medication options and treatment plan of prescribed medication as well as the risks, benefits, and side effects including potential falls, possible impaired driving and metabolic adversities among others. Patient is agreeable to call the office with any worsening of symptoms or onset of side effects. Patient is agreeable to call 911 or go to the nearest ER should he/she begin having SI/HI. No medication side effects or related complaints today.     MEDS ORDERED DURING VISIT:  No orders of the defined types were placed in this encounter.      Return in about 3 months (around 7/5/2023) for Video  visit.         I spent 35 minutes caring for Jenn on this date of service. This time includes time spent by me in the following activities: preparing for the visit, obtaining and/or reviewing a separately obtained history, performing a medically appropriate examination and/or evaluation, counseling and educating the patient/family/caregiver, referring and communicating with other health care professionals, documenting information in the medical record, care coordination and refill process.     This document has been electronically signed by CHELSI Burnett  April 5, 2023 08:24 EDT      Part of this note may be an electronic transcription/translation of spoken language to printed text using the Dragon Dictation System.your visit?: Other

## 2023-04-11 DIAGNOSIS — F90.0 ADHD (ATTENTION DEFICIT HYPERACTIVITY DISORDER), INATTENTIVE TYPE: ICD-10-CM

## 2023-04-11 RX ORDER — DEXTROAMPHETAMINE SACCHARATE, AMPHETAMINE ASPARTATE MONOHYDRATE, DEXTROAMPHETAMINE SULFATE AND AMPHETAMINE SULFATE 3.75; 3.75; 3.75; 3.75 MG/1; MG/1; MG/1; MG/1
15 CAPSULE, EXTENDED RELEASE ORAL EVERY MORNING
Qty: 30 CAPSULE | Refills: 0 | Status: SHIPPED | OUTPATIENT
Start: 2023-04-14 | End: 2023-05-14

## 2023-05-10 DIAGNOSIS — F90.0 ADHD (ATTENTION DEFICIT HYPERACTIVITY DISORDER), INATTENTIVE TYPE: ICD-10-CM

## 2023-05-10 NOTE — TELEPHONE ENCOUNTER
PT(PATIENT) VERIFIED     amphetamine-dextroamphetamine XR (Adderall XR) 15 MG 24 hr capsule (2023)    NEXT APPT WITH PROVIDER  Appointment with Adelaida Aguilar APRN (2023)    PROVIDER PLEASE ADVISE

## 2023-05-11 RX ORDER — DEXTROAMPHETAMINE SACCHARATE, AMPHETAMINE ASPARTATE MONOHYDRATE, DEXTROAMPHETAMINE SULFATE AND AMPHETAMINE SULFATE 3.75; 3.75; 3.75; 3.75 MG/1; MG/1; MG/1; MG/1
15 CAPSULE, EXTENDED RELEASE ORAL EVERY MORNING
Qty: 30 CAPSULE | Refills: 0 | Status: SHIPPED | OUTPATIENT
Start: 2023-05-13 | End: 2023-05-12 | Stop reason: SDUPTHER

## 2023-05-11 NOTE — TELEPHONE ENCOUNTER
[facility] [address] CALLED ASKING IF THEY CAN REFILL THE 15MG ADDERALL RX EARLY PER PT REQUEST.    PT REQUEST THAT IF IT CANT BE REFILLED EARLY SHE WANTS IT SENT TO THE [facility] ON [address] SINCE THE ONE ON [address] ISNT OPEN ON THE WEEKENDS.    PLEASE REVIEW AND ADVISE.

## 2023-05-12 RX ORDER — DEXTROAMPHETAMINE SACCHARATE, AMPHETAMINE ASPARTATE MONOHYDRATE, DEXTROAMPHETAMINE SULFATE AND AMPHETAMINE SULFATE 3.75; 3.75; 3.75; 3.75 MG/1; MG/1; MG/1; MG/1
15 CAPSULE, EXTENDED RELEASE ORAL EVERY MORNING
Qty: 30 CAPSULE | Refills: 0 | Status: SHIPPED | OUTPATIENT
Start: 2023-05-13 | End: 2023-06-12

## 2023-05-12 NOTE — TELEPHONE ENCOUNTER
please send the Adderall XR 15 mg to the [facility] on [address], order is pended for that location, thank you. Patient reported that weekend closure for other location is new.

## 2023-05-12 NOTE — TELEPHONE ENCOUNTER
PT SAYS THAT SHE NEEDS HER PRESCRIP[TION NOTATED SO SHE CAN PICK IT UP TOMORROW AT THE [facility] [facility] AS THE [facility] [address] IS CLOSED ON THE WEEKENDS AND SHE IS GOING OUT OF TOWN.  PLEASE REVIEW AND REROUTE.

## 2023-06-08 DIAGNOSIS — F90.0 ADHD (ATTENTION DEFICIT HYPERACTIVITY DISORDER), INATTENTIVE TYPE: ICD-10-CM

## 2023-06-08 NOTE — TELEPHONE ENCOUNTER
Med Refill Request patient states she is down to 5 days worth of her Adderall left  She will be needing a refill.  Med will be due on Monday so will postpone request until tomorrow 06/09/2023 and then send to Adelaida for response. Med pended

## 2023-06-09 RX ORDER — DEXTROAMPHETAMINE SACCHARATE, AMPHETAMINE ASPARTATE MONOHYDRATE, DEXTROAMPHETAMINE SULFATE AND AMPHETAMINE SULFATE 3.75; 3.75; 3.75; 3.75 MG/1; MG/1; MG/1; MG/1
15 CAPSULE, EXTENDED RELEASE ORAL EVERY MORNING
Qty: 30 CAPSULE | Refills: 0 | Status: SHIPPED | OUTPATIENT
Start: 2023-06-12 | End: 2023-07-12

## 2023-08-07 DIAGNOSIS — F90.0 ADHD (ATTENTION DEFICIT HYPERACTIVITY DISORDER), INATTENTIVE TYPE: ICD-10-CM

## 2023-08-07 RX ORDER — DEXTROAMPHETAMINE SACCHARATE, AMPHETAMINE ASPARTATE MONOHYDRATE, DEXTROAMPHETAMINE SULFATE AND AMPHETAMINE SULFATE 3.75; 3.75; 3.75; 3.75 MG/1; MG/1; MG/1; MG/1
15 CAPSULE, EXTENDED RELEASE ORAL EVERY MORNING
Qty: 30 CAPSULE | Refills: 0 | Status: SHIPPED | OUTPATIENT
Start: 2023-08-08 | End: 2023-09-07

## 2023-09-07 ENCOUNTER — OFFICE VISIT (OUTPATIENT)
Dept: BEHAVIORAL HEALTH | Facility: CLINIC | Age: 58
End: 2023-09-07
Payer: COMMERCIAL

## 2023-09-07 ENCOUNTER — CLINICAL SUPPORT (OUTPATIENT)
Dept: FAMILY MEDICINE CLINIC | Age: 58
End: 2023-09-07
Payer: COMMERCIAL

## 2023-09-07 VITALS
WEIGHT: 175.2 LBS | HEART RATE: 69 BPM | BODY MASS INDEX: 27.5 KG/M2 | HEIGHT: 67 IN | DIASTOLIC BLOOD PRESSURE: 83 MMHG | SYSTOLIC BLOOD PRESSURE: 133 MMHG

## 2023-09-07 DIAGNOSIS — Z79.899 CONTROLLED SUBSTANCE AGREEMENT SIGNED: ICD-10-CM

## 2023-09-07 DIAGNOSIS — F90.0 ADHD (ATTENTION DEFICIT HYPERACTIVITY DISORDER), INATTENTIVE TYPE: Primary | ICD-10-CM

## 2023-09-07 DIAGNOSIS — Z79.899 HIGH RISK MEDICATION USE: ICD-10-CM

## 2023-09-07 DIAGNOSIS — R82.5 POSITIVE URINE DRUG SCREEN: ICD-10-CM

## 2023-09-07 DIAGNOSIS — F90.0 ADHD (ATTENTION DEFICIT HYPERACTIVITY DISORDER), INATTENTIVE TYPE: ICD-10-CM

## 2023-09-07 LAB
AMPHET+METHAMPHET UR QL: POSITIVE
AMPHETAMINES UR QL: NEGATIVE
BARBITURATES UR QL SCN: NEGATIVE
BENZODIAZ UR QL SCN: NEGATIVE
BUPRENORPHINE SERPL-MCNC: NEGATIVE NG/ML
CANNABINOIDS SERPL QL: POSITIVE
COCAINE UR QL: NEGATIVE
EXPIRATION DATE: ABNORMAL
Lab: ABNORMAL
MDMA UR QL SCN: NEGATIVE
METHADONE UR QL SCN: NEGATIVE
OPIATES UR QL: NEGATIVE
OXYCODONE UR QL SCN: NEGATIVE
PCP UR QL SCN: NEGATIVE

## 2023-09-07 PROCEDURE — G0480 DRUG TEST DEF 1-7 CLASSES: HCPCS | Performed by: NURSE PRACTITIONER

## 2023-09-07 RX ORDER — DEXTROAMPHETAMINE SACCHARATE, AMPHETAMINE ASPARTATE MONOHYDRATE, DEXTROAMPHETAMINE SULFATE AND AMPHETAMINE SULFATE 3.75; 3.75; 3.75; 3.75 MG/1; MG/1; MG/1; MG/1
15 CAPSULE, EXTENDED RELEASE ORAL EVERY MORNING
Qty: 7 CAPSULE | Refills: 0 | Status: SHIPPED | OUTPATIENT
Start: 2023-09-11 | End: 2023-09-18

## 2023-09-07 RX ORDER — EPINEPHRINE 0.3 MG/.3ML
INJECTION SUBCUTANEOUS
COMMUNITY

## 2023-09-07 NOTE — PROGRESS NOTES
Subjective   Jenn James is a 58 y.o. female who presents today for follow up    Referring Provider:  No referring provider defined for this encounter.    Chief Complaint:  ADHD, CSA, UDS    Answers submitted by the patient for this visit:  Other (Submitted on 9/5/2023)  Please describe your symptoms.: Yearly physical checkin with Emi for adhd  Have you had these symptoms before?: Yes  How long have you been having these symptoms?: Greater than 2 weeks  Please list any medications you are currently taking for this condition.: Adderall  Primary Reason for Visit (Submitted on 9/5/2023)  What is the primary reason for your visit?: Other    History of Present Illness:   9/7/23:  Patient presents today in office for annual CSA formalities, ADHD management.  Patient reports weight has been steady low 170's, continues to walk a lot though not able to exercise due to vocal cord dysfunction which effects nerves.  8 yrs ago patient had to go to speech therapy, which was caused by hyperventilating, due to tension in neck feels soreness which extends to left arm, though has had symptoms on right arm. Doing physical therapy currently and noted improvement, was told the symptoms will subside and are currently flared due to allergy to mold.     In regards to ADHD, patient reports current dose of Adderall XR 15 mg remains effective. However,  patient noticed while relatives were to come to house, in preparation patient found self jumping from task to task though was anxious about getting house prepared for their visit. Patient continues to use visual reminders. Since relatives have departed symptoms have improved. Patient does find self forgetful of keeping sunglasses in house, and is concerned if the dose needs to be adjusted. Reports the symptoms are minimal.     Denies side effects of elevated heart rate, elevated blood pressure, irritability, insomnia, decreased appetite, nor feeling shaky or jittery with stimulant  "medication.      Patient reports having 4 Adderall XR 15 mg remaining.   Every 2 yrs patient goes to [place] for 2 weeks, plans to leave Nov. 2, 2023 and to return 11/16/23.   Patient reports continues to take CBD oil in the evening for muscle relaxation and to help with sleep, takes 3 drops. Obtains from [place] from a retired [employer].     7/5/23:  Patient presents today via [employer] visit from home, located in [place], [place].    Patient reports having adequate supply through Thurs 7/13/23.  Patient reports pharmacy filled last Adderall XR 15 mg early which actually took the pressure of worrying as [facility] location is not open on the weekend.    Patient reports positive outcome with current dose of Adderall XR, as family has been visiting from out of town.  Patient feels she did well with various company visiting for Memorial Day and Fourth of July, was able to complete tasks without shifting from one task to another.  Patient has had company in home since May 19, 2023 with the exception of 1 week.     Patient denies current symptoms include fidgeting, difficulty remaining seated, easy distractability, blurting out answers prematurely, inability to complete tasks, difficulty sustaining attention, shifting from one uncompleted activity to another, talking excessively, interrupting others, ineffective listening, frequently losing items.   Denies side effects of elevated heart rate, elevated blood pressure, irritability, insomnia, decreased appetite, nor feeling shaky or jittery with stimulant medication.      Patient reports parents will be moving into an AL facility as they have long term care insurance, and have looked at several facilities, patient voices worry for her father due to father caring for mother.     4/5/23:  Patient presents today via [employer] Video visit from home, located in [place], [place].  Patient asking about redness to nose and lateral nose/face \"tiny white oil, and I push it and small " "amount of oil comes out.\" Patient suspected possible relation to medication, patient has started washing face at night as patient was only washing in the morning.  Denies changes to laundry detergent, face cleanser, though did change skin care moisturizer which may be a contributing factor.   Mild anxiety and worry noted per screening.  Patient reports Adderall XR dose remains effective, symptoms are well controlled, able to maintain focus, concentration, and complete tasks. Denies side effects.     Patient daughter, son in law, and grand kids are coming in town from [place] in May for patient 58 th birthday.  Which patient is looking forward to.     2/8/23:  Patient presents today via [employer] Video visit from home in [place], [place].  Patient reports feeling good, busy with family birthdays, and going to [place] to see grand kids to do some crafts. Symptoms of inattentiveness, shifting from one uncompleted activity or topic to another, impaired concentration are controlled well with current dose of Adderall XR 10 mg. Patient frequently freezing on video and had to go outside of home for better service, though continued to have connection problems intermittently.     Patient continues to be conscious of eating, denies decreased appetite, patient is walking with friends, eats a breakfast bar normally and then eats lunch around 1130, however, yesterday got caught up shopping and realized she had not eaten.  When patient does eat she eats good quantity.  Feels she may have lost 1-2 lbs, otherwise weight has been steady.      Patient has checked with [facility] pharmacy and Adderall dose is in stock.  Patient plans to travel to [place] in November, no other trips planned for out of state at this time.          1/4/23:  Patient presents today via [employer] visit from home, Adderall XR was increased from 10 to 15 mg at last visit, for which patient reports the first few days of the increase felt \"I don't know if I can " "handle this, just a little spaced out, I don't know, but it was fine in a few days.\"  Denies increased heart rate, difficulty sleeping.   Patient feels she may be decreased weight of 15 lb, though patient has been busy with mother in the hospital and getting things together for the holidays.  Reports mother was admitted to [facility] and had medication adjustments and plan to get mother into an adult day care.  Tomorrow patient is taking father to see a specialist at [facility] to help him cope with mother's mental illness- \"alzheimers and delusional dementia\" per the neurologist.  Reports mother can get argumentative and paranoid.       Reports home scale weight on 1/1/3 of 183.6 lbs.  Patient reports increased walking with friends, not eating as much in the morning, \"constantly on the go.\"  Had wanted to do some fasting and weight loss.  Denies having to purchase new clothing, though noticed face was slimmer and clothes were looser.  Patient had been on weight watchers in the past before COVID and was down to 167 lbs, as patient was working out in the gym, and gym's were closed during COVID and weight returned.  Reports eating at 1130-12 noon for lunch, and 7 pm for dinner as spouse likes to eat at 8 pm.  Will also snack during the day on skinny pop popcorn.  Reports at last office visit recalled telling MA to not tell her what the weight was, however, felt \"good\" seeing the scale weight a few days ago.  Plans to start going back to the gym with friend as they would go every morning, \"that was my life.\"     ADHD symptoms are improving, as patient reports ability to listen more effectively, more patient with others is noticed the most since dose increase.  Upon feeling organized with holiday decorations, noticed was able to stay on task, only bring up one box at a time to decorate instead of having all boxes out and not being able to organize.    Patient reports having enough supply of Adderall XR 15 mg through next " "Wed. 1/11/23.  Reports after speaking with pharmacist at [facility] was told all medications would have to be transferred from [facility] and [facility] will not only fill the Adderall prescription. Patient had used [facility] due to [facility] not having Adderall XR available.  Patient is also concerned due to frequent travels of having adequate supply and ability to fill medication at outlying pharmacies.       11/22/22:  Patient presents today via MyChart Video visit from patient home.  Patient reports would like to increase dose possibly, reports spouse notices \"I am drifting some.\" Patient further explains will frequently jump to other topics during conversation, though improved with Adderall XR 10 mg.  Patient reports daughter was able to notice improvement, though now that patient has returned to home from travels, she is noticing elevation in symptoms.      Patient reports some difficulty with sleeping, as last night had minimal sleep as patient is worried about mother whom has dementia and father is caring for mother, has had  involved.  Believes temporary, situational anxiety is reason for sleep difficulty.  Patient reports mother's dementia symptoms are worsening and difficult to deal with, though hopeful as  has set up for assistant to come to home several days a week to help with house cleaning, meal prep, and care.       10/20/22:  Patient presents today via MyChart Video visit from daughter's home in [place].   Patient was started on Adderall XR 10 mg at last visit for which patient reports the first few days had increased energy, which had company, had difficulty sleeping the first 2 nights, however, no longer having difficulty.  Patient reports taking medication at 7 am, one day she took it later at 930 am and had difficulty sleeping that night. Patient has been taking thyroid medication upon awakening to use the bathroom around 5-530 am and upon waking up around 7 takes " "the Adderall.      \"I think it's a lot better, my daughter said she seems to notice, I still have a tendency to interrupt but that's better, I don't seem to repeat questions, as it was an issue before because I was not really listening. My mind was somewhere else.\"   Denies side effects, patient was surprised she was able to sit still after a few days, now able to complete tasks as less distraction, able to continue with task at hand before switching to another task.     Patient will be returning from [place] 10/25/22.     9/16/22:  Patient presents today in office today to further discuss ADHD treatment with a controlled substance and to complete CSA and obtain POC UDS today per policy.     Patient brought in bottle of Hemp CBD oil 3200mg/2 oz, 1-3 drops 3 times daily on bottle, however, patient only takes in the evening for post menopausal symptoms, muscle aches, and vocal cord dysfunction, \"my neck tenses up , had to do physical therapy in past, and oil helps, I found out I am Allergic to mold.\"    9/15/22:  Patient presents today via [employer] visit from home, reports received ADHD test results though has not discussed.  Patient continues to have symptoms of inttentiveness, concentration difficulty, difficulty completing tasks, and switching from one uncompleted task to another.     Patient does take 3 drops of CBD oil, hemp based, at night for sleep, relaxes muscles as patient started taking while going through menopause.   Patient reports  is allergic to Wellbutrin and daughter tried Wellbutrin and did not do well, as patient would prefer to try Adderall first rather than a non-stimulant medication.  Patient will be out of town for 2 weeks in October visiting daughter in [place], likely early October.       7/14/22:  INITIAL EVAL  Patient presents today via MyChart Video visit from home with a history of depression and anxiety for which treatment was started in late 1995 with therapy, and " "medications from [id number] due to divorce.     Patient is currently not taking any psychotropic medications nor has had any since 2005.     \"My issue is I can't concentrate, I start something and cant finish something, forgets things often\".  Patient recalls while in school always \"fidgety\" crossing legs, moving often in seat.  Admits to interrupting people, gets distracted easily, daughter noticing.    Admits to over the past 2 yrs began to feel ADHD may be a possibility, \"I am tired of being like this, forgetting stuff, going in and out of the house, tired of interrupting people, it seems to be bothering me more.\"  Patient uses reminders, lists which was started while kids in high school and has continued to have  self add items needed to List,\" I walk out without my list from the fridge, and I have to have him take a picture of it and send it to me\".  Difficulty sitting still when you should be sitting still in Sabianism or meetings. Impulsive with shopping tends to purchase items that may be on sale or those that she may not need with the idea of \"I can always return it\".     Symptoms include fidgeting, difficulty remaining seated, easy distractability, blurting out answers prematurely, inability to complete tasks, difficulty sustaining attention, shifting from one uncompleted activity to another, talking excessively, interrupting others, ineffective listening, frequently losing items, and impulsivity.    ADHD:   Elementary school:   Grades:A's and B's  Special classes or failures:no  Got in trouble:no  Referral for ADHD testing:no  Fhx:son, diagnosed officially in 7th grade, took medications for 1-2 yrs, restarted while in college only, \"he probably should be taking something, I have suggested it to him\"  Presently:  Problems with attention to detail:yes  Problems with sustained attention:Yes  Problems listening when spoken to directly:Yes, unable to concentrate on what others are saying, \"I have to get my " "point across, I know I have to wait for the break, but I can't wait for the break.\"  Failure to finish tasks:yes, \"I will lay my husbandsTshirts on couch intending to hang up and they will be there for 2-3 days\" house hold tasks-dusting, mopping, find need to focus on floor boards  Avoids tasks that require sustained mental effort:yes, \"I excelled at work,  I could multi-task, 5-6 projects at desk, however, would wait until the last week to update credentials, I put off stuff and procrastinate all the time\"  Easily distracted:yes  Forgetting things:yes  Losing things:yes  Hard to organize:yes  Talks a lot and cutting people off:yes  Drifts off during conversations:yes, \"I have to concentrate what I need to say to not forget what I have say, then I blurt it out, I am trying really hard, it's even hard with you to not stop and say stuff\"  Difficulty with Reading:yes, \"I used to read a lot, has been using Audio books to listen in car or while out with friends or walking, has to rewind at times because my mind drifts off.\"  Difficulty watching TV/Movies:\"not really, we hardly ever have the TV on anymore.\"       PHQ-9 Depression Screening  PHQ-9 Total Score: (P) 0 9/5/2023 0    Little interest or pleasure in doing things? (P) 0-->not at all   Feeling down, depressed, or hopeless? (P) 0-->not at all   Trouble falling or staying asleep, or sleeping too much? (P) 0-->not at all   Feeling tired or having little energy? (P) 0-->not at all   Poor appetite or overeating? (P) 0-->not at all   Feeling bad about yourself - or that you are a failure or have let yourself or your family down? (P) 0-->not at all   Trouble concentrating on things, such as reading the newspaper or watching television? (P) 0-->not at all   Moving or speaking so slowly that other people could have noticed? Or the opposite - being so fidgety or restless that you have been moving around a lot more than usual? (P) 0-->not at all   Thoughts that you would be " better off dead, or of hurting yourself in some way? (P) 0-->not at all   PHQ-9 Total Score (P) 0     SUZANNE-7  Feeling nervous, anxious or on edge: (P) Not at all  Not being able to stop or control worrying: (P) Not at all  Worrying too much about different things: (P) Not at all  Trouble Relaxing: (P) Not at all  Being so restless that it is hard to sit still: (P) Not at all  Feeling afraid as if something awful might happen: (P) Not at all  Becoming easily annoyed or irritable: (P) Not at all  SUZANNE 7 Total Score: (P) 0  If you checked any problems, how difficult have these problems made it for you to do your work, take care of things at home, or get along with other people: (P) Not difficult at all 9/5/2023     Past Surgical History:  Past Surgical History:   Procedure Laterality Date    BLADDER SUSPENSION  2008    COLON SURGERY      COLONOSCOPY      ESSURE TUBAL LIGATION  1999    JOINT REPLACEMENT  Jan 2017    Right hip replacement    KIDNEY SURGERY      OTHER SURGICAL HISTORY      metal implant    TUBAL ABDOMINAL LIGATION         Problem List:  Patient Active Problem List   Diagnosis    Primary osteoarthritis of right hip    Status post right hip replacement    Hypothyroidism (acquired)    Paroxysmal atrial fibrillation    Anxiety    Deep venous thrombosis    Disorder of vocal cord    Edema of lower extremity    Hyperlipidemia    Hyperthyroidism    Onychomycosis    Pain of right hip joint    Laryngitis due to gastroesophageal reflux    Sensation of heaviness in extremities    Varicose veins of lower extremity    Vitamin deficiency       Allergy:   Allergies   Allergen Reactions    Sulfa Antibiotics Rash        Discontinued Medications:  Medications Discontinued During This Encounter   Medication Reason    MAGNESIUM MALATE PO *Therapy completed    Auvi-Q 0.3 MG/0.3ML solution auto-injector injection *Therapy completed    ciclopirox (PENLAC) 8 % solution *Therapy completed       Current Medications:   Current  "Outpatient Medications   Medication Sig Dispense Refill    amphetamine-dextroamphetamine XR (Adderall XR) 15 MG 24 hr capsule Take 1 capsule by mouth Every Morning for 30 days Indications: Attention Deficit Hyperactivity Disorder 30 capsule 0    azelastine (ASTELIN) 0.1 % nasal spray USE 1 TO 2 SPRAYS IN EACH NOSTRIL TWICE DAILY AS NEEDED FOR RUNNY NOSE OR SNEEZING OR POST NASAL DRIP      CBD (cannabidiol) oral oil Take  by mouth.      cetirizine (zyrTEC) 10 MG tablet Take 1 tablet by mouth Daily.      Cyanocobalamin (Vitamin B 12) 250 MCG lozenge Take 2,500 mcg by mouth.      EPINEPHrine (EPIPEN) 0.3 MG/0.3ML solution auto-injector injection       levothyroxine (SYNTHROID, LEVOTHROID) 25 MCG tablet TAKE 1 TABLET BY MOUTH DAILY 90 tablet 1    Magnesium Oxide (MAGNESIUM EXTRA STRENGTH PO) Take  by mouth.      Milk Thistle 150 MG capsule Take  by mouth.      Misc. Devices (Nasal Spray Bottle) misc       montelukast (SINGULAIR) 10 MG tablet Take 1 tablet by mouth Every Night.      Vitamin D-Vitamin K (K2 Plus D3) 100-1000 MCG-UNIT tablet Take  by mouth Daily. Patient takes drops (not tablets) due to GI upset with tablet       No current facility-administered medications for this visit.       Past Medical History:  Past Medical History:   Diagnosis Date    ADHD (attention deficit hyperactivity disorder) Sep/22    Now on adderrall    Allergic Whole life    Anxiety     Cervical cancer screening 5/30/2109    Chronic allergic rhinitis     Deep vein thrombosis 2017    Caused after my hip replacement.    Depression     Hyperlipemia 03/15/2017    Hypothyroidism 03/15/2017    Kidney stones     Limb pain     Limb swelling        Past Psychiatric History:  Began Treatment: started in 1995 with therapy due to spouse having an affair  Diagnoses:Depression and Anxiety  Psychiatrist: last seen from [id number]  Therapist: last seen from [id number]  Admission History:Denies  Medication Trials: Prozac-1995-for one year \"felt like i was " "floating;\" Zoloft for 1 yr 95-96, then started Effexor with divorce in  for1 yr-during time having increased stress with divorce as pt had lost hair and stomach problems and asked to be placed on meds; tolerated well  Self Harm: Denies  Suicide Attempts:Denies   Psychosis, Anxiety, Depression: Denies    Substance Abuse History:   Types: Alcohol daily in the evening to relax, 1-2 glasses of wine, or 1 beer or gin and tonic  Withdrawal Symptoms:Denies  Longest Period Sober:Not Applicable   AA: Not applicable     Social History:  Martial Status:  Employed:No 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  Kids:Yes or If so, how many 2 children and 2 step children  House:Lives in a house   History: Denies  Access to Guns: no    Social History     Socioeconomic History    Marital status:    Tobacco Use    Smoking status: Never    Smokeless tobacco: Never   Vaping Use    Vaping Use: Never used   Substance and Sexual Activity    Alcohol use: Yes     Alcohol/week: 3.0 - 4.0 standard drinks     Types: 3 - 4 Glasses of wine per week     Comment: drinks daily, wine, beer and lquor    Drug use: Never     Types: Marijuana     Comment: In teenage years    Sexual activity: Yes     Partners: Male     Birth control/protection: Surgical       Family History:   Suicide Attempts: Denies  Suicide Completions:Denies      Family History   Problem Relation Age of Onset    Dementia Mother     Diabetes type II Mother     Cancer Mother         Kidney cancer (left kidney removed) and endometrial cancer (hysterectomy)    Diabetes Mother         Type 2    Stroke Mother         TIA    Prostate cancer Father     Hearing loss Father         Hearing aids    Other Maternal Grandmother         Colon cancer    Colon cancer Maternal Grandmother 70    Diabetes Paternal Grandmother         Type 2    ADD / ADHD Son        Developmental History:   Born: [place], lived in [place] since age 1  Siblings:1 sister, 1 " brother-both younger  Childhood: Denies Abuse  High School:Completed  College: 2 yrs, no degree    Mental Status Exam:   Hygiene:   good  Cooperation:  Cooperative  Eye Contact:  Good  Psychomotor Behavior:  Appropriate  Affect:  Appropriate  Mood: euthymic  Speech:  Normal  Thought Process:  Goal directed  Thought Content:  Mood congruent  Suicidal:  None  Homicidal:  None  Hallucinations:  None  Delusion:  None  Memory:  Intact  Orientation:  Grossly intact  Reliability:  good  Insight:  Good  Judgement:  Good  Impulse Control:  Good  Physical/Medical Issues:  Yes Hypothyroidism,OA rt hip,paroxysmal afib, HLD      Review of Systems:  Review of Systems   Constitutional:  Negative for appetite change, diaphoresis and fatigue.   HENT:  Negative for drooling.    Eyes:  Negative for visual disturbance.   Respiratory:  Negative for cough and shortness of breath.    Cardiovascular:  Negative for chest pain, palpitations and leg swelling.   Gastrointestinal:  Negative for nausea and vomiting.   Endocrine: Negative for cold intolerance and heat intolerance.   Genitourinary:  Negative for difficulty urinating.   Musculoskeletal:  Negative for joint swelling.   Allergic/Immunologic: Negative for immunocompromised state.   Neurological:  Negative for dizziness, seizures, speech difficulty and numbness.   Psychiatric/Behavioral:  Negative for decreased concentration, self-injury, sleep disturbance and suicidal ideas. The patient is not nervous/anxious and is not hyperactive.        Physical Exam:  Physical Exam  Psychiatric:         Attention and Perception: Attention and perception normal.         Mood and Affect: Mood and affect normal.         Speech: Speech normal.         Behavior: Behavior normal. Behavior is cooperative.         Thought Content: Thought content normal. Thought content does not include suicidal ideation. Thought content does not include suicidal plan.         Cognition and Memory: Cognition and memory  "normal.         Judgment: Judgment normal.       Vital Signs:   /83   Pulse 69   Ht 170.2 cm (67\")   Wt 79.5 kg (175 lb 3.2 oz)   BMI 27.44 kg/m²      Lab Results:   Clinical Support on 09/07/2023   Component Date Value Ref Range Status    Amphetamine Screen, Urine 09/07/2023 Positive (A)  Negative Final    Barbiturates Screen, Urine 09/07/2023 Negative  Negative Final    Buprenorphine, Screen, Urine 09/07/2023 Negative  Negative Final    Benzodiazepine Screen, Urine 09/07/2023 Negative  Negative Final    Cocaine Screen, Urine 09/07/2023 Negative  Negative Final    MDMA (ECSTASY) 09/07/2023 Negative  Negative Final    Methamphetamine, Ur 09/07/2023 Negative  Negative Final    Methadone Screen, Urine 09/07/2023 Negative  Negative Final    Opiate Screen 09/07/2023 Negative  Negative Final    Oxycodone Screen, Urine 09/07/2023 Negative  Negative Final    Phencyclidine (PCP), Urine 09/07/2023 Negative  Negative Final    THC, Screen, Urine 09/07/2023 Positive (A)  Negative Final    Lot Number 09/07/2023 [id number]   Final    Expiration Date 09/07/2023 09/18/2024   Final   Lab on 07/18/2023   Component Date Value Ref Range Status    Hemoglobin A1C 07/18/2023 5.30  4.80 - 5.60 % Final   Lab on 07/13/2023   Component Date Value Ref Range Status    Glucose 07/13/2023 119 (H)  65 - 99 mg/dL Final    BUN 07/13/2023 12  8 - 23 mg/dL Final    Creatinine 07/13/2023 0.91  0.57 - 1.00 mg/dL Final    Sodium 07/13/2023 140  136 - 145 mmol/L Final    Potassium 07/13/2023 4.2  3.5 - 5.2 mmol/L Final    Chloride 07/13/2023 103  98 - 107 mmol/L Final    CO2 07/13/2023 24.7  22.0 - 29.0 mmol/L Final    Calcium 07/13/2023 9.9  8.6 - 10.5 mg/dL Final    Total Protein 07/13/2023 6.8  6.0 - 8.5 g/dL Final    Albumin 07/13/2023 4.4  3.5 - 5.2 g/dL Final    ALT (SGPT) 07/13/2023 21  1 - 33 U/L Final    AST (SGOT) 07/13/2023 24  1 - 32 U/L Final    Alkaline Phosphatase 07/13/2023 70  39 - 117 U/L Final    Total Bilirubin 07/13/2023 0.8  " 0.0 - 1.2 mg/dL Final    Globulin 07/13/2023 2.4  gm/dL Final    A/G Ratio 07/13/2023 1.8  g/dL Final    BUN/Creatinine Ratio 07/13/2023 13.2  7.0 - 25.0 Final    Anion Gap 07/13/2023 12.3  5.0 - 15.0 mmol/L Final    eGFR 07/13/2023 72.4  >60.0 mL/min/1.73 Final    Total Cholesterol 07/13/2023 211 (H)  0 - 200 mg/dL Final    Triglycerides 07/13/2023 107  0 - 150 mg/dL Final    HDL Cholesterol 07/13/2023 65 (H)  40 - 60 mg/dL Final    LDL Cholesterol  07/13/2023 127 (H)  0 - 100 mg/dL Final    VLDL Cholesterol 07/13/2023 19  5 - 40 mg/dL Final    LDL/HDL Ratio 07/13/2023 1.92   Final    TSH 07/13/2023 6.280 (H)  0.270 - 4.200 uIU/mL Final    25 Hydroxy, Vitamin D 07/13/2023 92.3  30.0 - 100.0 ng/ml Final    WBC 07/13/2023 6.16  3.40 - 10.80 10*3/mm3 Final    RBC 07/13/2023 4.61  3.77 - 5.28 10*6/mm3 Final    Hemoglobin 07/13/2023 14.3  12.0 - 15.9 g/dL Final    Hematocrit 07/13/2023 42.2  34.0 - 46.6 % Final    MCV 07/13/2023 91.5  79.0 - 97.0 fL Final    MCH 07/13/2023 31.0  26.6 - 33.0 pg Final    MCHC 07/13/2023 33.9  31.5 - 35.7 g/dL Final    RDW 07/13/2023 12.0 (L)  12.3 - 15.4 % Final    RDW-SD 07/13/2023 39.7  37.0 - 54.0 fl Final    MPV 07/13/2023 9.9  6.0 - 12.0 fL Final    Platelets 07/13/2023 240  140 - 450 10*3/mm3 Final    Neutrophil % 07/13/2023 65.4  42.7 - 76.0 % Final    Lymphocyte % 07/13/2023 22.9  19.6 - 45.3 % Final    Monocyte % 07/13/2023 8.0  5.0 - 12.0 % Final    Eosinophil % 07/13/2023 2.4  0.3 - 6.2 % Final    Basophil % 07/13/2023 1.0  0.0 - 1.5 % Final    Immature Grans % 07/13/2023 0.3  0.0 - 0.5 % Final    Neutrophils, Absolute 07/13/2023 4.03  1.70 - 7.00 10*3/mm3 Final    Lymphocytes, Absolute 07/13/2023 1.41  0.70 - 3.10 10*3/mm3 Final    Monocytes, Absolute 07/13/2023 0.49  0.10 - 0.90 10*3/mm3 Final    Eosinophils, Absolute 07/13/2023 0.15  0.00 - 0.40 10*3/mm3 Final    Basophils, Absolute 07/13/2023 0.06  0.00 - 0.20 10*3/mm3 Final    Immature Grans, Absolute 07/13/2023 0.02  0.00  - 0.05 10*3/mm3 Final    nRBC 07/13/2023 0.0  0.0 - 0.2 /100 WBC Final   Office Visit on 07/11/2023   Component Date Value Ref Range Status    Color 07/11/2023 Yellow  Yellow, Straw, Dark Yellow, Mercedes Final    Clarity, UA 07/11/2023 Clear  Clear Final    Specific Gravity  07/11/2023 1.030  1.005 - 1.030 Final    pH, Urine 07/11/2023 6.0  5.0 - 8.0 Final    Leukocytes 07/11/2023 Trace (A)  Negative Final    Nitrite, UA 07/11/2023 Negative  Negative Final    Protein, POC 07/11/2023 Negative  Negative mg/dL Final    Glucose, UA 07/11/2023 Negative  Negative mg/dL Final    Ketones, UA 07/11/2023 Negative  Negative Final    Urobilinogen, UA 07/11/2023 Normal  Normal, 0.2 E.U./dL Final    Bilirubin 07/11/2023 Negative  Negative Final    Blood, UA 07/11/2023 Negative  Negative Final    Lot Number 07/11/2023 [id number]   Final    Expiration Date 07/11/2023 4/30/24   Final    Diagnosis 07/11/2023 Comment   Final    NEGATIVE FOR INTRAEPITHELIAL LESION OR MALIGNANCY.    Specimen adequacy: 07/11/2023 Comment   Final    Satisfactory for evaluation.  Endocervical and/or squamous metaplastic  cells (endocervical component) are present.    Clinician Provided ICD-10: 07/11/2023 Comment   Final    Z01.419  Z12.4    Performed by: 07/11/2023 Comment   Final    Andre Costello, Cytotechnologist (ASCP)    . 07/11/2023 .   Final    Note: 07/11/2023 Comment   Final    The Pap smear is a screening test designed to aid in the detection of  premalignant and malignant conditions of the uterine cervix.  It is not a  diagnostic procedure and should not be used as the sole means of detecting  cervical cancer.  Both false-positive and false-negative reports do occur.    Method: 07/11/2023 Comment   Final    This liquid based ThinPrep(R) pap test was screened with the  use of an image guided system.    Conv .conv 07/11/2023 Comment   Final    The HPV DNA reflex criteria were not met with this specimen result  therefore, no HPV testing was performed.        EKG Results:  No orders to display       Imaging Results:  No Images in the past 120 days found..      Assessment & Plan   Diagnoses and all orders for this visit:    1. ADHD (attention deficit hyperactivity disorder), inattentive type (Primary)    2. High risk medication use  -     POC Urine Drug Screen [facility] [employer]; Future    3. Positive urine drug screen  -     Cannabinoid Confirmation, Ur - Urine, Clean Catch; Future    4. Controlled substance agreement signed        Visit Diagnoses:    ICD-10-CM ICD-9-CM   1. ADHD (attention deficit hyperactivity disorder), inattentive type  F90.0 314.00   2. High risk medication use  Z79.899 V58.69   3. Positive urine drug screen  R82.5 796.0   4. Controlled substance agreement signed  Z79.899 V58.69       PLAN:  1.   Safety: No acute safety concerns  Therapy: None  Risk Assessment: Risk of self-harm acutely is low.  Risk factors include anxiety disorder, mood disorder, and recent psychosocial stressors (pandemic). Protective factors include no family history, denies access to guns/weapons, no present SI, no history of suicide attempts or self-harm in the past, minimal AODA, healthcare seeking, future orientation, willingness to engage in care.  Risk of self-harm chronically is also low, but could be further elevated in the event of treatment noncompliance and/or AODA.  Meds:  Continue Adderall XR 15 mg by mouth daily in the morning to target symptoms of ADHD including:  Inattentiveness & impaired concentration.  Risks, benefits, side effects discussed with patient including elevated heart rate, elevated blood pressure, irritability, insomnia, sexual dysfunction, appetite suppressing properties, psychosis.  After discussion of these risks and benefits, the patient voiced understanding and agreed to proceed. Cali reviewed, LAST DISPENSED 8/7/23 #30/30 DAYS  Informed patient order would be sent to Dr. Harkins in separate entry for signature due to EMR system, and will  not see as refilled on AVS today.  Due to positive UDS, will send a prescription to Dr. Harkins today for a 7  day supply, patient has adequate supply through Monday 9/11/23. Instructed patient to contact provider on Monday 9/18/23 to request refill.     Controlled substance documentation: Cali reviewed; prior urine drug screen consistent obtained 9/16/22, ordered today and results discussed with patient ; consent is up to date, signed, witnessed and in EHR, dated today 9/7/23, which will be updated annually per policy. Patient is aware of risk of addiction on this medication, understands need to follow up for a review every 3 months and medications will be adjusted or decreased as deemed appropriate at each visit.  No history of drug or alcohol abuse.  No concerns about diversion or abuse. Patient denies side effects related to the medication.  Patient is aware of random urine drug screens and pill counts. The dosing of this medication will be reviewed on a regular basis and reduced if possible. Ongoing use of a controlled substance is necessary for this patient to have a normal quality of life.    Labs: POC UDS today in clinic resulted as +THC, which patient reported continues to take CBD oil in the evening for muscle relaxation to help with sleep, takes 3 drops. Obtains from [place] from a retired .     Symptoms of  ADHD are under good control with current medication regimen.  Reminded patient to practice mindfulness and behavioral modifications to help with shifting from one uncompleted activity to another, to continue with alarms, reminders. Patient informed that CBD oil is not regulated by the FDA and each bottle could contain various amounts of THC, however, if confirmation deems positive for THC, patient has been advised to stop CBD oil.      Patient will be traveling to [place] 11/2-11/16/23 and concerned with ability to obtain medication timely, instructed patient to remind provider with refill in Oct or  early Nov. So the supply can be extended to cover days of travel.   Patient was given instructions and counseling regarding condition and for health maintenance advice. Please see specific information pulled into the AVS if appropriate.    Patient to contact provider if symptoms worsen or fail to improve.        7/5/23:   Continue Adderall XR 15 mg by mouth daily in the morning to target symptoms of ADHD including:  Inattentiveness & impaired concentration.  Risks, benefits, side effects discussed with patient including elevated heart rate, elevated blood pressure, irritability, insomnia, sexual dysfunction, appetite suppressing properties, psychosis.  After discussion of these risks and benefits, the patient voiced understanding and agreed to proceed. Cali reviewed, LAST DISPENSED 6/8/23 #30/30 DAYS  Informed patient order would be sent to Dr. Harkins in separate entry for signature due to EMR system, and will not see as refilled on AVS today.  First available fill date of 7/7/23.     Symptoms of ADHD are under good control with current medication regimen.  Informed patient next visit will be scheduled for in the office for annual CSA and UDS.  Scheduled for Thurs 9/7/23 at 10 am, instructed patient to arrive by 945 am to complete UDS prior to start of visit to allow ample time for results to show by end of visit.   Patient was given instructions and counseling regarding condition and for health maintenance advice. Please see specific information pulled into the AVS if appropriate.    Patient to contact provider if symptoms worsen or fail to improve.       4/4/23:  Continue Adderall XR 15 mg by mouth daily in the morning to target symptoms of ADHD including:  Inattentiveness & impaired concentration.  Risks, benefits, side effects discussed with patient including elevated heart rate, elevated blood pressure, irritability, insomnia, sexual dysfunction, appetite suppressing properties, psychosis.  After discussion of  these risks and benefits, the patient voiced understanding and agreed to proceed. Cali reviewed, LAST DISPENSED 3/15/23 #30/30 DAYS  Patient to request refill when appropriate. Patient has 9 pills remaining after taking this morning dose, instructed patient to [facility] to refill via [facility] on Monday 4/10/23, and provider will send refill in on Tues. 4/11/23 with first allowed fill date of Thurs 4/13/23.     Controlled substance documentation: Cali reviewed; prior urine drug screen consistent obtained 9/16/22; consent is up to date, signed, witnessed and in EHR, dated 9/16/22, which will be updated annually per policy. Patient is aware of risk of addiction on this medication, understands need to follow up for a review every 3 months and medications will be adjusted or decreased as deemed appropriate at each visit.  No history of drug or alcohol abuse.  No concerns about diversion or abuse. Patient denies side effects related to the medication.  Patient is aware of random urine drug screens and pill counts. The dosing of this medication will be reviewed on a regular basis and reduced if possible..  Ongoing use of a controlled substance is necessary for this patient to have a normal quality of life.  Symptoms of ADHD are under good control with Adderall XR 15 mg daily. Patient to contact provider if symptoms worsen or fail to improve.       3/10/23:  TELEPHONE  Patient called to check in re:her medication (per Adelaida's request)  Patient says she  Has 5 days worth of medication left.  Please send refill when appropriate and please advise      2/10/23:  TELPHONE  Patient called to report that the [facility] [place] and [facility] are now out of the Adderall XR and she says she did call the [facility] and [place] (I did change in this request.).  Please resubmit.  Med not pended      2/8/23:  Continue Adderall XR 15 mg by mouth daily in the morning to target symptoms of ADHD including:   Inattentiveness & impaired concentration.  Risks, benefits, side effects discussed with patient including elevated heart rate, elevated blood pressure, irritability, insomnia, sexual dysfunction, appetite suppressing properties, psychosis.  After discussion of these risks and benefits, the patient voiced understanding and agreed to proceed. Cali reviewed, LAST DISPENSED 1/11/23 #30/30 DAYS  Informed patient order would be sent to Dr. Harkins in separate entry for signature due to EMR system, and will not see as refilled on AVS today.    Symptoms of ADHD are under good control with Adderall XR 15 mg daily. Instructed to request refill via pharmacy when appropriate.  Will coordinate with staff to ensure refill requests are sent to this provider.  Due to pandemic state of emergency  ending 5/11/23, discussed upcoming travel as patient is aware telehealth cannot be provided across state lines nor can prescription of controlled substances, patient has plans to go to [place] for 2 weeks in November 2023 at this time, and will update provider for any other travels to ensure patient has adequate supply of medication.  Patient to contact provider if symptoms worsen or fail to improve.       1/4/23:  Continue Adderall XR 15 mg by mouth daily in the morning to target symptoms of ADHD including:  Inattentiveness & impaired concentration.  Risks, benefits, side effects discussed with patient including elevated heart rate, elevated blood pressure, irritability, insomnia, sexual dysfunction, appetite suppressing properties, psychosis.  After discussion of these risks and benefits, the patient voiced understanding and agreed to proceed. Cali reviewed, LAST DISPENSED 12/12/22 #30/30 DAYS  Patient instructed to call [facility] Monday 1/9/23 to determine medication availability due to nationwide shortage of Adderall, and to then contact provider [place] directly to inform as patient will have to transfer all medications to  [place] if continued to use that pharmacy.  Patient also instructed to inquire with pharmacy of earliest dispense date as most pharmacies are 24-48 hrs for controlled substances.   Symptoms of ADHD are under good control with Adderall XR 15 mg, denies side effects, though has noted decreased appetite with weight loss, which patient has also increased daily activity, exercising, and has been under some increased stress with parents care.  Will continue to check in with patient regarding weight loss.  Lengthy discussion with patient today regarding telehealth services as patient and provider must both be located in the same state of [place], and would not be able to provide telehealth services while visiting daughter in [place].  Patient verbalized understanding.  Patient to contact provider if symptoms worsen or fail to improve.         11/22/22:  Continue Adderall XR 10 mg by mouth daily in the morning to target symptoms of ADHD including:  Inattentiveness & impaired concentration.  Risks, benefits, side effects discussed with patient including elevated heart rate, elevated blood pressure, irritability, insomnia, sexual dysfunction, appetite suppressing properties, psychosis.  After discussion of these risks and benefits, the patient voiced understanding and agreed to proceed. Cali reviewed, LAST DISPENSED 10/28/22 #42/42 DAYS, reported #20 remain in bottle.    Will send in order for Adderall XR 5 mg by mouth daily in the morning for 20 days (through 12/12/22), patient instructed to add the 5 mg dose to the 10 mg to equal 15 mg total daily in the morning.  Patient instructed to contact provider in office when down to 3-5 days remaining of supply. Call 12/8/22.  Informed patient order would be sent to Dr. Harkins in separate entry for signature due to EMR system, and will not see as refilled on AVS today.    Controlled substance documentation: Cali reviewed; prior urine drug screen consistent obtained 9/16/22; consent is  "up to date, signed, witnessed and in EHR, dated 9/16/22, which will be updated annually per policy. Patient is aware of risk of addiction on this medication, understands need to follow up for a review every 3 months and medications will be adjusted or decreased as deemed appropriate at each visit.  No history of drug or alcohol abuse.  No concerns about diversion or abuse. Patient denies side effects related to the medication.  Patient is aware of random urine drug screens and pill counts. The dosing of this medication will be reviewed on a regular basis and reduced if possible..  Ongoing use of a controlled substance is necessary for this patient to have a normal quality of life.    ADHD symptoms are under fair control with Adderall XR 10 mg, will increase to 15 mg.   Patient to contact provider if symptoms worsen or fail to improve.       10/31/22:  TELEPHONE  Patient called and LMVM that the [facility] in [place] did fill the Rx for her Adderall XR 10mg.  Patient just wanted to let Adelaida know.  Patient said she was \"happy about it\"   Prescription was verified as dispensed with pharmacy in [place].     10/28/22:  TELEPHONE  Please determine when she will return to [place], as she was supposed to return 10/25/22 and was originally given adequate supply through 10/28/22.    Called and spoke with patient, she states that she will be back on Wednesday Nov.2,2022 and will take her last pill tomorrow.  Patient does say that the [facility] in [place] has now reopened but they say they are very backed up and someone even told her that they will probably not fill it because Dr. Harkins is not licensed in [place].  Patient says she will wait to see if they do fill it over the weekend and she will call me back on Monday and let me know.  If they don't fill it she will just have it sent to the [facility] in [place], [place].   I will forward message to  so he is updated on status, Since she will be returning next " "Wed. 11/2/22, a new order will not be sent into local pharmacy until confirmed dispense from [place] pharmacy, as [place] was not an option to add with CALI report attempted today.  We will have to contact the pharmacy to verify dispense, and patient needs to be reminded to not wait until down to 1 pill remaining to request refill or problem with refill, as this refill was sent in per patient request Monday 10/24/22, and this information was relayed to office today.   Patient called and VM that the [facility] in [place] did fill the Rx for her Adderall XR 10mg.  Patient just wanted to let Adelaida know.  Patient said she was \"happy about it\"       10/20/22:   Continue Adderall XR 10 mg by mouth daily in the morning to target symptoms of ADHD including:  Inattentiveness & impaired concentration.  Risks, benefits, side effects discussed with patient including elevated heart rate, elevated blood pressure, irritability, insomnia, sexual dysfunction, appetite suppressing properties, psychosis.  After discussion of these risks and benefits, the patient voiced understanding and agreed to proceed. Cali reviewed, LAST DISPENSED 9/16/22 #42/42 DAYS  Patient instructed to request refill 10/25/22 upon return from [place]. Explained process for refills.   Controlled substance documentation: Cali reviewed; prior urine drug screen consistent obtained 9/16/22; consent is up to date, signed, witnessed and in EHR, dated 9/16/22, which will be updated annually per policy. Patient is aware of risk of addiction on this medication, understands need to follow up for a review every 3 months and medications will be adjusted or decreased as deemed appropriate at each visit.  No history of drug or alcohol abuse.  No concerns about diversion or abuse. Patient denies side effects related to the medication.  Patient is aware of random urine drug screens and pill counts. The dosing of this medication will be reviewed on a regular basis and " reduced if possible..  Ongoing use of a controlled substance is necessary for this patient to have a normal quality of life.  Symptoms of ADHD are managed well with current dose of Adderall XR 10 mg without side effects.      9/16/22:   Declines therapy  Will potentially start Adderall XR 10 mg by mouth daily in the morning to target symptoms of ADHD including:  Inattentiveness & impaired concentration.  Risks, benefits, side effects discussed with patient including elevated heart rate, elevated blood pressure, irritability, insomnia, sexual dysfunction, appetite suppressing properties, psychosis.  After discussion of these risks and benefits, the patient voiced understanding and agreed to proceed. Cali reviewed, UDS ordered, and controlled substance agreement signed & witnessed. Patient informed this medication would not be ordered until UDS resulted and was negative, at that time a Prescription will be sent to  for signature.  Labs: POC UDS today in clinic    Lengthy discussion held with patient regarding CSA and medication education.   Bottle of Hemp oil had a QRS code to scan, which was done per patient request, which indicated percentages of ingredients as each bottle varies.  Certificate of Analysis, [facility], Cannabinoid Results: Total THC 0.231%, Total CBD 6.101%, Total Cannabinoids 6.534%. Will scan certificate to EHR.   Patient will be in [place] in October, will check with  in regards to prescribing CS sending to [place].   Will contact patient if UDS needs to be sent for confirmation. Otherwise will proceed with ordering Adderall XR.         9/15/22:   ADHD testing completed by Dr. Greg Bennett on 9/1/22 confirming a diagnosis of ADHD. (total time of review 9 mins)    Discussed ADHD treatment options of non-stimulants vs stimulant medications, after discussion patient wishes to proceed with Adderall.  Informed patient of policy requirements prior to starting Adderall, patient will plan  on coming in tomorrow due to available appointment at 8 am.  Discussed current CBD oil which patient reports is hemp based as the UDS may show positive for THC. Patient takes CBD oil for sleep and muscle aches, which has been effective.  IF UDS positive patient was informed Adderall would not be prescribed, and will have to send for a confirmation.  Patient verbalized understanding.       7/14/22:   No Medications, Declines therapy at this time.  Referral for ADHD testing with   Dr. Greg Bennett, Psychologist, MS, LPP  [address], [address]  [place] [id number] [phone]   Currently only accepting referrals for psychological testing services.  Patient to contact provider and set up appointment.  And to contact office if unable to get an appointment scheduled.   -Attached list of several other sites for ADHD testing. Patient instructed to contact providers on list to determine availability of appointments, instructed patient to take notes while calling places indicating first available appointment, and to ask to be placed on waiting list.  If an office is chosen and requests the referral order please contact my Medical Assistant, Hanh, directly at [phone] informing of chosen office and the information will be sent.    Patient with high suspicion of having ADHD, will send for formal testing and have patient return in 2 weeks to discuss medication options as if patient is able to be tested in the next 1-2 months, may wait on starting a non-stimulant medication.     Patient screened positive for depression based on a PHQ-9 score of 0 on 9/5/2023. Follow-up recommendations include: Suicide Risk Assessment performed.    TREATMENT PLAN/GOALS: Continue supportive psychotherapy efforts and medications as indicated. Treatment and medication options discussed during today's visit. Patient ackowledged and verbally consented to continue with current treatment plan and was educated on the importance of  compliance with treatment and follow-up appointments.    MEDICATION ISSUES:  MARLYS reviewed as expected.    Discussed medication options and treatment plan of prescribed medication as well as the risks, benefits, and side effects including potential falls, possible impaired driving and metabolic adversities among others. Patient is agreeable to call the office with any worsening of symptoms or onset of side effects. Patient is agreeable to call 911 or go to the nearest ER should he/she begin having SI/HI. No medication side effects or related complaints today.     MEDS ORDERED DURING VISIT:  No orders of the defined types were placed in this encounter.      Return in about 3 months (around 12/7/2023) for Video visit.         I spent 40 minutes caring for Jenn on this date of service. This time includes time spent by me in the following activities: preparing for the visit, reviewing tests, obtaining and/or reviewing a separately obtained history, performing a medically appropriate examination and/or evaluation, counseling and educating the patient/family/caregiver, ordering medications, tests, or procedures, referring and communicating with other health care professionals, documenting information in the medical record, independently interpreting results and communicating that information with the patient/family/caregiver, and care coordination    This document has been electronically signed by CHELSI Burnett  September 7, 2023 10:38 EDT      Part of this note may be an electronic transcription/translation of spoken language to printed text using the Dragon Dictation System.your visit?: Other

## 2023-09-07 NOTE — PATIENT INSTRUCTIONS
"1. Should you want to get in touch with your provider, CHELSI Burnett, please contact MY Medical Assistant, Hanh, directly at [phone].  Recommend saving Hanh's direct number in phone as this is the PREFERRED & EASIEST way to get in contact with your provider.  Please leave a voice mail if you do not get an answer and she will return your call within 24 hrs. You will NOT be able to contact provider on [employer], as Behavioral Health Providers are restricted. YOU MUST CALL [phone]  If you need to speak with the on call provider after hours or on weekends, please Contact the [facility] ([phone]) and staff will be able to page the provider on call directly.     2.  In the event you need to cancel an appointment, please notify the office at least 24 hrs prior:   Contact **Hanh Medical Assistant at [facility] directly at [phone] or the [facility] ([phone])     3. MEDICATION REFILLS:  PLEASE CALL THE PHARMACY TO REQUEST ALL MEDICATION REFILLS or via [employer] TO ENSURE YOU ARE RECEIVING YOUR MEDICATIONS IN A TIMELY MANNER. The pharmacy or [employer] merced will send this request ELECTRONICALLY to the ordering provider.   IF YOU USE AN AUTOMATED SERVICE AT THE PHARMACY FOR REFILLS AND ARE TOLD THERE ARE \"NO REFILLS REMAINING\"   PLEASE CALL THE PHARMACY & SPEAK TO A LIVE PERSON TO VERIFY IT IS THE MOST UP TO DATE PRESCRIPTION ON FILE.    All new prescriptions will have a different number, therefore, if you were given refills for a medication today or at last visit it will not have the same number as the previous prescription.     4.  In the event you have personal crisis, contact the following crisis numbers: Suicide Prevention Hotline 1-658.811.4369 or *988, [facility] Helpline 5-263-940-HGEL; [facility] Emergency Room [phone]; text HELLO to 755395; or 911.  If you feel like harming yourself or others, call 911 right away.  You can call the 987 Suicide and Crisis " "Lifeline at  988   to speak with a counselor at the [facility], or you can connect with one using their online chat  .    5.  Never stop an antidepressant medicine without first talking to a healthcare provider. Suddenly stopping this type of medication can cause withdrawal symptoms.    6.  Counseling and talk therapy  Counseling or therapy teaches you new coping skills and more adaptive ways of thinking about problems. These tools can help you make positive changes. The benefits of counseling often last long after treatment sessions have stopped.    7.   We would appreciate your feedback, please scan the QRS code on the back of your appointment card (or see below) and complete a brief survey.  [place] location is still not available, so please click \"[place]\" location.  Thank you      SPECIFIC RECOMMENDATIONS:     1.      Medications discussed at this encounter:                   -  Will send refill for Adderall XR 15 mg once urine drug screen confirmation received, which could take up to 7 days, I will send in a fill for 7 days for you to  to cover days while awaiting confirmation, please contact provider on Monday 9/18/23 to refill further.     2.      Psychotherapy recommendations: Declined     3.     Return to clinic: 3 months, Thurs. 12/7/23 at 8 am via video      Please arrive at least 15 minutes before your scheduled appointment time to complete check in process.      IF you are scheduled for a [facility] VIDEO visit, YOU MUST COMPLETE THE \"E-CHECK IN\" PROCESS PRIOR TO BEGINNING THE VISIT, YOU WILL NO LONGER RECEIVE A PHONE CALL PRIOR TO ALL VIDEO VISITS; You may still complete the E-Check in for in office visits prior to appointment, you will receive multiple text/email reminders which will direct you further if needed.           "

## 2023-09-08 ENCOUNTER — TELEPHONE (OUTPATIENT)
Dept: PSYCHIATRY | Facility: CLINIC | Age: 58
End: 2023-09-08
Payer: COMMERCIAL

## 2023-09-08 NOTE — TELEPHONE ENCOUNTER
PA APPROVAL RECEIVED FOR PTS ADDERALL.    APPROVAL DATES ARE 08/09/2023-09/07/2024.  BEHAVIORAL HEALTH - SCAN - [place] APPROVAL ADDERALL; GIULIANA; 09/08/2023. (09/08/2023)     I VERBALLY SPOKE TO PT AND INFORMED OF APPROVAL. PT EXPRESSED UNDERSTANDING.

## 2023-09-12 LAB — CANNABINOIDS UR QL CFM: POSITIVE

## 2023-09-14 ENCOUNTER — TELEPHONE (OUTPATIENT)
Dept: PSYCHIATRY | Facility: CLINIC | Age: 58
End: 2023-09-14
Payer: COMMERCIAL

## 2023-09-14 DIAGNOSIS — F90.0 ADHD (ATTENTION DEFICIT HYPERACTIVITY DISORDER), INATTENTIVE TYPE: ICD-10-CM

## 2023-09-14 RX ORDER — DEXTROAMPHETAMINE SACCHARATE, AMPHETAMINE ASPARTATE MONOHYDRATE, DEXTROAMPHETAMINE SULFATE AND AMPHETAMINE SULFATE 3.75; 3.75; 3.75; 3.75 MG/1; MG/1; MG/1; MG/1
15 CAPSULE, EXTENDED RELEASE ORAL EVERY MORNING
Qty: 7 CAPSULE | Refills: 0 | Status: SHIPPED | OUTPATIENT
Start: 2023-09-14 | End: 2023-09-18 | Stop reason: SDUPTHER

## 2023-09-14 NOTE — TELEPHONE ENCOUNTER
PT CALLED AND STATED THAT SHE TESTED POSITIVE FOR THC IN HER URINE DRUG SCREEN SO PT ONLY GOT A 7 DAY SUPPLY OF ADDERALL.    PT STATES THAT SHE WAS TAKING CBD OIL LIKE SHE HAD BEFORE.   PT IS ALSO ON NAPROXEN/ ALEVE BUT SHE ONLY TAKES IT PRN BUT SEEN THAT ITS POSSIBLE THAT COULD MAKE HER TEST POSITIVE.    PT HAS STOPPED TAKING THE CBD OIL.     PT IS NEEDING REFILL ON HER ADDERALL.    COVERING PROVIDER PLEASE ADVISE.   POC Urine Drug Screen [facility] Bio-Cup (09/07/2023 10:01)     Cannabinoid Confirmation, Ur - Urine, Clean Catch (09/07/2023 10:20)     PT STATED ITS OK TO LEAVE HER A DETAILED VOICEMAIL IF NEEDED.

## 2023-09-14 NOTE — TELEPHONE ENCOUNTER
I REACHED BACK OUT TO PT AND INFORMED HER OF PROVIDERS MESSAGE.     PT EXPRESSED UNDERSTANDING AND WOULD LIKE TO DISCUSS THIS FURTHER AND HER TEST RESULTS WITH PROVIDER ON MONDAY MORNING.

## 2023-09-15 DIAGNOSIS — E03.9 ACQUIRED HYPOTHYROIDISM: ICD-10-CM

## 2023-09-15 RX ORDER — LEVOTHYROXINE SODIUM 0.03 MG/1
25 TABLET ORAL DAILY
Qty: 90 TABLET | Refills: 0 | Status: SHIPPED | OUTPATIENT
Start: 2023-09-15

## 2023-09-18 DIAGNOSIS — F90.0 ADHD (ATTENTION DEFICIT HYPERACTIVITY DISORDER), INATTENTIVE TYPE: ICD-10-CM

## 2023-09-18 RX ORDER — DEXTROAMPHETAMINE SACCHARATE, AMPHETAMINE ASPARTATE MONOHYDRATE, DEXTROAMPHETAMINE SULFATE AND AMPHETAMINE SULFATE 3.75; 3.75; 3.75; 3.75 MG/1; MG/1; MG/1; MG/1
15 CAPSULE, EXTENDED RELEASE ORAL EVERY MORNING
Qty: 30 CAPSULE | Refills: 0 | Status: SHIPPED | OUTPATIENT
Start: 2023-09-19

## 2023-09-18 NOTE — TELEPHONE ENCOUNTER
Noted last dispense of Adderall XR 15 mg #7 caps was on 9/7/23, as discussed with patient at appointment on 9/7/23 a 7 day supply would only be sent due to awaiting confirmation for THC, which confirmed a level of 10 which is the cutoff, and due to low level, patient history of use of CBD oil, will proceed with refill request for today as patient was instructed to request today 9/18/23. Did note patient had called [place] office on 9/14/23 requesting a refill.

## 2023-09-29 ENCOUNTER — LAB (OUTPATIENT)
Dept: LAB | Facility: HOSPITAL | Age: 58
End: 2023-09-29
Payer: COMMERCIAL

## 2023-09-29 DIAGNOSIS — R79.89 ELEVATED TSH: ICD-10-CM

## 2023-09-29 LAB — TSH SERPL DL<=0.05 MIU/L-ACNC: 3.3 UIU/ML (ref 0.27–4.2)

## 2023-09-29 PROCEDURE — 84443 ASSAY THYROID STIM HORMONE: CPT

## 2023-09-29 PROCEDURE — 36415 COLL VENOUS BLD VENIPUNCTURE: CPT

## 2023-10-16 DIAGNOSIS — F90.0 ADHD (ATTENTION DEFICIT HYPERACTIVITY DISORDER), INATTENTIVE TYPE: ICD-10-CM

## 2023-10-16 RX ORDER — DEXTROAMPHETAMINE SACCHARATE, AMPHETAMINE ASPARTATE MONOHYDRATE, DEXTROAMPHETAMINE SULFATE AND AMPHETAMINE SULFATE 3.75; 3.75; 3.75; 3.75 MG/1; MG/1; MG/1; MG/1
15 CAPSULE, EXTENDED RELEASE ORAL EVERY MORNING
Qty: 30 CAPSULE | Refills: 0 | Status: SHIPPED | OUTPATIENT
Start: 2023-10-17

## 2023-10-16 NOTE — TELEPHONE ENCOUNTER
Patient is requesting a refill on her Adderall.  Patient states she will run out on Wednesday.  Med Pended Please review

## 2023-10-16 NOTE — TELEPHONE ENCOUNTER
Last dispensed 9/18/23, order is ready for signature. naturopath - Cary Nielsen in [place]  - Usha Christianson,   Family Medicine  [facility] for [facility]  

## 2023-10-23 ENCOUNTER — HOSPITAL ENCOUNTER (OUTPATIENT)
Dept: MAMMOGRAPHY | Facility: HOSPITAL | Age: 58
Discharge: HOME | End: 2023-10-23
Admitting: NURSE PRACTITIONER
Payer: COMMERCIAL

## 2023-10-23 DIAGNOSIS — Z01.419 WELL WOMAN EXAM WITH ROUTINE GYNECOLOGICAL EXAM: ICD-10-CM

## 2023-10-23 DIAGNOSIS — Z12.31 ENCOUNTER FOR SCREENING MAMMOGRAM FOR MALIGNANT NEOPLASM OF BREAST: ICD-10-CM

## 2023-10-23 PROCEDURE — 77067 SCR MAMMO BI INCL CAD: CPT

## 2023-10-23 PROCEDURE — 77063 BREAST TOMOSYNTHESIS BI: CPT

## 2023-10-27 ENCOUNTER — TRANSCRIBE ORDERS (OUTPATIENT)
Dept: ADMINISTRATIVE | Facility: HOSPITAL | Age: 58
End: 2023-10-27
Payer: COMMERCIAL

## 2023-10-27 DIAGNOSIS — Z12.31 SCREENING MAMMOGRAM FOR BREAST CANCER: Primary | ICD-10-CM

## 2023-11-13 DIAGNOSIS — F90.0 ADHD (ATTENTION DEFICIT HYPERACTIVITY DISORDER), INATTENTIVE TYPE: ICD-10-CM

## 2023-11-13 NOTE — TELEPHONE ENCOUNTER
Patient called to request a refill on her Adderall XR 15mg.  Patient states she will take her last one on Friday and her Pharmacy is not open on weekends.  Med pended Sending to Provider

## 2023-11-16 DIAGNOSIS — F90.0 ADHD (ATTENTION DEFICIT HYPERACTIVITY DISORDER), INATTENTIVE TYPE: ICD-10-CM

## 2023-11-16 RX ORDER — DEXTROAMPHETAMINE SACCHARATE, AMPHETAMINE ASPARTATE MONOHYDRATE, DEXTROAMPHETAMINE SULFATE AND AMPHETAMINE SULFATE 3.75; 3.75; 3.75; 3.75 MG/1; MG/1; MG/1; MG/1
15 CAPSULE, EXTENDED RELEASE ORAL EVERY MORNING
Qty: 30 CAPSULE | Refills: 0 | Status: SHIPPED | OUTPATIENT
Start: 2023-11-16

## 2023-11-16 RX ORDER — DEXTROAMPHETAMINE SACCHARATE, AMPHETAMINE ASPARTATE MONOHYDRATE, DEXTROAMPHETAMINE SULFATE AND AMPHETAMINE SULFATE 3.75; 3.75; 3.75; 3.75 MG/1; MG/1; MG/1; MG/1
15 CAPSULE, EXTENDED RELEASE ORAL EVERY MORNING
Qty: 30 CAPSULE | Refills: 0 | OUTPATIENT
Start: 2023-11-16

## 2023-11-16 NOTE — TELEPHONE ENCOUNTER
PT(PATIENT) VERIFIED      NEXT VISIT WITH PROVIDER   Appointment with Adelaida Aguilar APRN (2023)     LAST SEEN BY PROVIDER   Office Visit with Adelaida Aguilar APRN (2023)     LAST MED REFILL  amphetamine-dextroamphetamine XR (Adderall XR) 15 MG 24 hr capsule (10/17/2023)     LAST UDS(URINE DRUG SCREEN)   POC Urine Drug Screen [facility]-C[facility] (2023 10:01)     PT(PATIENT) CONFIRMED PHARMACY PT(PATIENT) CONFIRMED [facility] AS CORRECT     PT(PATIENT) STATES THEY ARE NOT OPEN ON WEEKENDS    PT(PATIENT) STATES SHE ONLY HAS 1 MORE PILL TO GET THROUGH TOMORROW BUT WILL RUN OUT OVER THE WEEKEND     PROVIDER PLEASE ADVISE

## 2023-12-07 ENCOUNTER — TELEMEDICINE (OUTPATIENT)
Dept: BEHAVIORAL HEALTH | Facility: CLINIC | Age: 58
End: 2023-12-07
Payer: COMMERCIAL

## 2023-12-07 DIAGNOSIS — F90.0 ADHD (ATTENTION DEFICIT HYPERACTIVITY DISORDER), INATTENTIVE TYPE: Primary | ICD-10-CM

## 2023-12-07 DIAGNOSIS — Z63.79 STRESS DUE TO ILLNESS OF FAMILY MEMBER: ICD-10-CM

## 2023-12-07 DIAGNOSIS — F90.0 ADHD (ATTENTION DEFICIT HYPERACTIVITY DISORDER), INATTENTIVE TYPE: ICD-10-CM

## 2023-12-07 RX ORDER — DEXTROAMPHETAMINE SACCHARATE, AMPHETAMINE ASPARTATE MONOHYDRATE, DEXTROAMPHETAMINE SULFATE AND AMPHETAMINE SULFATE 3.75; 3.75; 3.75; 3.75 MG/1; MG/1; MG/1; MG/1
15 CAPSULE, EXTENDED RELEASE ORAL EVERY MORNING
Qty: 30 CAPSULE | Refills: 0 | Status: SHIPPED | OUTPATIENT
Start: 2023-12-15

## 2023-12-07 NOTE — PROGRESS NOTES
"  Subjective   Jenn James is a 58 y.o. female who presents today for follow up    Referring Provider:  No referring provider defined for this encounter.    Chief Complaint:  ADHD, stress due to illness of     History of Present Illness:   12/7/23:  Patient presents today via MyChart Video visit from home, located in [place], [place].  Reports  has been diagnosed with rare blood cancer, myelofibrosis, which is a slow progression as patient was told he has had for likely 10-12 years.   had 2nd bone marrow bx yesterday, reports increased stress due to transporting parents and now  to [facility].  In process of making 4 quilts for grandchildren started in June for [employer], and has a friend whom may help with quilting.   Has not put up [employer] tree yet due to overwhelming tasks and stress.      has lost 60-70 lbs over the last 6-7 mo. Extreme fatigue, poor endurance, and is severely weak after a simple task of letting the dogs out. Patient is primarily taking care of everything in house hold.   Once results of bone marrow bx, will find out if  qualifies for a treatment that is, 30% morbidity rate for a bone marrow transplant, though still working out details and decisions.  Patient was unable to go travel to [place] to see grand kids.     Patient is trying to prioritize tasks, as house is not tidy as she would like, and feels very overwhelmed.  Patient doesn't have the energy to communicate 's health decline to all family members. Patient  relatives were willing to come to home from [place] to help and spend holidays, however, the stress of patient being the host and \"they let me be it.\"    In regards to ADHD, patient explains while sewing, will be easily distracted by tasks that are not done, such as going to  medications from pharmacy, grocery, to do this and that, as it was forgotten previously.  Patient wishes to not change dose of Adderall as " the current stress with  and holidays are contributing factors.     Denies side effects of elevated heart rate, elevated blood pressure, irritability, insomnia, decreased appetite, nor feeling shaky or jittery with stimulant medication.      9/7/23:    Patient presents today in office for annual CSA formalities, ADHD management.  Patient reports weight has been steady low 170's, continues to walk a lot though not able to exercise due to vocal cord dysfunction which effects nerves.  8 yrs ago patient had to go to speech therapy, which was caused by hyperventilating, due to tension in neck feels soreness which extends to left arm, though has had symptoms on right arm. Doing physical therapy currently and noted improvement, was told the symptoms will subside and are currently flared due to allergy to mold.     In regards to ADHD, patient reports current dose of Adderall XR 15 mg remains effective. However,  patient noticed while relatives were to come to house, in preparation patient found self jumping from task to task though was anxious about getting house prepared for their visit. Patient continues to use visual reminders. Since relatives have departed symptoms have improved. Patient does find self forgetful of keeping sunglasses in house, and is concerned if the dose needs to be adjusted. Reports the symptoms are minimal.     Denies side effects of elevated heart rate, elevated blood pressure, irritability, insomnia, decreased appetite, nor feeling shaky or jittery with stimulant medication.      Patient reports having 4 Adderall XR 15 mg remaining.   Every 2 yrs patient goes to [place] for 2 weeks, plans to leave Nov. 2, 2023 and to return 11/16/23.   Patient reports continues to take CBD oil in the evening for muscle relaxation and to help with sleep, takes 3 drops. Obtains from [place] from a retired [place].     7/5/23:  Patient presents today via [employer] Video visit from home, located in [place], [place].   "  Patient reports having adequate supply through Thurs 7/13/23.  Patient reports pharmacy filled last Adderall XR 15 mg early which actually took the pressure of worrying as [facility] location is not open on the weekend.    Patient reports positive outcome with current dose of Adderall XR, as family has been visiting from out of town.  Patient feels she did well with various company visiting for Memorial Day and Fourth of July, was able to complete tasks without shifting from one task to another.  Patient has had company in home since May 19, 2023 with the exception of 1 week.     Patient denies current symptoms include fidgeting, difficulty remaining seated, easy distractability, blurting out answers prematurely, inability to complete tasks, difficulty sustaining attention, shifting from one uncompleted activity to another, talking excessively, interrupting others, ineffective listening, frequently losing items.   Denies side effects of elevated heart rate, elevated blood pressure, irritability, insomnia, decreased appetite, nor feeling shaky or jittery with stimulant medication.      Patient reports parents will be moving into an AL facility as they have long term care insurance, and have looked at several facilities, patient voices worry for her father due to father caring for mother.     4/5/23:  Patient presents today via [employer] Video visit from home, located in [place], [place].  Patient asking about redness to nose and lateral nose/face \"tiny white oil, and I push it and small amount of oil comes out.\" Patient suspected possible relation to medication, patient has started washing face at night as patient was only washing in the morning.  Denies changes to laundry detergent, face cleanser, though did change skin care moisturizer which may be a contributing factor.   Mild anxiety and worry noted per screening.  Patient reports Adderall XR dose remains effective, symptoms are well controlled, able to maintain " "focus, concentration, and complete tasks. Denies side effects.     Patient daughter, son in law, and grand kids are coming in town from [place] in May for patient 58 th birthday.  Which patient is looking forward to.     2/8/23:  Patient presents today via [employer] Video visit from home in [place], [place].  Patient reports feeling good, busy with family birthdays, and going to [place] to see grand kids to do some crafts. Symptoms of inattentiveness, shifting from one uncompleted activity or topic to another, impaired concentration are controlled well with current dose of Adderall XR 10 mg. Patient frequently freezing on video and had to go outside of home for better service, though continued to have connection problems intermittently.     Patient continues to be conscious of eating, denies decreased appetite, patient is walking with friends, eats a breakfast bar normally and then eats lunch around 1130, however, yesterday got caught up shopping and realized she had not eaten.  When patient does eat she eats good quantity.  Feels she may have lost 1-2 lbs, otherwise weight has been steady.      Patient has checked with [facility] and Adderall dose is in stock.  Patient plans to travel to [place] in November, no other trips planned for out of state at this time.          1/4/23:  Patient presents today via [employer] Video visit from home, Adderall XR was increased from 10 to 15 mg at last visit, for which patient reports the first few days of the increase felt \"I don't know if I can handle this, just a little spaced out, I don't know, but it was fine in a few days.\"  Denies increased heart rate, difficulty sleeping.   Patient feels she may be decreased weight of 15 lb, though patient has been busy with mother in the hospital and getting things together for the holidays.  Reports mother was admitted to [facility] and had medication adjustments and plan to get mother into an adult day care.  Tomorrow patient is taking " "father to see a specialist at [facility] to help him cope with mother's mental illness- \"alzheimers and delusional dementia\" per the neurologist.  Reports mother can get argumentative and paranoid.       Reports home scale weight on 1/1/3 of 183.6 lbs.  Patient reports increased walking with friends, not eating as much in the morning, \"constantly on the go.\"  Had wanted to do some fasting and weight loss.  Denies having to purchase new clothing, though noticed face was slimmer and clothes were looser.  Patient had been on weight watchers in the past before COVID and was down to 167 lbs, as patient was working out in the gym, and gym's were closed during COVID and weight returned.  Reports eating at 1130-12 noon for lunch, and 7 pm for dinner as spouse likes to eat at 8 pm.  Will also snack during the day on skinny pop popcorn.  Reports at last office visit recalled telling MA to not tell her what the weight was, however, felt \"good\" seeing the scale weight a few days ago.  Plans to start going back to the gym with friend as they would go every morning, \"that was my life.\"     ADHD symptoms are improving, as patient reports ability to listen more effectively, more patient with others is noticed the most since dose increase.  Upon feeling organized with holiday decorations, noticed was able to stay on task, only bring up one box at a time to decorate instead of having all boxes out and not being able to organize.    Patient reports having enough supply of Adderall XR 15 mg through next Wed. 1/11/23.  Reports after speaking with pharmacist at [facility] was told all medications would have to be transferred from [facility] and [facility] will not only fill the Adderall prescription. Patient had used [facility] due to [facility] not having Adderall XR available.  Patient is also concerned due to frequent travels of having adequate supply and ability to fill medication at outlying pharmacies.       11/22/22:  Patient " "presents today via MyChart Video visit from patient home.  Patient reports would like to increase dose possibly, reports spouse notices \"I am drifting some.\" Patient further explains will frequently jump to other topics during conversation, though improved with Adderall XR 10 mg.  Patient reports daughter was able to notice improvement, though now that patient has returned to home from travels, she is noticing elevation in symptoms.      Patient reports some difficulty with sleeping, as last night had minimal sleep as patient is worried about mother whom has dementia and father is caring for mother, has had  involved.  Believes temporary, situational anxiety is reason for sleep difficulty.  Patient reports mother's dementia symptoms are worsening and difficult to deal with, though hopeful as  has set up for assistant to come to home several days a week to help with house cleaning, meal prep, and care.       10/20/22:  Patient presents today via MyChart Video visit from daughter's home in [place].   Patient was started on Adderall XR 10 mg at last visit for which patient reports the first few days had increased energy, which had company, had difficulty sleeping the first 2 nights, however, no longer having difficulty.  Patient reports taking medication at 7 am, one day she took it later at 930 am and had difficulty sleeping that night. Patient has been taking thyroid medication upon awakening to use the bathroom around 5-530 am and upon waking up around 7 takes the Adderall.      \"I think it's a lot better, my daughter said she seems to notice, I still have a tendency to interrupt but that's better, I don't seem to repeat questions, as it was an issue before because I was not really listening. My mind was somewhere else.\"   Denies side effects, patient was surprised she was able to sit still after a few days, now able to complete tasks as less distraction, able to continue with task at " "hand before switching to another task.     Patient will be returning from [place] 10/25/22.     9/16/22:  Patient presents today in office today to further discuss ADHD treatment with a controlled substance and to complete CSA and obtain POC UDS today per policy.     Patient brought in bottle of Hemp CBD oil 3200mg/2 oz, 1-3 drops 3 times daily on bottle, however, patient only takes in the evening for post menopausal symptoms, muscle aches, and vocal cord dysfunction, \"my neck tenses up , had to do physical therapy in past, and oil helps, I found out I am Allergic to mold.\"    9/15/22:  Patient presents today via MyChart Video visit from home, reports received ADHD test results though has not discussed.  Patient continues to have symptoms of inttentiveness, concentration difficulty, difficulty completing tasks, and switching from one uncompleted task to another.     Patient does take 3 drops of CBD oil, hemp based, at night for sleep, relaxes muscles as patient started taking while going through menopause.   Patient reports  is allergic to Wellbutrin and daughter tried Wellbutrin and did not do well, as patient would prefer to try Adderall first rather than a non-stimulant medication.  Patient will be out of town for 2 weeks in October visiting daughter in [place], likely early October.       7/14/22:  INITIAL EVAL  Patient presents today via MyChart Video visit from home with a history of depression and anxiety for which treatment was started in late 1995 with therapy, and medications from [id number] due to divorce.     Patient is currently not taking any psychotropic medications nor has had any since 2005.     \"My issue is I can't concentrate, I start something and cant finish something, forgets things often\".  Patient recalls while in school always \"fidgety\" crossing legs, moving often in seat.  Admits to interrupting people, gets distracted easily, daughter noticing.    Admits to over the past 2 yrs " "began to feel ADHD may be a possibility, \"I am tired of being like this, forgetting stuff, going in and out of the house, tired of interrupting people, it seems to be bothering me more.\"  Patient uses reminders, lists which was started while kids in high school and has continued to have  self add items needed to List,\" I walk out without my list from the fridge, and I have to have him take a picture of it and send it to me\".  Difficulty sitting still when you should be sitting still in [employer] or meetings. Impulsive with shopping tends to purchase items that may be on sale or those that she may not need with the idea of \"I can always return it\".     Symptoms include fidgeting, difficulty remaining seated, easy distractability, blurting out answers prematurely, inability to complete tasks, difficulty sustaining attention, shifting from one uncompleted activity to another, talking excessively, interrupting others, ineffective listening, frequently losing items, and impulsivity.    ADHD:   Elementary school:   Grades:A's and B's  Special classes or failures:no  Got in trouble:no  Referral for ADHD testing:no  Fhx:son, diagnosed officially in 7th grade, took medications for 1-2 yrs, restarted while in college only, \"he probably should be taking something, I have suggested it to him\"  Presently:  Problems with attention to detail:yes  Problems with sustained attention:Yes  Problems listening when spoken to directly:Yes, unable to concentrate on what others are saying, \"I have to get my point across, I know I have to wait for the break, but I can't wait for the break.\"  Failure to finish tasks:yes, \"I will lay my husbandsTshirts on couch intending to hang up and they will be there for 2-3 days\" house hold tasks-dusting, mopping, find need to focus on floor boards  Avoids tasks that require sustained mental effort:yes, \"I excelled at work,  I could multi-task, 5-6 projects at desk, however, would wait until the last " "week to update credentials, I put off stuff and procrastinate all the time\"  Easily distracted:yes  Forgetting things:yes  Losing things:yes  Hard to organize:yes  Talks a lot and cutting people off:yes  Drifts off during conversations:yes, \"I have to concentrate what I need to say to not forget what I have say, then I blurt it out, I am trying really hard, it's even hard with you to not stop and say stuff\"  Difficulty with Reading:yes, \"I used to read a lot, has been using Audio books to listen in car or while out with friends or walking, has to rewind at times because my mind drifts off.\"  Difficulty watching TV/Movies:\"not really, we hardly ever have the TV on anymore.\"       PHQ-9 Depression Screening  PHQ-9 Total Score:   9/5/2023 0    Little interest or pleasure in doing things?     Feeling down, depressed, or hopeless?     Trouble falling or staying asleep, or sleeping too much?     Feeling tired or having little energy?     Poor appetite or overeating?     Feeling bad about yourself - or that you are a failure or have let yourself or your family down?     Trouble concentrating on things, such as reading the newspaper or watching television?     Moving or speaking so slowly that other people could have noticed? Or the opposite - being so fidgety or restless that you have been moving around a lot more than usual?     Thoughts that you would be better off dead, or of hurting yourself in some way?     PHQ-9 Total Score       SUZANNE-7    9/5/2023 0    Past Surgical History:  Past Surgical History:   Procedure Laterality Date    BLADDER SUSPENSION  2008    COLON SURGERY      COLONOSCOPY      ESSURE TUBAL LIGATION  1999    JOINT REPLACEMENT  Jan 2017    Right hip replacement    KIDNEY SURGERY      OTHER SURGICAL HISTORY      metal implant    TUBAL ABDOMINAL LIGATION         Problem List:  Patient Active Problem List   Diagnosis    Primary osteoarthritis of right hip    Status post right hip replacement    " Hypothyroidism (acquired)    Paroxysmal atrial fibrillation    Anxiety    Deep venous thrombosis    Disorder of vocal cord    Edema of lower extremity    Hyperlipidemia    Hyperthyroidism    Onychomycosis    Pain of right hip joint    Laryngitis due to gastroesophageal reflux    Sensation of heaviness in extremities    Varicose veins of lower extremity    Vitamin deficiency       Allergy:   Allergies   Allergen Reactions    Sulfa Antibiotics Rash        Discontinued Medications:  There are no discontinued medications.      Current Medications:   Current Outpatient Medications   Medication Sig Dispense Refill    amphetamine-dextroamphetamine XR (Adderall XR) 15 MG 24 hr capsule Take 1 capsule by mouth Every Morning Indications: Attention Deficit Hyperactivity Disorder 30 capsule 0    azelastine (ASTELIN) 0.1 % nasal spray USE 1 TO 2 SPRAYS IN EACH NOSTRIL TWICE DAILY AS NEEDED FOR RUNNY NOSE OR SNEEZING OR POST NASAL DRIP      CBD (cannabidiol) oral oil Take  by mouth.      cetirizine (zyrTEC) 10 MG tablet Take 1 tablet by mouth Daily.      Cyanocobalamin (Vitamin B 12) 250 MCG lozenge Take 2,500 mcg by mouth.      EPINEPHrine (EPIPEN) 0.3 MG/0.3ML solution auto-injector injection       levothyroxine (SYNTHROID, LEVOTHROID) 25 MCG tablet TAKE 1 TABLET BY MOUTH DAILY 90 tablet 0    Magnesium Oxide (MAGNESIUM EXTRA STRENGTH PO) Take  by mouth.      Milk Thistle 150 MG capsule Take  by mouth.      Misc. Devices (Nasal Spray Bottle) misc       montelukast (SINGULAIR) 10 MG tablet Take 1 tablet by mouth Every Night.      Vitamin D-Vitamin K (K2 Plus D3) 100-1000 MCG-UNIT tablet Take  by mouth Daily. Patient takes drops (not tablets) due to GI upset with tablet       No current facility-administered medications for this visit.       Past Medical History:  Past Medical History:   Diagnosis Date    ADHD (attention deficit hyperactivity disorder) Sep/22    Now on adderrall    Allergic Whole life    Anxiety     Cervical cancer  "screening 2109    Chronic allergic rhinitis     Deep vein thrombosis 2017    Caused after my hip replacement.    Depression     Hyperlipemia 03/15/2017    Hypothyroidism 03/15/2017    Kidney stones     Limb pain     Limb swelling        Past Psychiatric History:  Began Treatment: started in  with therapy due to spouse having an affair  Diagnoses:Depression and Anxiety  Psychiatrist: last seen from [id number]  Therapist: last seen from [id number]  Admission History:Denies  Medication Trials: Prozac--for one year \"felt like i was floating;\" Zoloft for 1 yr -, then started Effexor with divorce in  for1 yr-during time having increased stress with divorce as pt had lost hair and stomach problems and asked to be placed on meds; tolerated well  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  Kids:Yes or If so, how many 2 children and 2 step children  House:Lives in a house   History: Denies  Access to Guns: no    Social History     Socioeconomic History    Marital status:    Tobacco Use    Smoking status: Never    Smokeless tobacco: Never   Vaping Use    Vaping Use: Never used   Substance and Sexual Activity    Alcohol use: Yes     Alcohol/week: 3.0 - 4.0 standard drinks of alcohol     Types: 3 - 4 Glasses of wine per week     Comment: drinks daily, wine, beer and lquor    Drug use: Never     Types: Marijuana     Comment: In teenage years    Sexual activity: Yes     Partners: Male     Birth control/protection: Surgical       Family History:   Suicide Attempts: Denies  Suicide Completions:Denies      Family History   Problem " Relation Age of Onset    Dementia Mother     Diabetes type II Mother     Cancer Mother 70        Kidney cancer (left kidney removed) and endometrial cancer (hysterectomy)    Diabetes Mother         Type 2    Stroke Mother         TIA    Prostate cancer Father     Hearing loss Father         Hearing aids    Other Maternal Grandmother         Colon cancer    Colon cancer Maternal Grandmother 70    Diabetes Paternal Grandmother         Type 2    ADD / ADHD Son        Developmental History:   Born: [place], lived in [place] since age 1  Siblings:1 sister, 1 brother-both younger  Childhood: Denies Abuse  High School:Completed  College: 2 yrs, no degree    Mental Status Exam:   Hygiene:   good  Cooperation:  Cooperative  Eye Contact:  Good  Psychomotor Behavior:  Appropriate  Affect:  Appropriate  Mood: anxious  Speech:  Normal  Thought Process:  Goal directed  Thought Content:  Mood congruent  Suicidal:  None  Homicidal:  None  Hallucinations:  None  Delusion:  None  Memory:  Intact  Orientation:  Grossly intact  Reliability:  good  Insight:  Good  Judgement:  Good  Impulse Control:  Good  Physical/Medical Issues:  Yes Hypothyroidism,OA rt hip,paroxysmal afib, HLD      Review of Systems:  Review of Systems   Constitutional:  Negative for appetite change, diaphoresis and fatigue.   HENT:  Negative for drooling.    Eyes:  Negative for visual disturbance.   Respiratory:  Negative for cough and shortness of breath.    Cardiovascular:  Negative for chest pain, palpitations and leg swelling.   Gastrointestinal:  Negative for nausea and vomiting.   Endocrine: Negative for cold intolerance and heat intolerance.   Genitourinary:  Negative for difficulty urinating.   Musculoskeletal:  Negative for joint swelling.   Allergic/Immunologic: Negative for immunocompromised state.   Neurological:  Negative for dizziness, seizures, speech difficulty and numbness.   Psychiatric/Behavioral:  Negative for decreased concentration, self-injury, sleep  disturbance and suicidal ideas. The patient is nervous/anxious. The patient is not hyperactive.          Physical Exam:  Physical Exam  Psychiatric:         Attention and Perception: Attention and perception normal.         Mood and Affect: Mood and affect normal. Mood is anxious. Affect is tearful.         Speech: Speech normal.         Behavior: Behavior normal. Behavior is cooperative.         Thought Content: Thought content normal. Thought content does not include suicidal ideation. Thought content does not include suicidal plan.         Cognition and Memory: Cognition and memory normal.         Judgment: Judgment normal.      Comments: Tearful due to overwhelmed with tasks of new cancer diagnosed of , and being primary person responsible.         Vital Signs:   There were no vitals taken for this visit.     Lab Results:   Lab on 09/29/2023   Component Date Value Ref Range Status    TSH 09/29/2023 3.300  0.270 - 4.200 uIU/mL Final   Clinical Support on 09/07/2023   Component Date Value Ref Range Status    Amphetamine Screen, Urine 09/07/2023 Positive (A)  Negative Final    Barbiturates Screen, Urine 09/07/2023 Negative  Negative Final    Buprenorphine, Screen, Urine 09/07/2023 Negative  Negative Final    Benzodiazepine Screen, Urine 09/07/2023 Negative  Negative Final    Cocaine Screen, Urine 09/07/2023 Negative  Negative Final    MDMA (ECSTASY) 09/07/2023 Negative  Negative Final    Methamphetamine, Ur 09/07/2023 Negative  Negative Final    Methadone Screen, Urine 09/07/2023 Negative  Negative Final    Opiate Screen 09/07/2023 Negative  Negative Final    Oxycodone Screen, Urine 09/07/2023 Negative  Negative Final    Phencyclidine (PCP), Urine 09/07/2023 Negative  Negative Final    THC, Screen, Urine 09/07/2023 Positive (A)  Negative Final    Lot Number 09/07/2023 [id number]   Final    Expiration Date 09/07/2023 09/18/2024   Final   Orders Only on 09/07/2023   Component Date Value Ref Range Status    THC,  Urine 09/07/2023 Positive (A)   Final    Carboxy THC Conf, MS, UR    10                 ng/mL Cutoff=10        01   Lab on 07/18/2023   Component Date Value Ref Range Status    Hemoglobin A1C 07/18/2023 5.30  4.80 - 5.60 % Final   Lab on 07/13/2023   Component Date Value Ref Range Status    Glucose 07/13/2023 119 (H)  65 - 99 mg/dL Final    BUN 07/13/2023 12  8 - 23 mg/dL Final    Creatinine 07/13/2023 0.91  0.57 - 1.00 mg/dL Final    Sodium 07/13/2023 140  136 - 145 mmol/L Final    Potassium 07/13/2023 4.2  3.5 - 5.2 mmol/L Final    Chloride 07/13/2023 103  98 - 107 mmol/L Final    CO2 07/13/2023 24.7  22.0 - 29.0 mmol/L Final    Calcium 07/13/2023 9.9  8.6 - 10.5 mg/dL Final    Total Protein 07/13/2023 6.8  6.0 - 8.5 g/dL Final    Albumin 07/13/2023 4.4  3.5 - 5.2 g/dL Final    ALT (SGPT) 07/13/2023 21  1 - 33 U/L Final    AST (SGOT) 07/13/2023 24  1 - 32 U/L Final    Alkaline Phosphatase 07/13/2023 70  39 - 117 U/L Final    Total Bilirubin 07/13/2023 0.8  0.0 - 1.2 mg/dL Final    Globulin 07/13/2023 2.4  gm/dL Final    A/G Ratio 07/13/2023 1.8  g/dL Final    BUN/Creatinine Ratio 07/13/2023 13.2  7.0 - 25.0 Final    Anion Gap 07/13/2023 12.3  5.0 - 15.0 mmol/L Final    eGFR 07/13/2023 72.4  >60.0 mL/min/1.73 Final    Total Cholesterol 07/13/2023 211 (H)  0 - 200 mg/dL Final    Triglycerides 07/13/2023 107  0 - 150 mg/dL Final    HDL Cholesterol 07/13/2023 65 (H)  40 - 60 mg/dL Final    LDL Cholesterol  07/13/2023 127 (H)  0 - 100 mg/dL Final    VLDL Cholesterol 07/13/2023 19  5 - 40 mg/dL Final    LDL/HDL Ratio 07/13/2023 1.92   Final    TSH 07/13/2023 6.280 (H)  0.270 - 4.200 uIU/mL Final    25 Hydroxy, Vitamin D 07/13/2023 92.3  30.0 - 100.0 ng/ml Final    WBC 07/13/2023 6.16  3.40 - 10.80 10*3/mm3 Final    RBC 07/13/2023 4.61  3.77 - 5.28 10*6/mm3 Final    Hemoglobin 07/13/2023 14.3  12.0 - 15.9 g/dL Final    Hematocrit 07/13/2023 42.2  34.0 - 46.6 % Final    MCV 07/13/2023 91.5  79.0 - 97.0 fL Final    MCH  07/13/2023 31.0  26.6 - 33.0 pg Final    MCHC 07/13/2023 33.9  31.5 - 35.7 g/dL Final    RDW 07/13/2023 12.0 (L)  12.3 - 15.4 % Final    RDW-SD 07/13/2023 39.7  37.0 - 54.0 fl Final    MPV 07/13/2023 9.9  6.0 - 12.0 fL Final    Platelets 07/13/2023 240  140 - 450 10*3/mm3 Final    Neutrophil % 07/13/2023 65.4  42.7 - 76.0 % Final    Lymphocyte % 07/13/2023 22.9  19.6 - 45.3 % Final    Monocyte % 07/13/2023 8.0  5.0 - 12.0 % Final    Eosinophil % 07/13/2023 2.4  0.3 - 6.2 % Final    Basophil % 07/13/2023 1.0  0.0 - 1.5 % Final    Immature Grans % 07/13/2023 0.3  0.0 - 0.5 % Final    Neutrophils, Absolute 07/13/2023 4.03  1.70 - 7.00 10*3/mm3 Final    Lymphocytes, Absolute 07/13/2023 1.41  0.70 - 3.10 10*3/mm3 Final    Monocytes, Absolute 07/13/2023 0.49  0.10 - 0.90 10*3/mm3 Final    Eosinophils, Absolute 07/13/2023 0.15  0.00 - 0.40 10*3/mm3 Final    Basophils, Absolute 07/13/2023 0.06  0.00 - 0.20 10*3/mm3 Final    Immature Grans, Absolute 07/13/2023 0.02  0.00 - 0.05 10*3/mm3 Final    nRBC 07/13/2023 0.0  0.0 - 0.2 /100 WBC Final   Office Visit on 07/11/2023   Component Date Value Ref Range Status    Color 07/11/2023 Yellow  Yellow, Straw, Dark Yellow, Mercedes Final    Clarity, UA 07/11/2023 Clear  Clear Final    Specific Gravity  07/11/2023 1.030  1.005 - 1.030 Final    pH, Urine 07/11/2023 6.0  5.0 - 8.0 Final    Leukocytes 07/11/2023 Trace (A)  Negative Final    Nitrite, UA 07/11/2023 Negative  Negative Final    Protein, POC 07/11/2023 Negative  Negative mg/dL Final    Glucose, UA 07/11/2023 Negative  Negative mg/dL Final    Ketones, UA 07/11/2023 Negative  Negative Final    Urobilinogen, UA 07/11/2023 Normal  Normal, 0.2 E.U./dL Final    Bilirubin 07/11/2023 Negative  Negative Final    Blood, UA 07/11/2023 Negative  Negative Final    Lot Number 07/11/2023 [id number]   Final    Expiration Date 07/11/2023 4/30/24   Final    Diagnosis 07/11/2023 Comment   Final    NEGATIVE FOR INTRAEPITHELIAL LESION OR MALIGNANCY.     Specimen adequacy: 07/11/2023 Comment   Final    Satisfactory for evaluation.  Endocervical and/or squamous metaplastic  cells (endocervical component) are present.    Clinician Provided ICD-10: 07/11/2023 Comment   Final    Z01.419  Z12.4    Performed by: 07/11/2023 Comment   Final    Andre Costello, Cytotechnologist (ASCP)    . 07/11/2023 .   Final    Note: 07/11/2023 Comment   Final    The Pap smear is a screening test designed to aid in the detection of  premalignant and malignant conditions of the uterine cervix.  It is not a  diagnostic procedure and should not be used as the sole means of detecting  cervical cancer.  Both false-positive and false-negative reports do occur.    Method: 07/11/2023 Comment   Final    This liquid based ThinPrep(R) pap test was screened with the  use of an image guided system.    Conv .conv 07/11/2023 Comment   Final    The HPV DNA reflex criteria were not met with this specimen result  therefore, no HPV testing was performed.       EKG Results:  No orders to display       Imaging Results:  No Images in the past 120 days found..      Assessment & Plan   Diagnoses and all orders for this visit:    1. ADHD (attention deficit hyperactivity disorder), inattentive type (Primary)    2. Stress due to illness of family member          Visit Diagnoses:    ICD-10-CM ICD-9-CM   1. ADHD (attention deficit hyperactivity disorder), inattentive type  F90.0 314.00   2. Stress due to illness of family member  Z63.79 V61.49         PLAN:  1.   Safety: No acute safety concerns  Therapy: None  Risk Assessment: Risk of self-harm acutely is low.  Risk factors include anxiety disorder, mood disorder, and recent psychosocial stressors (pandemic). Protective factors include no family history, denies access to guns/weapons, no present SI, no history of suicide attempts or self-harm in the past, minimal AODA, healthcare seeking, future orientation, willingness to engage in care.  Risk of self-harm  chronically is also low, but could be further elevated in the event of treatment noncompliance and/or AODA.  Meds:  Continue Adderall XR 15 mg by mouth daily in the morning to target symptoms of ADHD including:  Inattentiveness & impaired concentration.  Risks, benefits, side effects discussed with patient including elevated heart rate, elevated blood pressure, irritability, insomnia, sexual dysfunction, appetite suppressing properties, psychosis.  After discussion of these risks and benefits, the patient voiced understanding and agreed to proceed. Cali reviewed, LAST DISPENSED 11/17/23 #30/30 DAYS, Informed patient order would be sent to Dr. Harkins in separate entry for signature due to EMR system, and will not see as refilled on AVS today. First fill date for Friday 12/15/23 as pharmacy is not open on weekends. [facility].    Controlled substance documentation: Cali reviewed; prior urine drug screen consistent obtained 9/7/23;; consent is up to date, signed, witnessed and in EHR, dated 9/7/23, which will be updated annually per policy. Patient is aware of risk of addiction on this medication, understands need to follow up for a review every 3 months and medications will be adjusted or decreased as deemed appropriate at each visit.  No history of drug or alcohol abuse.  No concerns about diversion or abuse. Patient denies side effects related to the medication.  Patient is aware of random urine drug screens and pill counts. The dosing of this medication will be reviewed on a regular basis and reduced if possible. Ongoing use of a controlled substance is necessary for this patient to have a normal quality of life.    Labs: n/a    Symptoms of ADHD are under good control with current medication regimen. However, due to new cancer diagnosis with spouse, holidays, increased care giving tasks, as spouse is unable to help with simple tasks.  Patient wishes to remain on current  dose of Adderall as  current stressors are situational which are negatively impacting day to day activities, however, patient does not want to depend on a medication.  Will re-evaluate in 7 weeks.   Patient was given instructions and counseling regarding condition and for health maintenance advice. Please see specific information pulled into the AVS if appropriate.    Patient to contact provider if symptoms worsen or fail to improve.        9/7/23:   Continue Adderall XR 15 mg by mouth daily in the morning to target symptoms of ADHD including:  Inattentiveness & impaired concentration.  LAST DISPENSED 8/7/23 #30/30 DAYS  Informed patient order would be sent to Dr. Harkins in separate entry for signature due to EMR system, and will not see as refilled on AVS today.  Due to positive UDS, will send a prescription to Dr. Harkins today for a 7  day supply, patient has adequate supply through Monday 9/11/23. Instructed patient to contact provider on Monday 9/18/23 to request refill.     Controlled substance documentation: Cali reviewed; prior urine drug screen consistent obtained 9/16/22, ordered today and results discussed with patient ; consent is up to date, signed, witnessed and in EHR, dated today 9/7/23, which will be updated annually per policy. Patient is aware of risk of addiction on this medication, understands need to follow up for a review every 3 months and medications will be adjusted or decreased as deemed appropriate at each visit.  No history of drug or alcohol abuse.  No concerns about diversion or abuse. Patient denies side effects related to the medication.  Patient is aware of random urine drug screens and pill counts. The dosing of this medication will be reviewed on a regular basis and reduced if possible. Ongoing use of a controlled substance is necessary for this patient to have a normal quality of life.    Labs: POC UDS today in clinic resulted as +THC, which patient reported continues to take CBD oil in the evening  for muscle relaxation to help with sleep, takes 3 drops. Obtains from [place] from a retired .     Symptoms of  ADHD are under good control with current medication regimen.  Reminded patient to practice mindfulness and behavioral modifications to help with shifting from one uncompleted activity to another, to continue with alarms, reminders. Patient informed that CBD oil is not regulated by the FDA and each bottle could contain various amounts of THC, however, if confirmation deems positive for THC, patient has been advised to stop CBD oil.     Patient will be traveling to [place] 11/2-11/16/23 and concerned with ability to obtain medication timely, instructed patient to remind provider with refill in Oct or early Nov. So the supply can be extended to cover days of travel.   Patient was given instructions and counseling regarding condition and for health maintenance advice. Please see specific information pulled into the AVS if appropriate.    Patient to contact provider if symptoms worsen or fail to improve.        7/5/23:   Continue Adderall XR 15 mg by mouth daily in the morning to target symptoms of ADHD including:  Inattentiveness & impaired concentration.  Risks, benefits, side effects discussed with patient including elevated heart rate, elevated blood pressure, irritability, insomnia, sexual dysfunction, appetite suppressing properties, psychosis.  After discussion of these risks and benefits, the patient voiced understanding and agreed to proceed. Cali reviewed, LAST DISPENSED 6/8/23 #30/30 DAYS  Informed patient order would be sent to Dr. Harkins in separate entry for signature due to EMR system, and will not see as refilled on AVS today.  First available fill date of 7/7/23.     Symptoms of ADHD are under good control with current medication regimen.  Informed patient next visit will be scheduled for in the office for annual CSA and UDS.  Scheduled for Thurs 9/7/23 at 10 am, instructed patient to arrive  by 945 am to complete UDS prior to start of visit to allow ample time for results to show by end of visit.   Patient was given instructions and counseling regarding condition and for health maintenance advice. Please see specific information pulled into the AVS if appropriate.    Patient to contact provider if symptoms worsen or fail to improve.       4/4/23:  Continue Adderall XR 15 mg by mouth daily in the morning to target symptoms of ADHD including:  Inattentiveness & impaired concentration.  Risks, benefits, side effects discussed with patient including elevated heart rate, elevated blood pressure, irritability, insomnia, sexual dysfunction, appetite suppressing properties, psychosis.  After discussion of these risks and benefits, the patient voiced understanding and agreed to proceed. Cali reviewed, LAST DISPENSED 3/15/23 #30/30 DAYS  Patient to request refill when appropriate. Patient has 9 pills remaining after taking this morning dose, instructed patient to [facility] to [facility] on Monday 4/10/23, and provider will send refill in on Tues. 4/11/23 with first allowed fill date of Thurs 4/13/23.     Controlled substance documentation: Cali reviewed; prior urine drug screen consistent obtained 9/16/22; consent is up to date, signed, witnessed and in EHR, dated 9/16/22, which will be updated annually per policy. Patient is aware of risk of addiction on this medication, understands need to follow up for a review every 3 months and medications will be adjusted or decreased as deemed appropriate at each visit.  No history of drug or alcohol abuse.  No concerns about diversion or abuse. Patient denies side effects related to the medication.  Patient is aware of random urine drug screens and pill counts. The dosing of this medication will be reviewed on a regular basis and reduced if possible..  Ongoing use of a controlled substance is necessary for this patient to have a normal quality of  life.  Symptoms of ADHD are under good control with Adderall XR 15 mg daily. Patient to contact provider if symptoms worsen or fail to improve.       3/10/23:  TELEPHONE  Patient called to check in re:her medication (per Adelaida's request)  Patient says she  Has 5 days worth of medication left.  Please send refill when appropriate and please advise      2/10/23:  TELPHONE  Patient called to report that the [facility] [place] and [facility] are now out of the Adderall XR and she says she did call the [facility] and [place] (I did change in this request.).  Please resubmit.  Med not pended      2/8/23:  Continue Adderall XR 15 mg by mouth daily in the morning to target symptoms of ADHD including:  Inattentiveness & impaired concentration.  Risks, benefits, side effects discussed with patient including elevated heart rate, elevated blood pressure, irritability, insomnia, sexual dysfunction, appetite suppressing properties, psychosis.  After discussion of these risks and benefits, the patient voiced understanding and agreed to proceed. Cali reviewed, LAST DISPENSED 1/11/23 #30/30 DAYS  Informed patient order would be sent to Dr. Harkins in separate entry for signature due to EMR system, and will not see as refilled on AVS today.    Symptoms of ADHD are under good control with Adderall XR 15 mg daily. Instructed to request refill via pharmacy when appropriate.  Will coordinate with staff to ensure refill requests are sent to this provider.  Due to pandemic state of emergency  ending 5/11/23, discussed upcoming travel as patient is aware telehealth cannot be provided across state lines nor can prescription of controlled substances, patient has plans to go to [place] for 2 weeks in November 2023 at this time, and will update provider for any other travels to ensure patient has adequate supply of medication.  Patient to contact provider if symptoms worsen or fail to improve.       1/4/23:  Continue Adderall XR 15 mg by  mouth daily in the morning to target symptoms of ADHD including:  Inattentiveness & impaired concentration.  Risks, benefits, side effects discussed with patient including elevated heart rate, elevated blood pressure, irritability, insomnia, sexual dysfunction, appetite suppressing properties, psychosis.  After discussion of these risks and benefits, the patient voiced understanding and agreed to proceed. Cali reviewed, LAST DISPENSED 12/12/22 #30/30 DAYS  Patient instructed to call [facility] Monday 1/9/23 to determine medication availability due to nationwide shortage of Adderall, and to then contact provider MA directly to inform as patient will have to transfer all medications to [facility] if continued to use that pharmacy.  Patient also instructed to inquire with pharmacy of earliest dispense date as most pharmacies are 24-48 hrs for controlled substances.   Symptoms of ADHD are under good control with Adderall XR 15 mg, denies side effects, though has noted decreased appetite with weight loss, which patient has also increased daily activity, exercising, and has been under some increased stress with parents care.  Will continue to check in with patient regarding weight loss.  Lengthy discussion with patient today regarding telehealth services as patient and provider must both be located in the same state of [place], and would not be able to provide telehealth services while visiting daughter in [place].  Patient verbalized understanding.  Patient to contact provider if symptoms worsen or fail to improve.         11/22/22:  Continue Adderall XR 10 mg by mouth daily in the morning to target symptoms of ADHD including:  Inattentiveness & impaired concentration.  Risks, benefits, side effects discussed with patient including elevated heart rate, elevated blood pressure, irritability, insomnia, sexual dysfunction, appetite suppressing properties, psychosis.  After discussion of these risks and benefits, the patient  "voiced understanding and agreed to proceed. Cali reviewed, LAST DISPENSED 10/28/22 #42/42 DAYS, reported #20 remain in bottle.    Will send in order for Adderall XR 5 mg by mouth daily in the morning for 20 days (through 12/12/22), patient instructed to add the 5 mg dose to the 10 mg to equal 15 mg total daily in the morning.  Patient instructed to contact provider in office when down to 3-5 days remaining of supply. Call 12/8/22.  Informed patient order would be sent to Dr. Harkins in separate entry for signature due to EMR system, and will not see as refilled on AVS today.    Controlled substance documentation: Cali reviewed; prior urine drug screen consistent obtained 9/16/22; consent is up to date, signed, witnessed and in EHR, dated 9/16/22, which will be updated annually per policy. Patient is aware of risk of addiction on this medication, understands need to follow up for a review every 3 months and medications will be adjusted or decreased as deemed appropriate at each visit.  No history of drug or alcohol abuse.  No concerns about diversion or abuse. Patient denies side effects related to the medication.  Patient is aware of random urine drug screens and pill counts. The dosing of this medication will be reviewed on a regular basis and reduced if possible..  Ongoing use of a controlled substance is necessary for this patient to have a normal quality of life.    ADHD symptoms are under fair control with Adderall XR 10 mg, will increase to 15 mg.   Patient to contact provider if symptoms worsen or fail to improve.       10/31/22:  TELEPHONE  Patient called and VM that the [facility] in [place] did fill the Rx for her Adderall XR 10mg.  Patient just wanted to let Adelaida know.  Patient said she was \"happy about it\"   Prescription was verified as dispensed with pharmacy in [place].     10/28/22:  TELEPHONE  Please determine when she will return to [place], as she was supposed to return 10/25/22 and was " "originally given adequate supply through 10/28/22.    Called and spoke with patient, she states that she will be back on Wednesday Nov.2,2022 and will take her last pill tomorrow.  Patient does say that the [facility] in [place] has now reopened but they say they are very backed up and someone even told her that they will probably not fill it because Dr. Harkins is not licensed in [place].  Patient says she will wait to see if they do fill it over the weekend and she will call me back on Monday and let me know.  If they don't fill it she will just have it sent to the [facility] in [place], [place].   I will forward message to  so he is updated on status, Since she will be returning next Wed. 11/2/22, a new order will not be sent into local pharmacy until confirmed dispense from [place] pharmacy, as [place] was not an option to add with CALI report attempted today.  We will have to contact the pharmacy to verify dispense, and patient needs to be reminded to not wait until down to 1 pill remaining to request refill or problem with refill, as this refill was sent in per patient request Monday 10/24/22, and this information was relayed to office today.   Patient called and LMVM that the [facility] in [place] did fill the Rx for her Adderall XR 10mg.  Patient just wanted to let Adelaida know.  Patient said she was \"happy about it\"       10/20/22:   Continue Adderall XR 10 mg by mouth daily in the morning to target symptoms of ADHD including:  Inattentiveness & impaired concentration.  Risks, benefits, side effects discussed with patient including elevated heart rate, elevated blood pressure, irritability, insomnia, sexual dysfunction, appetite suppressing properties, psychosis.  After discussion of these risks and benefits, the patient voiced understanding and agreed to proceed. Cali reviewed, LAST DISPENSED 9/16/22 #42/42 DAYS  Patient instructed to request refill 10/25/22 upon return from [place]. Explained " process for refills.   Controlled substance documentation: Cali reviewed; prior urine drug screen consistent obtained 9/16/22; consent is up to date, signed, witnessed and in EHR, dated 9/16/22, which will be updated annually per policy. Patient is aware of risk of addiction on this medication, understands need to follow up for a review every 3 months and medications will be adjusted or decreased as deemed appropriate at each visit.  No history of drug or alcohol abuse.  No concerns about diversion or abuse. Patient denies side effects related to the medication.  Patient is aware of random urine drug screens and pill counts. The dosing of this medication will be reviewed on a regular basis and reduced if possible..  Ongoing use of a controlled substance is necessary for this patient to have a normal quality of life.  Symptoms of ADHD are managed well with current dose of Adderall XR 10 mg without side effects.      9/16/22:   Declines therapy  Will potentially start Adderall XR 10 mg by mouth daily in the morning to target symptoms of ADHD including:  Inattentiveness & impaired concentration.  Risks, benefits, side effects discussed with patient including elevated heart rate, elevated blood pressure, irritability, insomnia, sexual dysfunction, appetite suppressing properties, psychosis.  After discussion of these risks and benefits, the patient voiced understanding and agreed to proceed. [place] reviewed, UDS ordered, and controlled substance agreement signed & witnessed. Patient informed this medication would not be ordered until UDS resulted and was negative, at that time a Prescription will be sent to  for signature.  Labs: POC UDS today in clinic    Lengthy discussion held with patient regarding CSA and medication education.   Bottle of Hemp oil had a QRS code to scan, which was done per patient request, which indicated percentages of ingredients as each bottle varies.  Certificate of Analysis, Ginger  Labs, Cannabinoid Results: Total THC 0.231%, Total CBD 6.101%, Total Cannabinoids 6.534%. Will scan certificate to EHR.   Patient will be in [place] in October, will check with  in regards to prescribing CS sending to [place].   Will contact patient if UDS needs to be sent for confirmation. Otherwise will proceed with ordering Adderall XR.         9/15/22:   ADHD testing completed by Dr. Greg Bennett on 9/1/22 confirming a diagnosis of ADHD. (total time of review 9 mins)    Discussed ADHD treatment options of non-stimulants vs stimulant medications, after discussion patient wishes to proceed with Adderall.  Informed patient of policy requirements prior to starting Adderall, patient will plan on coming in tomorrow due to available appointment at 8 am.  Discussed current CBD oil which patient reports is hemp based as the UDS may show positive for THC. Patient takes CBD oil for sleep and muscle aches, which has been effective.  IF UDS positive patient was informed Adderall would not be prescribed, and will have to send for a confirmation.  Patient verbalized understanding.       7/14/22:   No Medications, Declines therapy at this time.  Referral for ADHD testing with   Dr. Greg Bennett, Psychologist, MS, LPP  [address], [address]  [place] [id number] [phone]   Currently only accepting referrals for psychological testing services.  Patient to contact provider and set up appointment.  And to contact office if unable to get an appointment scheduled.   -Attached list of several other sites for ADHD testing. Patient instructed to contact providers on list to determine availability of appointments, instructed patient to take notes while calling places indicating first available appointment, and to ask to be placed on waiting list.  If an office is chosen and requests the referral order please contact my Medical Assistant, Hanh, directly at [phone] informing of chosen office and the  information will be sent.    Patient with high suspicion of having ADHD, will send for formal testing and have patient return in 2 weeks to discuss medication options as if patient is able to be tested in the next 1-2 months, may wait on starting a non-stimulant medication.     Patient screened positive for depression based on a PHQ-9 score of 0 on 9/5/2023. Follow-up recommendations include: Suicide Risk Assessment performed.    TREATMENT PLAN/GOALS: Continue supportive psychotherapy efforts and medications as indicated. Treatment and medication options discussed during today's visit. Patient ackowledged and verbally consented to continue with current treatment plan and was educated on the importance of compliance with treatment and follow-up appointments.    MEDICATION ISSUES:  MARLYS reviewed as expected.    Discussed medication options and treatment plan of prescribed medication as well as the risks, benefits, and side effects including potential falls, possible impaired driving and metabolic adversities among others. Patient is agreeable to call the office with any worsening of symptoms or onset of side effects. Patient is agreeable to call 911 or go to the nearest ER should he/she begin having SI/HI. No medication side effects or related complaints today.     MEDS ORDERED DURING VISIT:  No orders of the defined types were placed in this encounter.      Return in about 7 weeks (around 1/25/2024) for Video visit, medication check.         I spent 34 minutes caring for Jenn on this date of service. This time includes time spent by me in the following activities: preparing for the visit, reviewing tests, performing a medically appropriate examination and/or evaluation, counseling and educating the patient/family/caregiver, referring and communicating with other health care professionals, documenting information in the medical record, care coordination, and scheduling    This document has been electronically  signed by CHELSI Burnett  December 7, 2023 08:29 EST      Part of this note may be an electronic transcription/translation of spoken language to printed text using the Dragon Dictation System.your visit?: Other

## 2023-12-07 NOTE — PATIENT INSTRUCTIONS
"1. Should you want to get in touch with your provider, CHELSI Burnett, please contact MY Medical Assistant, Hanh, directly at [phone].  Recommend saving Hanh's direct number in phone as this is the PREFERRED & EASIEST way to get in contact with your provider.  Please leave a voice mail if you do not get an answer and she will return your call within 24 hrs. You will NOT be able to contact provider on [employer], as Behavioral Health Providers are restricted. YOU MUST CALL [phone]  If you need to speak with the on call provider after hours or on weekends, please Contact the [facility] ([phone]) and staff will be able to page the provider on call directly.     2.  In the event you need to cancel an appointment, please notify the office at least 24 hrs prior:   Contact **Hanh Medical Assistant at [facility] directly at [phone] or the [facility] ([phone])     3. MEDICATION REFILLS:  PLEASE CALL THE PHARMACY TO REQUEST ALL MEDICATION REFILLS or via [employer] TO ENSURE YOU ARE RECEIVING YOUR MEDICATIONS IN A TIMELY MANNER. The pharmacy or [employer] merced will send this request ELECTRONICALLY to the ordering provider.   IF YOU USE AN AUTOMATED SERVICE AT THE PHARMACY FOR REFILLS AND ARE TOLD THERE ARE \"NO REFILLS REMAINING\"   PLEASE CALL THE PHARMACY & SPEAK TO A LIVE PERSON TO VERIFY IT IS THE MOST UP TO DATE PRESCRIPTION ON FILE.    All new prescriptions will have a different number, therefore, if you were given refills for a medication today or at last visit it will not have the same number as the previous prescription.     4.  In the event you have personal crisis, contact the following crisis numbers: Suicide Prevention Hotline 1-308.535.4274 or *988, ISHMAEL Helpline 7-721-010-NBCY; [facility] Emergency Room [phone]; text HELLO to 698060; or 911.  If you feel like harming yourself or others, call 911 right away.  You can call the 982 Suicide and Crisis " "Lifeline at  988   to speak with a counselor at the [facility], or you can connect with one using their online chat  .    5.  Never stop an antidepressant medicine without first talking to a healthcare provider. Suddenly stopping this type of medication can cause withdrawal symptoms.    6.  Counseling and talk therapy  Counseling or therapy teaches you new coping skills and more adaptive ways of thinking about problems. These tools can help you make positive changes. The benefits of counseling often last long after treatment sessions have stopped.    7.   We would appreciate your feedback, please scan the QRS code on the back of your appointment card (or see below) and complete a brief survey.  [place] location is still not available, so please click \"[place]\" location.  Thank you      SPECIFIC RECOMMENDATIONS:     1.      Medications discussed at this encounter:                   -  Informed patient order would be sent to Dr. Harkins in separate entry for signature due to EMR system, and will not see as refilled on AVS today. First fill date for Friday 12/15/23 as pharmacy is not open on weekends.     2.      Psychotherapy recommendations:  Declined     3.     Return to clinic: 7 weeks, Wed. 1/24/24 at 8 am via video    Please arrive at least 15 minutes before your scheduled appointment time to complete check in process.      IF you are scheduled for a [employer] VIDEO visit, YOU MUST COMPLETE THE \"E-CHECK IN\" PROCESS PRIOR TO BEGINNING THE VISIT, YOU WILL NO LONGER RECEIVE A PHONE CALL PRIOR TO ALL VIDEO VISITS; You may still complete the E-Check in for in office visits prior to appointment, you will receive multiple text/email reminders which will direct you further if needed.           "

## 2023-12-12 DIAGNOSIS — E03.9 ACQUIRED HYPOTHYROIDISM: ICD-10-CM

## 2023-12-12 RX ORDER — LEVOTHYROXINE SODIUM 0.03 MG/1
25 TABLET ORAL DAILY
Qty: 30 TABLET | Refills: 0 | Status: SHIPPED | OUTPATIENT
Start: 2023-12-12

## 2023-12-12 RX ORDER — LEVOTHYROXINE SODIUM 0.03 MG/1
25 TABLET ORAL DAILY
Qty: 90 TABLET | OUTPATIENT
Start: 2023-12-12

## 2024-01-11 DIAGNOSIS — E03.9 ACQUIRED HYPOTHYROIDISM: ICD-10-CM

## 2024-01-11 RX ORDER — LEVOTHYROXINE SODIUM 0.03 MG/1
25 TABLET ORAL DAILY
Qty: 30 TABLET | Refills: 0 | Status: SHIPPED | OUTPATIENT
Start: 2024-01-11

## 2024-01-11 RX ORDER — LEVOTHYROXINE SODIUM 0.03 MG/1
25 TABLET ORAL DAILY
Qty: 90 TABLET | Refills: 0 | OUTPATIENT
Start: 2024-01-11

## 2024-01-12 DIAGNOSIS — F90.0 ADHD (ATTENTION DEFICIT HYPERACTIVITY DISORDER), INATTENTIVE TYPE: ICD-10-CM

## 2024-01-12 RX ORDER — DEXTROAMPHETAMINE SACCHARATE, AMPHETAMINE ASPARTATE MONOHYDRATE, DEXTROAMPHETAMINE SULFATE AND AMPHETAMINE SULFATE 3.75; 3.75; 3.75; 3.75 MG/1; MG/1; MG/1; MG/1
15 CAPSULE, EXTENDED RELEASE ORAL EVERY MORNING
Qty: 30 CAPSULE | Refills: 0 | Status: SHIPPED | OUTPATIENT
Start: 2024-01-13

## 2024-01-12 NOTE — TELEPHONE ENCOUNTER
Med Refill Request. Patient has appt scheduled for 01/24/2024.  Med pended last refill 12/15/2023.  Please review

## 2024-01-22 ENCOUNTER — OFFICE VISIT (OUTPATIENT)
Dept: FAMILY MEDICINE CLINIC | Facility: CLINIC | Age: 59
End: 2024-01-22
Payer: COMMERCIAL

## 2024-01-22 ENCOUNTER — LAB (OUTPATIENT)
Dept: LAB | Facility: HOSPITAL | Age: 59
End: 2024-01-22
Payer: COMMERCIAL

## 2024-01-22 VITALS
OXYGEN SATURATION: 98 % | SYSTOLIC BLOOD PRESSURE: 117 MMHG | HEIGHT: 67 IN | HEART RATE: 62 BPM | DIASTOLIC BLOOD PRESSURE: 54 MMHG | WEIGHT: 169 LBS | BODY MASS INDEX: 26.53 KG/M2

## 2024-01-22 DIAGNOSIS — Z00.00 ANNUAL PHYSICAL EXAM: Primary | ICD-10-CM

## 2024-01-22 DIAGNOSIS — E53.8 B12 DEFICIENCY: ICD-10-CM

## 2024-01-22 DIAGNOSIS — Z13.220 SCREENING FOR CHOLESTEROL LEVEL: ICD-10-CM

## 2024-01-22 DIAGNOSIS — E03.9 HYPOTHYROIDISM, UNSPECIFIED TYPE: ICD-10-CM

## 2024-01-22 DIAGNOSIS — E55.9 VITAMIN D DEFICIENCY: ICD-10-CM

## 2024-01-22 DIAGNOSIS — F98.8 ATTENTION DEFICIT DISORDER, UNSPECIFIED HYPERACTIVITY PRESENCE: ICD-10-CM

## 2024-01-22 DIAGNOSIS — Z00.00 ANNUAL PHYSICAL EXAM: ICD-10-CM

## 2024-01-22 DIAGNOSIS — L98.9 SKIN LESION: ICD-10-CM

## 2024-01-22 DIAGNOSIS — J30.9 ALLERGIC RHINITIS, UNSPECIFIED SEASONALITY, UNSPECIFIED TRIGGER: ICD-10-CM

## 2024-01-22 LAB
25(OH)D3 SERPL-MCNC: 80.1 NG/ML (ref 30–100)
ALBUMIN SERPL-MCNC: 4.3 G/DL (ref 3.5–5.2)
ALBUMIN/GLOB SERPL: 1.7 G/DL
ALP SERPL-CCNC: 63 U/L (ref 39–117)
ALT SERPL W P-5'-P-CCNC: 24 U/L (ref 1–33)
ANION GAP SERPL CALCULATED.3IONS-SCNC: 10 MMOL/L (ref 5–15)
AST SERPL-CCNC: 25 U/L (ref 1–32)
BASOPHILS # BLD AUTO: 0.06 10*3/MM3 (ref 0–0.2)
BASOPHILS NFR BLD AUTO: 1 % (ref 0–1.5)
BILIRUB SERPL-MCNC: 0.6 MG/DL (ref 0–1.2)
BUN SERPL-MCNC: 15 MG/DL (ref 8–23)
BUN/CREAT SERPL: 18.1 (ref 7–25)
CALCIUM SPEC-SCNC: 9.3 MG/DL (ref 8.6–10.5)
CHLORIDE SERPL-SCNC: 104 MMOL/L (ref 98–107)
CHOLEST SERPL-MCNC: 202 MG/DL (ref 0–200)
CO2 SERPL-SCNC: 28 MMOL/L (ref 22–29)
CREAT SERPL-MCNC: 0.83 MG/DL (ref 0.57–1)
DEPRECATED RDW RBC AUTO: 38.8 FL (ref 37–54)
EGFRCR SERPLBLD CKD-EPI 2021: 80.8 ML/MIN/1.73
EOSINOPHIL # BLD AUTO: 0.25 10*3/MM3 (ref 0–0.4)
EOSINOPHIL NFR BLD AUTO: 4.3 % (ref 0.3–6.2)
ERYTHROCYTE [DISTWIDTH] IN BLOOD BY AUTOMATED COUNT: 11.7 % (ref 12.3–15.4)
FOLATE SERPL-MCNC: 5.97 NG/ML (ref 4.78–24.2)
GLOBULIN UR ELPH-MCNC: 2.5 GM/DL
GLUCOSE SERPL-MCNC: 102 MG/DL (ref 65–99)
HCT VFR BLD AUTO: 42.1 % (ref 34–46.6)
HDLC SERPL-MCNC: 70 MG/DL (ref 40–60)
HGB BLD-MCNC: 13.9 G/DL (ref 12–15.9)
IMM GRANULOCYTES # BLD AUTO: 0.01 10*3/MM3 (ref 0–0.05)
IMM GRANULOCYTES NFR BLD AUTO: 0.2 % (ref 0–0.5)
LDLC SERPL CALC-MCNC: 123 MG/DL (ref 0–100)
LDLC/HDLC SERPL: 1.74 {RATIO}
LYMPHOCYTES # BLD AUTO: 1.44 10*3/MM3 (ref 0.7–3.1)
LYMPHOCYTES NFR BLD AUTO: 24.7 % (ref 19.6–45.3)
MCH RBC QN AUTO: 30 PG (ref 26.6–33)
MCHC RBC AUTO-ENTMCNC: 33 G/DL (ref 31.5–35.7)
MCV RBC AUTO: 90.7 FL (ref 79–97)
MONOCYTES # BLD AUTO: 0.5 10*3/MM3 (ref 0.1–0.9)
MONOCYTES NFR BLD AUTO: 8.6 % (ref 5–12)
NEUTROPHILS NFR BLD AUTO: 3.58 10*3/MM3 (ref 1.7–7)
NEUTROPHILS NFR BLD AUTO: 61.2 % (ref 42.7–76)
NRBC BLD AUTO-RTO: 0 /100 WBC (ref 0–0.2)
PLATELET # BLD AUTO: 267 10*3/MM3 (ref 140–450)
PMV BLD AUTO: 9.9 FL (ref 6–12)
POTASSIUM SERPL-SCNC: 4.3 MMOL/L (ref 3.5–5.2)
PROT SERPL-MCNC: 6.8 G/DL (ref 6–8.5)
RBC # BLD AUTO: 4.64 10*6/MM3 (ref 3.77–5.28)
SODIUM SERPL-SCNC: 142 MMOL/L (ref 136–145)
TRIGL SERPL-MCNC: 50 MG/DL (ref 0–150)
TSH SERPL DL<=0.05 MIU/L-ACNC: 4.49 UIU/ML (ref 0.27–4.2)
VIT B12 BLD-MCNC: 1337 PG/ML (ref 211–946)
VLDLC SERPL-MCNC: 9 MG/DL (ref 5–40)
WBC NRBC COR # BLD AUTO: 5.84 10*3/MM3 (ref 3.4–10.8)

## 2024-01-22 PROCEDURE — 80050 GENERAL HEALTH PANEL: CPT

## 2024-01-22 PROCEDURE — 99396 PREV VISIT EST AGE 40-64: CPT | Performed by: NURSE PRACTITIONER

## 2024-01-22 PROCEDURE — 80061 LIPID PANEL: CPT

## 2024-01-22 PROCEDURE — 82746 ASSAY OF FOLIC ACID SERUM: CPT

## 2024-01-22 PROCEDURE — 36415 COLL VENOUS BLD VENIPUNCTURE: CPT

## 2024-01-22 PROCEDURE — 82306 VITAMIN D 25 HYDROXY: CPT

## 2024-01-22 PROCEDURE — 82607 VITAMIN B-12: CPT

## 2024-01-22 NOTE — PROGRESS NOTES
Chief Complaint  ADD, Hypothyroidism, and Annual Exam    Subjective            Jenn James presents to [facility]  History of Present Illness  Pt is an annual CPE for ADD and hypothyroidism. Pt has been trying natural supplements for thyroid.     Pt has c/o a spot on the middle of her back that itched off and on for a year. Pt states at times will become raised. Pt has not used anything topical. She is open to a skin survey with derm, wants to see SHAUNA Blevins.    Pt is followed by Dr. Harkins for psych.     Pt is due labs.    Mammogram: 10/23/23,  scheduled 10/25/24    Colon:9/13/19    Pap: 7/11/23    Pt is due shingles vaccine. Pt declines and understands the risks of not having.           Past Medical History:   Diagnosis Date    ADHD (attention deficit hyperactivity disorder) Sep/22    Now on adderrall    Allergic Whole life    Anxiety     Cervical cancer screening 5/30/2109    Chronic allergic rhinitis     Deep vein thrombosis 2017    Caused after my hip replacement.    Depression     Hyperlipemia 03/15/2017    Hypothyroidism 03/15/2017    Kidney stones     Limb pain     Limb swelling        Allergies   Allergen Reactions    Sulfa Antibiotics Rash        Past Surgical History:   Procedure Laterality Date    BLADDER SUSPENSION  2008    COLON SURGERY      COLONOSCOPY      ESSURE TUBAL LIGATION  1999    JOINT REPLACEMENT  Jan 2017    Right hip replacement    KIDNEY SURGERY      OTHER SURGICAL HISTORY      metal implant    TUBAL ABDOMINAL LIGATION          Social History     Tobacco Use    Smoking status: Never    Smokeless tobacco: Never   Substance Use Topics    Alcohol use: Yes     Alcohol/week: 3.0 - 4.0 standard drinks of alcohol     Types: 3 - 4 Glasses of wine per week     Comment: drinks daily, wine, beer and lquor       Family History   Problem Relation Age of Onset    Dementia Mother     Diabetes type II Mother     Cancer Mother 70        Kidney cancer (left kidney removed)  "and endometrial cancer (hysterectomy)    Diabetes Mother         Type 2    Stroke Mother         TIA    Prostate cancer Father     Hearing loss Father         Hearing aids    Other Maternal Grandmother         Colon cancer    Colon cancer Maternal Grandmother 70    Diabetes Paternal Grandmother         Type 2    ADD / ADHD Son         Current Outpatient Medications on File Prior to Visit   Medication Sig    amphetamine-dextroamphetamine XR (Adderall XR) 15 MG 24 hr capsule Take 1 capsule by mouth Every Morning Indications: Attention Deficit Hyperactivity Disorder    azelastine (ASTELIN) 0.1 % nasal spray USE 1 TO 2 SPRAYS IN EACH NOSTRIL TWICE DAILY AS NEEDED FOR RUNNY NOSE OR SNEEZING OR POST NASAL DRIP    cetirizine (zyrTEC) 10 MG tablet Take 1 tablet by mouth Daily.    Cyanocobalamin (Vitamin B 12) 250 MCG lozenge Take 2,500 mcg by mouth.    EPINEPHrine (EPIPEN) 0.3 MG/0.3ML solution auto-injector injection     levothyroxine (SYNTHROID, LEVOTHROID) 25 MCG tablet TAKE 1 TABLET BY MOUTH DAILY    Magnesium Oxide (MAGNESIUM EXTRA STRENGTH PO) Take  by mouth.    Milk Thistle 150 MG capsule Take  by mouth.    montelukast (SINGULAIR) 10 MG tablet Take 1 tablet by mouth Every Night.    Vitamin D-Vitamin K (K2 Plus D3) 100-1000 MCG-UNIT tablet Take  by mouth Daily. Patient takes drops (not tablets) due to GI upset with tablet    [DISCONTINUED] Misc. Devices (Nasal Spray Bottle) misc  (Patient not taking: Reported on 1/22/2024)     No current facility-administered medications on file prior to visit.       Health Maintenance Due   Topic Date Due    ANNUAL PHYSICAL  06/24/2022       Objective     /54   Pulse 62   Ht 170.2 cm (67\")   Wt 76.7 kg (169 lb)   SpO2 98%   BMI 26.47 kg/m²       Physical Exam  Constitutional:       General: She is not in acute distress.     Appearance: Normal appearance. She is not ill-appearing.   HENT:      Head: Normocephalic and atraumatic.      Right Ear: Tympanic membrane, ear canal " and external ear normal.      Left Ear: Tympanic membrane, ear canal and external ear normal.      Nose: Nose normal.   Cardiovascular:      Rate and Rhythm: Normal rate and regular rhythm.      Heart sounds: Normal heart sounds. No murmur heard.  Pulmonary:      Effort: Pulmonary effort is normal. No respiratory distress.      Breath sounds: Normal breath sounds.   Chest:      Chest wall: No tenderness.   Abdominal:      General: Abdomen is flat. Bowel sounds are normal. There is no distension.      Palpations: Abdomen is soft. There is no mass.      Tenderness: There is no abdominal tenderness. There is no guarding.   Musculoskeletal:         General: No swelling or tenderness. Normal range of motion.      Cervical back: Normal range of motion and neck supple.   Skin:     General: Skin is warm and dry.      Findings: No rash.      Comments: Scattered moles on body dry patch to mid upper back noted   Neurological:      General: No focal deficit present.      Mental Status: She is alert and oriented to person, place, and time. Mental status is at baseline.      Gait: Gait normal.   Psychiatric:         Mood and Affect: Mood normal.         Behavior: Behavior normal.         Thought Content: Thought content normal.         Judgment: Judgment normal.           Result Review :                           Assessment and Plan        Diagnoses and all orders for this visit:    1. Annual physical exam (Primary)  -     CBC w AUTO Differential; Future  -     Comprehensive metabolic panel; Future  -     Lipid panel; Future  -     TSH; Future  -     Vitamin B12; Future  -     Folate; Future  -     Vitamin D 25 hydroxy; Future    2. Attention deficit disorder, unspecified hyperactivity presence  Comments:  stable on adderall 15, continue f/u with PSY for mgmt    3. Hypothyroidism, unspecified type  Comments:  stable on synthroid 25mcg, continue  Orders:  -     TSH; Future    4. B12 deficiency  Comments:  stable on B12 250mcg,  continue  Orders:  -     Vitamin B12; Future  -     Folate; Future    5. Vitamin D deficiency  Comments:  stable on viatm D OTC supplement, continue  Orders:  -     Vitamin D 25 hydroxy; Future    6. Allergic rhinitis, unspecified seasonality, unspecified trigger  Comments:  stable on singulair 10mg, continue    7. Screening for cholesterol level  -     Comprehensive metabolic panel; Future  -     Lipid panel; Future    8. Skin lesion  -     Ambulatory Referral to Dermatology      Preventative Counseling:  Healthy diet  Daily exercise  Get adequate sleep        Follow Up     Return in about 6 months (around 7/22/2024).    Patient was given instructions and counseling regarding her condition or for health maintenance advice. Please see specific information pulled into the AVS if appropriate.     Jenn James  reports that she has never smoked. She has never used smokeless tobacco..

## 2024-01-23 DIAGNOSIS — E03.9 HYPOTHYROIDISM, UNSPECIFIED TYPE: Primary | ICD-10-CM

## 2024-01-24 ENCOUNTER — TELEMEDICINE (OUTPATIENT)
Dept: PSYCHIATRY | Facility: CLINIC | Age: 59
End: 2024-01-24
Payer: COMMERCIAL

## 2024-01-24 DIAGNOSIS — F90.0 ADHD (ATTENTION DEFICIT HYPERACTIVITY DISORDER), INATTENTIVE TYPE: ICD-10-CM

## 2024-01-24 DIAGNOSIS — Z63.79 STRESS DUE TO ILLNESS OF FAMILY MEMBER: ICD-10-CM

## 2024-01-24 DIAGNOSIS — F43.29 STRESS AND ADJUSTMENT REACTION: Primary | ICD-10-CM

## 2024-01-24 NOTE — PATIENT INSTRUCTIONS
"1. Should you want to get in touch with your provider, CHELSI Burnett, please contact MY Medical Assistant, Hanh, directly at [phone].  Recommend saving Hanh's direct number in phone as this is the PREFERRED & EASIEST way to get in contact with your provider.  Please leave a voice mail if you do not get an answer and she will return your call within 24 hrs. You will NOT be able to contact provider on [employer], as Behavioral Health Providers are restricted. YOU MUST CALL [phone]  If you need to speak with the on call provider after hours or on weekends, please Contact the [facility] ([phone]) and staff will be able to page the provider on call directly.     2.  In the event you need to cancel an appointment, please notify the office at least 24 hrs prior:   Contact **Hanh Medical Assistant at [facility] directly at [phone] or the [facility] ([phone])     3. MEDICATION REFILLS:  PLEASE CALL THE PHARMACY TO REQUEST ALL MEDICATION REFILLS or via [employer] TO ENSURE YOU ARE RECEIVING YOUR MEDICATIONS IN A TIMELY MANNER. The pharmacy or [employer] merced will send this request ELECTRONICALLY to the ordering provider.   IF YOU USE AN AUTOMATED SERVICE AT THE PHARMACY FOR REFILLS AND ARE TOLD THERE ARE \"NO REFILLS REMAINING\"   PLEASE CALL THE PHARMACY & SPEAK TO A LIVE PERSON TO VERIFY IT IS THE MOST UP TO DATE PRESCRIPTION ON FILE.    All new prescriptions will have a different number, therefore, if you were given refills for a medication today or at last visit it will not have the same number as the previous prescription.     4.  In the event you have personal crisis, contact the following crisis numbers: Suicide Prevention Hotline 1-362.880.7494 or *988, ISHMAEL Helpline 9-120-791-ESMU; [facility] Emergency Room [phone]; text HELLO to 422496; or 911.  If you feel like harming yourself or others, call 911 right away.  You can call the 989 Suicide and Crisis " "Lifeline at  988   to speak with a counselor at the [facility], or you can connect with one using their online chat  .    5.  Never stop an antidepressant medicine without first talking to a healthcare provider. Suddenly stopping this type of medication can cause withdrawal symptoms.    6.  Counseling and talk therapy  Counseling or therapy teaches you new coping skills and more adaptive ways of thinking about problems. These tools can help you make positive changes. The benefits of counseling often last long after treatment sessions have stopped.    7.   We would appreciate your feedback, please scan the QRS code on the back of your appointment card (or see below) and complete a brief survey.  [place] location is still not available, so please click \"[place]\" location.  Thank you      SPECIFIC RECOMMENDATIONS:     1.      Medications discussed at this encounter:                   -  Request refill when needed    2.      Psychotherapy recommendations: Declined     3.     Return to clinic:2 months, Wed. 3/20/24 at 8 am via video    Please arrive at least 15 minutes before your scheduled appointment time to complete check in process.      IF you are scheduled for a [employer] VIDEO visit, YOU MUST COMPLETE THE \"E-CHECK IN\" PROCESS PRIOR TO BEGINNING THE VISIT, YOU WILL NO LONGER RECEIVE A PHONE CALL PRIOR TO ALL VIDEO VISITS; You may still complete the E-Check in for in office visits prior to appointment, you will receive multiple text/email reminders which will direct you further if needed.           "

## 2024-01-24 NOTE — PROGRESS NOTES
This provider is [place] [address] in closed office to ensure privacy. The Patient is seen remotely using [employer]. Patient is being seen via telehealth and confirm that they are in a secure environment for this session. The patient's condition being diagnosed/treated is appropriate for telemedicine. The provider identified himself/herself: herself as well as her credentials.  The patient gave consent to be seen remotely, and when consent is given they understand that the consent allows for patient identifiable information to be sent to a third party as needed.  They may refuse to be seen remotely at any time. The electronic data is encrypted and password protected, and the patient has been advised of the potential risks to privacy not withstanding such measures.    You have chosen to receive care through a telehealth visit.  Do you consent to use a video/audio connection for your medical care today? Yes      Subjective   Jenn James is a 58 y.o. female who presents today for follow up    Referring Provider:  No referring provider defined for this encounter.    Chief Complaint:  ADHD, stress due to illness of , stress due to adjustment    History of Present Illness:   1/24/24: Patient presents today via [employer] Video visit from home, located in [place], [place].  Patient admits to stress due to  and parents, parents have now moved in to an AL facility.  Patient has been cleaning parents home which has been overwhelming, patient  is over there approximately 5 hrs a day.   will have a bone marrow transplant, though has to gain weight and strength, at least 30 minutes of ability to stand.   with poor appetite due to spleen enlarged pushing against his stomach, and endurance is poor of standing or walking for 5 minutes.   has to force himself to drink 2 protein shakes daily.  Patient expresses frustration about  not wanting to eat or improve  "in order to have the transplant.  does desire the transplant.  Patient also has worries about  not completing the living will as they currently do not have one.  \"He's hard headed, he doesn't want anyone telling him what to do. His daughter is a nurse for  in the bone marrow transplant wing in [place].\"  Patient has spoken with  and voiced concerns to have another care giver to assist her with 's needs, as patient does not have any family to help, his family lives in [place] or [place].  Tension with family members, though able to work things out.  Daughter in law is bringing down 2 grand babies today from [place], will do some crafts, visit parents at the AL.  Patient was planning to travel to [place] this month due to both grand daughters birthday's, which has been put on hold for the time being. Patient is planning on reaching out to 's daughter about care giving help.   will be in isolation after surgery while in the hospital for 20 days, and 80 days once returns home.  The 2nd care giver will be respite for patient, as patient cannot be gone more than one hour and patient struggling with what to do, as  becomes argumentative.      Patient continues to tolerate Adderall XR 15 mg for management of ADHD symptoms. Patient has gotten off task while cleaning parents with sister, though has been able to recognize distraction and return to task.  Sleeping well.   Denies side effects of elevated heart rate, elevated blood pressure, irritability, insomnia, decreased appetite, nor feeling shaky or jittery with stimulant medication.        12/7/23:  Patient presents today via [employer] Video visit from home, located in [place], [place].  Reports  has been diagnosed with rare blood cancer, myelofibrosis, which is a slow progression as patient was told he has had for likely 10-12 years.   had 2nd bone marrow bx yesterday, reports increased stress due to " "transporting parents and now  to [facility].  In process of making 4 quilts for grandchildren started in June for Crystal, and has a friend whom may help with quilting.   Has not put up Crystal tree yet due to overwhelming tasks and stress.      has lost 60-70 lbs over the last 6-7 mo. Extreme fatigue, poor endurance, and is severely weak after a simple task of letting the dogs out. Patient is primarily taking care of everything in house hold.   Once results of bone marrow bx, will find out if  qualifies for a treatment that is, 30% morbidity rate for a bone marrow transplant, though still working out details and decisions.  Patient was unable to go travel to [place] to see grand kids.     Patient is trying to prioritize tasks, as house is not tidy as she would like, and feels very overwhelmed.  Patient doesn't have the energy to communicate 's health decline to all family members. Patient  relatives were willing to come to home from [place] to help and spend holidays, however, the stress of patient being the host and \"they let me be it.\"    In regards to ADHD, patient explains while sewing, will be easily distracted by tasks that are not done, such as going to  medications from pharmacy, grocery, to do this and that, as it was forgotten previously.  Patient wishes to not change dose of Adderall as the current stress with  and holidays are contributing factors.     Denies side effects of elevated heart rate, elevated blood pressure, irritability, insomnia, decreased appetite, nor feeling shaky or jittery with stimulant medication.      9/7/23:    Patient presents today in office for annual CSA formalities, ADHD management.  Patient reports weight has been steady low 170's, continues to walk a lot though not able to exercise due to vocal cord dysfunction which effects nerves.  8 yrs ago patient had to go to speech therapy, which was caused by hyperventilating, " due to tension in neck feels soreness which extends to left arm, though has had symptoms on right arm. Doing physical therapy currently and noted improvement, was told the symptoms will subside and are currently flared due to allergy to mold.     In regards to ADHD, patient reports current dose of Adderall XR 15 mg remains effective. However,  patient noticed while relatives were to come to house, in preparation patient found self jumping from task to task though was anxious about getting house prepared for their visit. Patient continues to use visual reminders. Since relatives have departed symptoms have improved. Patient does find self forgetful of keeping sunglasses in house, and is concerned if the dose needs to be adjusted. Reports the symptoms are minimal.     Denies side effects of elevated heart rate, elevated blood pressure, irritability, insomnia, decreased appetite, nor feeling shaky or jittery with stimulant medication.      Patient reports having 4 Adderall XR 15 mg remaining.   Every 2 yrs patient goes to [place] for 2 weeks, plans to leave Nov. 2, 2023 and to return 11/16/23.   Patient reports continues to take CBD oil in the evening for muscle relaxation and to help with sleep, takes 3 drops. Obtains from [place] from a retired [employer].     7/5/23:  Patient presents today via [employer] Video visit from home, located in [place], [place].    Patient reports having adequate supply through Thurs 7/13/23.  Patient reports pharmacy filled last Adderall XR 15 mg early which actually took the pressure of worrying as [facility] location is not open on the weekend.    Patient reports positive outcome with current dose of Adderall XR, as family has been visiting from out of town.  Patient feels she did well with various company visiting for Memorial Day and Fourth of July, was able to complete tasks without shifting from one task to another.  Patient has had company in home since May 19, 2023 with the exception of 1  "week.     Patient denies current symptoms include fidgeting, difficulty remaining seated, easy distractability, blurting out answers prematurely, inability to complete tasks, difficulty sustaining attention, shifting from one uncompleted activity to another, talking excessively, interrupting others, ineffective listening, frequently losing items.   Denies side effects of elevated heart rate, elevated blood pressure, irritability, insomnia, decreased appetite, nor feeling shaky or jittery with stimulant medication.      Patient reports parents will be moving into an AL facility as they have long term care insurance, and have looked at several facilities, patient voices worry for her father due to father caring for mother.     4/5/23:  Patient presents today via [employer] visit from home, located in [place], [place].  Patient asking about redness to nose and lateral nose/face \"tiny white oil, and I push it and small amount of oil comes out.\" Patient suspected possible relation to medication, patient has started washing face at night as patient was only washing in the morning.  Denies changes to laundry detergent, face cleanser, though did change skin care moisturizer which may be a contributing factor.   Mild anxiety and worry noted per screening.  Patient reports Adderall XR dose remains effective, symptoms are well controlled, able to maintain focus, concentration, and complete tasks. Denies side effects.     Patient daughter, son in law, and grand kids are coming in town from [place] in May for patient 60 th birthday.  Which patient is looking forward to.     2/8/23:  Patient presents today via [employer] Video visit from home in [place], [place].  Patient reports feeling good, busy with family birthdays, and going to [place] to see grand kids to do some crafts. Symptoms of inattentiveness, shifting from one uncompleted activity or topic to another, impaired concentration are controlled well with current dose of " "Adderall XR 10 mg. Patient frequently freezing on video and had to go outside of home for better service, though continued to have connection problems intermittently.     Patient continues to be conscious of eating, denies decreased appetite, patient is walking with friends, eats a breakfast bar normally and then eats lunch around 1130, however, yesterday got caught up shopping and realized she had not eaten.  When patient does eat she eats good quantity.  Feels she may have lost 1-2 lbs, otherwise weight has been steady.      [facility] has checked with [facility] and Adderall dose is in stock.  Patient plans to travel to [place] in November, no other trips planned for out of state at this time.          1/4/23:  Patient presents today via [employer] Video visit from home, Adderall XR was increased from 10 to 15 mg at last visit, for which patient reports the first few days of the increase felt \"I don't know if I can handle this, just a little spaced out, I don't know, but it was fine in a few days.\"  Denies increased heart rate, difficulty sleeping.   Patient feels she may be decreased weight of 15 lb, though patient has been busy with mother in the hospital and getting things together for the holidays.  Reports mother was admitted to [facility] and had medication adjustments and plan to get mother into an adult day care.  Tomorrow patient is taking father to see a specialist at [facility] to help him cope with mother's mental illness- \"alzheimers and delusional dementia\" per the neurologist.  Reports mother can get argumentative and paranoid.       Reports home scale weight on 1/1/3 of 183.6 lbs.  Patient reports increased walking with friends, not eating as much in the morning, \"constantly on the go.\"  Had wanted to do some fasting and weight loss.  Denies having to purchase new clothing, though noticed face was slimmer and clothes were looser.  Patient had been on weight watchers in the past before COVID and was " "down to 167 lbs, as patient was working out in the gym, and gym's were closed during COVID and weight returned.  Reports eating at 1130-12 noon for lunch, and 7 pm for dinner as spouse likes to eat at 8 pm.  Will also snack during the day on skinny pop popcorn.  Reports at last office visit recalled telling MA to not tell her what the weight was, however, felt \"good\" seeing the scale weight a few days ago.  Plans to start going back to the gym with friend as they would go every morning, \"that was my life.\"     ADHD symptoms are improving, as patient reports ability to listen more effectively, more patient with others is noticed the most since dose increase.  Upon feeling organized with holiday decorations, noticed was able to stay on task, only bring up one box at a time to decorate instead of having all boxes out and not being able to organize.    Patient reports having enough supply of Adderall XR 15 mg through next Wed. 1/11/23.  Reports after speaking with pharmacist at [facility] was told all medications would have to be transferred from [facility] and [facility] will not only fill the Adderall prescription. [facility] due to [facility] not having Adderall XR available.  Patient is also concerned due to frequent travels of having adequate supply and ability to fill medication at outlying pharmacies.       11/22/22:  Patient presents today via [employer] Video visit from patient home.  Patient reports would like to increase dose possibly, reports spouse notices \"I am drifting some.\" Patient further explains will frequently jump to other topics during conversation, though improved with Adderall XR 10 mg.  Patient reports daughter was able to notice improvement, though now that patient has returned to home from travels, she is noticing elevation in symptoms.      Patient reports some difficulty with sleeping, as last night had minimal sleep as patient is worried about mother whom has dementia and " "father is caring for mother, has had  involved.  Believes temporary, situational anxiety is reason for sleep difficulty.  Patient reports mother's dementia symptoms are worsening and difficult to deal with, though hopeful as  has set up for assistant to come to home several days a week to help with house cleaning, meal prep, and care.       10/20/22:  Patient presents today via MyChart Video visit from daughter's home in [place].   Patient was started on Adderall XR 10 mg at last visit for which patient reports the first few days had increased energy, which had company, had difficulty sleeping the first 2 nights, however, no longer having difficulty.  Patient reports taking medication at 7 am, one day she took it later at 930 am and had difficulty sleeping that night. Patient has been taking thyroid medication upon awakening to use the bathroom around 5-530 am and upon waking up around 7 takes the Adderall.      \"I think it's a lot better, my daughter said she seems to notice, I still have a tendency to interrupt but that's better, I don't seem to repeat questions, as it was an issue before because I was not really listening. My mind was somewhere else.\"   Denies side effects, patient was surprised she was able to sit still after a few days, now able to complete tasks as less distraction, able to continue with task at hand before switching to another task.     Patient will be returning from [place] 10/25/22.     9/16/22:  Patient presents today in office today to further discuss ADHD treatment with a controlled substance and to complete CSA and obtain POC UDS today per policy.     Patient brought in bottle of Hemp CBD oil 3200mg/2 oz, 1-3 drops 3 times daily on bottle, however, patient only takes in the evening for post menopausal symptoms, muscle aches, and vocal cord dysfunction, \"my neck tenses up , had to do physical therapy in past, and oil helps, I found out I am Allergic to " "mold.\"    9/15/22:  Patient presents today via MyChart Video visit from home, reports received ADHD test results though has not discussed.  Patient continues to have symptoms of inttentiveness, concentration difficulty, difficulty completing tasks, and switching from one uncompleted task to another.     Patient does take 3 drops of CBD oil, hemp based, at night for sleep, relaxes muscles as patient started taking while going through menopause.   Patient reports  is allergic to Wellbutrin and daughter tried Wellbutrin and did not do well, as patient would prefer to try Adderall first rather than a non-stimulant medication.  Patient will be out of town for 2 weeks in October visiting daughter in [place], likely early October.       7/14/22:  INITIAL EVAL  Patient presents today via MyChart Video visit from home with a history of depression and anxiety for which treatment was started in late 1995 with therapy, and medications from [id number] due to divorce.     Patient is currently not taking any psychotropic medications nor has had any since 2005.     \"My issue is I can't concentrate, I start something and cant finish something, forgets things often\".  Patient recalls while in school always \"fidgety\" crossing legs, moving often in seat.  Admits to interrupting people, gets distracted easily, daughter noticing.    Admits to over the past 2 yrs began to feel ADHD may be a possibility, \"I am tired of being like this, forgetting stuff, going in and out of the house, tired of interrupting people, it seems to be bothering me more.\"  Patient uses reminders, lists which was started while kids in high school and has continued to have  self add items needed to List,\" I walk out without my list from the fridge, and I have to have him take a picture of it and send it to me\".  Difficulty sitting still when you should be sitting still in [employer] or meetings. Impulsive with shopping tends to purchase items that may be " "on sale or those that she may not need with the idea of \"I can always return it\".     Symptoms include fidgeting, difficulty remaining seated, easy distractability, blurting out answers prematurely, inability to complete tasks, difficulty sustaining attention, shifting from one uncompleted activity to another, talking excessively, interrupting others, ineffective listening, frequently losing items, and impulsivity.    ADHD:   Elementary school:   Grades:A's and B's  Special classes or failures:no  Got in trouble:no  Referral for ADHD testing:no  Fhx:son, diagnosed officially in 7th grade, took medications for 1-2 yrs, restarted while in college only, \"he probably should be taking something, I have suggested it to him\"  Presently:  Problems with attention to detail:yes  Problems with sustained attention:Yes  Problems listening when spoken to directly:Yes, unable to concentrate on what others are saying, \"I have to get my point across, I know I have to wait for the break, but I can't wait for the break.\"  Failure to finish tasks:yes, \"I will lay my husbandsTshirts on couch intending to hang up and they will be there for 2-3 days\" house hold tasks-dusting, mopping, find need to focus on floor boards  Avoids tasks that require sustained mental effort:yes, \"I excelled at work,  I could multi-task, 5-6 projects at desk, however, would wait until the last week to update credentials, I put off stuff and procrastinate all the time\"  Easily distracted:yes  Forgetting things:yes  Losing things:yes  Hard to organize:yes  Talks a lot and cutting people off:yes  Drifts off during conversations:yes, \"I have to concentrate what I need to say to not forget what I have say, then I blurt it out, I am trying really hard, it's even hard with you to not stop and say stuff\"  Difficulty with Reading:yes, \"I used to read a lot, has been using Audio books to listen in car or while out with friends or walking, has to rewind at times because " "my mind drifts off.\"  Difficulty watching TV/Movies:\"not really, we hardly ever have the TV on anymore.\"       PHQ-9 Depression Screening  PHQ-9 Total Score:   9/5/2023 0    Little interest or pleasure in doing things?     Feeling down, depressed, or hopeless?     Trouble falling or staying asleep, or sleeping too much?     Feeling tired or having little energy?     Poor appetite or overeating?     Feeling bad about yourself - or that you are a failure or have let yourself or your family down?     Trouble concentrating on things, such as reading the newspaper or watching television?     Moving or speaking so slowly that other people could have noticed? Or the opposite - being so fidgety or restless that you have been moving around a lot more than usual?     Thoughts that you would be better off dead, or of hurting yourself in some way?     PHQ-9 Total Score       SUZANNE-7    9/5/2023 0    Past Surgical History:  Past Surgical History:   Procedure Laterality Date    BLADDER SUSPENSION  2008    COLON SURGERY      COLONOSCOPY      ESSURE TUBAL LIGATION  1999    JOINT REPLACEMENT  Jan 2017    Right hip replacement    KIDNEY SURGERY      OTHER SURGICAL HISTORY      metal implant    TUBAL ABDOMINAL LIGATION         Problem List:  Patient Active Problem List   Diagnosis    Primary osteoarthritis of right hip    Status post right hip replacement    Hypothyroidism (acquired)    Paroxysmal atrial fibrillation    Anxiety    Deep venous thrombosis    Disorder of vocal cord    Edema of lower extremity    Hyperlipidemia    Hyperthyroidism    Onychomycosis    Pain of right hip joint    Laryngitis due to gastroesophageal reflux    Sensation of heaviness in extremities    Varicose veins of lower extremity    Vitamin deficiency       Allergy:   Allergies   Allergen Reactions    Sulfa Antibiotics Rash        Discontinued Medications:  There are no discontinued medications.      Current Medications:   Current Outpatient Medications " "  Medication Sig Dispense Refill    amphetamine-dextroamphetamine XR (Adderall XR) 15 MG 24 hr capsule Take 1 capsule by mouth Every Morning Indications: Attention Deficit Hyperactivity Disorder 30 capsule 0    azelastine (ASTELIN) 0.1 % nasal spray USE 1 TO 2 SPRAYS IN EACH NOSTRIL TWICE DAILY AS NEEDED FOR RUNNY NOSE OR SNEEZING OR POST NASAL DRIP      cetirizine (zyrTEC) 10 MG tablet Take 1 tablet by mouth Daily.      Cyanocobalamin (Vitamin B 12) 250 MCG lozenge Take 2,500 mcg by mouth.      EPINEPHrine (EPIPEN) 0.3 MG/0.3ML solution auto-injector injection       levothyroxine (SYNTHROID, LEVOTHROID) 25 MCG tablet TAKE 1 TABLET BY MOUTH DAILY 30 tablet 0    Magnesium Oxide (MAGNESIUM EXTRA STRENGTH PO) Take  by mouth.      Milk Thistle 150 MG capsule Take  by mouth.      montelukast (SINGULAIR) 10 MG tablet Take 1 tablet by mouth Every Night.      Vitamin D-Vitamin K (K2 Plus D3) 100-1000 MCG-UNIT tablet Take  by mouth Daily. Patient takes drops (not tablets) due to GI upset with tablet       No current facility-administered medications for this visit.       Past Medical History:  Past Medical History:   Diagnosis Date    ADHD (attention deficit hyperactivity disorder) Sep/22    Now on adderrall    Allergic Whole life    Anxiety     Cervical cancer screening 5/30/2109    Chronic allergic rhinitis     Deep vein thrombosis 2017    Caused after my hip replacement.    Depression     Hyperlipemia 03/15/2017    Hypothyroidism 03/15/2017    Kidney stones     Limb pain     Limb swelling        Past Psychiatric History:  Began Treatment: started in 1995 with therapy due to spouse having an affair  Diagnoses:Depression and Anxiety  Psychiatrist: last seen from [id number]  Therapist: last seen from [id number]  Admission History:Denies  Medication Trials: Prozac-1995-for one year \"felt like i was floating;\" Zoloft for 1 yr 95-96, then started Effexor with divorce in 2001 for1 yr-during time having increased stress with " divorce as pt had lost hair and stomach problems and asked to be placed on meds; tolerated well  Self Harm: Denies  Suicide Attempts:Denies   Psychosis, Anxiety, Depression: Denies    Substance Abuse History:   Types: Alcohol daily in the evening to relax, 1-2 glasses of wine, or 1 beer or gin and tonic  Withdrawal Symptoms:Denies  Longest Period Sober:Not Applicable   AA: Not applicable     Social History:  Martial Status:  Employed:No 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  Kids:Yes or If so, how many 2 children and 2 step children  House:Lives in a house   History: Denies  Access to Guns: no    Social History     Socioeconomic History    Marital status:    Tobacco Use    Smoking status: Never    Smokeless tobacco: Never   Vaping Use    Vaping Use: Never used   Substance and Sexual Activity    Alcohol use: Yes     Alcohol/week: 3.0 - 4.0 standard drinks of alcohol     Types: 3 - 4 Glasses of wine per week     Comment: drinks daily, wine, beer and lquor    Drug use: Never     Types: Marijuana     Comment: In teenage years    Sexual activity: Yes     Partners: Male     Birth control/protection: Surgical       Family History:   Suicide Attempts: Denies  Suicide Completions:Denies      Family History   Problem Relation Age of Onset    Dementia Mother     Diabetes type II Mother     Cancer Mother 70        Kidney cancer (left kidney removed) and endometrial cancer (hysterectomy)    Diabetes Mother         Type 2    Stroke Mother         TIA    Prostate cancer Father     Hearing loss Father         Hearing aids    Other Maternal Grandmother         Colon cancer    Colon cancer Maternal Grandmother 70    Diabetes Paternal Grandmother         Type 2    ADD / ADHD Son        Developmental History:   Born: [place], lived in [place] since age 1  Siblings:1 sister, 1 brother-both younger  Childhood: Denies Abuse  High School:Completed  College: 2 yrs, no degree    Mental Status Exam:    Hygiene:   good  Cooperation:  Cooperative  Eye Contact:  Good  Psychomotor Behavior:  Appropriate  Affect:  Appropriate  Mood: anxious   Speech:  Normal  Thought Process:  Goal directed  Thought Content:  Mood congruent  Suicidal:  None  Homicidal:  None  Hallucinations:  None  Delusion:  None  Memory:  Intact  Orientation:  Grossly intact  Reliability:  good  Insight:  Good  Judgement:  Good  Impulse Control:  Good  Physical/Medical Issues:  Yes Hypothyroidism,OA rt hip,paroxysmal afib, HLD      Review of Systems:  Review of Systems   Constitutional:  Negative for appetite change, diaphoresis and fatigue.   HENT:  Negative for drooling.    Eyes:  Negative for visual disturbance.   Respiratory:  Negative for cough and shortness of breath.    Cardiovascular:  Negative for chest pain, palpitations and leg swelling.   Gastrointestinal:  Negative for nausea and vomiting.   Endocrine: Negative for cold intolerance and heat intolerance.   Genitourinary:  Negative for difficulty urinating.   Musculoskeletal:  Negative for joint swelling.   Allergic/Immunologic: Negative for immunocompromised state.   Neurological:  Negative for dizziness, seizures, speech difficulty and numbness.   Psychiatric/Behavioral:  Negative for decreased concentration, self-injury, sleep disturbance and suicidal ideas. The patient is nervous/anxious. The patient is not hyperactive.          Physical Exam:  Physical Exam  Psychiatric:         Attention and Perception: Attention and perception normal.         Mood and Affect: Mood and affect normal. Mood is anxious.         Speech: Speech normal.         Behavior: Behavior normal. Behavior is cooperative.         Thought Content: Thought content normal. Thought content does not include suicidal ideation. Thought content does not include suicidal plan.         Cognition and Memory: Cognition and memory normal.         Judgment: Judgment normal.         Vital Signs:   There were no vitals taken for  this visit.     Office notes from care team reviewed:  Date of Service: 1/22/24 OV with CHELSI Blanco with [facility]     Lab Results: Reviewed  Lab on 01/22/2024   Component Date Value Ref Range Status    Glucose 01/22/2024 102 (H)  65 - 99 mg/dL Final    BUN 01/22/2024 15  8 - 23 mg/dL Final    Creatinine 01/22/2024 0.83  0.57 - 1.00 mg/dL Final    Sodium 01/22/2024 142  136 - 145 mmol/L Final    Potassium 01/22/2024 4.3  3.5 - 5.2 mmol/L Final    Chloride 01/22/2024 104  98 - 107 mmol/L Final    CO2 01/22/2024 28.0  22.0 - 29.0 mmol/L Final    Calcium 01/22/2024 9.3  8.6 - 10.5 mg/dL Final    Total Protein 01/22/2024 6.8  6.0 - 8.5 g/dL Final    Albumin 01/22/2024 4.3  3.5 - 5.2 g/dL Final    ALT (SGPT) 01/22/2024 24  1 - 33 U/L Final    AST (SGOT) 01/22/2024 25  1 - 32 U/L Final    Alkaline Phosphatase 01/22/2024 63  39 - 117 U/L Final    Total Bilirubin 01/22/2024 0.6  0.0 - 1.2 mg/dL Final    Globulin 01/22/2024 2.5  gm/dL Final    A/G Ratio 01/22/2024 1.7  g/dL Final    BUN/Creatinine Ratio 01/22/2024 18.1  7.0 - 25.0 Final    Anion Gap 01/22/2024 10.0  5.0 - 15.0 mmol/L Final    eGFR 01/22/2024 80.8  >60.0 mL/min/1.73 Final    Total Cholesterol 01/22/2024 202 (H)  0 - 200 mg/dL Final    Triglycerides 01/22/2024 50  0 - 150 mg/dL Final    HDL Cholesterol 01/22/2024 70 (H)  40 - 60 mg/dL Final    LDL Cholesterol  01/22/2024 123 (H)  0 - 100 mg/dL Final    VLDL Cholesterol 01/22/2024 9  5 - 40 mg/dL Final    LDL/HDL Ratio 01/22/2024 1.74   Final    TSH 01/22/2024 4.490 (H)  0.270 - 4.200 uIU/mL Final    Vitamin B-12 01/22/2024 1,337 (H)  211 - 946 pg/mL Final    Folate 01/22/2024 5.97  4.78 - 24.20 ng/mL Final    25 Hydroxy, Vitamin D 01/22/2024 80.1  30.0 - 100.0 ng/ml Final    WBC 01/22/2024 5.84  3.40 - 10.80 10*3/mm3 Final    RBC 01/22/2024 4.64  3.77 - 5.28 10*6/mm3 Final    Hemoglobin 01/22/2024 13.9  12.0 - 15.9 g/dL Final    Hematocrit 01/22/2024 42.1  34.0 - 46.6 % Final    MCV 01/22/2024 90.7  79.0 -  97.0 fL Final    MCH 01/22/2024 30.0  26.6 - 33.0 pg Final    MCHC 01/22/2024 33.0  31.5 - 35.7 g/dL Final    RDW 01/22/2024 11.7 (L)  12.3 - 15.4 % Final    RDW-SD 01/22/2024 38.8  37.0 - 54.0 fl Final    MPV 01/22/2024 9.9  6.0 - 12.0 fL Final    Platelets 01/22/2024 267  140 - 450 10*3/mm3 Final    Neutrophil % 01/22/2024 61.2  42.7 - 76.0 % Final    Lymphocyte % 01/22/2024 24.7  19.6 - 45.3 % Final    Monocyte % 01/22/2024 8.6  5.0 - 12.0 % Final    Eosinophil % 01/22/2024 4.3  0.3 - 6.2 % Final    Basophil % 01/22/2024 1.0  0.0 - 1.5 % Final    Immature Grans % 01/22/2024 0.2  0.0 - 0.5 % Final    Neutrophils, Absolute 01/22/2024 3.58  1.70 - 7.00 10*3/mm3 Final    Lymphocytes, Absolute 01/22/2024 1.44  0.70 - 3.10 10*3/mm3 Final    Monocytes, Absolute 01/22/2024 0.50  0.10 - 0.90 10*3/mm3 Final    Eosinophils, Absolute 01/22/2024 0.25  0.00 - 0.40 10*3/mm3 Final    Basophils, Absolute 01/22/2024 0.06  0.00 - 0.20 10*3/mm3 Final    Immature Grans, Absolute 01/22/2024 0.01  0.00 - 0.05 10*3/mm3 Final    nRBC 01/22/2024 0.0  0.0 - 0.2 /100 WBC Final   Lab on 09/29/2023   Component Date Value Ref Range Status    TSH 09/29/2023 3.300  0.270 - 4.200 uIU/mL Final   Clinical Support on 09/07/2023   Component Date Value Ref Range Status    Amphetamine Screen, Urine 09/07/2023 Positive (A)  Negative Final    Barbiturates Screen, Urine 09/07/2023 Negative  Negative Final    Buprenorphine, Screen, Urine 09/07/2023 Negative  Negative Final    Benzodiazepine Screen, Urine 09/07/2023 Negative  Negative Final    Cocaine Screen, Urine 09/07/2023 Negative  Negative Final    MDMA (ECSTASY) 09/07/2023 Negative  Negative Final    Methamphetamine, Ur 09/07/2023 Negative  Negative Final    Methadone Screen, Urine 09/07/2023 Negative  Negative Final    Opiate Screen 09/07/2023 Negative  Negative Final    Oxycodone Screen, Urine 09/07/2023 Negative  Negative Final    Phencyclidine (PCP), Urine 09/07/2023 Negative  Negative Final     THC, Screen, Urine 09/07/2023 Positive (A)  Negative Final    Lot Number 09/07/2023 [id number]   Final    Expiration Date 09/07/2023 09/18/2024   Final   Orders Only on 09/07/2023   Component Date Value Ref Range Status    THC, Urine 09/07/2023 Positive (A)   Final    Carboxy THC Conf, MS, UR    10                 ng/mL Cutoff=10        01       EKG Results:  No orders to display       Imaging Results:  No Images in the past 120 days found..      Assessment & Plan   Diagnoses and all orders for this visit:    1. Stress and adjustment reaction (Primary)    2. Stress due to illness of family member    3. ADHD (attention deficit hyperactivity disorder), inattentive type            Visit Diagnoses:    ICD-10-CM ICD-9-CM   1. Stress and adjustment reaction  F43.29 309.89   2. Stress due to illness of family member  Z63.79 V61.49   3. ADHD (attention deficit hyperactivity disorder), inattentive type  F90.0 314.00           PLAN:  1.   Safety: No acute safety concerns  Therapy: None  Risk Assessment: Risk of self-harm acutely is low.  Risk factors include anxiety disorder, mood disorder, and recent psychosocial stressors (pandemic). Protective factors include no family history, denies access to guns/weapons, no present SI, no history of suicide attempts or self-harm in the past, minimal AODA, healthcare seeking, future orientation, willingness to engage in care.  Risk of self-harm chronically is also low, but could be further elevated in the event of treatment noncompliance and/or AODA.  Meds:  Continue Adderall XR 15 mg by mouth daily in the morning to target symptoms of ADHD including:  Inattentiveness & impaired concentration.  Risks, benefits, side effects discussed with patient including elevated heart rate, elevated blood pressure, irritability, insomnia, sexual dysfunction, appetite suppressing properties, psychosis.  After discussion of these risks and benefits, the patient voiced understanding and agreed to  proceed. Cali reviewed, LAST DISPENSED 1/15/24 #30/30 DAYS, patient to request refill when needed.    Controlled substance documentation: Cali reviewed; prior urine drug screen consistent obtained 9/7/23;; consent is up to date, signed, witnessed and in EHR, dated 9/7/23, which will be updated annually per policy. Patient is aware of risk of addiction on this medication, understands need to follow up for a review every 3 months and medications will be adjusted or decreased as deemed appropriate at each visit.  No history of drug or alcohol abuse.  No concerns about diversion or abuse. Patient denies side effects related to the medication.  Patient is aware of random urine drug screens and pill counts. The dosing of this medication will be reviewed on a regular basis and reduced if possible. Ongoing use of a controlled substance is necessary for this patient to have a normal quality of life.    Labs: n/a    Symptoms of ADHD are under good control with current medication regimen.  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  Emotional support given, suggested having  daughter whom is a nurse to discuss feeding tube options, necessity of following doctor instructions.  Patient was given instructions and counseling regarding condition and for health maintenance advice. Please see specific information pulled into the AVS if appropriate.    Patient to contact provider if symptoms worsen or fail to improve.       12/7/23:  Continue Adderall XR 15 mg by mouth daily in the morning to target symptoms of ADHD including:  Inattentiveness & impaired concentration.  Risks, benefits, side effects discussed with patient including elevated heart rate, elevated blood pressure, irritability, insomnia, sexual dysfunction, appetite suppressing properties, psychosis.  After discussion of these risks and benefits,  the patient voiced understanding and agreed to proceed. Cali reviewed, LAST DISPENSED 11/17/23 #30/30 DAYS, Informed patient order would be sent to Dr. Harkins in separate entry for signature due to EMR system, and will not see as refilled on AVS today. First fill date for Friday 12/15/23 as pharmacy is not open on weekends. [facility] location.    Controlled substance documentation: Cali reviewed; prior urine drug screen consistent obtained 9/7/23;; consent is up to date, signed, witnessed and in EHR, dated 9/7/23, which will be updated annually per policy. Patient is aware of risk of addiction on this medication, understands need to follow up for a review every 3 months and medications will be adjusted or decreased as deemed appropriate at each visit.  No history of drug or alcohol abuse.  No concerns about diversion or abuse. Patient denies side effects related to the medication.  Patient is aware of random urine drug screens and pill counts. The dosing of this medication will be reviewed on a regular basis and reduced if possible. Ongoing use of a controlled substance is necessary for this patient to have a normal quality of life.    Symptoms of ADHD are under good control with current medication regimen. However, due to new cancer diagnosis with spouse, holidays, increased care giving tasks, as spouse is unable to help with simple tasks.  Patient wishes to remain on current  dose of Adderall as current stressors are situational which are negatively impacting day to day activities, however, patient does not want to depend on a medication.  Will re-evaluate in 7 weeks.   Patient was given instructions and counseling regarding condition and for health maintenance advice. Please see specific information pulled into the AVS if appropriate.    Patient to contact provider if symptoms worsen or fail to improve.        9/7/23:   Continue Adderall XR 15 mg by mouth daily in the morning to target symptoms of  ADHD including:  Inattentiveness & impaired concentration.  LAST DISPENSED 8/7/23 #30/30 DAYS  Informed patient order would be sent to Dr. Harkins in separate entry for signature due to EMR system, and will not see as refilled on AVS today.  Due to positive UDS, will send a prescription to Dr. Harkins today for a 7  day supply, patient has adequate supply through Monday 9/11/23. Instructed patient to contact provider on Monday 9/18/23 to request refill.     Controlled substance documentation: Cali reviewed; prior urine drug screen consistent obtained 9/16/22, ordered today and results discussed with patient ; consent is up to date, signed, witnessed and in EHR, dated today 9/7/23, which will be updated annually per policy. Patient is aware of risk of addiction on this medication, understands need to follow up for a review every 3 months and medications will be adjusted or decreased as deemed appropriate at each visit.  No history of drug or alcohol abuse.  No concerns about diversion or abuse. Patient denies side effects related to the medication.  Patient is aware of random urine drug screens and pill counts. The dosing of this medication will be reviewed on a regular basis and reduced if possible. Ongoing use of a controlled substance is necessary for this patient to have a normal quality of life.    Labs: POC UDS today in clinic resulted as +THC, which patient reported continues to take CBD oil in the evening for muscle relaxation to help with sleep, takes 3 drops. Obtains from [place] from a retired [employer].     Symptoms of  ADHD are under good control with current medication regimen.  Reminded patient to practice mindfulness and behavioral modifications to help with shifting from one uncompleted activity to another, to continue with alarms, reminders. Patient informed that CBD oil is not regulated by the FDA and each bottle could contain various amounts of THC, however, if confirmation deems positive for THC, patient  has been advised to stop CBD oil.     Patient will be traveling to [place] 11/2-11/16/23 and concerned with ability to obtain medication timely, instructed patient to remind provider with refill in Oct or early Nov. So the supply can be extended to cover days of travel.   Patient was given instructions and counseling regarding condition and for health maintenance advice. Please see specific information pulled into the AVS if appropriate.    Patient to contact provider if symptoms worsen or fail to improve.        7/5/23:   Continue Adderall XR 15 mg by mouth daily in the morning to target symptoms of ADHD including:  Inattentiveness & impaired concentration.  Risks, benefits, side effects discussed with patient including elevated heart rate, elevated blood pressure, irritability, insomnia, sexual dysfunction, appetite suppressing properties, psychosis.  After discussion of these risks and benefits, the patient voiced understanding and agreed to proceed. Cali reviewed, LAST DISPENSED 6/8/23 #30/30 DAYS  Informed patient order would be sent to Dr. Harkins in separate entry for signature due to EMR system, and will not see as refilled on AVS today.  First available fill date of 7/7/23.     Symptoms of ADHD are under good control with current medication regimen.  Informed patient next visit will be scheduled for in the office for annual CSA and UDS.  Scheduled for Thurs 9/7/23 at 10 am, instructed patient to arrive by 945 am to complete UDS prior to start of visit to allow ample time for results to show by end of visit.   Patient was given instructions and counseling regarding condition and for health maintenance advice. Please see specific information pulled into the AVS if appropriate.    Patient to contact provider if symptoms worsen or fail to improve.       4/4/23:  Continue Adderall XR 15 mg by mouth daily in the morning to target symptoms of ADHD including:  Inattentiveness & impaired concentration.  Risks,  benefits, side effects discussed with patient including elevated heart rate, elevated blood pressure, irritability, insomnia, sexual dysfunction, appetite suppressing properties, psychosis.  After discussion of these risks and benefits, the patient voiced understanding and agreed to proceed. Cali reviewed, LAST DISPENSED 3/15/23 #30/30 DAYS  Patient to request refill when appropriate. Patient has 9 pills remaining after taking this morning dose, instructed patient to try to refill via [facility] on Monday 4/10/23, and provider will send refill in on Tues. 4/11/23 with first allowed fill date of Thurs 4/13/23.     Controlled substance documentation: Cali reviewed; prior urine drug screen consistent obtained 9/16/22; consent is up to date, signed, witnessed and in EHR, dated 9/16/22, which will be updated annually per policy. Patient is aware of risk of addiction on this medication, understands need to follow up for a review every 3 months and medications will be adjusted or decreased as deemed appropriate at each visit.  No history of drug or alcohol abuse.  No concerns about diversion or abuse. Patient denies side effects related to the medication.  Patient is aware of random urine drug screens and pill counts. The dosing of this medication will be reviewed on a regular basis and reduced if possible..  Ongoing use of a controlled substance is necessary for this patient to have a normal quality of life.  Symptoms of ADHD are under good control with Adderall XR 15 mg daily. Patient to contact provider if symptoms worsen or fail to improve.       3/10/23:  TELEPHONE  Patient called to check in re:her medication (per dAelaida's request)  Patient says she  Has 5 days worth of medication left.  Please send refill when appropriate and please advise      2/10/23:  TELPHONE  Patient called to report that the [facility] at [place] and [facility] are now out of the Adderall XR and she says she did call the  [facility] at [facility] and [place] (I did change in this request.).  Please resubmit.  Med not pended      2/8/23:  Continue Adderall XR 15 mg by mouth daily in the morning to target symptoms of ADHD including:  Inattentiveness & impaired concentration.  Risks, benefits, side effects discussed with patient including elevated heart rate, elevated blood pressure, irritability, insomnia, sexual dysfunction, appetite suppressing properties, psychosis.  After discussion of these risks and benefits, the patient voiced understanding and agreed to proceed. Cali reviewed, LAST DISPENSED 1/11/23 #30/30 DAYS  Informed patient order would be sent to Dr. Harkins in separate entry for signature due to EMR system, and will not see as refilled on AVS today.    Symptoms of ADHD are under good control with Adderall XR 15 mg daily. Instructed to request refill via pharmacy when appropriate.  Will coordinate with staff to ensure refill requests are sent to this provider.  Due to pandemic state of emergency  ending 5/11/23, discussed upcoming travel as patient is aware telehealth cannot be provided across state lines nor can prescription of controlled substances, patient has plans to go to [place] for 2 weeks in November 2023 at this time, and will update provider for any other travels to ensure patient has adequate supply of medication.  Patient to contact provider if symptoms worsen or fail to improve.       1/4/23:  Continue Adderall XR 15 mg by mouth daily in the morning to target symptoms of ADHD including:  Inattentiveness & impaired concentration.  Risks, benefits, side effects discussed with patient including elevated heart rate, elevated blood pressure, irritability, insomnia, sexual dysfunction, appetite suppressing properties, psychosis.  After discussion of these risks and benefits, the patient voiced understanding and agreed to proceed. Cali reviewed, LAST DISPENSED 12/12/22 #30/30 DAYS  Patient instructed to call [facility] Monday 1/9/23 to determine medication availability due to nationwide shortage of Adderall, and to then contact provider [place] directly to inform as patient will have to transfer all medications to [facility] if continued to use that pharmacy.  Patient also instructed to inquire with pharmacy of earliest dispense date as most pharmacies are 24-48 hrs for controlled substances.   Symptoms of ADHD are under good control with Adderall XR 15 mg, denies side effects, though has noted decreased appetite with weight loss, which patient has also increased daily activity, exercising, and has been under some increased stress with parents care.  Will continue to check in with patient regarding weight loss.  Lengthy discussion with patient today regarding telehealth services as patient and provider must both be located in the same state of [place], and would not be able to provide telehealth services while visiting daughter in [place].  Patient verbalized understanding.  Patient to contact provider if symptoms worsen or fail to improve.         11/22/22:  Continue Adderall XR 10 mg by mouth daily in the morning to target symptoms of ADHD including:  Inattentiveness & impaired concentration.  Risks, benefits, side effects discussed with patient including elevated heart rate, elevated blood pressure, irritability, insomnia, sexual dysfunction, appetite suppressing properties, psychosis.  After discussion of these risks and benefits, the patient voiced understanding and agreed to proceed. Cali reviewed, LAST DISPENSED 10/28/22 #42/42 DAYS, reported #20 remain in bottle.    Will send in order for Adderall XR 5 mg by mouth daily in the morning for 20 days (through 12/12/22), patient instructed to add the 5 mg dose to the 10 mg to equal 15 mg total daily in the morning.  Patient instructed to contact provider in office when down to 3-5 days remaining of supply. Call 12/8/22.  Informed patient order would be sent to Dr. Harkins in separate  "entry for signature due to EMR system, and will not see as refilled on AVS today.    Controlled substance documentation: Cali reviewed; prior urine drug screen consistent obtained 9/16/22; consent is up to date, signed, witnessed and in EHR, dated 9/16/22, which will be updated annually per policy. Patient is aware of risk of addiction on this medication, understands need to follow up for a review every 3 months and medications will be adjusted or decreased as deemed appropriate at each visit.  No history of drug or alcohol abuse.  No concerns about diversion or abuse. Patient denies side effects related to the medication.  Patient is aware of random urine drug screens and pill counts. The dosing of this medication will be reviewed on a regular basis and reduced if possible..  Ongoing use of a controlled substance is necessary for this patient to have a normal quality of life.    ADHD symptoms are under fair control with Adderall XR 10 mg, will increase to 15 mg.   Patient to contact provider if symptoms worsen or fail to improve.       10/31/22:  TELEPHONE  Patient called and LMVM that the [facility] in [place] did fill the Rx for her Adderall XR 10mg.  Patient just wanted to let Adelaida know.  Patient said she was \"happy about it\"   Prescription was verified as dispensed with pharmacy in [place].     10/28/22:  TELEPHONE  Please determine when she will return to [place], as she was supposed to return 10/25/22 and was originally given adequate supply through 10/28/22.    Called and spoke with patient, she states that she will be back on Wednesday Nov.2,2022 and will take her last pill tomorrow.  Patient does say that the [facility] in [place] has now reopened but they say they are very backed up and someone even told her that they will probably not fill it because Dr. Harkins is not licensed in [place].  Patient says she will wait to see if they do fill it over the weekend and she will call me back on Monday " "and let me know.  If they don't fill it she will just have it sent to the [facility] in [place], [place].   I will forward message to  so he is updated on status, Since she will be returning next Wed. 11/2/22, a new order will not be sent into local pharmacy until confirmed dispense from [place] pharmacy, as [place] was not an option to add with [place] report attempted today.  We will have to contact the pharmacy to verify dispense, and patient needs to be reminded to not wait until down to 1 pill remaining to request refill or problem with refill, as this refill was sent in per patient request Monday 10/24/22, and this information was relayed to office today.   Patient called and LMVM that the [facility] in [place] did fill the Rx for her Adderall XR 10mg.  Patient just wanted to let Adelaida know.  Patient said she was \"happy about it\"       10/20/22:   Continue Adderall XR 10 mg by mouth daily in the morning to target symptoms of ADHD including:  Inattentiveness & impaired concentration.  Risks, benefits, side effects discussed with patient including elevated heart rate, elevated blood pressure, irritability, insomnia, sexual dysfunction, appetite suppressing properties, psychosis.  After discussion of these risks and benefits, the patient voiced understanding and agreed to proceed. [place]i reviewed, LAST DISPENSED 9/16/22 #42/42 DAYS  Patient instructed to request refill 10/25/22 upon return from [place]. Explained process for refills.   Controlled substance documentation: [place]i reviewed; prior urine drug screen consistent obtained 9/16/22; consent is up to date, signed, witnessed and in EHR, dated 9/16/22, which will be updated annually per policy. Patient is aware of risk of addiction on this medication, understands need to follow up for a review every 3 months and medications will be adjusted or decreased as deemed appropriate at each visit.  No history of drug or alcohol abuse.  No concerns about diversion " or abuse. Patient denies side effects related to the medication.  Patient is aware of random urine drug screens and pill counts. The dosing of this medication will be reviewed on a regular basis and reduced if possible..  Ongoing use of a controlled substance is necessary for this patient to have a normal quality of life.  Symptoms of ADHD are managed well with current dose of Adderall XR 10 mg without side effects.      9/16/22:   Declines therapy  Will potentially start Adderall XR 10 mg by mouth daily in the morning to target symptoms of ADHD including:  Inattentiveness & impaired concentration.  Risks, benefits, side effects discussed with patient including elevated heart rate, elevated blood pressure, irritability, insomnia, sexual dysfunction, appetite suppressing properties, psychosis.  After discussion of these risks and benefits, the patient voiced understanding and agreed to proceed. [place] reviewed, UDS ordered, and controlled substance agreement signed & witnessed. Patient informed this medication would not be ordered until UDS resulted and was negative, at that time a Prescription will be sent to  for signature.  Labs: POC UDS today in clinic    Lengthy discussion held with patient regarding CSA and medication education.   Bottle of Hemp oil had a QRS code to scan, which was done per patient request, which indicated percentages of ingredients as each bottle varies.  Certificate of Analysis, [facility], Cannabinoid Results: Total THC 0.231%, Total CBD 6.101%, Total Cannabinoids 6.534%. Will scan certificate to EHR.   Patient will be in [place] in October, will check with  in regards to prescribing CS sending to [place].   Will contact patient if UDS needs to be sent for confirmation. Otherwise will proceed with ordering Adderall XR.     9/15/22:   ADHD testing completed by Dr. Greg Bennett on 9/1/22 confirming a diagnosis of ADHD. (total time of review 9 mins)    Discussed ADHD treatment  options of non-stimulants vs stimulant medications, after discussion patient wishes to proceed with Adderall.  Informed patient of policy requirements prior to starting Adderall, patient will plan on coming in tomorrow due to available appointment at 8 am.  Discussed current CBD oil which patient reports is hemp based as the UDS may show positive for THC. Patient takes CBD oil for sleep and muscle aches, which has been effective.  IF UDS positive patient was informed Adderall would not be prescribed, and will have to send for a confirmation.  Patient verbalized understanding.       7/14/22:   No Medications, Declines therapy at this time.  Referral for ADHD testing with   Dr. Greg Bennett, Psychologist, MS, LPP  [address] [facility], [address]  [place] [id number] [phone]   Currently only accepting referrals for psychological testing services.  Patient to contact provider and set up appointment.  And to contact office if unable to get an appointment scheduled.   -Attached list of several other sites for ADHD testing. Patient instructed to contact providers on list to determine availability of appointments, instructed patient to take notes while calling places indicating first available appointment, and to ask to be placed on waiting list.  If an office is chosen and requests the referral order please contact my Medical Assistant, Hanh, directly at [phone] informing of chosen office and the information will be sent.    Patient with high suspicion of having ADHD, will send for formal testing and have patient return in 2 weeks to discuss medication options as if patient is able to be tested in the next 1-2 months, may wait on starting a non-stimulant medication.     Patient screened positive for depression based on a PHQ-9 score of 0 on 9/5/2023. Follow-up recommendations include: Suicide Risk Assessment performed.    TREATMENT PLAN/GOALS: Continue supportive psychotherapy efforts and medications as  indicated. Treatment and medication options discussed during today's visit. Patient ackowledged and verbally consented to continue with current treatment plan and was educated on the importance of compliance with treatment and follow-up appointments.    MEDICATION ISSUES:  MARLYS reviewed as expected.    Discussed medication options and treatment plan of prescribed medication as well as the risks, benefits, and side effects including potential falls, possible impaired driving and metabolic adversities among others. Patient is agreeable to call the office with any worsening of symptoms or onset of side effects. Patient is agreeable to call 911 or go to the nearest ER should he/she begin having SI/HI. No medication side effects or related complaints today.     MEDS ORDERED DURING VISIT:  No orders of the defined types were placed in this encounter.      Return in about 2 months (around 3/24/2024) for Video visit, medication check.         I spent 35 minutes caring for Jenn on this date of service. This time includes time spent by me in the following activities: preparing for the visit, reviewing tests, obtaining and/or reviewing a separately obtained history, performing a medically appropriate examination and/or evaluation, counseling and educating the patient/family/caregiver, referring and communicating with other health care professionals, documenting information in the medical record, care coordination, and scheduling    This document has been electronically signed by CHELSI Burnett  January 24, 2024 08:26 EST      Part of this note may be an electronic transcription/translation of spoken language to printed text using the Dragon Dictation System.your visit?: Other

## 2024-02-10 DIAGNOSIS — E03.9 ACQUIRED HYPOTHYROIDISM: ICD-10-CM

## 2024-02-12 DIAGNOSIS — E03.9 ACQUIRED HYPOTHYROIDISM: ICD-10-CM

## 2024-02-12 RX ORDER — LEVOTHYROXINE SODIUM 0.03 MG/1
25 TABLET ORAL DAILY
Qty: 30 TABLET | Refills: 0 | Status: SHIPPED | OUTPATIENT
Start: 2024-02-12 | End: 2024-02-12

## 2024-02-12 RX ORDER — LEVOTHYROXINE SODIUM 0.03 MG/1
25 TABLET ORAL DAILY
Qty: 90 TABLET | Refills: 0 | Status: SHIPPED | OUTPATIENT
Start: 2024-02-12

## 2024-02-13 DIAGNOSIS — F90.0 ADHD (ATTENTION DEFICIT HYPERACTIVITY DISORDER), INATTENTIVE TYPE: ICD-10-CM

## 2024-02-13 RX ORDER — DEXTROAMPHETAMINE SACCHARATE, AMPHETAMINE ASPARTATE MONOHYDRATE, DEXTROAMPHETAMINE SULFATE AND AMPHETAMINE SULFATE 3.75; 3.75; 3.75; 3.75 MG/1; MG/1; MG/1; MG/1
15 CAPSULE, EXTENDED RELEASE ORAL EVERY MORNING
Qty: 30 CAPSULE | Refills: 0 | Status: SHIPPED | OUTPATIENT
Start: 2024-02-14

## 2024-03-13 DIAGNOSIS — F90.0 ADHD (ATTENTION DEFICIT HYPERACTIVITY DISORDER), INATTENTIVE TYPE: ICD-10-CM

## 2024-03-13 RX ORDER — DEXTROAMPHETAMINE SACCHARATE, AMPHETAMINE ASPARTATE MONOHYDRATE, DEXTROAMPHETAMINE SULFATE AND AMPHETAMINE SULFATE 3.75; 3.75; 3.75; 3.75 MG/1; MG/1; MG/1; MG/1
15 CAPSULE, EXTENDED RELEASE ORAL EVERY MORNING
Qty: 30 CAPSULE | Refills: 0 | Status: SHIPPED | OUTPATIENT
Start: 2024-03-15

## 2024-03-13 NOTE — TELEPHONE ENCOUNTER
"REFILL REQUEST FROM PT FOR ADDERALL XR 15 MG CAPSULES.  amphetamine-dextroamphetamine XR (Adderall XR) 15 MG 24 hr capsule (02/14/2024)     FOLLOW UP APPT ON 03/20/2024.  PT LAST SEEN ON 01/24/2024.    \"Patient Comment: Only have a couple left. My pharmacy is closed on the weekends.\"     ORDER IS PENDED.  "

## 2024-03-20 ENCOUNTER — TELEMEDICINE (OUTPATIENT)
Dept: PSYCHIATRY | Facility: CLINIC | Age: 59
End: 2024-03-20
Payer: COMMERCIAL

## 2024-03-20 DIAGNOSIS — Z63.79 STRESS DUE TO ILLNESS OF FAMILY MEMBER: ICD-10-CM

## 2024-03-20 DIAGNOSIS — F43.29 STRESS AND ADJUSTMENT REACTION: ICD-10-CM

## 2024-03-20 DIAGNOSIS — F90.0 ADHD (ATTENTION DEFICIT HYPERACTIVITY DISORDER), INATTENTIVE TYPE: Primary | ICD-10-CM

## 2024-03-20 NOTE — PATIENT INSTRUCTIONS
"1. Should you want to get in touch with your provider, CHELSI Burnett, please contact MY Medical Assistant, Hanh, directly at [phone].  Recommend saving Hanh's direct number in phone as this is the PREFERRED & EASIEST way to get in contact with your provider.  Please leave a voice mail if you do not get an answer and she will return your call within 24 hrs. You will NOT be able to contact provider on [employer], as Behavioral Health Providers are restricted. YOU MUST CALL [phone]  If you need to speak with the on call provider after hours or on weekends, please Contact the [facility] ([phone]) and staff will be able to page the provider on call directly.     2.  In the event you need to cancel an appointment, please notify the office at least 24 hrs prior:   Contact **Hanh Medical Assistant at [facility] directly at [phone] or the [facility] ([phone])     3. MEDICATION REFILLS:  PLEASE CALL THE PHARMACY TO REQUEST ALL MEDICATION REFILLS or via [employer] TO ENSURE YOU ARE RECEIVING YOUR MEDICATIONS IN A TIMELY MANNER. The pharmacy or [facility] will send this request ELECTRONICALLY to the ordering provider.   IF YOU USE AN AUTOMATED SERVICE AT THE PHARMACY FOR REFILLS AND ARE TOLD THERE ARE \"NO REFILLS REMAINING\"   PLEASE CALL THE PHARMACY & SPEAK TO A LIVE PERSON TO VERIFY IT IS THE MOST UP TO DATE PRESCRIPTION ON FILE.    All new prescriptions will have a different number, therefore, if you were given refills for a medication today or at last visit it will not have the same number as the previous prescription.     4.  In the event you have personal crisis, contact the following crisis numbers: Suicide Prevention Hotline 1-595.193.6551 or *988, [facility] Helpline 9-493-759-RTOD; [facility] Emergency Room [phone]; text HELLO to 502543; or 911.  If you feel like harming yourself or others, call 911 right away.  You can call the 987 Suicide and Crisis " "Lifeline at  988   to [facility] a [facility], or you can connect with one using their online chat  .    5.  Never stop an antidepressant medicine without first talking to a healthcare provider. Suddenly stopping this type of medication can cause withdrawal symptoms.    6.  Counseling and talk therapy  Counseling or therapy teaches you new coping skills and more adaptive ways of thinking about problems. These tools can help you make positive changes. The benefits of counseling often last long after treatment sessions have stopped.    7.   We would appreciate your feedback, please scan the QRS code on the back of your appointment card (or see below) and complete a brief survey.  [place] location is still not available, so please click \"[place]\" location.  Thank you      SPECIFIC RECOMMENDATIONS:     1.      Medications discussed at this encounter:                   -  Request refill of Adderall XR when needed    2.      Psychotherapy recommendations:  Declined     3.     Return to clinic: 3 months Wed. 6/19/24 at 8 am via video    Please arrive at least 15 minutes before your scheduled appointment time to complete check in process.      IF you are scheduled for a [facility] VIDEO visit, YOU MUST COMPLETE THE \"E-CHECK IN\" PROCESS PRIOR TO BEGINNING THE VISIT, YOU WILL NO LONGER RECEIVE A PHONE CALL PRIOR TO ALL VIDEO VISITS; You may still complete the E-Check in for in office visits prior to appointment, you will receive multiple text/email reminders which will direct you further if needed.           "

## 2024-03-20 NOTE — PROGRESS NOTES
This provider is located [place] [address] in closed office to ensure privacy. The Patient is seen remotely using [email or web address]. Patient is being seen via telehealth and confirm that they are in a secure environment for this session. The patient's condition being diagnosed/treated is appropriate for telemedicine. The provider identified himself/herself: herself as well as her credentials.  The patient gave consent to be seen remotely, and when consent is given they understand that the consent allows for patient identifiable information to be sent to a third party as needed.  They may refuse to be seen remotely at any time. The electronic data is encrypted and password protected, and the patient has been advised of the potential risks to privacy not withstanding such measures.    You have chosen to receive care through a telehealth visit.  Do you consent to use a video/audio connection for your medical care today? Yes      Subjective   Jenn James is a 58 y.o. female who presents today for follow up    Referring Provider:  No referring provider defined for this encounter.    Chief Complaint:  ADHD, stress adjustment reaction, stress due to illness of     Answers submitted by the patient for this visit:  Other (Submitted on 3/19/2024)  Please describe your symptoms.: Check in sonce taking adderall  Have you had these symptoms before?: Yes  How long have you been having these symptoms?: Greater than 2 weeks  Please list any medications you are currently taking for this condition.: Adderrall  Please describe any probable cause for these symptoms. : ADD  Primary Reason for Visit (Submitted on 3/19/2024)  What is the primary reason for your visit?: Other      History of Present Illness:   3/20/24:  Patient presents today via [employer] Video visit from home, located in [place], [place].  Patient reports things have improved and slowed down, has completed estate sale of parents belongings, and  " will be having bone marrow transplant mid May at [facility], which has provided some relief, and he has improved with his strength and endurance.  Patient expresses positive experience with husbands doctors. Spouses brothers came up for a weekend, and patient went to [place] with sister in law to see another sister in law whom has ALS.  Patient felt  needed visit with his brothers, they took him out golfing. Brothers also helped with other projects around the house  had started, and cared for him which he enjoyed.  Both sides of family benefited from the visits, able to see the light at the end of the tunnel. Patient was able to enjoy self and get some self care done.      Continues to tolerate Adderall XR 15 mg every morning, able to complete tasks, attention and concentration improved, less anxiety since stress with spouse has decreased and having family support. Denies side effects of elevated heart rate, elevated blood pressure, irritability, insomnia, decreased appetite, nor feeling shaky or jittery with stimulant medication.   Patient feels this past Saturday had a normal day, [place], came home fixed lunch for her and spouse, cleaned house, baked cakes for grandchildren. Patient wasn't working on the basement, getting things organized, didn't have to go to parents house, and has not had a \"normal day\" since October due to duties with parents, moving and sorting their belongings, which patient felt good had a great day.  Patient expresses some concerns of ability to continue walking and isolation precautions after spouse's surgery, though very hopeful, 100 days of isolation due to risks, able to keep animals in home.  Will have to remove live plants from around spouse, will move to upstairs area, due to potential of growing spores.    1/24/24: Patient presents today via [employer] Video visit from home, located in [place], [place].  Patient admits to " "stress due to  and parents, parents have now moved in to an AL facility.  Patient has been cleaning parents home which has been overwhelming, patient  is over there approximately 5 hrs a day.   will have a bone marrow transplant, though has to gain weight and strength, at least 30 minutes of ability to stand.   with poor appetite due to spleen enlarged pushing against his stomach, and endurance is poor of standing or walking for 5 minutes.   has to force himself to drink 2 protein shakes daily.  Patient expresses frustration about  not wanting to eat or improve in order to have the transplant.  does desire the transplant.  Patient also has worries about  not completing the living will as they currently do not have one.  \"He's hard headed, he doesn't want anyone telling him what to do. His daughter is a nurse for  in the bone marrow transplant wing in [place].\"  Patient has spoken with  and voiced concerns to have another care giver to assist her with 's needs, as patient does not have any family to help, his family lives in [place] or [place].  Tension with family members, though able to work things out.  Daughter in law is bringing down 2 grand babies today from [place], will do some crafts, visit parents at the AL.  Patient was planning to travel to [place] this month due to both grand daughters birthday's, which has been put on hold for the time being. Patient is planning on reaching out to 's daughter about care giving help.   will be in isolation after surgery while in the hospital for 20 days, and 80 days once returns home.  The 2nd care giver will be respite for patient, as patient cannot be gone more than one hour and patient struggling with what to do, as  becomes argumentative.      Patient continues to tolerate Adderall XR 15 mg for management of ADHD symptoms. Patient has gotten off task while cleaning parents with " "sister, though has been able to recognize distraction and return to task.  Sleeping well.   Denies side effects of elevated heart rate, elevated blood pressure, irritability, insomnia, decreased appetite, nor feeling shaky or jittery with stimulant medication.        12/7/23:  Patient presents today via MyChart Video visit from home, located in [place], [place].  Reports  has been diagnosed with rare blood cancer, myelofibrosis, which is a slow progression as patient was told he has had for likely 10-12 years.   had 2nd bone marrow bx yesterday, reports increased stress due to transporting parents and now  to [facility].  In process of making 4 quilts for grandchildren started in June for [employer], and has a friend whom may help with quilting.   Has not put up [employer] tree yet due to overwhelming tasks and stress.      has lost 60-70 lbs over the last 6-7 mo. Extreme fatigue, poor endurance, and is severely weak after a simple task of letting the dogs out. Patient is primarily taking care of everything in house hold.   Once results of bone marrow bx, will find out if  qualifies for a treatment that is, 30% morbidity rate for a bone marrow transplant, though still working out details and decisions.  Patient was unable to go travel to [place] to see grand kids.     Patient is trying to prioritize tasks, as house is not tidy as she would like, and feels very overwhelmed.  Patient doesn't have the energy to communicate 's health decline to all family members. Patient  relatives were willing to come to home from [place] to help and spend holidays, however, the stress of patient being the host and \"they let me be it.\"    In regards to ADHD, patient explains while sewing, will be easily distracted by tasks that are not done, such as going to  medications from pharmacy, grocery, to do this and that, as it was forgotten previously.  Patient wishes to not change " dose of Adderall as the current stress with  and holidays are contributing factors.     Denies side effects of elevated heart rate, elevated blood pressure, irritability, insomnia, decreased appetite, nor feeling shaky or jittery with stimulant medication.      9/7/23:    Patient presents today in office for annual CSA formalities, ADHD management.  Patient reports weight has been steady low 170's, continues to walk a lot though not able to exercise due to vocal cord dysfunction which effects nerves.  8 yrs ago patient had to go to speech therapy, which was caused by hyperventilating, due to tension in neck feels soreness which extends to left arm, though has had symptoms on right arm. Doing physical therapy currently and noted improvement, was told the symptoms will subside and are currently flared due to allergy to mold.     In regards to ADHD, patient reports current dose of Adderall XR 15 mg remains effective. However,  patient noticed while relatives were to come to house, in preparation patient found self jumping from task to task though was anxious about getting house prepared for their visit. Patient continues to use visual reminders. Since relatives have departed symptoms have improved. Patient does find self forgetful of keeping sunglasses in house, and is concerned if the dose needs to be adjusted. Reports the symptoms are minimal.     Denies side effects of elevated heart rate, elevated blood pressure, irritability, insomnia, decreased appetite, nor feeling shaky or jittery with stimulant medication.      Patient reports having 4 Adderall XR 15 mg remaining.   Every 2 yrs patient goes to [place] for 2 weeks, plans to leave Nov. 2, 2023 and to return 11/16/23.   Patient reports continues to take CBD oil in the evening for muscle relaxation and to help with sleep, takes 3 drops. Obtains from [place] from a retired .     7/5/23:  Patient presents today via [facility] Video visit from home, located in  "[place], [place].    Patient reports having adequate supply through Thurs 7/13/23.  Patient reports pharmacy filled last Adderall XR 15 mg early which actually took the pressure of worrying as [facility] location is not open on the weekend.    Patient reports positive outcome with current dose of Adderall XR, as family has been visiting from out of town.  Patient feels she did well with various company visiting for Memorial Day and Fourth of July, was able to complete tasks without shifting from one task to another.  Patient has had company in home since May 19, 2023 with the exception of 1 week.     Patient denies current symptoms include fidgeting, difficulty remaining seated, easy distractability, blurting out answers prematurely, inability to complete tasks, difficulty sustaining attention, shifting from one uncompleted activity to another, talking excessively, interrupting others, ineffective listening, frequently losing items.   Denies side effects of elevated heart rate, elevated blood pressure, irritability, insomnia, decreased appetite, nor feeling shaky or jittery with stimulant medication.      Patient reports parents will be moving into an AL facility as they have long term care insurance, and have looked at several facilities, patient voices worry for her father due to father caring for mother.     4/5/23:  Patient presents today via MyChart Video visit from home, located in [place], [place].  Patient asking about redness to nose and lateral nose/face \"tiny white oil, and I push it and small amount of oil comes out.\" Patient suspected possible relation to medication, patient has started washing face at night as patient was only washing in the morning.  Denies changes to laundry detergent, face cleanser, though did change skin care moisturizer which may be a contributing factor.   Mild anxiety and worry noted per screening.  Patient reports Adderall XR dose remains effective, symptoms are well " "controlled, able to maintain focus, concentration, and complete tasks. Denies side effects.     Patient daughter, son in law, and grand kids are coming in town from [place] in May for patient 60 th birthday.  Which patient is looking forward to.     2/8/23:  Patient presents today via MyChart Video visit from home in [place], [place].  Patient reports feeling good, busy with family birthdays, and going to [place] to see grand kids to do some crafts. Symptoms of inattentiveness, shifting from one uncompleted activity or topic to another, impaired concentration are controlled well with current dose of Adderall XR 10 mg. Patient frequently freezing on video and had to go outside of home for better service, though continued to have connection problems intermittently.     Patient continues to be conscious of eating, denies decreased appetite, patient is walking with friends, eats a breakfast bar normally and then eats lunch around 1130, however, yesterday got caught up shopping and realized she had not eaten.  When patient does eat she eats good quantity.  Feels she may have lost 1-2 lbs, otherwise weight has been steady.      Patient has checked with [facility] pharmacy and Adderall dose is in stock.  Patient plans to travel to [place] in November, no other trips planned for out of state at this time.          1/4/23:  Patient presents today via [employer] Video visit from home, Adderall XR was increased from 10 to 15 mg at last visit, for which patient reports the first few days of the increase felt \"I don't know if I can handle this, just a little spaced out, I don't know, but it was fine in a few days.\"  Denies increased heart rate, difficulty sleeping.   Patient feels she may be decreased weight of 15 lb, though patient has been busy with mother in the hospital and getting things together for the holidays.  Reports mother was admitted to [facility] and had medication adjustments and plan to get mother into an adult day care.  " "Tomorrow patient is taking father to see a specialist at [facility] to help him cope with mother's mental illness- \"alzheimers and delusional dementia\" per the neurologist.  Reports mother can get argumentative and paranoid.       Reports home scale weight on 1/1/3 of 183.6 lbs.  Patient reports increased walking with friends, not eating as much in the morning, \"constantly on the go.\"  Had wanted to do some fasting and weight loss.  Denies having to purchase new clothing, though noticed face was slimmer and clothes were looser.  Patient had been on weight watchers in the past before COVID and was down to 167 lbs, as patient was working out in the gym, and gym's were closed during COVID and weight returned.  Reports eating at 1130-12 noon for lunch, and 7 pm for dinner as spouse likes to eat at 8 pm.  Will also snack during the day on skinny pop popcorn.  Reports at last office visit recalled telling MA to not tell her what the weight was, however, felt \"good\" seeing the scale weight a few days ago.  Plans to start going back to the gym with friend as they would go every morning, \"that was my life.\"     ADHD symptoms are improving, as patient reports ability to listen more effectively, more patient with others is noticed the most since dose increase.  Upon feeling organized with holiday decorations, noticed was able to stay on task, only bring up one box at a time to decorate instead of having all boxes out and not being able to organize.    Patient reports having enough supply of Adderall XR 15 mg through next Wed. 1/11/23.  Reports after speaking with pharmacist at [facility] was told all medications would have to be transferred from [facility] and [facility] will not only fill the Adderall prescription. Patient had used [facility] due to [facility] not having Adderall XR available.  Patient is also concerned due to frequent travels of having adequate supply and ability to fill medication at outlying pharmacies. " "      11/22/22:  Patient presents today via MyChart Video visit from patient home.  Patient reports would like to increase dose possibly, reports spouse notices \"I am drifting some.\" Patient further explains will frequently jump to other topics during conversation, though improved with Adderall XR 10 mg.  Patient reports daughter was able to notice improvement, though now that patient has returned to home from travels, she is noticing elevation in symptoms.      Patient reports some difficulty with sleeping, as last night had minimal sleep as patient is worried about mother whom has dementia and father is caring for mother, has had  involved.  Believes temporary, situational anxiety is reason for sleep difficulty.  Patient reports mother's dementia symptoms are worsening and difficult to deal with, though hopeful as  has set up for assistant to come to home several days a week to help with house cleaning, meal prep, and care.       10/20/22:  Patient presents today via MyChart Video visit from daughter's home in [place].   Patient was started on Adderall XR 10 mg at last visit for which patient reports the first few days had increased energy, which had company, had difficulty sleeping the first 2 nights, however, no longer having difficulty.  Patient reports taking medication at 7 am, one day she took it later at 930 am and had difficulty sleeping that night. Patient has been taking thyroid medication upon awakening to use the bathroom around 5-530 am and upon waking up around 7 takes the Adderall.      \"I think it's a lot better, my daughter said she seems to notice, I still have a tendency to interrupt but that's better, I don't seem to repeat questions, as it was an issue before because I was not really listening. My mind was somewhere else.\"   Denies side effects, patient was surprised she was able to sit still after a few days, now able to complete tasks as less distraction, able to " "continue with task at hand before switching to another task.     Patient will be returning from [place] 10/25/22.     9/16/22:  Patient presents today in office today to further discuss ADHD treatment with a controlled substance and to complete CSA and obtain POC UDS today per policy.     Patient brought in bottle of Hemp CBD oil 3200mg/2 oz, 1-3 drops 3 times daily on bottle, however, patient only takes in the evening for post menopausal symptoms, muscle aches, and vocal cord dysfunction, \"my neck tenses up , had to do physical therapy in past, and oil helps, I found out I am Allergic to mold.\"    9/15/22:  Patient presents today via MyChart Video visit from home, reports received ADHD test results though has not discussed.  Patient continues to have symptoms of inttentiveness, concentration difficulty, difficulty completing tasks, and switching from one uncompleted task to another.     Patient does take 3 drops of CBD oil, hemp based, at night for sleep, relaxes muscles as patient started taking while going through menopause.   Patient reports  is allergic to Wellbutrin and daughter tried Wellbutrin and did not do well, as patient would prefer to try Adderall first rather than a non-stimulant medication.  Patient will be out of town for 2 weeks in October visiting daughter in [place], likely early October.       7/14/22:  INITIAL EVAL  Patient presents today via MyChart Video visit from home with a history of depression and anxiety for which treatment was started in late 1995 with therapy, and medications from [id number] due to divorce.     Patient is currently not taking any psychotropic medications nor has had any since 2005.     \"My issue is I can't concentrate, I start something and cant finish something, forgets things often\".  Patient recalls while in school always \"fidgety\" crossing legs, moving often in seat.  Admits to interrupting people, gets distracted easily, daughter noticing.    Admits " "to over the past 2 yrs began to feel ADHD may be a possibility, \"I am tired of being like this, forgetting stuff, going in and out of the house, tired of interrupting people, it seems to be bothering me more.\"  Patient uses reminders, lists which was started while kids in high school and has continued to have  self add items needed to List,\" I walk out without my list from the fridge, and I have to have him take a picture of it and send it to me\".  Difficulty sitting still when you should be sitting still in [employer] or meetings. Impulsive with shopping tends to purchase items that may be on sale or those that she may not need with the idea of \"I can always return it\".     Symptoms include fidgeting, difficulty remaining seated, easy distractability, blurting out answers prematurely, inability to complete tasks, difficulty sustaining attention, shifting from one uncompleted activity to another, talking excessively, interrupting others, ineffective listening, frequently losing items, and impulsivity.    ADHD:   Elementary school:   Grades:A's and B's  Special classes or failures:no  Got in trouble:no  Referral for ADHD testing:no  Fhx:son, diagnosed officially in 7th grade, took medications for 1-2 yrs, restarted while in college only, \"he probably should be taking something, I have suggested it to him\"  Presently:  Problems with attention to detail:yes  Problems with sustained attention:Yes  Problems listening when spoken to directly:Yes, unable to concentrate on what others are saying, \"I have to get my point across, I know I have to wait for the break, but I can't wait for the break.\"  Failure to finish tasks:yes, \"I will lay my husbandsTshirts on couch intending to hang up and they will be there for 2-3 days\" house hold tasks-dusting, mopping, find need to focus on floor boards  Avoids tasks that require sustained mental effort:yes, \"I excelled at work,  I could multi-task, 5-6 projects at desk, however, " "would wait until the last week to update credentials, I put off stuff and procrastinate all the time\"  Easily distracted:yes  Forgetting things:yes  Losing things:yes  Hard to organize:yes  Talks a lot and cutting people off:yes  Drifts off during conversations:yes, \"I have to concentrate what I need to say to not forget what I have say, then I blurt it out, I am trying really hard, it's even hard with you to not stop and say stuff\"  Difficulty with Reading:yes, \"I used to read a lot, has been using Audio books to listen in car or while out with friends or walking, has to rewind at times because my mind drifts off.\"  Difficulty watching TV/Movies:\"not really, we hardly ever have the TV on anymore.\"       PHQ-9 Depression Screening  PHQ-9 Total Score: (P) 1 3/19/2024 1    Little interest or pleasure in doing things? (P) 0-->not at all   Feeling down, depressed, or hopeless? (P) 0-->not at all   Trouble falling or staying asleep, or sleeping too much? (P) 1-->several days   Feeling tired or having little energy? (P) 0-->not at all   Poor appetite or overeating? (P) 0-->not at all   Feeling bad about yourself - or that you are a failure or have let yourself or your family down? (P) 0-->not at all   Trouble concentrating on things, such as reading the newspaper or watching television? (P) 0-->not at all   Moving or speaking so slowly that other people could have noticed? Or the opposite - being so fidgety or restless that you have been moving around a lot more than usual? (P) 0-->not at all   Thoughts that you would be better off dead, or of hurting yourself in some way? (P) 0-->not at all   PHQ-9 Total Score (P) 1     SUZANNE-7  Feeling nervous, anxious or on edge: (P) Several days  Not being able to stop or control worrying: (P) Several days  Worrying too much about different things: (P) Several days  Trouble Relaxing: (P) Not at all  Being so restless that it is hard to sit still: (P) Not at all  Feeling afraid as if " something awful might happen: (P) Not at all  Becoming easily annoyed or irritable: (P) Not at all  SUZANNE 7 Total Score: (P) 3  If you checked any problems, how difficult have these problems made it for you to do your work, take care of things at home, or get along with other people: (P) Not difficult at all 3/19/2024     Past Surgical History:  Past Surgical History:   Procedure Laterality Date    BLADDER SUSPENSION  2008    COLON SURGERY      COLONOSCOPY      ESSURE TUBAL LIGATION  1999    JOINT REPLACEMENT  Jan 2017    Right hip replacement    KIDNEY SURGERY      OTHER SURGICAL HISTORY      metal implant    TUBAL ABDOMINAL LIGATION         Problem List:  Patient Active Problem List   Diagnosis    Primary osteoarthritis of right hip    Status post right hip replacement    Hypothyroidism (acquired)    Paroxysmal atrial fibrillation    Anxiety    Deep venous thrombosis    Disorder of vocal cord    Edema of lower extremity    Hyperlipidemia    Hyperthyroidism    Onychomycosis    Pain of right hip joint    Laryngitis due to gastroesophageal reflux    Sensation of heaviness in extremities    Varicose veins of lower extremity    Vitamin deficiency       Allergy:   Allergies   Allergen Reactions    Sulfa Antibiotics Rash        Discontinued Medications:  There are no discontinued medications.      Current Medications:   Current Outpatient Medications   Medication Sig Dispense Refill    levothyroxine (SYNTHROID, LEVOTHROID) 25 MCG tablet TAKE 1 TABLET BY MOUTH DAILY 90 tablet 0    amphetamine-dextroamphetamine XR (Adderall XR) 15 MG 24 hr capsule Take 1 capsule by mouth Every Morning Indications: Attention Deficit Hyperactivity Disorder 30 capsule 0    azelastine (ASTELIN) 0.1 % nasal spray USE 1 TO 2 SPRAYS IN EACH NOSTRIL TWICE DAILY AS NEEDED FOR RUNNY NOSE OR SNEEZING OR POST NASAL DRIP      cetirizine (zyrTEC) 10 MG tablet Take 1 tablet by mouth Daily.      Cyanocobalamin (Vitamin B 12) 250 MCG lozenge Take 2,500 mcg  "by mouth.      EPINEPHrine (EPIPEN) 0.3 MG/0.3ML solution auto-injector injection       Magnesium Oxide (MAGNESIUM EXTRA STRENGTH PO) Take  by mouth.      Milk Thistle 150 MG capsule Take  by mouth.      montelukast (SINGULAIR) 10 MG tablet Take 1 tablet by mouth Every Night.      Vitamin D-Vitamin K (K2 Plus D3) 100-1000 MCG-UNIT tablet Take  by mouth Daily. Patient takes drops (not tablets) due to GI upset with tablet       No current facility-administered medications for this visit.       Past Medical History:  Past Medical History:   Diagnosis Date    ADHD (attention deficit hyperactivity disorder) Sep/22    Now on adderrall    Allergic Whole life    Anxiety     Cervical cancer screening 2109    Chronic allergic rhinitis     Deep vein thrombosis     Caused after my hip replacement.    Depression     Hyperlipemia 03/15/2017    Hypothyroidism 03/15/2017    Kidney stones     Limb pain     Limb swelling        Past Psychiatric History:  Began Treatment: started in  with therapy due to spouse having an affair  Diagnoses:Depression and Anxiety  Psychiatrist: last seen from [id number]  Therapist: last seen from [id number]  Admission History:Denies  Medication Trials: Prozac--for one year \"felt like i was floating;\" Zoloft for 1 yr 95-96, then started Effexor with divorce in  for1 yr-during time having increased stress with divorce as pt had lost hair and stomach problems and asked to be placed on meds; tolerated well  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  Kids:Yes or If so, how many 2 children and 2 step " children  House:Lives in a house   History: Denies  Access to Guns: no    Social History     Socioeconomic History    Marital status:      Spouse name: Abdias    Number of children: 2    Highest education level: Some college, no degree   Tobacco Use    Smoking status: Never    Smokeless tobacco: Never   Vaping Use    Vaping status: Never Used   Substance and Sexual Activity    Alcohol use: Yes     Alcohol/week: 3.0 - 4.0 standard drinks of alcohol     Types: 3 - 4 Glasses of wine per week     Comment: drinks daily, wine, beer and lquor    Drug use: Never     Types: Marijuana     Comment: In teenage years    Sexual activity: Yes     Partners: Male     Birth control/protection: Surgical       Family History:   Suicide Attempts: Denies  Suicide Completions:Denies      Family History   Problem Relation Age of Onset    Dementia Mother     Diabetes type II Mother     Cancer Mother 70        Kidney cancer (left kidney removed) and endometrial cancer (hysterectomy)    Diabetes Mother         Type 2    Stroke Mother         TIA    Prostate cancer Father     Hearing loss Father         Hearing aids    Other Maternal Grandmother         Colon cancer    Colon cancer Maternal Grandmother 70    Diabetes Paternal Grandmother         Type 2    ADD / ADHD Son        Developmental History:   Born: [place], lived in [place] since age 1  Siblings:1 sister, 1 brother-both younger  Childhood: Denies Abuse  High School:Completed  College: 2 yrs, no degree    Mental Status Exam:   Hygiene:   good  Cooperation:  Cooperative  Eye Contact:  Good  Psychomotor Behavior:  Appropriate  Affect:  Appropriate  Mood: euthymic   Speech:  Normal  Thought Process:  Goal directed  Thought Content:  Mood congruent  Suicidal:  None  Homicidal:  None  Hallucinations:  None  Delusion:  None  Memory:  Intact  Orientation:  Grossly intact  Reliability:  good  Insight:  Good  Judgement:  Good  Impulse Control:  Good  Physical/Medical Issues:  Yes  Hypothyroidism,OA rt hip,paroxysmal afib, HLD      Review of Systems:  Review of Systems   Constitutional:  Negative for appetite change, diaphoresis and fatigue.   HENT:  Negative for drooling.    Eyes:  Negative for visual disturbance.   Respiratory:  Negative for cough and shortness of breath.    Cardiovascular:  Negative for chest pain, palpitations and leg swelling.   Gastrointestinal:  Negative for nausea and vomiting.   Endocrine: Negative for cold intolerance and heat intolerance.   Genitourinary:  Negative for difficulty urinating.   Musculoskeletal:  Negative for joint swelling.   Allergic/Immunologic: Negative for immunocompromised state.   Neurological:  Negative for dizziness, seizures, speech difficulty and numbness.   Psychiatric/Behavioral:  Negative for decreased concentration, self-injury, sleep disturbance and suicidal ideas. The patient is nervous/anxious. The patient is not hyperactive.          Physical Exam:  Physical Exam  Psychiatric:         Attention and Perception: Attention and perception normal.         Mood and Affect: Mood and affect normal.         Speech: Speech normal.         Behavior: Behavior normal. Behavior is cooperative.         Thought Content: Thought content normal. Thought content does not include suicidal ideation. Thought content does not include suicidal plan.         Cognition and Memory: Cognition and memory normal.         Judgment: Judgment normal.         Vital Signs:   There were no vitals taken for this visit.       Lab Results:   Lab on 01/22/2024   Component Date Value Ref Range Status    Glucose 01/22/2024 102 (H)  65 - 99 mg/dL Final    BUN 01/22/2024 15  8 - 23 mg/dL Final    Creatinine 01/22/2024 0.83  0.57 - 1.00 mg/dL Final    Sodium 01/22/2024 142  136 - 145 mmol/L Final    Potassium 01/22/2024 4.3  3.5 - 5.2 mmol/L Final    Chloride 01/22/2024 104  98 - 107 mmol/L Final    CO2 01/22/2024 28.0  22.0 - 29.0 mmol/L Final    Calcium 01/22/2024 9.3  8.6 -  10.5 mg/dL Final    Total Protein 01/22/2024 6.8  6.0 - 8.5 g/dL Final    Albumin 01/22/2024 4.3  3.5 - 5.2 g/dL Final    ALT (SGPT) 01/22/2024 24  1 - 33 U/L Final    AST (SGOT) 01/22/2024 25  1 - 32 U/L Final    Alkaline Phosphatase 01/22/2024 63  39 - 117 U/L Final    Total Bilirubin 01/22/2024 0.6  0.0 - 1.2 mg/dL Final    Globulin 01/22/2024 2.5  gm/dL Final    A/G Ratio 01/22/2024 1.7  g/dL Final    BUN/Creatinine Ratio 01/22/2024 18.1  7.0 - 25.0 Final    Anion Gap 01/22/2024 10.0  5.0 - 15.0 mmol/L Final    eGFR 01/22/2024 80.8  >60.0 mL/min/1.73 Final    Total Cholesterol 01/22/2024 202 (H)  0 - 200 mg/dL Final    Triglycerides 01/22/2024 50  0 - 150 mg/dL Final    HDL Cholesterol 01/22/2024 70 (H)  40 - 60 mg/dL Final    LDL Cholesterol  01/22/2024 123 (H)  0 - 100 mg/dL Final    VLDL Cholesterol 01/22/2024 9  5 - 40 mg/dL Final    LDL/HDL Ratio 01/22/2024 1.74   Final    TSH 01/22/2024 4.490 (H)  0.270 - 4.200 uIU/mL Final    Vitamin B-12 01/22/2024 1,337 (H)  211 - 946 pg/mL Final    Folate 01/22/2024 5.97  4.78 - 24.20 ng/mL Final    25 Hydroxy, Vitamin D 01/22/2024 80.1  30.0 - 100.0 ng/ml Final    WBC 01/22/2024 5.84  3.40 - 10.80 10*3/mm3 Final    RBC 01/22/2024 4.64  3.77 - 5.28 10*6/mm3 Final    Hemoglobin 01/22/2024 13.9  12.0 - 15.9 g/dL Final    Hematocrit 01/22/2024 42.1  34.0 - 46.6 % Final    MCV 01/22/2024 90.7  79.0 - 97.0 fL Final    MCH 01/22/2024 30.0  26.6 - 33.0 pg Final    MCHC 01/22/2024 33.0  31.5 - 35.7 g/dL Final    RDW 01/22/2024 11.7 (L)  12.3 - 15.4 % Final    RDW-SD 01/22/2024 38.8  37.0 - 54.0 fl Final    MPV 01/22/2024 9.9  6.0 - 12.0 fL Final    Platelets 01/22/2024 267  140 - 450 10*3/mm3 Final    Neutrophil % 01/22/2024 61.2  42.7 - 76.0 % Final    Lymphocyte % 01/22/2024 24.7  19.6 - 45.3 % Final    Monocyte % 01/22/2024 8.6  5.0 - 12.0 % Final    Eosinophil % 01/22/2024 4.3  0.3 - 6.2 % Final    Basophil % 01/22/2024 1.0  0.0 - 1.5 % Final    Immature Grans % 01/22/2024  0.2  0.0 - 0.5 % Final    Neutrophils, Absolute 01/22/2024 3.58  1.70 - 7.00 10*3/mm3 Final    Lymphocytes, Absolute 01/22/2024 1.44  0.70 - 3.10 10*3/mm3 Final    Monocytes, Absolute 01/22/2024 0.50  0.10 - 0.90 10*3/mm3 Final    Eosinophils, Absolute 01/22/2024 0.25  0.00 - 0.40 10*3/mm3 Final    Basophils, Absolute 01/22/2024 0.06  0.00 - 0.20 10*3/mm3 Final    Immature Grans, Absolute 01/22/2024 0.01  0.00 - 0.05 10*3/mm3 Final    nRBC 01/22/2024 0.0  0.0 - 0.2 /100 WBC Final   Lab on 09/29/2023   Component Date Value Ref Range Status    TSH 09/29/2023 3.300  0.270 - 4.200 uIU/mL Final       EKG Results:  No orders to display       Imaging Results:  No Images in the past 120 days found..      Assessment & Plan   Diagnoses and all orders for this visit:    1. ADHD (attention deficit hyperactivity disorder), inattentive type (Primary)    2. Stress and adjustment reaction    3. Stress due to illness of family member              Visit Diagnoses:    ICD-10-CM ICD-9-CM   1. ADHD (attention deficit hyperactivity disorder), inattentive type  F90.0 314.00   2. Stress and adjustment reaction  F43.29 309.89   3. Stress due to illness of family member  Z63.79 V61.49             PLAN:  1.   Safety: No acute safety concerns  Therapy: None  Risk Assessment: Risk of self-harm acutely is low.  Risk factors include anxiety disorder, mood disorder, and recent psychosocial stressors (pandemic). Protective factors include no family history, denies access to guns/weapons, no present SI, no history of suicide attempts or self-harm in the past, minimal AODA, healthcare seeking, future orientation, willingness to engage in care.  Risk of self-harm chronically is also low, but could be further elevated in the event of treatment noncompliance and/or AODA.  Meds:  Continue Adderall XR 15 mg by mouth daily in the morning to target symptoms of ADHD including:  Inattentiveness & impaired concentration.  Risks, benefits, side effects  discussed with patient including elevated heart rate, elevated blood pressure, irritability, insomnia, sexual dysfunction, appetite suppressing properties, psychosis.  After discussion of these risks and benefits, the patient voiced understanding and agreed to proceed. Clai reviewed, LAST DISPENSED 3/15/24 #30/30 DAYS, patient to request refill when needed. Will plan for next refill for 90 days if pharmacy will allow, due to duration of therapy, compliance, and upcoming surgery with isolation precautions of spouse in May, to limit frequent visits to the pharmacy.      Controlled substance documentation: Cali reviewed; prior urine drug screen consistent obtained 9/7/23;; consent is up to date, signed, witnessed and in EHR, dated 9/7/23, which will be updated annually per policy. Patient is aware of risk of addiction on this medication, understands need to follow up for a review every 3 months and medications will be adjusted or decreased as deemed appropriate at each visit.  No history of drug or alcohol abuse.  No concerns about diversion or abuse. Patient denies side effects related to the medication.  Patient is aware of random urine drug screens and pill counts. The dosing of this medication will be reviewed on a regular basis and reduced if possible. Ongoing use of a controlled substance is necessary for this patient to have a normal quality of life.    Labs: n/a    Symptoms of stress related to spouses illness and ADHD are under good control with current medication regimen.  Overall, patient improving with stress, positive outlook.  Will plan for next visit in 3 months via video.   Patient was given instructions and counseling regarding condition and for health maintenance advice. Please see specific information pulled into the AVS if appropriate.    Patient to contact provider if symptoms worsen or fail to improve.        1/24/24:  Continue Adderall XR 15 mg by mouth daily in the morning to target symptoms  of ADHD including:  Inattentiveness & impaired concentration. LAST DISPENSED 1/15/24 #30/30 DAYS, patient to request refill when needed.    Symptoms of ADHD are under good control with current medication regimen.  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  Emotional support given, suggested having  daughter whom is a nurse to discuss feeding tube options, necessity of following doctor instructions.  Patient was given instructions and counseling regarding condition and for health maintenance advice. Please see specific information pulled into the AVS if appropriate.    Patient to contact provider if symptoms worsen or fail to improve.       12/7/23:  Continue Adderall XR 15 mg by mouth daily in the morning to target symptoms of ADHD including:  Inattentiveness & impaired concentration.  Risks, benefits, side effects discussed with patient including elevated heart rate, elevated blood pressure, irritability, insomnia, sexual dysfunction, appetite suppressing properties, psychosis.  After discussion of these risks and benefits, the patient voiced understanding and agreed to proceed. Cali reviewed, LAST DISPENSED 11/17/23 #30/30 DAYS, Informed patient order would be sent to Dr. Harkins in separate entry for signature due to EMR system, and will not see as refilled on AVS today. First fill date for Friday 12/15/23 as pharmacy is not open on weekends. [facility].    Controlled substance documentation: Cali reviewed; prior urine drug screen consistent obtained 9/7/23;; consent is up to date, signed, witnessed and in EHR, dated 9/7/23, which will be updated annually per policy. Patient is aware of risk of addiction on this medication, understands need to follow up for a review every 3 months and medications will be adjusted or decreased as deemed appropriate at each visit.  No history  of drug or alcohol abuse.  No concerns about diversion or abuse. Patient denies side effects related to the medication.  Patient is aware of random urine drug screens and pill counts. The dosing of this medication will be reviewed on a regular basis and reduced if possible. Ongoing use of a controlled substance is necessary for this patient to have a normal quality of life.    Symptoms of ADHD are under good control with current medication regimen. However, due to new cancer diagnosis with spouse, holidays, increased care giving tasks, as spouse is unable to help with simple tasks.  Patient wishes to remain on current  dose of Adderall as current stressors are situational which are negatively impacting day to day activities, however, patient does not want to depend on a medication.  Will re-evaluate in 7 weeks.   Patient was given instructions and counseling regarding condition and for health maintenance advice. Please see specific information pulled into the AVS if appropriate.    Patient to contact provider if symptoms worsen or fail to improve.        9/7/23:   Continue Adderall XR 15 mg by mouth daily in the morning to target symptoms of ADHD including:  Inattentiveness & impaired concentration.  LAST DISPENSED 8/7/23 #30/30 DAYS  Informed patient order would be sent to Dr. Harkins in separate entry for signature due to EMR system, and will not see as refilled on AVS today.  Due to positive UDS, will send a prescription to Dr. Harkins today for a 7  day supply, patient has adequate supply through Monday 9/11/23. Instructed patient to contact provider on Monday 9/18/23 to request refill.     Controlled substance documentation: Clai reviewed; prior urine drug screen consistent obtained 9/16/22, ordered today and results discussed with patient ; consent is up to date, signed, witnessed and in EHR, dated today 9/7/23, which will be updated annually per policy. Patient is aware of risk of addiction on this medication,  understands need to follow up for a review every 3 months and medications will be adjusted or decreased as deemed appropriate at each visit.  No history of drug or alcohol abuse.  No concerns about diversion or abuse. Patient denies side effects related to the medication.  Patient is aware of random urine drug screens and pill counts. The dosing of this medication will be reviewed on a regular basis and reduced if possible. Ongoing use of a controlled substance is necessary for this patient to have a normal quality of life.    Labs: POC UDS today in clinic resulted as +THC, which patient reported continues to take CBD oil in the evening for muscle relaxation to help with sleep, takes 3 drops. Obtains from [place] from a retired .     Symptoms of  ADHD are under good control with current medication regimen.  Reminded patient to practice mindfulness and behavioral modifications to help with shifting from one uncompleted activity to another, to continue with alarms, reminders. Patient informed that CBD oil is not regulated by the FDA and each bottle could contain various amounts of THC, however, if confirmation deems positive for THC, patient has been advised to stop CBD oil.     Patient will be traveling to [place] 11/2-11/16/23 and concerned with ability to obtain medication timely, instructed patient to remind provider with refill in Oct or early Nov. So the supply can be extended to cover days of travel.   Patient was given instructions and counseling regarding condition and for health maintenance advice. Please see specific information pulled into the AVS if appropriate.    Patient to contact provider if symptoms worsen or fail to improve.        7/5/23:   Continue Adderall XR 15 mg by mouth daily in the morning to target symptoms of ADHD including:  Inattentiveness & impaired concentration.  Risks, benefits, side effects discussed with patient including elevated heart rate, elevated blood pressure, irritability,  insomnia, sexual dysfunction, appetite suppressing properties, psychosis.  After discussion of these risks and benefits, the patient voiced understanding and agreed to proceed. Cali reviewed, LAST DISPENSED 6/8/23 #30/30 DAYS  Informed patient order would be sent to Dr. Harkins in separate entry for signature due to EMR system, and will not see as refilled on AVS today.  First available fill date of 7/7/23.     Symptoms of ADHD are under good control with current medication regimen.  Informed patient next visit will be scheduled for in the office for annual CSA and UDS.  Scheduled for Thurs 9/7/23 at 10 am, instructed patient to arrive by 945 am to complete UDS prior to start of visit to allow ample time for results to show by end of visit.   Patient was given instructions and counseling regarding condition and for health maintenance advice. Please see specific information pulled into the AVS if appropriate.    Patient to contact provider if symptoms worsen or fail to improve.       4/4/23:  Continue Adderall XR 15 mg by mouth daily in the morning to target symptoms of ADHD including:  Inattentiveness & impaired concentration.  Risks, benefits, side effects discussed with patient including elevated heart rate, elevated blood pressure, irritability, insomnia, sexual dysfunction, appetite suppressing properties, psychosis.  After discussion of these risks and benefits, the patient voiced understanding and agreed to proceed. Cali reviewed, LAST DISPENSED 3/15/23 #30/30 DAYS  Patient to request refill when appropriate. Patient has 9 pills remaining after taking this morning dose, instructed patient to try to refill via [facility] on Monday 4/10/23, and provider will send refill in on Tues. 4/11/23 with first allowed fill date of Thurs 4/13/23.     Controlled substance documentation: Cali reviewed; prior urine drug screen consistent obtained 9/16/22; consent is up to date, signed, witnessed and in  EHR, dated 9/16/22, which will be updated annually per policy. Patient is aware of risk of addiction on this medication, understands need to follow up for a review every 3 months and medications will be adjusted or decreased as deemed appropriate at each visit.  No history of drug or alcohol abuse.  No concerns about diversion or abuse. Patient denies side effects related to the medication.  Patient is aware of random urine drug screens and pill counts. The dosing of this medication will be reviewed on a regular basis and reduced if possible..  Ongoing use of a controlled substance is necessary for this patient to have a normal quality of life.  Symptoms of ADHD are under good control with Adderall XR 15 mg daily. Patient to contact provider if symptoms worsen or fail to improve.       3/10/23:  TELEPHONE  Patient called to check in re:her medication (per Adelaida's request)  Patient says she  Has 5 days worth of medication left.  Please send refill when appropriate and please advise      2/10/23:  TELPHONE  Patient called to report that the [facility] at [place] and [facility] are now out of the Adderall XR and she says she did call the [facility] at [facility] and [place] (I did change in this request.).  Please resubmit.  Med not pended      2/8/23:  Continue Adderall XR 15 mg by mouth daily in the morning to target symptoms of ADHD including:  Inattentiveness & impaired concentration.  Risks, benefits, side effects discussed with patient including elevated heart rate, elevated blood pressure, irritability, insomnia, sexual dysfunction, appetite suppressing properties, psychosis.  After discussion of these risks and benefits, the patient voiced understanding and agreed to proceed. Cali reviewed, LAST DISPENSED 1/11/23 #30/30 DAYS  Informed patient order would be sent to Dr. Harkins in separate entry for signature due to EMR system, and will not see as refilled on AVS today.    Symptoms of ADHD are under good control with  Adderall XR 15 mg daily. Instructed to request refill via pharmacy when appropriate.  Will coordinate with staff to ensure refill requests are sent to this provider.  Due to pandemic state of emergency  ending 5/11/23, discussed upcoming travel as patient is aware telehealth cannot be provided across state lines nor can prescription of controlled substances, patient has plans to go to [place] for 2 weeks in November 2023 at this time, and will update provider for any other travels to ensure patient has adequate supply of medication.  Patient to contact provider if symptoms worsen or fail to improve.       1/4/23:  Continue Adderall XR 15 mg by mouth daily in the morning to target symptoms of ADHD including:  Inattentiveness & impaired concentration.  Risks, benefits, side effects discussed with patient including elevated heart rate, elevated blood pressure, irritability, insomnia, sexual dysfunction, appetite suppressing properties, psychosis.  After discussion of these risks and benefits, the patient voiced understanding and agreed to proceed. Cali reviewed, LAST DISPENSED 12/12/22 #30/30 DAYS  Patient instructed to call [facility] Monday 1/9/23 to determine medication availability due to nationwide shortage of Adderall, and to then contact provider MA directly to inform as patient will have to transfer all medications to [facility] if continued to use that pharmacy.  Patient also instructed to inquire with pharmacy of earliest dispense date as most pharmacies are 24-48 hrs for controlled substances.   Symptoms of ADHD are under good control with Adderall XR 15 mg, denies side effects, though has noted decreased appetite with weight loss, which patient has also increased daily activity, exercising, and has been under some increased stress with parents care.  Will continue to check in with patient regarding weight loss.  Lengthy discussion with patient today regarding telehealth services as patient and  provider must both be located in the same state of [place], and would not be able to provide telehealth services while visiting daughter in [place].  Patient verbalized understanding.  Patient to contact provider if symptoms worsen or fail to improve.         11/22/22:  Continue Adderall XR 10 mg by mouth daily in the morning to target symptoms of ADHD including:  Inattentiveness & impaired concentration.  Risks, benefits, side effects discussed with patient including elevated heart rate, elevated blood pressure, irritability, insomnia, sexual dysfunction, appetite suppressing properties, psychosis.  After discussion of these risks and benefits, the patient voiced understanding and agreed to proceed. Cali reviewed, LAST DISPENSED 10/28/22 #42/42 DAYS, reported #20 remain in bottle.    Will send in order for Adderall XR 5 mg by mouth daily in the morning for 20 days (through 12/12/22), patient instructed to add the 5 mg dose to the 10 mg to equal 15 mg total daily in the morning.  Patient instructed to contact provider in office when down to 3-5 days remaining of supply. Call 12/8/22.  Informed patient order would be sent to Dr. Harkins in separate entry for signature due to EMR system, and will not see as refilled on AVS today.    Controlled substance documentation: Cali reviewed; prior urine drug screen consistent obtained 9/16/22; consent is up to date, signed, witnessed and in EHR, dated 9/16/22, which will be updated annually per policy. Patient is aware of risk of addiction on this medication, understands need to follow up for a review every 3 months and medications will be adjusted or decreased as deemed appropriate at each visit.  No history of drug or alcohol abuse.  No concerns about diversion or abuse. Patient denies side effects related to the medication.  Patient is aware of random urine drug screens and pill counts. The dosing of this medication will be reviewed on a regular basis and reduced if possible..   "Ongoing use of a controlled substance is necessary for this patient to have a normal quality of life.    ADHD symptoms are under fair control with Adderall XR 10 mg, will increase to 15 mg.   Patient to contact provider if symptoms worsen or fail to improve.       10/31/22:  TELEPHONE  Patient called and LMVM that the [facility] in [place] did fill the Rx for her Adderall XR 10mg.  Patient just wanted to let Adelaida know.  Patient said she was \"happy about it\"   Prescription was verified as dispensed with pharmacy in [place].     10/28/22:  TELEPHONE  Please determine when she will return to [place], as she was supposed to return 10/25/22 and was originally given adequate supply through 10/28/22.    Called and spoke with patient, she states that she will be back on Wednesday Nov.2,2022 and will take her last pill tomorrow.  Patient does say that the [facility] in [place] has now reopened but they say they are very backed up and someone even told her that they will probably not fill it because Dr. Harkins is not licensed in [place].  Patient says she will wait to see if they do fill it over the weekend and she will call me back on Monday and let me know.  If they don't fill it she will just have it sent to the [facility] in [place], [place].   I will forward message to  so he is updated on status, Since she will be returning next Wed. 11/2/22, a new order will not be sent into local pharmacy until confirmed dispense from [place] pharmacy, as [place] was not an option to add with MARLYS report attempted today.  We will have to contact the pharmacy to verify dispense, and patient needs to be reminded to not wait until down to 1 pill remaining to request refill or problem with refill, as this refill was sent in per patient request Monday 10/24/22, and this information was relayed to office today.   Patient called and LMVM that the [facility] in [place] did fill the Rx for her Adderall XR 10mg.  Patient just " "wanted to let Adelaida know.  Patient said she was \"happy about it\"       10/20/22:   Continue Adderall XR 10 mg by mouth daily in the morning to target symptoms of ADHD including:  Inattentiveness & impaired concentration.  Risks, benefits, side effects discussed with patient including elevated heart rate, elevated blood pressure, irritability, insomnia, sexual dysfunction, appetite suppressing properties, psychosis.  After discussion of these risks and benefits, the patient voiced understanding and agreed to proceed. Cali reviewed, LAST DISPENSED 9/16/22 #42/42 DAYS  Patient instructed to request refill 10/25/22 upon return from [place]. Explained process for refills.   Controlled substance documentation: Cali reviewed; prior urine drug screen consistent obtained 9/16/22; consent is up to date, signed, witnessed and in EHR, dated 9/16/22, which will be updated annually per policy. Patient is aware of risk of addiction on this medication, understands need to follow up for a review every 3 months and medications will be adjusted or decreased as deemed appropriate at each visit.  No history of drug or alcohol abuse.  No concerns about diversion or abuse. Patient denies side effects related to the medication.  Patient is aware of random urine drug screens and pill counts. The dosing of this medication will be reviewed on a regular basis and reduced if possible..  Ongoing use of a controlled substance is necessary for this patient to have a normal quality of life.  Symptoms of ADHD are managed well with current dose of Adderall XR 10 mg without side effects.      9/16/22:   Declines therapy  Will potentially start Adderall XR 10 mg by mouth daily in the morning to target symptoms of ADHD including:  Inattentiveness & impaired concentration.  Risks, benefits, side effects discussed with patient including elevated heart rate, elevated blood pressure, irritability, insomnia, sexual dysfunction, appetite suppressing " properties, psychosis.  After discussion of these risks and benefits, the patient voiced understanding and agreed to proceed. Cali reviewed, UDS ordered, and controlled substance agreement signed & witnessed. Patient informed this medication would not be ordered until UDS resulted and was negative, at that time a Prescription will be sent to  for signature.  Labs: POC UDS today in clinic    Lengthy discussion held with patient regarding CSA and medication education.   Bottle of Hemp oil had a QRS code to scan, which was done per patient request, which indicated percentages of ingredients as each bottle varies.  Certificate of Analysis, [facility], Cannabinoid Results: Total THC 0.231%, Total CBD 6.101%, Total Cannabinoids 6.534%. Will scan certificate to EHR.   Patient will be in [place] in October, will check with  in regards to prescribing CS sending to [place].   Will contact patient if UDS needs to be sent for confirmation. Otherwise will proceed with ordering Adderall XR.     9/15/22:   ADHD testing completed by Dr. Greg Bennett on 9/1/22 confirming a diagnosis of ADHD. (total time of review 9 mins)    Discussed ADHD treatment options of non-stimulants vs stimulant medications, after discussion patient wishes to proceed with Adderall.  Informed patient of policy requirements prior to starting Adderall, patient will plan on coming in tomorrow due to available appointment at 8 am.  Discussed current CBD oil which patient reports is hemp based as the UDS may show positive for THC. Patient takes CBD oil for sleep and muscle aches, which has been effective.  IF UDS positive patient was informed Adderall would not be prescribed, and will have to send for a confirmation.  Patient verbalized understanding.       7/14/22:   No Medications, Declines therapy at this time.  Referral for ADHD testing with   Dr. Greg Bennett, Psychologist, MS, LPP  [address], [address]  [place]  [address]  [phone]   Currently only accepting referrals for psychological testing services.  Patient to contact provider and set up appointment.  And to contact office if unable to get an appointment scheduled.   -Attached list of several other sites for ADHD testing. Patient instructed to contact providers on list to determine availability of appointments, instructed patient to take notes while calling places indicating first available appointment, and to ask to be placed on waiting list.  If an office is chosen and requests the referral order please contact my Medical Assistant, Hanh, directly at [phone] informing of chosen office and the information will be sent.    Patient with high suspicion of having ADHD, will send for formal testing and have patient return in 2 weeks to discuss medication options as if patient is able to be tested in the next 1-2 months, may wait on starting a non-stimulant medication.     Patient screened positive for depression based on a PHQ-9 score of 1 on 3/19/2024. Follow-up recommendations include: Suicide Risk Assessment performed.    TREATMENT PLAN/GOALS: Continue supportive psychotherapy efforts and medications as indicated. Treatment and medication options discussed during today's visit. Patient ackowledged and verbally consented to continue with current treatment plan and was educated on the importance of compliance with treatment and follow-up appointments.    MEDICATION ISSUES:  MARLYS reviewed as expected.    Discussed medication options and treatment plan of prescribed medication as well as the risks, benefits, and side effects including potential falls, possible impaired driving and metabolic adversities among others. Patient is agreeable to call the office with any worsening of symptoms or onset of side effects. Patient is agreeable to call 911 or go to the nearest ER should he/she begin having SI/HI. No medication side effects or related complaints today.     MEDS  ORDERED DURING VISIT:  No orders of the defined types were placed in this encounter.      Return in about 3 months (around 6/20/2024) for Video visit, medication check.         I spent 30 minutes caring for Jenn on this date of service. This time includes time spent by me in the following activities: preparing for the visit, performing a medically appropriate examination and/or evaluation, counseling and educating the patient/family/caregiver, referring and communicating with other health care professionals, documenting information in the medical record, care coordination, and scheduling     This document has been electronically signed by CHELSI Burnett  March 20, 2024 08:21 EDT      Part of this note may be an electronic transcription/translation of spoken language to printed text using the Dragon Dictation System.your visit?: Other

## 2024-04-09 DIAGNOSIS — F90.0 ADHD (ATTENTION DEFICIT HYPERACTIVITY DISORDER), INATTENTIVE TYPE: ICD-10-CM

## 2024-04-10 RX ORDER — DEXTROAMPHETAMINE SACCHARATE, AMPHETAMINE ASPARTATE MONOHYDRATE, DEXTROAMPHETAMINE SULFATE AND AMPHETAMINE SULFATE 3.75; 3.75; 3.75; 3.75 MG/1; MG/1; MG/1; MG/1
15 CAPSULE, EXTENDED RELEASE ORAL EVERY MORNING
Qty: 30 CAPSULE | Refills: 0 | Status: SHIPPED | OUTPATIENT
Start: 2024-04-13

## 2024-04-10 NOTE — TELEPHONE ENCOUNTER
"PT REQUESTING REFILL ON HER ADDERALL XR 15 MG CAPSULES.    amphetamine-dextroamphetamine XR (Adderall XR) 15 MG 24 hr capsule (03/15/2024)     FOLLOW UP APPT ON 06/19/2024.  PT LAST SEEN ON 03/20/2024.    \"Patient Comment: I will run out Sunday, April 14. My pharma cy closes at 6 pm Friday. Not open on the weekends.\"     ORDER IS PENDED.    "

## 2024-04-12 ENCOUNTER — TELEPHONE (OUTPATIENT)
Dept: BEHAVIORAL HEALTH | Facility: CLINIC | Age: 59
End: 2024-04-12
Payer: COMMERCIAL

## 2024-04-12 NOTE — TELEPHONE ENCOUNTER
"Patient will need to go to the pharmacy when they open on Monday 3/15/24, as covering provider for  may not get to refills until later this evening, or I could provide 1 pill for her to obtain today for Monday morning, however, it will likely cancel out the 30 day order and may be more feasible to just go to the pharmacy on Monday morning. Called Angela and left detailed message on the pharmacy voicemail for \"Sterling\"  "

## 2024-04-12 NOTE — TELEPHONE ENCOUNTER
[facility] called asking if they can fill the patient's Adderall 1 day early due to them Not being open on the weekend.  Please advise. It appears to have been indicated when patient requested refill on 04/09/2024 (see refill encounter)

## 2024-04-12 NOTE — TELEPHONE ENCOUNTER
Patient will need to go to the pharmacy when they open on Monday 3/15/24, as covering provider for  may not get to refills until later this evening, or I could provide 1 pill for her to obtain today for Monday morning, however, it will likely cancel out the 30 day order and may be more feasible to just go to the pharmacy on Monday morning.

## 2024-04-12 NOTE — TELEPHONE ENCOUNTER
Spoke with pharmacist, Sterling, and gave verbal permission to fill Adderall XR today instead of tomorrow.  No concerns of diversion or abuse. Initially had a first fill date of today 4/12/24.

## 2024-05-06 ENCOUNTER — TELEPHONE (OUTPATIENT)
Dept: BEHAVIORAL HEALTH | Facility: CLINIC | Age: 59
End: 2024-05-06
Payer: COMMERCIAL

## 2024-05-07 ENCOUNTER — TELEMEDICINE (OUTPATIENT)
Dept: BEHAVIORAL HEALTH | Facility: CLINIC | Age: 59
End: 2024-05-07
Payer: COMMERCIAL

## 2024-05-07 DIAGNOSIS — F41.9 ANXIETY: ICD-10-CM

## 2024-05-07 DIAGNOSIS — Z63.79 STRESS DUE TO ILLNESS OF FAMILY MEMBER: ICD-10-CM

## 2024-05-07 DIAGNOSIS — Z79.899 MEDICATION MANAGEMENT: ICD-10-CM

## 2024-05-07 DIAGNOSIS — F43.29 STRESS AND ADJUSTMENT REACTION: ICD-10-CM

## 2024-05-07 DIAGNOSIS — F33.1 MAJOR DEPRESSIVE DISORDER, RECURRENT EPISODE, MODERATE: Primary | ICD-10-CM

## 2024-05-07 DIAGNOSIS — F90.0 ADHD (ATTENTION DEFICIT HYPERACTIVITY DISORDER), INATTENTIVE TYPE: ICD-10-CM

## 2024-05-07 DIAGNOSIS — Z63.8 CAREGIVER ROLE STRAIN: ICD-10-CM

## 2024-05-07 PROCEDURE — 99417 PROLNG OP E/M EACH 15 MIN: CPT | Performed by: NURSE PRACTITIONER

## 2024-05-07 PROCEDURE — 99215 OFFICE O/P EST HI 40 MIN: CPT | Performed by: NURSE PRACTITIONER

## 2024-05-07 SDOH — SOCIAL STABILITY - SOCIAL INSECURITY: OTHER SPECIFIED PROBLEMS RELATED TO PRIMARY SUPPORT GROUP: Z63.8

## 2024-05-09 ENCOUNTER — TELEPHONE (OUTPATIENT)
Dept: BEHAVIORAL HEALTH | Facility: CLINIC | Age: 59
End: 2024-05-09
Payer: COMMERCIAL

## 2024-05-09 DIAGNOSIS — F90.0 ADHD (ATTENTION DEFICIT HYPERACTIVITY DISORDER), INATTENTIVE TYPE: ICD-10-CM

## 2024-05-09 DIAGNOSIS — F33.1 MAJOR DEPRESSIVE DISORDER, RECURRENT EPISODE, MODERATE: Primary | ICD-10-CM

## 2024-05-09 DIAGNOSIS — F41.9 ANXIETY: ICD-10-CM

## 2024-05-09 RX ORDER — DEXTROAMPHETAMINE SACCHARATE, AMPHETAMINE ASPARTATE MONOHYDRATE, DEXTROAMPHETAMINE SULFATE AND AMPHETAMINE SULFATE 3.75; 3.75; 3.75; 3.75 MG/1; MG/1; MG/1; MG/1
15 CAPSULE, EXTENDED RELEASE ORAL EVERY MORNING
Qty: 30 CAPSULE | Refills: 0 | Status: SHIPPED | OUTPATIENT
Start: 2024-05-10

## 2024-05-10 DIAGNOSIS — E03.9 ACQUIRED HYPOTHYROIDISM: ICD-10-CM

## 2024-05-10 RX ORDER — LEVOTHYROXINE SODIUM 0.03 MG/1
25 TABLET ORAL DAILY
Qty: 90 TABLET | Refills: 0 | Status: SHIPPED | OUTPATIENT
Start: 2024-05-10

## 2024-06-07 DIAGNOSIS — F90.0 ADHD (ATTENTION DEFICIT HYPERACTIVITY DISORDER), INATTENTIVE TYPE: ICD-10-CM

## 2024-06-07 RX ORDER — DEXTROAMPHETAMINE SACCHARATE, AMPHETAMINE ASPARTATE MONOHYDRATE, DEXTROAMPHETAMINE SULFATE AND AMPHETAMINE SULFATE 3.75; 3.75; 3.75; 3.75 MG/1; MG/1; MG/1; MG/1
15 CAPSULE, EXTENDED RELEASE ORAL EVERY MORNING
Qty: 30 CAPSULE | Refills: 0 | Status: SHIPPED | OUTPATIENT
Start: 2024-06-10

## 2024-06-19 ENCOUNTER — TELEMEDICINE (OUTPATIENT)
Dept: PSYCHIATRY | Facility: CLINIC | Age: 59
End: 2024-06-19
Payer: COMMERCIAL

## 2024-06-19 DIAGNOSIS — F43.23 ADJUSTMENT DISORDER WITH MIXED ANXIETY AND DEPRESSED MOOD: Primary | ICD-10-CM

## 2024-06-19 DIAGNOSIS — Z79.899 MEDICATION MANAGEMENT: ICD-10-CM

## 2024-06-19 DIAGNOSIS — F90.0 ADHD (ATTENTION DEFICIT HYPERACTIVITY DISORDER), INATTENTIVE TYPE: ICD-10-CM

## 2024-06-19 DIAGNOSIS — Z63.79 STRESS DUE TO ILLNESS OF FAMILY MEMBER: ICD-10-CM

## 2024-06-19 PROCEDURE — 99215 OFFICE O/P EST HI 40 MIN: CPT | Performed by: NURSE PRACTITIONER

## 2024-06-19 NOTE — PATIENT INSTRUCTIONS
"1. Should you want to get in touch with your provider, CHELSI Burnett, please contact MY Medical Assistant, Hanh, directly at [phone].  Recommend saving Hanh's direct number in phone as this is the PREFERRED & EASIEST way to get in contact with your provider.  Please leave a voice mail if you do not get an answer and she will return your call within 24 hrs. You will NOT be able to contact provider on [employer], as Behavioral Health Providers are restricted. YOU MUST CALL [phone]  If you need to speak with the on call provider after hours or on weekends, please Contact the [facility] ([phone]) and staff will be able to page the provider on call directly.     2.  In the event you need to cancel an appointment, please notify the office at least 24 hrs prior:   Contact **Hanh Medical Assistant at [facility] directly at [phone] or the [facility] ([phone])     3. MEDICATION REFILLS:  PLEASE CALL THE PHARMACY TO REQUEST ALL MEDICATION REFILLS or via [employer] TO ENSURE YOU ARE RECEIVING YOUR MEDICATIONS IN A TIMELY MANNER. The pharmacy or [employer] merced will send this request ELECTRONICALLY to the ordering provider.   IF YOU USE AN AUTOMATED SERVICE AT THE PHARMACY FOR REFILLS AND ARE TOLD THERE ARE \"NO REFILLS REMAINING\"   PLEASE CALL THE PHARMACY & SPEAK TO A LIVE PERSON TO VERIFY IT IS THE MOST UP TO DATE PRESCRIPTION ON FILE.    All new prescriptions will have a different number, therefore, if you were given refills for a medication today or at last visit it will not have the same number as the previous prescription.     4.  In the event you have personal crisis, contact the following crisis numbers: Suicide Prevention Hotline 1-833.908.5126 or *988, [facility] Helpline 7-954-005-ZNFX; [facility] Emergency Room [phone]; text HELLO to 430550; or 911.  If you feel like harming yourself or others, call 911 right away.  You can call the 982 Suicide and Crisis " "Lifeline at  988   to [facility] a counselor [facility] the [facility], or you can connect with one using their online chat  .    5.  Never stop an antidepressant medicine without first talking to a healthcare provider. Suddenly stopping this type of medication can cause withdrawal symptoms.    6.  Counseling and talk therapy  Counseling or therapy teaches you new coping skills and more adaptive ways of thinking about problems. These tools can help you make positive changes. The benefits of counseling often last long after treatment sessions have stopped.    7.   We would appreciate your feedback, please scan the QRS code on the back of your appointment card (or see below) and complete a brief survey.  [place] location is still not available, so please click \"[place]\" location.  Thank you      SPECIFIC RECOMMENDATIONS:     1.      Medications discussed at this encounter:                   -  no changes, request refills when needed    2.      Psychotherapy recommendations: Declined     3.     Return to clinic: 2 months, Thurs.8/22/24 at 10am in office    Please arrive at least 15 minutes before your scheduled appointment time to complete check in process.      IF you are scheduled for a [facility] VIDEO visit, YOU MUST COMPLETE THE \"E-CHECK IN\" PROCESS PRIOR TO BEGINNING THE VISIT, YOU WILL NO LONGER RECEIVE A PHONE CALL PRIOR TO ALL VIDEO VISITS; You may still complete the E-Check in for in office visits prior to appointment, you will receive multiple text/email reminders which will direct you further if needed.           "

## 2024-06-19 NOTE — PROGRESS NOTES
"This provider is located at [address] [place] [address]. [address], [place] [address]. The Patient is seen remotely using [employer]. Patient is being seen via telehealth and confirm that they are in a secure environment for this session. The patient's condition being diagnosed/treated is appropriate for telemedicine. The provider identified himself/herself: herself as well as her credentials.  The patient gave consent to be seen remotely, and when consent is given they understand that the consent allows for patient identifiable information to be sent to a third party as needed.  They may refuse to be seen remotely at any time. The electronic data is encrypted and password protected, and the patient has been advised of the potential risks to privacy not withstanding such measures.    You have chosen to receive care through a telehealth visit.  Do you consent to use a video/audio connection for your medical care today? Yes      Subjective   Jenn James is a 59 y.o. female who presents today for follow up    Referring Provider:  No referring provider defined for this encounter.    Chief Complaint:  Adjustment disorder with mixed anxiety and depression, ADHD, stress due to illness of , medication management       History of Present Illness:   6/19/24:  Patient presents today via [employer] Video visit from home in basement on [address], located in [place], [place].  At last visit patient was started on up titrated dose of Zoloft for which patient reports she has been calmer, has been sewing quilts, working on table runners, and 2 cats provide comfort and stay with patient often.  Has been staying in basement in sewing room a majority of the day, \"its my happy place.\"  \"I am doing okay.\"   will have another PFT's next month and if improved will be placed on surgery schedule for transplant, and if some improvement they may wait another 3 months, or disqualify him from transplant list.  Patient has disassociated from " , avoiding confrontation as  will react in negative manner.   has been going to the gym every other day, though suppose to go daily. Patient did go with him once, though  doesn't want to talk, and he walks for 30 minutes whereas patient try's to encourage him, though he will not walk longer than 30 minutes.   Patient has been attending video meetings on Tues. Nights and learning coping strategies which are helpful.  Patient has been on myelofibrosis social media group as she joined as care giver which provides support, though patient reports some outcomes are scary, though she wants to be prepared and wanted spouse to join to help him realize disease prognosis. Patient feels spouse is in denial, he has declined joining, though patient will send him screen shots and make comments to him about group.     Patient enjoys sewing, as it is calming, if patient makes a mistake will go back to it, frustrated though able to move past.     ADHD:  Symptoms include inattention and careless mistakes. The symptoms occur at home and during social events.  The symptoms are described as stable. Symptoms are exacerbated by fatigue, distracting activities, conversation, and mental effort. Symptoms are relieved by attention holding activities and stimulant medication. Associated symptoms include anxiety disorder. Current treatment includes behavior modification, a structured daily routine, educational interventions, and dextroamphetamine/amphetamine (Adderall). By report, Jenn is compliant all of the time.    Denies side effects of elevated heart rate, elevated blood pressure, irritability, insomnia, decreased appetite, nor feeling shaky or jittery with stimulant medication.       5/7/24:  Patient presents today via [employer] Video visit from outside of son's and daughter in law's home, located in [place], [place].  Patient had called the office to request a refill of Adderall XR 15 mg yesterday and inquired  "about a dose increase, for which patient was asked to schedule an appointment to further assess.  Patient reports  was supposed to be admitted tomorrow for 1 week to hospital until 5/14/24 to have several rounds of chemotherapy after placement of IP, though due PFT's the transplant will be delayed for at least 3 months.  \"It just rocked the world, if its not at the level, it will put us at another 3 months, and it will be the holiday season, we will be isolated from everyone.\"  Patient is hopeful to have transplant end of July or early August.  Patient is planning to a support group for caregivers tonight, which is in [place] on Tues nights, have dinner, and also offers other services and will plan on attending every week if she enjoys.  Explains 's behavior has worsened over time since cancer diagnosis.  Patient went to [place] 4/21/24 for a week, due to patient feeling she was at her breaking point, \"there's no appreciation, he expects me to do everything, the way he is treating me right now isn't right.\"  Daughter had noticed patient was not able to complete sentences, thoughts were scattered.  Last night patient and spouse were having an argument, and cannot recall what she had said the night before when they were arguing, finds self already having difficulty remembering.     \"I need to concentrate to deal with this, I don't know if I can continue because its so bad. I set him straight last night.\" Patient does record visits with 's doctors which patient started to do for her parents, and patient was able to replay discussion with provider, and after he listened  did not argue any further. Patient  used to keep TV on, and it has been off, which is not his normal behavior.  Patient has been trying to interact with , watching a movie, and  was scrolling through his phone, \"he said I let you watch one show.\" Patient walked away from situation and upon return  " "was accusing patient of \"setting him up.\"  The night before the dish scrubber was broken, and had been using the same one until she can get to the store for a new one, and couldn't find it as  had thrown it away.  Patient asked  to please inform her if he throws something away, which spouse did not take lightly.  \"I think he is seriously depressed, you can't tell him that tho, he hibernates in the house.\"   Since spouse has been stronger, he is okay by himself, though frustration with spouse as he hasn't been following exercise regimen to build up endurance and strength.      Patient feels when she was transferred to work at [employer] in [id number] when she retired, which was a strain on marriage, and spouse had to deal with other family stressors, and he got used to being alone, as patient had to rent an apartment and come home on the weekends.   was on a different schedule than they had before with meals, etc. \"He got into his own little world, he started drinking a lot, but he slowed down, and he quit cold turkey in Sept and he quit vaping a month ago.\"     Upon discussing adding an antidepressant medication to current regimen, patient has reservations about potential weight gain.     ADHD:  Symptoms include inattention and forgetfulness. The symptoms occur at home and during social events. The symptoms are described as Moderate in severity. The symptoms are described as gradually worsening. Symptoms are exacerbated by fatigue and mental effort. Symptoms are relieved by attention holding activities and stimulant medication. Associated symptoms include anxiety disorder and major depression. Current treatment includes behavior modification, a structured daily routine, educational interventions, and dextroamphetamine/amphetamine (Adderall). By report, Jenn is compliant all of the time.    3/20/24:    Answers submitted by the patient for this visit:  Other (Submitted on 3/19/2024)  Please " describe your symptoms.: Check in sonce taking adderall  Have you had these symptoms before?: Yes  How long have you been having these symptoms?: Greater than 2 weeks  Please list any medications you are currently taking for this condition.: Paulo  Please describe any probable cause for these symptoms. : ADD  Primary Reason for Visit (Submitted on 3/19/2024)  What is the primary reason for your visit?: Other    Patient presents today via [employer] Video visit from home, located in [place], [place].  Patient reports things have improved and slowed down, has completed estate sale of parents belongings, and  will be having bone marrow transplant mid May at [facility], which has provided some relief, and he has improved with his strength and endurance.  Patient expresses positive experience with husbands doctors. Spouses brothers came up for a weekend, and patient went to [place] with sister in law to see another sister in law whom has ALS.  Patient felt  needed visit with his brothers, they took him out golfing. Brothers also helped with other projects around the house  had started, and cared for him which he enjoyed.  Both sides of family benefited from the visits, able to see the light at the end of the tunnel. Patient was able to enjoy self and get some self care done.      Continues to tolerate Adderall XR 15 mg every morning, able to complete tasks, attention and concentration improved, less anxiety since stress with spouse has decreased and having family support. Denies side effects of elevated heart rate, elevated blood pressure, irritability, insomnia, decreased appetite, nor feeling shaky or jittery with stimulant medication.   Patient feels this past Saturday had a normal day, [place], came home fixed lunch for her and spouse, cleaned house, baked cakes for grandchildren. Patient wasn't working on the basement, getting things organized, didn't  "have to go to parents house, and has not had a \"normal day\" since October due to duties with parents, moving and sorting their belongings, which patient felt good had a great day.  Patient expresses some concerns of ability to continue walking and isolation precautions after spouse's surgery, though very hopeful, 100 days of isolation due to risks, able to keep animals in home.  Will have to remove live plants from around spouse, will move to upstairs area, due to potential of growing spores.    1/24/24: Patient presents today via [employer] Video visit from home, located in [place], [place].  Patient admits to stress due to  and parents, parents have now moved in to an AL facility.  Patient has been cleaning parents home which has been overwhelming, patient  is over there approximately 5 hrs a day.   will have a bone marrow transplant, though has to gain weight and strength, at least 30 minutes of ability to stand.   with poor appetite due to spleen enlarged pushing against his stomach, and endurance is poor of standing or walking for 5 minutes.   has to force himself to drink 2 protein shakes daily.  Patient expresses frustration about  not wanting to eat or improve in order to have the transplant.  does desire the transplant.  Patient also has worries about  not completing the living will as they currently do not have one.  \"He's hard headed, he doesn't want anyone telling him what to do. His daughter is a nurse for [employer] in the bone marrow transplant wing in [place].\"  Patient has spoken with  and voiced concerns to have another care giver to assist her with 's needs, as patient does not have any family to help, his family lives in [place] or [place].  Tension with family members, though able to work things out.  Daughter in law is bringing down 2 grand babies today from [place], will do some crafts, visit parents at the AL.  Patient was planning to " travel to [place] this month due to both grand daughters birthday's, which has been put on hold for the time being. Patient is planning on reaching out to 's daughter about care giving help.   will be in isolation after surgery while in the hospital for 20 days, and 80 days once returns home.  The 2nd care giver will be respite for patient, as patient cannot be gone more than one hour and patient struggling with what to do, as  becomes argumentative.      Patient continues to tolerate Adderall XR 15 mg for management of ADHD symptoms. Patient has gotten off task while cleaning parents with sister, though has been able to recognize distraction and return to task.  Sleeping well.   Denies side effects of elevated heart rate, elevated blood pressure, irritability, insomnia, decreased appetite, nor feeling shaky or jittery with stimulant medication.        12/7/23:  Patient presents today via [employer] Video visit from home, located in [place], [place].  Reports  has been diagnosed with rare blood cancer, myelofibrosis, which is a slow progression as patient was told he has had for likely 10-12 years.   had 2nd bone marrow bx yesterday, reports increased stress due to transporting parents and now  to [facility].  In process of making 4 quilts for grandchildren started in June for [employer], and has a friend whom may help with quilting.   Has not put up [employer] tree yet due to overwhelming tasks and stress.      has lost 60-70 lbs over the last 6-7 mo. Extreme fatigue, poor endurance, and is severely weak after a simple task of letting the dogs out. Patient is primarily taking care of everything in house hold.   Once results of bone marrow bx, will find out if  qualifies for a treatment that is, 30% morbidity rate for a bone marrow transplant, though still working out details and decisions.  Patient was unable to go travel to [place] to see grand kids.     Patient  "is trying to prioritize tasks, as house is not tidy as she would like, and feels very overwhelmed.  Patient doesn't have the energy to communicate 's health decline to all family members. Patient  relatives were willing to come to home from [place] to help and spend holidays, however, the stress of patient being the host and \"they let me be it.\"    In regards to ADHD, patient explains while sewing, will be easily distracted by tasks that are not done, such as going to  medications from pharmacy, grocery, to do this and that, as it was forgotten previously.  Patient wishes to not change dose of Adderall as the current stress with  and holidays are contributing factors.     Denies side effects of elevated heart rate, elevated blood pressure, irritability, insomnia, decreased appetite, nor feeling shaky or jittery with stimulant medication.      9/7/23:    Patient presents today in office for annual CSA formalities, ADHD management.  Patient reports weight has been steady low 170's, continues to walk a lot though not able to exercise due to vocal cord dysfunction which effects nerves.  8 yrs ago patient had to go to speech therapy, which was caused by hyperventilating, due to tension in neck feels soreness which extends to left arm, though has had symptoms on right arm. Doing physical therapy currently and noted improvement, was told the symptoms will subside and are currently flared due to allergy to mold.     In regards to ADHD, patient reports current dose of Adderall XR 15 mg remains effective. However,  patient noticed while relatives were to come to house, in preparation patient found self jumping from task to task though was anxious about getting house prepared for their visit. Patient continues to use visual reminders. Since relatives have departed symptoms have improved. Patient does find self forgetful of keeping sunglasses in house, and is concerned if the dose needs to be " adjusted. Reports the symptoms are minimal.     Denies side effects of elevated heart rate, elevated blood pressure, irritability, insomnia, decreased appetite, nor feeling shaky or jittery with stimulant medication.      Patient reports having 4 Adderall XR 15 mg remaining.   Every 2 yrs patient goes to [place] for 2 weeks, plans to leave Nov. 2, 2023 and to return 11/16/23.   Patient reports continues to take CBD oil in the evening for muscle relaxation and to help with sleep, takes 3 drops. Obtains from [place] from a retired [employer].     7/5/23:  Patient presents today via [employer] Video visit from home, located in [place], [place].    Patient reports having adequate supply through Thurs 7/13/23.  Patient reports pharmacy filled last Adderall XR 15 mg early which actually took the pressure of worrying as [facility] location is not open on the weekend.    Patient reports positive outcome with current dose of Adderall XR, as family has been visiting from out of town.  Patient feels she did well with various company visiting for Memorial Day and Fourth of July, was able to complete tasks without shifting from one task to another.  Patient has had company in home since May 19, 2023 with the exception of 1 week.     Patient denies current symptoms include fidgeting, difficulty remaining seated, easy distractability, blurting out answers prematurely, inability to complete tasks, difficulty sustaining attention, shifting from one uncompleted activity to another, talking excessively, interrupting others, ineffective listening, frequently losing items.   Denies side effects of elevated heart rate, elevated blood pressure, irritability, insomnia, decreased appetite, nor feeling shaky or jittery with stimulant medication.      Patient reports parents will be moving into an AL facility as they have long term care insurance, and have looked at several facilities, patient voices worry for her father due to father caring for mother.  "    4/5/23:  Patient presents today via [employer] Video visit from home, located in [place], [place].  Patient asking about redness to nose and lateral nose/face \"tiny white oil, and I push it and small amount of oil comes out.\" Patient suspected possible relation to medication, patient has started washing face at night as patient was only washing in the morning.  Denies changes to laundry detergent, face cleanser, though did change skin care moisturizer which may be a contributing factor.   Mild anxiety and worry noted per screening.  Patient reports Adderall XR dose remains effective, symptoms are well controlled, able to maintain focus, concentration, and complete tasks. Denies side effects.     Patient daughter, son in law, and grand kids are coming in town from [place] in May for patient 60 th birthday.  Which patient is looking forward to.     2/8/23:  Patient presents today via [employer] Video visit from home in [place], [place].  Patient reports feeling good, busy with family birthdays, and going to [place] to see grand kids to do some crafts. Symptoms of inattentiveness, shifting from one uncompleted activity or topic to another, impaired concentration are controlled well with current dose of Adderall XR 10 mg. Patient frequently freezing on video and had to go outside of home for better service, though continued to have connection problems intermittently.     Patient continues to be conscious of eating, denies decreased appetite, patient is walking with friends, eats a breakfast bar normally and then eats lunch around 1130, however, yesterday got caught up shopping and realized she had not eaten.  When patient does eat she eats good quantity.  Feels she may have lost 1-2 lbs, otherwise weight has been steady.      Patient has checked with [facility] pharmacy and Adderall dose is in stock.  Patient plans to travel to [place] in November, no other trips planned for out of state at this time.          1/4/23:  " "Patient presents today via [employer] Video visit from home, Adderall XR was increased from 10 to 15 mg at last visit, for which patient reports the first few days of the increase felt \"I don't know if I can handle this, just a little spaced out, I don't know, but it was fine in a few days.\"  Denies increased heart rate, difficulty sleeping.   Patient feels she may be decreased weight of 15 lb, though patient has been busy with mother in the hospital and getting things together for the holidays.  Reports mother was admitted to [facility] and had medication adjustments and plan to get mother into an adult day care.  Tomorrow patient is taking father to see a specialist at [facility] to help him cope with mother's mental illness- \"alzheimers and delusional dementia\" per the neurologist.  Reports mother can get argumentative and paranoid.       Reports home scale weight on 1/1/3 of 183.6 lbs.  Patient reports increased walking with friends, not eating as much in the morning, \"constantly on the go.\"  Had wanted to do some fasting and weight loss.  Denies having to purchase new clothing, though noticed face was slimmer and clothes were looser.  Patient had been on weight watchers in the past before COVID and was down to 167 lbs, as patient was working out in the gym, and gym's were closed during COVID and weight returned.  Reports eating at 1130-12 noon for lunch, and 7 pm for dinner as spouse likes to eat at 8 pm.  Will also snack during the day on skinny pop popcorn.  Reports at last office visit recalled telling MA to not tell her what the weight was, however, felt \"good\" seeing the scale weight a few days ago.  Plans to start going back to the gym with friend as they would go every morning, \"that was my life.\"     ADHD symptoms are improving, as patient reports ability to listen more effectively, more patient with others is noticed the most since dose increase.  Upon feeling organized with holiday decorations, noticed was " "able to stay on task, only bring up one box at a time to decorate instead of having all boxes out and not being able to organize.    Patient reports having enough supply of Adderall XR 15 mg through next Wed. 1/11/23.  Reports after speaking with pharmacist at [facility] was told all medications would have to be transferred from [facility] and [facility] will not only fill the Adderall prescription. Patient had [facility] due to [facility] not having Adderall XR available.  Patient is also concerned due to frequent travels of having adequate supply and ability to fill medication at outlying pharmacies.       11/22/22:  Patient presents today via MyChart Video visit from patient home.  Patient reports would like to increase dose possibly, reports spouse notices \"I am drifting some.\" Patient further explains will frequently jump to other topics during conversation, though improved with Adderall XR 10 mg.  Patient reports daughter was able to notice improvement, though now that patient has returned to home from travels, she is noticing elevation in symptoms.      Patient reports some difficulty with sleeping, as last night had minimal sleep as patient is worried about mother whom has dementia and father is caring for mother, has had  involved.  Believes temporary, situational anxiety is reason for sleep difficulty.  Patient reports mother's dementia symptoms are worsening and difficult to deal with, though hopeful as  has set up for assistant to come to home several days a week to help with house cleaning, meal prep, and care.       10/20/22:  Patient presents today via MyChart Video visit from daughter's home in [place].   Patient was started on Adderall XR 10 mg at last visit for which patient reports the first few days had increased energy, which had company, had difficulty sleeping the first 2 nights, however, no longer having difficulty.  Patient reports taking medication at 7 am, " "one day she took it later at 930 am and had difficulty sleeping that night. Patient has been taking thyroid medication upon awakening to use the bathroom around 5-530 am and upon waking up around 7 takes the Adderall.      \"I think it's a lot better, my daughter said she seems to notice, I still have a tendency to interrupt but that's better, I don't seem to repeat questions, as it was an issue before because I was not really listening. My mind was somewhere else.\"   Denies side effects, patient was surprised she was able to sit still after a few days, now able to complete tasks as less distraction, able to continue with task at hand before switching to another task.     Patient will be returning from [place] 10/25/22.     9/16/22:  Patient presents today in office today to further discuss ADHD treatment with a controlled substance and to complete CSA and obtain POC UDS today per policy.     Patient brought in bottle of Hemp CBD oil 3200mg/2 oz, 1-3 drops 3 times daily on bottle, however, patient only takes in the evening for post menopausal symptoms, muscle aches, and vocal cord dysfunction, \"my neck tenses up , had to do physical therapy in past, and oil helps, I found out I am Allergic to mold.\"    9/15/22:  Patient presents today via MyChart Video visit from home, reports received ADHD test results though has not discussed.  Patient continues to have symptoms of inttentiveness, concentration difficulty, difficulty completing tasks, and switching from one uncompleted task to another.     Patient does take 3 drops of CBD oil, hemp based, at night for sleep, relaxes muscles as patient started taking while going through menopause.   Patient reports  is allergic to Wellbutrin and daughter tried Wellbutrin and did not do well, as patient would prefer to try Adderall first rather than a non-stimulant medication.  Patient will be out of town for 2 weeks in October visiting daughter in [place], likely early " "October.       7/14/22:  INITIAL EVAL  Patient presents today via MyChart Video visit from home with a history of depression and anxiety for which treatment was started in late 1995 with therapy, and medications from [id number] due to divorce.     Patient is currently not taking any psychotropic medications nor has had any since 2005.     \"My issue is I can't concentrate, I start something and cant finish something, forgets things often\".  Patient recalls while in school always \"fidgety\" crossing legs, moving often in seat.  Admits to interrupting people, gets distracted easily, daughter noticing.    Admits to over the past 2 yrs began to feel ADHD may be a possibility, \"I am tired of being like this, forgetting stuff, going in and out of the house, tired of interrupting people, it seems to be bothering me more.\"  Patient uses reminders, lists which was started while kids in high school and has continued to have  self add items needed to List,\" I walk out without my list from the fridge, and I have to have him take a picture of it and send it to me\".  Difficulty sitting still when you should be sitting still in [place] or meetings. Impulsive with shopping tends to purchase items that may be on sale or those that she may not need with the idea of \"I can always return it\".     Symptoms include fidgeting, difficulty remaining seated, easy distractability, blurting out answers prematurely, inability to complete tasks, difficulty sustaining attention, shifting from one uncompleted activity to another, talking excessively, interrupting others, ineffective listening, frequently losing items, and impulsivity.    ADHD:   Elementary school:   Grades:A's and B's  Special classes or failures:no  Got in trouble:no  Referral for ADHD testing:no  Fhx:son, diagnosed officially in 7th grade, took medications for 1-2 yrs, restarted while in college only, \"he probably should be taking something, I have suggested it to " "him\"  Presently:  Problems with attention to detail:yes  Problems with sustained attention:Yes  Problems listening when spoken to directly:Yes, unable to concentrate on what others are saying, \"I have to get my point across, I know I have to wait for the break, but I can't wait for the break.\"  Failure to finish tasks:yes, \"I will lay my husbandsTshirts on couch intending to hang up and they will be there for 2-3 days\" house hold tasks-dusting, mopping, find need to focus on floor boards  Avoids tasks that require sustained mental effort:yes, \"I excelled at work,  I could multi-task, 5-6 projects at desk, however, would wait until the last week to update credentials, I put off stuff and procrastinate all the time\"  Easily distracted:yes  Forgetting things:yes  Losing things:yes  Hard to organize:yes  Talks a lot and cutting people off:yes  Drifts off during conversations:yes, \"I have to concentrate what I need to say to not forget what I have say, then I blurt it out, I am trying really hard, it's even hard with you to not stop and say stuff\"  Difficulty with Reading:yes, \"I used to read a lot, has been using Audio books to listen in car or while out with friends or walking, has to rewind at times because my mind drifts off.\"  Difficulty watching TV/Movies:\"not really, we hardly ever have the TV on anymore.\"       PHQ-9 Depression Screening  PHQ-9 Total Score:   3/19/2024 1 (PHQ2-0)    Little interest or pleasure in doing things?     Feeling down, depressed, or hopeless?     Trouble falling or staying asleep, or sleeping too much?     Feeling tired or having little energy?     Poor appetite or overeating?     Feeling bad about yourself - or that you are a failure or have let yourself or your family down?     Trouble concentrating on things, such as reading the newspaper or watching television?     Moving or speaking so slowly that other people could have noticed? Or the opposite - being so fidgety or restless that " you have been moving around a lot more than usual?     Thoughts that you would be better off dead, or of hurting yourself in some way?     PHQ-9 Total Score       SUZANNE-7    3/19/2024 3    Past Surgical History:  Past Surgical History:   Procedure Laterality Date    BLADDER SUSPENSION  2008    COLON SURGERY      COLONOSCOPY      ESSURE TUBAL LIGATION  1999    JOINT REPLACEMENT  Jan 2017    Right hip replacement    KIDNEY SURGERY      OTHER SURGICAL HISTORY      metal implant    TUBAL ABDOMINAL LIGATION         Problem List:  Patient Active Problem List   Diagnosis    Primary osteoarthritis of right hip    Status post right hip replacement    Hypothyroidism (acquired)    Paroxysmal atrial fibrillation    Anxiety    Deep venous thrombosis    Disorder of vocal cord    Edema of lower extremity    Hyperlipidemia    Hyperthyroidism    Onychomycosis    Pain of right hip joint    Laryngitis due to gastroesophageal reflux    Sensation of heaviness in extremities    Varicose veins of lower extremity    Vitamin deficiency       Allergy:   Allergies   Allergen Reactions    Sulfa Antibiotics Rash        Discontinued Medications:  There are no discontinued medications.      Current Medications:   Current Outpatient Medications   Medication Sig Dispense Refill    amphetamine-dextroamphetamine XR (Adderall XR) 15 MG 24 hr capsule Take 1 capsule by mouth Every Morning Indications: Attention Deficit Hyperactivity Disorder 30 capsule 0    azelastine (ASTELIN) 0.1 % nasal spray USE 1 TO 2 SPRAYS IN EACH NOSTRIL TWICE DAILY AS NEEDED FOR RUNNY NOSE OR SNEEZING OR POST NASAL DRIP      cetirizine (zyrTEC) 10 MG tablet Take 1 tablet by mouth Daily.      Cyanocobalamin (Vitamin B 12) 250 MCG lozenge Take 2,500 mcg by mouth.      EPINEPHrine (EPIPEN) 0.3 MG/0.3ML solution auto-injector injection       levothyroxine (SYNTHROID, LEVOTHROID) 25 MCG tablet TAKE 1 TABLET BY MOUTH DAILY 90 tablet 0    Magnesium Oxide (MAGNESIUM EXTRA STRENGTH PO)  "Take  by mouth.      Milk Thistle 150 MG capsule Take  by mouth.      montelukast (SINGULAIR) 10 MG tablet Take 1 tablet by mouth Every Night.      sertraline (Zoloft) 50 MG tablet Take 0.5 tablets by mouth Daily for 8 days, THEN 1 tablet Daily for 56 days. Indications: Generalized Anxiety Disorder, Major Depressive Disorder 60 tablet 0    Vitamin D-Vitamin K (K2 Plus D3) 100-1000 MCG-UNIT tablet Take  by mouth Daily. Patient takes drops (not tablets) due to GI upset with tablet       No current facility-administered medications for this visit.       Past Medical History:  Past Medical History:   Diagnosis Date    ADHD (attention deficit hyperactivity disorder) Sep/22    Now on adderrall    Allergic Whole life    Anxiety     Cervical cancer screening 2109    Chronic allergic rhinitis     Deep vein thrombosis     Caused after my hip replacement.    Depression     Hyperlipemia 03/15/2017    Hypothyroidism 03/15/2017    Kidney stones     Limb pain     Limb swelling        Past Psychiatric History:  Began Treatment: started in  with therapy due to spouse having an affair  Diagnoses:Depression and Anxiety  Psychiatrist: last seen from [id number]  Therapist: last seen from [id number]  Admission History:Denies  Medication Trials: Prozac--for one year \"felt like i was floating;\" Zoloft for 1 yr -, then started Effexor with divorce in  for 1 yr-during time having increased stress with divorce as pt had lost hair and stomach problems and asked to be placed on meds; tolerated well; Lexapro -  excessive drowsiness, couldn't get out of bed  Self Harm: Denies  Suicide Attempts:Denies   Psychosis, Anxiety, Depression: Denies    Substance Abuse History:   Types: Alcohol daily in the evening to relax, 1-2 glasses of wine, or 1 beer or gin and tonic  Withdrawal Symptoms:Denies  Longest Period Sober:Not Applicable   AA: Not applicable     Social History:  Martial Status:  Employed:No " 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  Kids:Yes or If so, how many 2 children and 2 step children  House:Lives in a house   History: Denies  Access to Guns: no    Social History     Socioeconomic History    Marital status:      Spouse name: Abdias    Number of children: 2    Highest education level: Some college, no degree   Tobacco Use    Smoking status: Never    Smokeless tobacco: Never   Vaping Use    Vaping status: Never Used   Substance and Sexual Activity    Alcohol use: Yes     Alcohol/week: 3.0 - 4.0 standard drinks of alcohol     Types: 3 - 4 Glasses of wine per week     Comment: drinks daily, wine, beer and lquor    Drug use: Never     Types: Marijuana     Comment: In teenage years    Sexual activity: Yes     Partners: Male     Birth control/protection: Surgical       Family History:   Suicide Attempts: Denies  Suicide Completions:Denies      Family History   Problem Relation Age of Onset    Dementia Mother     Diabetes type II Mother     Cancer Mother 70        Kidney cancer (left kidney removed) and endometrial cancer (hysterectomy)    Diabetes Mother         Type 2    Stroke Mother         TIA    Prostate cancer Father     Hearing loss Father         Hearing aids    Other Maternal Grandmother         Colon cancer    Colon cancer Maternal Grandmother 70    Diabetes Paternal Grandmother         Type 2    ADD / ADHD Son        Developmental History:   Born: [place], lived in [place] since age 1  Siblings:1 sister, 1 brother-both younger  Childhood: Denies Abuse  High School:Completed  College: 2 yrs, no degree    Mental Status Exam:   Hygiene:   good  Cooperation:  Cooperative  Eye Contact:  Good  Psychomotor Behavior:  Appropriate  Affect:  Appropriate  Mood: euthymic   Speech:  Normal  Thought Process:  Goal directed  Thought Content:  Mood congruent  Suicidal:  None  Homicidal:  None  Hallucinations:  None  Delusion:  None  Memory:  Intact  Orientation:  Grossly  intact  Reliability:  good  Insight:  Good  Judgement:  Good  Impulse Control:  Good  Physical/Medical Issues:  Yes Hypothyroidism,OA rt hip,paroxysmal afib, HLD      Review of Systems:  Review of Systems   Constitutional:  Negative for appetite change, diaphoresis and fatigue.   HENT:  Negative for drooling.    Eyes:  Negative for visual disturbance.   Respiratory:  Negative for cough and shortness of breath.    Cardiovascular:  Negative for chest pain, palpitations and leg swelling.   Gastrointestinal:  Negative for nausea and vomiting.   Endocrine: Negative for cold intolerance and heat intolerance.   Genitourinary:  Negative for difficulty urinating.   Musculoskeletal:  Negative for joint swelling.   Allergic/Immunologic: Negative for immunocompromised state.   Neurological:  Negative for dizziness, seizures, speech difficulty and numbness.   Psychiatric/Behavioral:  Negative for agitation, decreased concentration, self-injury, sleep disturbance and suicidal ideas. The patient is nervous/anxious. The patient is not hyperactive.          Physical Exam:  Physical Exam  Psychiatric:         Attention and Perception: Attention and perception normal.         Mood and Affect: Mood and affect normal.         Speech: Speech normal.         Behavior: Behavior normal. Behavior is cooperative.         Thought Content: Thought content normal. Thought content does not include suicidal ideation. Thought content does not include suicidal plan.         Cognition and Memory: Cognition and memory normal.         Judgment: Judgment normal.         Vital Signs:   There were no vitals taken for this visit.       Lab Results:   Lab on 01/22/2024   Component Date Value Ref Range Status    Glucose 01/22/2024 102 (H)  65 - 99 mg/dL Final    BUN 01/22/2024 15  8 - 23 mg/dL Final    Creatinine 01/22/2024 0.83  0.57 - 1.00 mg/dL Final    Sodium 01/22/2024 142  136 - 145 mmol/L Final    Potassium 01/22/2024 4.3  3.5 - 5.2 mmol/L Final     Chloride 01/22/2024 104  98 - 107 mmol/L Final    CO2 01/22/2024 28.0  22.0 - 29.0 mmol/L Final    Calcium 01/22/2024 9.3  8.6 - 10.5 mg/dL Final    Total Protein 01/22/2024 6.8  6.0 - 8.5 g/dL Final    Albumin 01/22/2024 4.3  3.5 - 5.2 g/dL Final    ALT (SGPT) 01/22/2024 24  1 - 33 U/L Final    AST (SGOT) 01/22/2024 25  1 - 32 U/L Final    Alkaline Phosphatase 01/22/2024 63  39 - 117 U/L Final    Total Bilirubin 01/22/2024 0.6  0.0 - 1.2 mg/dL Final    Globulin 01/22/2024 2.5  gm/dL Final    A/G Ratio 01/22/2024 1.7  g/dL Final    BUN/Creatinine Ratio 01/22/2024 18.1  7.0 - 25.0 Final    Anion Gap 01/22/2024 10.0  5.0 - 15.0 mmol/L Final    eGFR 01/22/2024 80.8  >60.0 mL/min/1.73 Final    Total Cholesterol 01/22/2024 202 (H)  0 - 200 mg/dL Final    Triglycerides 01/22/2024 50  0 - 150 mg/dL Final    HDL Cholesterol 01/22/2024 70 (H)  40 - 60 mg/dL Final    LDL Cholesterol  01/22/2024 123 (H)  0 - 100 mg/dL Final    VLDL Cholesterol 01/22/2024 9  5 - 40 mg/dL Final    LDL/HDL Ratio 01/22/2024 1.74   Final    TSH 01/22/2024 4.490 (H)  0.270 - 4.200 uIU/mL Final    Vitamin B-12 01/22/2024 1,337 (H)  211 - 946 pg/mL Final    Folate 01/22/2024 5.97  4.78 - 24.20 ng/mL Final    25 Hydroxy, Vitamin D 01/22/2024 80.1  30.0 - 100.0 ng/ml Final    WBC 01/22/2024 5.84  3.40 - 10.80 10*3/mm3 Final    RBC 01/22/2024 4.64  3.77 - 5.28 10*6/mm3 Final    Hemoglobin 01/22/2024 13.9  12.0 - 15.9 g/dL Final    Hematocrit 01/22/2024 42.1  34.0 - 46.6 % Final    MCV 01/22/2024 90.7  79.0 - 97.0 fL Final    MCH 01/22/2024 30.0  26.6 - 33.0 pg Final    MCHC 01/22/2024 33.0  31.5 - 35.7 g/dL Final    RDW 01/22/2024 11.7 (L)  12.3 - 15.4 % Final    RDW-SD 01/22/2024 38.8  37.0 - 54.0 fl Final    MPV 01/22/2024 9.9  6.0 - 12.0 fL Final    Platelets 01/22/2024 267  140 - 450 10*3/mm3 Final    Neutrophil % 01/22/2024 61.2  42.7 - 76.0 % Final    Lymphocyte % 01/22/2024 24.7  19.6 - 45.3 % Final    Monocyte % 01/22/2024 8.6  5.0 - 12.0 % Final     Eosinophil % 01/22/2024 4.3  0.3 - 6.2 % Final    Basophil % 01/22/2024 1.0  0.0 - 1.5 % Final    Immature Grans % 01/22/2024 0.2  0.0 - 0.5 % Final    Neutrophils, Absolute 01/22/2024 3.58  1.70 - 7.00 10*3/mm3 Final    Lymphocytes, Absolute 01/22/2024 1.44  0.70 - 3.10 10*3/mm3 Final    Monocytes, Absolute 01/22/2024 0.50  0.10 - 0.90 10*3/mm3 Final    Eosinophils, Absolute 01/22/2024 0.25  0.00 - 0.40 10*3/mm3 Final    Basophils, Absolute 01/22/2024 0.06  0.00 - 0.20 10*3/mm3 Final    Immature Grans, Absolute 01/22/2024 0.01  0.00 - 0.05 10*3/mm3 Final    nRBC 01/22/2024 0.0  0.0 - 0.2 /100 WBC Final       EKG Results:  No orders to display       Imaging Results:  No Images in the past 120 days found..      Assessment & Plan   Diagnoses and all orders for this visit:    1. Adjustment disorder with mixed anxiety and depressed mood (Primary)    2. ADHD (attention deficit hyperactivity disorder), inattentive type    3. Stress due to illness of family member    4. Medication management                  Visit Diagnoses:    ICD-10-CM ICD-9-CM   1. Adjustment disorder with mixed anxiety and depressed mood  F43.23 309.28   2. ADHD (attention deficit hyperactivity disorder), inattentive type  F90.0 314.00   3. Stress due to illness of family member  Z63.79 V61.49   4. Medication management  Z79.899 V58.69         PLAN:  1.   Safety: No acute safety concerns  Therapy: None  Risk Assessment: Risk of self-harm acutely is low.  Risk factors include anxiety disorder, mood disorder, and recent psychosocial stressors (pandemic). Protective factors include no family history, denies access to guns/weapons, no present SI, no history of suicide attempts or self-harm in the past, minimal AODA, healthcare seeking, future orientation, willingness to engage in care.  Risk of self-harm chronically is also low, but could be further elevated in the event of treatment noncompliance and/or AODA.  Meds:    -Continue Adderall XR 15 mg by  mouth daily in the morning to target symptoms of ADHD including:  Inattentiveness & impaired concentration.  Risks, benefits, side effects discussed with patient including elevated heart rate, elevated blood pressure, irritability, insomnia, sexual dysfunction, appetite suppressing properties, psychosis.  After discussion of these risks and benefits, the patient voiced understanding and agreed to proceed. Cali reviewed, LAST DISPENSED 6/10/24 #30/30 days.  Controlled substance documentation: Cali reviewed; prior urine drug screen consistent obtained 9/7/23;; consent is up to date, signed, witnessed and in EHR, dated 9/7/23, which will be updated annually per policy. Patient is aware of risk of addiction on this medication, understands need to follow up for a review every 3 months and medications will be adjusted or decreased as deemed appropriate at each visit. No history of drug or alcohol abuse.  No concerns about diversion or abuse. Patient denies side effects related to the medication.  Patient is aware of random urine drug screens and pill counts. The dosing of this medication will be reviewed on a regular basis and reduced if possible. Ongoing use of a controlled substance is necessary for this patient to have a normal quality of life. Next visit will be in the office for annual CSA requirements.     Continue Zoloft 50 mg by mouth daily in the morning to target depression and anxiety. Discussed all risks, benefits, alternatives, and side effects of Zoloft (Sertraline) including but not limited to GI upset, sexual dysfunction, bleeding risk, seizure risk, insomnia, sedation, dizziness, fatigue, drowsiness, activation of temi or hypomania, increased fragility fracture risk, hyponatremia, ocular effects, withdrawal syndrome following abrupt discontinuation, serotonin syndrome, and activation of suicidal ideation and behavior.   Patient educated on the need to practice safe sex while taking this med.  Instructed patient if drowsiness is felt approximately 2-3 hrs after taking medication, it can be switched to nightly the following day.  Discussed the need for patient to immediately call the office for any new or worsening symptoms, such as worsening depression; feeling nervous or restless; suicidal thoughts or actions; or other changes in mood or behavior, and all other concerns. Patient  educated on medication compliance and the risks of suddenly stopping this medication or missing doses. Patient verbalized understanding and is agreeable to taking Sertraline. Addressed all questions and concerns.      Labs: n/a  Patient instructed to request refills when appropriate, when down to 5-7 days remaining via pharmacy or via MyChart.       Symptoms of anxiety, depression, ADHD are under good control with current medication regimen.  Encouraged patient to continue to send posts and messages to spouse as there may be one post that he reacts to and realize importance of increasing endurance for transplant. Patient prefers to come to office early August as  surgery would be as soon early August and he will be in the hospital for 20-30 days which will be best time to schedule.  The plan was discussed with the patient. The patient was given time to ask questions and these questions were answered. At the conclusion of their visit they had no additional questions or concerns and all questions were answered to their satisfaction.   Patient was given instructions and counseling regarding condition and for health maintenance advice. Please see specific information pulled into the AVS if appropriate.    Patient to contact provider if symptoms worsen or fail to improve.      5/9/24:  TELEPHONE  -Patient called [facility] that she is at the [facility] and they are closed for the afternoon however her insurance did cover the Trintellix but the cost is still $250.00 and she wants to know if she can try something else?  Patient asked her daughter and her daughter is on Zoloft and she advises she would like to try that again.. Please advise  Please inform patient I have ordered the Zoloft as follows:   -Start Zoloft 25 mg by mouth daily for 8 days, THEN increase to 50 mg thereafter, instruct patient to break 4 of the 50 mg tab in half to equal 25 mg for the first 8 days, Instruct patient dose can be switched to nightly the following day, if drowsiness is felt approximately 2-3 hrs after taking the medication in the morning.   -Provider will inform staff at the [place] office of cancelling the Trintellix samples due to cost of medication with insurance coverage.   -Called patient and advised her of the Zoloft instructions      5/7/24:    -Continue Adderall XR 15 mg by mouth daily in the morning to target symptoms of ADHD including:  Inattentiveness & impaired concentration.  Risks, benefits, side effects discussed with patient including elevated heart rate, elevated blood pressure, irritability, insomnia, sexual dysfunction, appetite suppressing properties, psychosis.  After discussion of these risks and benefits, the patient voiced understanding and agreed to proceed. Cali reviewed, LAST DISPENSED 4/12/24 #30/30 days .  Informed patient order would be sent to Dr. Harkins in separate entry for signature due to EMR system, and will not see as refilled on AVS today, first fill date of Friday 5/10/24 due to pharmacy closed on the weekends and patient will take last dose Sunday 5/9/24.    Start Trintellix 10 mg by mouth nightly with food to target anxiety and depression.  Risks, benefits, alternatives discussed with patient including nausea, vomiting, constipation, sexual dysfunction, increased risk of bleeding especially when combined with anticoagulants (NSAIDS, blood thinners), when combined with triptans could theoretically cause weakness, hyperreflexia, and incoordination, caution with history of seizures.  Onset of action is  usually in 2 weeks. GI symptoms can last up to 14 days, may take at night if nausea persists.  After discussion of these risks and benefits, the patient voiced understanding and agreed to proceed. Included past failures in order to help speed up PA process.  Sample ordered as well for patient to  from [place] office tomorrow. Patient given contact information and advised to call [place] office prior to arrival to ensure medication is ready.  Secure chat message sent to staff in the [place] office and informed of patient plan to pickup tomorrow for 7 day supply.   Patient informed medication may require a prior authorization (PA) from insurance company, which may delay start date of medication, as PA process can vary.  Patient would be contacted when medication has been approved if a PA is required.      Symptoms of anxiety, depression, are present which have been exacerbated due to stress and adjustment reaction with  illness. Patient has experienced care giver role strain, worsened anxiety, and informed patient a dose increase of Adderall XR could worsened anxiety and recommended a daily maintenance medication.  Patient is willing to try Trintellix prefers to not take a medication that could potentially cause weight gain due to patient working so hard to lose weight over the last year.    Patient was given instructions and counseling regarding condition and for health maintenance advice. Please see specific information pulled into the AVS if appropriate.    Patient to contact provider if symptoms worsen or fail to improve.        5/6/24: TELEPHONE  Patient LMVM that she thinks she needs a little bit of dose increase on her medication.  Patient advises that she is back to not finishing tasks and her daughter also told her she isn't even finishing her sentences.  Patient says her  was supposed to get a transplant this week but because of some abnormal labs he is excluded for 3  "more months.  Patient says \"feel free to call her.  Patient says she will be taking her last 15mg on Sunday so will need her medication refilled by Friday (pharmacy is closed on the weekend.).     -Patient will need to be seen for further evaluation before Adderall XR can be increased.   -Called patient to advise keon Son would need to be seen first to discuss increase in Adderall XR.  Patient is now scheduled for Tuesday 05/07/2024 to discuss via video    3/20/24:   -Continue Adderall XR 15 mg by mouth daily in the morning to target symptoms of ADHD including:  Inattentiveness & impaired concentration.  Risks, benefits, side effects discussed with patient including elevated heart rate, elevated blood pressure, irritability, insomnia, sexual dysfunction, appetite suppressing properties, psychosis.  After discussion of these risks and benefits, the patient voiced understanding and agreed to proceed. Cali reviewed, LAST DISPENSED 3/15/24 #30/30 DAYS, patient to request refill when needed. Will plan for next refill for 90 days if pharmacy will allow, due to duration of therapy, compliance, and upcoming surgery with isolation precautions of spouse in May, to limit frequent visits to the pharmacy.      Symptoms of stress related to spouses illness and ADHD are under good control with current medication regimen.  Overall, patient improving with stress, positive outlook.  Will plan for next visit in 3 months via video.   Patient was given instructions and counseling regarding condition and for health maintenance advice. Please see specific information pulled into the AVS if appropriate.    Patient to contact provider if symptoms worsen or fail to improve.        1/24/24:  Continue Adderall XR 15 mg by mouth daily in the morning to target symptoms of ADHD including:  Inattentiveness & impaired concentration. LAST DISPENSED 1/15/24 #30/30 DAYS, patient to request refill when needed.    Symptoms of ADHD are under good " control with current medication regimen.  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  Emotional support given, suggested having  daughter whom is a nurse to discuss feeding tube options, necessity of following doctor instructions.  Patient was given instructions and counseling regarding condition and for health maintenance advice. Please see specific information pulled into the AVS if appropriate.    Patient to contact provider if symptoms worsen or fail to improve.       12/7/23:  Continue Adderall XR 15 mg by mouth daily in the morning to target symptoms of ADHD including:  Inattentiveness & impaired concentration.  Risks, benefits, side effects discussed with patient including elevated heart rate, elevated blood pressure, irritability, insomnia, sexual dysfunction, appetite suppressing properties, psychosis.  After discussion of these risks and benefits, the patient voiced understanding and agreed to proceed. Cali reviewed, LAST DISPENSED 11/17/23 #30/30 DAYS, Informed patient order would be sent to Dr. Harkins in separate entry for signature due to EMR system, and will not see as refilled on AVS today. First fill date for Friday 12/15/23 as pharmacy is not open on weekends. [facility] location.    Controlled substance documentation: Clai reviewed; prior urine drug screen consistent obtained 9/7/23;; consent is up to date, signed, witnessed and in EHR, dated 9/7/23, which will be updated annually per policy. Patient is aware of risk of addiction on this medication, understands need to follow up for a review every 3 months and medications will be adjusted or decreased as deemed appropriate at each visit.  No history of drug or alcohol abuse.  No concerns about diversion or abuse. Patient denies side effects related to the medication.  Patient is aware of random urine drug screens and  pill counts. The dosing of this medication will be reviewed on a regular basis and reduced if possible. Ongoing use of a controlled substance is necessary for this patient to have a normal quality of life.    Symptoms of ADHD are under good control with current medication regimen. However, due to new cancer diagnosis with spouse, holidays, increased care giving tasks, as spouse is unable to help with simple tasks.  Patient wishes to remain on current  dose of Adderall as current stressors are situational which are negatively impacting day to day activities, however, patient does not want to depend on a medication.  Will re-evaluate in 7 weeks.   Patient was given instructions and counseling regarding condition and for health maintenance advice. Please see specific information pulled into the AVS if appropriate.    Patient to contact provider if symptoms worsen or fail to improve.        9/7/23:   Continue Adderall XR 15 mg by mouth daily in the morning to target symptoms of ADHD including:  Inattentiveness & impaired concentration.  LAST DISPENSED 8/7/23 #30/30 DAYS  Informed patient order would be sent to Dr. Harkins in separate entry for signature due to EMR system, and will not see as refilled on AVS today.  Due to positive UDS, will send a prescription to Dr. Harkins today for a 7  day supply, patient has adequate supply through Monday 9/11/23. Instructed patient to contact provider on Monday 9/18/23 to request refill.     Controlled substance documentation: Cali reviewed; prior urine drug screen consistent obtained 9/16/22, ordered today and results discussed with patient ; consent is up to date, signed, witnessed and in EHR, dated today 9/7/23, which will be updated annually per policy. Patient is aware of risk of addiction on this medication, understands need to follow up for a review every 3 months and medications will be adjusted or decreased as deemed appropriate at each visit.  No history of drug or alcohol  abuse.  No concerns about diversion or abuse. Patient denies side effects related to the medication.  Patient is aware of random urine drug screens and pill counts. The dosing of this medication will be reviewed on a regular basis and reduced if possible. Ongoing use of a controlled substance is necessary for this patient to have a normal quality of life.    Labs: POC UDS today in clinic resulted as +THC, which patient reported continues to take CBD oil in the evening for muscle relaxation to help with sleep, takes 3 drops. Obtains from [place] from a retired [employer].     Symptoms of  ADHD are under good control with current medication regimen.  Reminded patient to practice mindfulness and behavioral modifications to help with shifting from one uncompleted activity to another, to continue with alarms, reminders. Patient informed that CBD oil is not regulated by the FDA and each bottle could contain various amounts of THC, however, if confirmation deems positive for THC, patient has been advised to stop CBD oil.     Patient will be traveling to [place] 11/2-11/16/23 and concerned with ability to obtain medication timely, instructed patient to remind provider with refill in Oct or early Nov. So the supply can be extended to cover days of travel.   Patient was given instructions and counseling regarding condition and for health maintenance advice. Please see specific information pulled into the AVS if appropriate.    Patient to contact provider if symptoms worsen or fail to improve.        7/5/23:   Continue Adderall XR 15 mg by mouth daily in the morning to target symptoms of ADHD including:  Inattentiveness & impaired concentration.  Risks, benefits, side effects discussed with patient including elevated heart rate, elevated blood pressure, irritability, insomnia, sexual dysfunction, appetite suppressing properties, psychosis.  After discussion of these risks and benefits, the patient voiced understanding and agreed to  proceed. Cali reviewed, LAST DISPENSED 6/8/23 #30/30 DAYS  Informed patient order would be sent to Dr. Harkins in separate entry for signature due to EMR system, and will not see as refilled on AVS today.  First available fill date of 7/7/23.     Symptoms of ADHD are under good control with current medication regimen.  Informed patient next visit will be scheduled for in the office for annual CSA and UDS.  Scheduled for Thurs 9/7/23 at 10 am, instructed patient to arrive by 945 am to complete UDS prior to start of visit to allow ample time for results to show by end of visit.   Patient was given instructions and counseling regarding condition and for health maintenance advice. Please see specific information pulled into the AVS if appropriate.    Patient to contact provider if symptoms worsen or fail to improve.       4/4/23:  Continue Adderall XR 15 mg by mouth daily in the morning to target symptoms of ADHD including:  Inattentiveness & impaired concentration.  Risks, benefits, side effects discussed with patient including elevated heart rate, elevated blood pressure, irritability, insomnia, sexual dysfunction, appetite suppressing properties, psychosis.  After discussion of these risks and benefits, the patient voiced understanding and agreed to proceed. Cali reviewed, LAST DISPENSED 3/15/23 #30/30 DAYS  Patient to request refill when appropriate. Patient has 9 pills remaining after taking this morning dose, instructed patient to try to refill via pharmacy [facility] or [facility] on Monday 4/10/23, and provider will send refill in on Tues. 4/11/23 with first allowed fill date of Thurs 4/13/23.     Controlled substance documentation: Cali reviewed; prior urine drug screen consistent obtained 9/16/22; consent is up to date, signed, witnessed and in EHR, dated 9/16/22, which will be updated annually per policy. Patient is aware of risk of addiction on this medication, understands need to follow up for a review every  3 months and medications will be adjusted or decreased as deemed appropriate at each visit.  No history of drug or alcohol abuse.  No concerns about diversion or abuse. Patient denies side effects related to the medication.  Patient is aware of random urine drug screens and pill counts. The dosing of this medication will be reviewed on a regular basis and reduced if possible..  Ongoing use of a controlled substance is necessary for this patient to have a normal quality of life.  Symptoms of ADHD are under good control with Adderall XR 15 mg daily. Patient to contact provider if symptoms worsen or fail to improve.       3/10/23:  TELEPHONE  Patient called to check in re:her medication (per Adelaida's request)  Patient says she  Has 5 days worth of medication left.  Please send refill when appropriate and please advise      2/10/23:  TELPHONE  Patient called to report that the [facility] at [place] and [facility] are now out of the Adderall XR and she says she did call the [facility] at [facility] and [place] (I did change in this request.).  Please resubmit.  Med not pended      2/8/23:  Continue Adderall XR 15 mg by mouth daily in the morning to target symptoms of ADHD including:  Inattentiveness & impaired concentration.  Risks, benefits, side effects discussed with patient including elevated heart rate, elevated blood pressure, irritability, insomnia, sexual dysfunction, appetite suppressing properties, psychosis.  After discussion of these risks and benefits, the patient voiced understanding and agreed to proceed. Cali reviewed, LAST DISPENSED 1/11/23 #30/30 DAYS  Informed patient order would be sent to Dr. Harkins in separate entry for signature due to EMR system, and will not see as refilled on AVS today.    Symptoms of ADHD are under good control with Adderall XR 15 mg daily. Instructed to request refill via pharmacy when appropriate.  Will coordinate with staff to ensure refill requests are sent to this provider.  Due to  pandemic state of emergency  ending 5/11/23, discussed upcoming travel as patient is aware telehealth cannot be provided across state lines nor can prescription of controlled substances, patient has plans to go to [place] for 2 weeks in November 2023 at this time, and will update provider for any other travels to ensure patient has adequate supply of medication.  Patient to contact provider if symptoms worsen or fail to improve.       1/4/23:  Continue Adderall XR 15 mg by mouth daily in the morning to target symptoms of ADHD including:  Inattentiveness & impaired concentration.  Risks, benefits, side effects discussed with patient including elevated heart rate, elevated blood pressure, irritability, insomnia, sexual dysfunction, appetite suppressing properties, psychosis.  After discussion of these risks and benefits, the patient voiced understanding and agreed to proceed. Cali reviewed, LAST DISPENSED 12/12/22 #30/30 DAYS  Patient instructed to call [facility] Monday 1/9/23 to determine medication availability due to nationwide shortage of Adderall, and to then contact provider MA directly to inform as patient will have to transfer all medications to [facility] if continued to use that pharmacy.  Patient also instructed to inquire with pharmacy of earliest dispense date as most pharmacies are 24-48 hrs for controlled substances.   Symptoms of ADHD are under good control with Adderall XR 15 mg, denies side effects, though has noted decreased appetite with weight loss, which patient has also increased daily activity, exercising, and has been under some increased stress with parents care.  Will continue to check in with patient regarding weight loss.  Lengthy discussion with patient today regarding telehealth services as patient and provider must both be located in the same state of [place], and would not be able to provide telehealth services while visiting daughter in [place].  Patient verbalized understanding.   Patient to contact provider if symptoms worsen or fail to improve.         11/22/22:  Continue Adderall XR 10 mg by mouth daily in the morning to target symptoms of ADHD including:  Inattentiveness & impaired concentration.  Risks, benefits, side effects discussed with patient including elevated heart rate, elevated blood pressure, irritability, insomnia, sexual dysfunction, appetite suppressing properties, psychosis.  After discussion of these risks and benefits, the patient voiced understanding and agreed to proceed. Cali reviewed, LAST DISPENSED 10/28/22 #42/42 DAYS, reported #20 remain in bottle.    Will send in order for Adderall XR 5 mg by mouth daily in the morning for 20 days (through 12/12/22), patient instructed to add the 5 mg dose to the 10 mg to equal 15 mg total daily in the morning.  Patient instructed to contact provider in office when down to 3-5 days remaining of supply. Call 12/8/22.  Informed patient order would be sent to Dr. Harkins in separate entry for signature due to EMR system, and will not see as refilled on AVS today.    Controlled substance documentation: Cali reviewed; prior urine drug screen consistent obtained 9/16/22; consent is up to date, signed, witnessed and in EHR, dated 9/16/22, which will be updated annually per policy. Patient is aware of risk of addiction on this medication, understands need to follow up for a review every 3 months and medications will be adjusted or decreased as deemed appropriate at each visit.  No history of drug or alcohol abuse.  No concerns about diversion or abuse. Patient denies side effects related to the medication.  Patient is aware of random urine drug screens and pill counts. The dosing of this medication will be reviewed on a regular basis and reduced if possible..  Ongoing use of a controlled substance is necessary for this patient to have a normal quality of life.    ADHD symptoms are under fair control with Adderall XR 10 mg, will  "increase to 15 mg.   Patient to contact provider if symptoms worsen or fail to improve.       10/31/22:  TELEPHONE  Patient called and LMVM that the [facility] in [place] did fill the Rx for her Adderall XR 10mg.  Patient just wanted to let Adelaida know.  Patient said she was \"happy about it\"   Prescription was verified as dispensed with pharmacy in [place].     10/28/22:  TELEPHONE  Please determine when she will return to [place], as she was supposed to return 10/25/22 and was originally given adequate supply through 10/28/22.    Called and spoke with patient, she states that she will be back on Wednesday Nov.2,2022 and will take her last pill tomorrow.  Patient does say that the [facility] in [place] has now reopened but they say they are very backed up and someone even told her that they will probably not fill it because Dr. Harkins is not licensed in [place].  Patient says she will wait to see if they do fill it over the weekend and she will call me back on Monday and let me know.  If they don't fill it she will just have it sent to the [facility] in [place], [place].   I will forward message to  so he is updated on status, Since she will be returning next Wed. 11/2/22, a new order will not be sent into local pharmacy until confirmed dispense from [place] pharmacy, as [place] was not an option to add with MARLYS report attempted today.  We will have to contact the pharmacy to verify dispense, and patient needs to be reminded to not wait until down to 1 pill remaining to request refill or problem with refill, as this refill was sent in per patient request Monday 10/24/22, and this information was relayed to office today.   Patient called and LMVM that the [facility] in [place] did fill the Rx for her Adderall XR 10mg.  Patient just wanted to let Adelaida know.  Patient said she was \"happy about it\"       10/20/22:   Continue Adderall XR 10 mg by mouth daily in the morning to target symptoms of ADHD " including:  Inattentiveness & impaired concentration.  Risks, benefits, side effects discussed with patient including elevated heart rate, elevated blood pressure, irritability, insomnia, sexual dysfunction, appetite suppressing properties, psychosis.  After discussion of these risks and benefits, the patient voiced understanding and agreed to proceed. Cali reviewed, LAST DISPENSED 9/16/22 #42/42 DAYS  Patient instructed to request refill 10/25/22 upon return from [place]. Explained process for refills.   Controlled substance documentation: Cali reviewed; prior urine drug screen consistent obtained 9/16/22; consent is up to date, signed, witnessed and in EHR, dated 9/16/22, which will be updated annually per policy. Patient is aware of risk of addiction on this medication, understands need to follow up for a review every 3 months and medications will be adjusted or decreased as deemed appropriate at each visit.  No history of drug or alcohol abuse.  No concerns about diversion or abuse. Patient denies side effects related to the medication.  Patient is aware of random urine drug screens and pill counts. The dosing of this medication will be reviewed on a regular basis and reduced if possible..  Ongoing use of a controlled substance is necessary for this patient to have a normal quality of life.  Symptoms of ADHD are managed well with current dose of Adderall XR 10 mg without side effects.      9/16/22:   Declines therapy  Will potentially start Adderall XR 10 mg by mouth daily in the morning to target symptoms of ADHD including:  Inattentiveness & impaired concentration.  Risks, benefits, side effects discussed with patient including elevated heart rate, elevated blood pressure, irritability, insomnia, sexual dysfunction, appetite suppressing properties, psychosis.  After discussion of these risks and benefits, the patient voiced understanding and agreed to proceed. Cali reviewed, UDS ordered, and controlled  substance agreement signed & witnessed. Patient informed this medication would not be ordered until UDS resulted and was negative, at that time a Prescription will be sent to  for signature.  Labs: POC UDS today in clinic    Lengthy discussion held with patient regarding CSA and medication education.   Bottle of Hemp oil had a QRS code to scan, which was done per patient request, which indicated percentages of ingredients as each bottle varies.  Certificate of Analysis, [facility], Cannabinoid Results: Total THC 0.231%, Total CBD 6.101%, Total Cannabinoids 6.534%. Will scan certificate to EHR.   Patient will be in [place] in October, will check with  in regards to prescribing CS sending to [place].   Will contact patient if UDS needs to be sent for confirmation. Otherwise will proceed with ordering Adderall XR.     9/15/22:   ADHD testing completed by Dr. Greg Bennett on 9/1/22 confirming a diagnosis of ADHD. (total time of review 9 mins)    Discussed ADHD treatment options of non-stimulants vs stimulant medications, after discussion patient wishes to proceed with Adderall.  Informed patient of policy requirements prior to starting Adderall, patient will plan on coming in tomorrow due to available appointment at 8 am.  Discussed current CBD oil which patient reports is hemp based as the UDS may show positive for THC. Patient takes CBD oil for sleep and muscle aches, which has been effective.  IF UDS positive patient was informed Adderall would not be prescribed, and will have to send for a confirmation.  Patient verbalized understanding.       7/14/22:   No Medications, Declines therapy at this time.  Referral for ADHD testing with   Dr. Greg Bennett, Psychologist, MS, LPP  [address], [address]  [place] [address] [phone]   Currently only accepting referrals for psychological testing services.  Patient to contact provider and set up appointment.  And to contact office if  unable to get an appointment scheduled.   -Attached list of several other sites for ADHD testing. Patient instructed to contact providers on list to determine availability of appointments, instructed patient to take notes while calling places indicating first available appointment, and to ask to be placed on waiting list.  If an office is chosen and requests the referral order please contact my Medical Assistant, Hanh, directly at [phone] informing of chosen office and the information will be sent.    Patient with high suspicion of having ADHD, will send for formal testing and have patient return in 2 weeks to discuss medication options as if patient is able to be tested in the next 1-2 months, may wait on starting a non-stimulant medication.     Patient screened positive for depression based on a PHQ-9 score of 1 on 3/19/2024. Follow-up recommendations include: Suicide Risk Assessment performed.    TREATMENT PLAN/GOALS: Continue supportive psychotherapy efforts and medications as indicated. Treatment and medication options discussed during today's visit. Patient ackowledged and verbally consented to continue with current treatment plan and was educated on the importance of compliance with treatment and follow-up appointments.    MEDICATION ISSUES:  MARLYS reviewed as expected.    Discussed medication options and treatment plan of prescribed medication as well as the risks, benefits, and side effects including potential falls, possible impaired driving and metabolic adversities among others. Patient is agreeable to call the office with any worsening of symptoms or onset of side effects. Patient is agreeable to call 911 or go to the nearest ER should he/she begin having SI/HI. No medication side effects or related complaints today.     MEDS ORDERED DURING VISIT:  No orders of the defined types were placed in this encounter.      Return in about 2 months (around 8/19/2024) for medication check.         I spent  41 minutes caring for Jenn on this date of service. This time includes time spent by me in the following activities: preparing for the visit, performing a medically appropriate examination and/or evaluation, counseling and educating the patient/family/caregiver, referring and communicating with other health care professionals, documenting information in the medical record, care coordination, and scheduling      This document has been electronically signed by CHELSI Burnett  June 19, 2024 08:40 EDT      Part of this note may be an electronic transcription/translation of spoken language to printed text using the Dragon Dictation System.your visit?: Other

## 2024-07-08 DIAGNOSIS — F33.1 MAJOR DEPRESSIVE DISORDER, RECURRENT EPISODE, MODERATE: ICD-10-CM

## 2024-07-08 DIAGNOSIS — F43.23 ADJUSTMENT DISORDER WITH MIXED ANXIETY AND DEPRESSED MOOD: Primary | ICD-10-CM

## 2024-07-08 DIAGNOSIS — F90.0 ADHD (ATTENTION DEFICIT HYPERACTIVITY DISORDER), INATTENTIVE TYPE: ICD-10-CM

## 2024-07-08 DIAGNOSIS — F41.9 ANXIETY: ICD-10-CM

## 2024-07-08 RX ORDER — DEXTROAMPHETAMINE SACCHARATE, AMPHETAMINE ASPARTATE MONOHYDRATE, DEXTROAMPHETAMINE SULFATE AND AMPHETAMINE SULFATE 3.75; 3.75; 3.75; 3.75 MG/1; MG/1; MG/1; MG/1
15 CAPSULE, EXTENDED RELEASE ORAL EVERY MORNING
Qty: 30 CAPSULE | Refills: 0 | Status: SHIPPED | OUTPATIENT
Start: 2024-07-09

## 2024-07-08 NOTE — TELEPHONE ENCOUNTER
amphetamine-dextroamphetamine XR (Adderall XR) 15 MG 24 hr capsule       Last ordered: 1 month ago (6/7/2024) by Hans Harkins MD     Follow up 08/22/2024

## 2024-07-08 NOTE — TELEPHONE ENCOUNTER
sertraline (Zoloft) 50 MG tablet   Last ordered: 2 months ago (5/9/2024) by CHELSI Burnett     Follow up 08/22/2024

## 2024-07-24 ENCOUNTER — LAB (OUTPATIENT)
Dept: LAB | Facility: HOSPITAL | Age: 59
End: 2024-07-24
Payer: COMMERCIAL

## 2024-07-24 DIAGNOSIS — E03.9 HYPOTHYROIDISM, UNSPECIFIED TYPE: ICD-10-CM

## 2024-07-24 LAB — TSH SERPL DL<=0.05 MIU/L-ACNC: 2.18 UIU/ML (ref 0.27–4.2)

## 2024-07-24 PROCEDURE — 36415 COLL VENOUS BLD VENIPUNCTURE: CPT

## 2024-07-24 PROCEDURE — 84443 ASSAY THYROID STIM HORMONE: CPT

## 2024-07-27 DIAGNOSIS — E03.9 ACQUIRED HYPOTHYROIDISM: ICD-10-CM

## 2024-07-29 DIAGNOSIS — E03.9 ACQUIRED HYPOTHYROIDISM: ICD-10-CM

## 2024-07-29 RX ORDER — LEVOTHYROXINE SODIUM 0.03 MG/1
25 TABLET ORAL DAILY
Qty: 90 TABLET | OUTPATIENT
Start: 2024-07-29

## 2024-07-29 RX ORDER — EPINEPHRINE 0.3 MG/.3ML
0.3 INJECTION SUBCUTANEOUS ONCE
Qty: 1 EACH | Refills: 0 | Status: SHIPPED | OUTPATIENT
Start: 2024-07-29 | End: 2024-07-29

## 2024-07-29 RX ORDER — LEVOTHYROXINE SODIUM 0.03 MG/1
25 TABLET ORAL DAILY
Qty: 30 TABLET | Refills: 0 | Status: SHIPPED | OUTPATIENT
Start: 2024-07-29

## 2024-08-07 DIAGNOSIS — F90.0 ADHD (ATTENTION DEFICIT HYPERACTIVITY DISORDER), INATTENTIVE TYPE: ICD-10-CM

## 2024-08-07 RX ORDER — DEXTROAMPHETAMINE SACCHARATE, AMPHETAMINE ASPARTATE MONOHYDRATE, DEXTROAMPHETAMINE SULFATE AND AMPHETAMINE SULFATE 3.75; 3.75; 3.75; 3.75 MG/1; MG/1; MG/1; MG/1
15 CAPSULE, EXTENDED RELEASE ORAL EVERY MORNING
Qty: 30 CAPSULE | Refills: 0 | Status: SHIPPED | OUTPATIENT
Start: 2024-08-07

## 2024-08-07 RX ORDER — DEXTROAMPHETAMINE SACCHARATE, AMPHETAMINE ASPARTATE MONOHYDRATE, DEXTROAMPHETAMINE SULFATE AND AMPHETAMINE SULFATE 3.75; 3.75; 3.75; 3.75 MG/1; MG/1; MG/1; MG/1
15 CAPSULE, EXTENDED RELEASE ORAL EVERY MORNING
Qty: 30 CAPSULE | Refills: 0 | Status: SHIPPED | OUTPATIENT
Start: 2024-08-09 | End: 2024-08-07 | Stop reason: SDUPTHER

## 2024-08-07 RX ORDER — EPINEPHRINE 0.3 MG/.3ML
INJECTION SUBCUTANEOUS
COMMUNITY
Start: 2024-07-29

## 2024-08-07 NOTE — TELEPHONE ENCOUNTER
Med Refill Request. Patient is requesting to be able to  RX today due to her pharmacy being closed on the weekend.  Med pended Please review Next appointment scheduled for 08/22/2024

## 2024-08-22 ENCOUNTER — OFFICE VISIT (OUTPATIENT)
Dept: BEHAVIORAL HEALTH | Facility: CLINIC | Age: 59
End: 2024-08-22
Payer: COMMERCIAL

## 2024-08-22 ENCOUNTER — CLINICAL SUPPORT (OUTPATIENT)
Dept: FAMILY MEDICINE CLINIC | Age: 59
End: 2024-08-22
Payer: COMMERCIAL

## 2024-08-22 VITALS
OXYGEN SATURATION: 100 % | HEIGHT: 67 IN | WEIGHT: 174.9 LBS | HEART RATE: 78 BPM | DIASTOLIC BLOOD PRESSURE: 78 MMHG | BODY MASS INDEX: 27.45 KG/M2 | SYSTOLIC BLOOD PRESSURE: 120 MMHG

## 2024-08-22 DIAGNOSIS — Z79.899 HIGH RISK MEDICATION USE: ICD-10-CM

## 2024-08-22 DIAGNOSIS — F90.0 ADHD (ATTENTION DEFICIT HYPERACTIVITY DISORDER), INATTENTIVE TYPE: Primary | ICD-10-CM

## 2024-08-22 DIAGNOSIS — Z79.899 CONTROLLED SUBSTANCE AGREEMENT SIGNED: ICD-10-CM

## 2024-08-22 DIAGNOSIS — Z79.899 MEDICATION MANAGEMENT: ICD-10-CM

## 2024-08-22 DIAGNOSIS — F43.23 ADJUSTMENT DISORDER WITH MIXED ANXIETY AND DEPRESSED MOOD: ICD-10-CM

## 2024-08-22 LAB
AMPHET+METHAMPHET UR QL: NEGATIVE
AMPHETAMINES UR QL: NEGATIVE
BARBITURATES UR QL SCN: NEGATIVE
BENZODIAZ UR QL SCN: NEGATIVE
BUPRENORPHINE SERPL-MCNC: NEGATIVE NG/ML
CANNABINOIDS SERPL QL: NEGATIVE
COCAINE UR QL: NEGATIVE
EXPIRATION DATE: NORMAL
Lab: NORMAL
MDMA UR QL SCN: NEGATIVE
METHADONE UR QL SCN: NEGATIVE
MORPHINE/OPIATES SCREEN, URINE: NEGATIVE
OXYCODONE UR QL SCN: NEGATIVE
PCP UR QL SCN: NEGATIVE

## 2024-08-22 PROCEDURE — G0480 DRUG TEST DEF 1-7 CLASSES: HCPCS | Performed by: NURSE PRACTITIONER

## 2024-08-22 NOTE — PATIENT INSTRUCTIONS
"     SPECIFIC RECOMMENDATIONS:     1.      Medications discussed at this encounter:                   -  request refills when needed, will plan on refilling the Adderall XR for 90 days with next fill    2.      Psychotherapy recommendations:  Declined     3.     Return to clinic: 3 months Thurs. 11/21/24 at 8am via video    4.  Urine drug screen today in clinic    Please arrive at least 15 minutes before your scheduled appointment time to complete check in process.      IF you are scheduled for a [facility] VIDEO visit, YOU MUST COMPLETE THE \"E-CHECK IN\" PROCESS PRIOR TO BEGINNING THE VISIT, YOU WILL NO LONGER RECEIVE A PHONE CALL PRIOR TO ALL VIDEO VISITS; You may still complete the E-Check in for in office visits prior to appointment, you will receive multiple text/email reminders which will direct you further if needed.           "

## 2024-08-22 NOTE — PROGRESS NOTES
"  Subjective   Jenn James is a 59 y.o. female who presents today for follow up    Referring Provider:  No referring provider defined for this encounter.    Chief Complaint:  Adjustment disorder with mixed anxiety and depression, ADHD, CSA signed/reviewed, high risk medication, medication management     Answers submitted by the patient for this visit:  Other (Submitted on 8/15/2024)  Please describe your symptoms.: Medicine review  Have you had these symptoms before?: Yes  How long have you been having these symptoms?: Greater than 2 weeks  Please list any medications you are currently taking for this condition.: Adderall  Primary Reason for Visit (Submitted on 8/15/2024)  What is the primary reason for your visit?: Other      History of Present Illness:   8/22/24:  Patient presents today in office, completed UDS upon arrival to clinic.   Reports adherence with daily Adderall XR, though POC UDS this morning was negative, last dose this morning.   Patient  now goes every week for blood work, and plan for transplant mid Sept. Bone bx scheduled 8/27, and 6 tests 8/29/24.   Patient is calmer since continued use of Zoloft, has gained 2 lbs due to not exercising \"like I should\", and snacking more.  Tolerating Zoloft well, feels mood has improved.  Daughter moved to [place], [place] from [place] in July. Patient did go to [place] for 1 week to visit.      ADHD:  Symptoms include inattention, need for frequent task redirection, forgetfulness, careless mistakes, losing things, and avoiding mental tasks. The symptoms occur at home and during social events. The symptoms are described as completely resolved. Symptoms are exacerbated by fatigue, distracting activities, conversation, and mental effort. Symptoms are relieved by attention holding activities, stimulant medication, and crafts . Associated symptoms include anxiety disorder and depression . Current treatment includes behavior modification, a " "structured daily routine, educational interventions, and dextroamphetamine/amphetamine (Adderall). By report, Jenn is compliant all of the time.    Denies side effects of elevated heart rate, elevated blood pressure, irritability, insomnia, decreased appetite, nor feeling shaky or jittery with stimulant medication.       6/19/24:  Patient presents today via [employer] Video visit from home in basement on [address], located in [place], [place].  At last visit patient was started on up titrated dose of Zoloft for which patient reports she has been calmer, has been sewing quilts, working on table runners, and 2 cats provide comfort and stay with patient often.  Has been staying in basement in sewing room a majority of the day, \"its my happy place.\"  \"I am doing okay.\"   will have another PFT's next month and if improved will be placed on surgery schedule for transplant, and if some improvement they may wait another 3 months, or disqualify him from transplant list.  Patient has disassociated from , avoiding confrontation as  will react in negative manner.   has been going to the gym every other day, though suppose to go daily. Patient did go with him once, though  doesn't want to talk, and he walks for 30 minutes whereas patient try's to encourage him, though he will not walk longer than 30 minutes.   Patient has been attending video meetings on Tues. Nights and learning coping strategies which are helpful.  Patient has been on myelofibrosis social media group as she joined as care giver which provides support, though patient reports some outcomes are scary, though she wants to be prepared and wanted spouse to join to help him realize disease prognosis. Patient feels spouse is in denial, he has declined joining, though patient will send him screen shots and make comments to him about group.     Patient enjoys sewing, as it is calming, if patient makes a mistake will go back to it, " "frustrated though able to move past.     ADHD:  Symptoms include inattention and careless mistakes. The symptoms occur at home and during social events.  The symptoms are described as stable. Symptoms are exacerbated by fatigue, distracting activities, conversation, and mental effort. Symptoms are relieved by attention holding activities and stimulant medication. Associated symptoms include anxiety disorder. Current treatment includes behavior modification, a structured daily routine, educational interventions, and dextroamphetamine/amphetamine (Adderall). By report, Jenn is compliant all of the time.    Denies side effects of elevated heart rate, elevated blood pressure, irritability, insomnia, decreased appetite, nor feeling shaky or jittery with stimulant medication.       5/7/24:  Patient presents today via db4objectshart Video visit from outside of son's and daughter in law's home, located in [place], [place].  Patient had called the office to request a refill of Adderall XR 15 mg yesterday and inquired about a dose increase, for which patient was asked to schedule an appointment to further assess.  Patient reports  was supposed to be admitted tomorrow for 1 week to hospital until 5/14/24 to have several rounds of chemotherapy after placement of IP, though due PFT's the transplant will be delayed for at least 3 months.  \"It just rocked the world, if its not at the level, it will put us at another 3 months, and it will be the holiday season, we will be isolated from everyone.\"  Patient is hopeful to have transplant end of July or early August.  Patient is planning to a support group for caregivers tonight, which is in [place] on Tues nights, have dinner, and also offers other services and will plan on attending every week if she enjoys.  Explains 's behavior has worsened over time since cancer diagnosis.  Patient went to [place] 4/21/24 for a week, due to patient feeling she was at her breaking point, " "\"there's no appreciation, he expects me to do everything, the way he is treating me right now isn't right.\"  Daughter had noticed patient was not able to complete sentences, thoughts were scattered.  Last night patient and spouse were having an argument, and cannot recall what she had said the night before when they were arguing, finds self already having difficulty remembering.     \"I need to concentrate to deal with this, I don't know if I can continue because its so bad. I set him straight last night.\" Patient does record visits with 's doctors which patient started to do for her parents, and patient was able to replay discussion with provider, and after he listened  did not argue any further. Patient  used to keep TV on, and it has been off, which is not his normal behavior.  Patient has been trying to interact with , watching a movie, and  was scrolling through his phone, \"he said I let you watch one show.\" Patient walked away from situation and upon return  was accusing patient of \"setting him up.\"  The night before the dish scrubber was broken, and had been using the same one until she can get to the store for a new one, and couldn't find it as  had thrown it away.  Patient asked  to please inform her if he throws something away, which spouse did not take lightly.  \"I think he is seriously depressed, you can't tell him that tho, he hibernates in the house.\"   Since spouse has been stronger, he is okay by himself, though frustration with spouse as he hasn't been following exercise regimen to build up endurance and strength.      Patient feels when she was transferred to work at [employer] in [id number] when she retired, which was a strain on marriage, and spouse had to deal with other family stressors, and he got used to being alone, as patient had to rent an apartment and come home on the weekends.   was on a different schedule than they had " "before with meals, etc. \"He got into his own little world, he started drinking a lot, but he slowed down, and he quit cold turkey in Sept and he quit vaping a month ago.\"     Upon discussing adding an antidepressant medication to current regimen, patient has reservations about potential weight gain.     ADHD:  Symptoms include inattention and forgetfulness. The symptoms occur at home and during social events. The symptoms are described as Moderate in severity. The symptoms are described as gradually worsening. Symptoms are exacerbated by fatigue and mental effort. Symptoms are relieved by attention holding activities and stimulant medication. Associated symptoms include anxiety disorder and major depression. Current treatment includes behavior modification, a structured daily routine, educational interventions, and dextroamphetamine/amphetamine (Adderall). By report, Jenn is compliant all of the time.    3/20/24:    Answers submitted by the patient for this visit:  Other (Submitted on 3/19/2024)  Please describe your symptoms.: Check in sonce taking adderall  Have you had these symptoms before?: Yes  How long have you been having these symptoms?: Greater than 2 weeks  Please list any medications you are currently taking for this condition.: Adderrall  Please describe any probable cause for these symptoms. : ADD  Primary Reason for Visit (Submitted on 3/19/2024)  What is the primary reason for your visit?: Other    Patient presents today via [employer] Video visit from home, located in [place], [place].  Patient reports things have improved and slowed down, has completed estate sale of parents belongings, and  will be having bone marrow transplant mid May at [facility], which has provided some relief, and he has improved with his strength and endurance.  Patient expresses positive experience with husbands doctors. Spouses brothers came up for a weekend, and patient went to [place] with sister in " "law to see another sister in law whom has ALS.  Patient felt  needed visit with his brothers, they took him out golfing. Brothers also helped with other projects around the house  had started, and cared for him which he enjoyed.  Both sides of family benefited from the visits, able to see the light at the end of the tunnel. Patient was able to enjoy self and get some self care done.      Continues to tolerate Adderall XR 15 mg every morning, able to complete tasks, attention and concentration improved, less anxiety since stress with spouse has decreased and having family support. Denies side effects of elevated heart rate, elevated blood pressure, irritability, insomnia, decreased appetite, nor feeling shaky or jittery with stimulant medication.   Patient feels this past Saturday had a normal day, [place], came home fixed lunch for her and spouse, cleaned house, baked cakes for grandchildren. Patient wasn't working on the basement, getting things organized, didn't have to go to parents house, and has not had a \"normal day\" since October due to duties with parents, moving and sorting their belongings, which patient felt good had a great day.  Patient expresses some concerns of ability to continue walking and isolation precautions after spouse's surgery, though very hopeful, 100 days of isolation due to risks, able to keep animals in home.  Will have to remove live plants from around spouse, will move to upstairs area, due to potential of growing spores.    1/24/24: Patient presents today via [employer] Video visit from home, located in [place], [place].  Patient admits to stress due to  and parents, parents have now moved in to an AL facility.  Patient has been cleaning parents home which has been overwhelming, patient  is over there approximately 5 hrs a day.   will have a bone marrow transplant, though has to gain weight and strength, at least 30 minutes of ability " "to stand.   with poor appetite due to spleen enlarged pushing against his stomach, and endurance is poor of standing or walking for 5 minutes.   has to force himself to drink 2 protein shakes daily.  Patient expresses frustration about  not wanting to eat or improve in order to have the transplant.  does desire the transplant.  Patient also has worries about  not completing the living will as they currently do not have one.  \"He's hard headed, he doesn't want anyone telling him what to do. His daughter is a nurse for  in the bone marrow transplant wing in [place].\"  Patient has spoken with  and voiced concerns to have another care giver to assist her with 's needs, as patient does not have any family to help, his family lives in [place] or [place].  Tension with family members, though able to work things out.  Daughter in law is bringing down 2 grand babies today from [place], will do some crafts, visit parents at the AL.  Patient was planning to travel to [place] this month due to both grand daughters birthday's, which has been put on hold for the time being. Patient is planning on reaching out to 's daughter about care giving help.   will be in isolation after surgery while in the hospital for 20 days, and 80 days once returns home.  The 2nd care giver will be respite for patient, as patient cannot be gone more than one hour and patient struggling with what to do, as  becomes argumentative.      Patient continues to tolerate Adderall XR 15 mg for management of ADHD symptoms. Patient has gotten off task while cleaning parents with sister, though has been able to recognize distraction and return to task.  Sleeping well.   Denies side effects of elevated heart rate, elevated blood pressure, irritability, insomnia, decreased appetite, nor feeling shaky or jittery with stimulant medication.        12/7/23:  Patient presents today via [employer] Video " "visit from home, located in [place], [place].  Reports  has been diagnosed with rare blood cancer, myelofibrosis, which is a slow progression as patient was told he has had for likely 10-12 years.   had 2nd bone marrow bx yesterday, reports increased stress due to transporting parents and now  to [facility].  In process of making 4 quilts for grandchildren started in June for [place], and has a friend whom may help with quilting.   Has not put up Wildfire tree yet due to overwhelming tasks and stress.      has lost 60-70 lbs over the last 6-7 mo. Extreme fatigue, poor endurance, and is severely weak after a simple task of letting the dogs out. Patient is primarily taking care of everything in house hold.   Once results of bone marrow bx, will find out if  qualifies for a treatment that is, 30% morbidity rate for a bone marrow transplant, though still working out details and decisions.  Patient was unable to go travel to [place] to see grand kids.     Patient is trying to prioritize tasks, as house is not tidy as she would like, and feels very overwhelmed.  Patient doesn't have the energy to communicate 's health decline to all family members. Patient  relatives were willing to come to home from [place] to help and spend holidays, however, the stress of patient being the host and \"they let me be it.\"    In regards to ADHD, patient explains while sewing, will be easily distracted by tasks that are not done, such as going to  medications from pharmacy, grocery, to do this and that, as it was forgotten previously.  Patient wishes to not change dose of Adderall as the current stress with  and holidays are contributing factors.     Denies side effects of elevated heart rate, elevated blood pressure, irritability, insomnia, decreased appetite, nor feeling shaky or jittery with stimulant medication.      9/7/23:    Patient presents today in office for " annual [facility] formalities, ADHD management.  Patient reports weight has been steady low 170's, continues to walk a lot though not able to exercise due to vocal cord dysfunction which effects nerves.  8 yrs ago patient had to go to speech therapy, which was caused by hyperventilating, due to tension in neck feels soreness which extends to left arm, though has had symptoms on right arm. Doing physical therapy currently and noted improvement, was told the symptoms will subside and are currently flared due to allergy to mold.     In regards to ADHD, patient reports current dose of Adderall XR 15 mg remains effective. However,  patient noticed while relatives were to come to house, in preparation patient found self jumping from task to task though was anxious about getting house prepared for their visit. Patient continues to use visual reminders. Since relatives have departed symptoms have improved. Patient does find self forgetful of keeping sunglasses in house, and is concerned if the dose needs to be adjusted. Reports the symptoms are minimal.     Denies side effects of elevated heart rate, elevated blood pressure, irritability, insomnia, decreased appetite, nor feeling shaky or jittery with stimulant medication.      Patient reports having 4 Adderall XR 15 mg remaining.   Every 2 yrs patient goes to [place] for 2 weeks, plans to leave Nov. 2, 2023 and to return 11/16/23.   Patient reports continues to take CBD oil in the evening for muscle relaxation and to help with sleep, takes 3 drops. Obtains from [place] from a retired .     7/5/23:  Patient presents today via MyChart Video visit from home, located in [place], [place].    Patient reports having adequate supply through Thurs 7/13/23.  Patient reports pharmacy filled last Adderall XR 15 mg early which actually took the pressure of worrying as [facility] location is not open on the weekend.    Patient reports positive outcome with current dose of Adderall XR, as  "family has been visiting from out of town.  Patient feels she did well with various company visiting for Memorial Day and Fourth of July, was able to complete tasks without shifting from one task to another.  Patient has had company in home since May 19, 2023 with the exception of 1 week.     Patient denies current symptoms include fidgeting, difficulty remaining seated, easy distractability, blurting out answers prematurely, inability to complete tasks, difficulty sustaining attention, shifting from one uncompleted activity to another, talking excessively, interrupting others, ineffective listening, frequently losing items.   Denies side effects of elevated heart rate, elevated blood pressure, irritability, insomnia, decreased appetite, nor feeling shaky or jittery with stimulant medication.      Patient reports parents will be moving into an AL facility as they have long term care insurance, and have looked at several facilities, patient voices worry for her father due to father caring for mother.     4/5/23:  Patient presents today via Tr[employer] Video visit from home, located in [place], [place].  Patient asking about redness to nose and lateral nose/face \"tiny white oil, and I push it and small amount of oil comes out.\" Patient suspected possible relation to medication, patient has started washing face at night as patient was only washing in the morning.  Denies changes to laundry detergent, face cleanser, though did change skin care moisturizer which may be a contributing factor.   Mild anxiety and worry noted per screening.  Patient reports Adderall XR dose remains effective, symptoms are well controlled, able to maintain focus, concentration, and complete tasks. Denies side effects.     Patient daughter, son in law, and grand kids are coming in town from [place] in May for patient 60 th birthday.  Which patient is looking forward to.     2/8/23:  Patient presents today via Tr[employer]rap Video visit from home in " "[place], [place].  Patient reports feeling good, busy with family birthdays, and going to [place] to see grand kids to do some crafts. Symptoms of inattentiveness, shifting from one uncompleted activity or topic to another, impaired concentration are controlled well with current dose of Adderall XR 10 mg. Patient frequently freezing on video and had to go outside of home for better service, though continued to have connection problems intermittently.     Patient continues to be conscious of eating, denies decreased appetite, patient is walking with friends, eats a breakfast bar normally and then eats lunch around 1130, however, yesterday got caught up shopping and realized she had not eaten.  When patient does eat she eats good quantity.  Feels she may have lost 1-2 lbs, otherwise weight has been steady.      Patient has checked with [facility] pharmacy and Adderall dose is in stock.  Patient plans to travel to [place] in November, no other trips planned for out of state at this time.          1/4/23:  Patient presents today via Referlyt Video visit from home, Adderall XR was increased from 10 to 15 mg at last visit, for which patient reports the first few days of the increase felt \"I don't know if I can handle this, just a little spaced out, I don't know, but it was fine in a few days.\"  Denies increased heart rate, difficulty sleeping.   Patient feels she may be decreased weight of 15 lb, though patient has been busy with mother in the hospital and getting things together for the holidays.  Reports mother was admitted to [facility] and had medication adjustments and plan to get mother into an adult day care.  Tomorrow patient is taking father to see a specialist at [facility] to help him cope with mother's mental illness- \"alzheimers and delusional dementia\" per the neurologist.  Reports mother can get argumentative and paranoid.       Reports home scale weight on 1/1/3 of 183.6 lbs.  Patient reports increased " "walking with friends, not eating as much in the morning, \"constantly on the go.\"  Had wanted to do some fasting and weight loss.  Denies having to purchase new clothing, though noticed face was slimmer and clothes were looser.  Patient had been on weight watchers in the past before COVID and was down to 167 lbs, as patient was working out in the gym, and gym's were closed during COVID and weight returned.  Reports eating at 1130-12 noon for lunch, and 7 pm for dinner as spouse likes to eat at 8 pm.  Will also snack during the day on skinny pop popcorn.  Reports at last office visit recalled telling MA to not tell her what the weight was, however, felt \"good\" seeing the scale weight a few days ago.  Plans to start going back to the gym with friend as they would go every morning, \"that was my life.\"     ADHD symptoms are improving, as patient reports ability to listen more effectively, more patient with others is noticed the most since dose increase.  Upon feeling organized with holiday decorations, noticed was able to stay on task, only bring up one box at a time to decorate instead of having all boxes out and not being able to organize.    Patient reports having enough supply of Adderall XR 15 mg through next Wed. 1/11/23.  Reports after speaking with pharmacist at [facility] was told all medications would have to be transferred from [facility] and [facility] will not only fill the Adderall prescription. Patient had used [facility] due to [facility] not having Adderall XR available.  Patient is also concerned due to frequent travels of having adequate supply and ability to fill medication at outlying pharmacies.       11/22/22:  Patient presents today via [employer] Video visit from patient home.  Patient reports would like to increase dose possibly, reports spouse notices \"I am drifting some.\" Patient further explains will frequently jump to other topics during conversation, though improved with Adderall XR 10 mg.  " "Patient reports daughter was able to notice improvement, though now that patient has returned to home from travels, she is noticing elevation in symptoms.      Patient reports some difficulty with sleeping, as last night had minimal sleep as patient is worried about mother whom has dementia and father is caring for mother, has had  involved.  Believes temporary, situational anxiety is reason for sleep difficulty.  Patient reports mother's dementia symptoms are worsening and difficult to deal with, though hopeful as  has set up for assistant to come to home several days a week to help with house cleaning, meal prep, and care.       10/20/22:  Patient presents today via [employer] Video visit from daughter's home in [place].   Patient was started on Adderall XR 10 mg at last visit for which patient reports the first few days had increased energy, which had company, had difficulty sleeping the first 2 nights, however, no longer having difficulty.  Patient reports taking medication at 7 am, one day she took it later at 930 am and had difficulty sleeping that night. Patient has been taking thyroid medication upon awakening to use the bathroom around 5-530 am and upon waking up around 7 takes the Adderall.      \"I think it's a lot better, my daughter said she seems to notice, I still have a tendency to interrupt but that's better, I don't seem to repeat questions, as it was an issue before because I was not really listening. My mind was somewhere else.\"   Denies side effects, patient was surprised she was able to sit still after a few days, now able to complete tasks as less distraction, able to continue with task at hand before switching to another task.     Patient will be returning from [place] 10/25/22.     9/16/22:  Patient presents today in office today to further discuss ADHD treatment with a controlled substance and to complete CSA and obtain POC UDS today per policy.     Patient " "brought in bottle of Hemp CBD oil 3200mg/2 oz, 1-3 drops 3 times daily on bottle, however, patient only takes in the evening for post menopausal symptoms, muscle aches, and vocal cord dysfunction, \"my neck tenses up , had to do physical therapy in past, and oil helps, I found out I am Allergic to mold.\"    9/15/22:  Patient presents today via MyChart Video visit from home, reports received ADHD test results though has not discussed.  Patient continues to have symptoms of inttentiveness, concentration difficulty, difficulty completing tasks, and switching from one uncompleted task to another.     Patient does take 3 drops of CBD oil, hemp based, at night for sleep, relaxes muscles as patient started taking while going through menopause.   Patient reports  is allergic to Wellbutrin and daughter tried Wellbutrin and did not do well, as patient would prefer to try Adderall first rather than a non-stimulant medication.  Patient will be out of town for 2 weeks in October visiting daughter in [place], likely early October.       7/14/22:  INITIAL EVAL  Patient presents today via MyChart Video visit from home with a history of depression and anxiety for which treatment was started in late 1995 with therapy, and medications from [id number] due to divorce.     Patient is currently not taking any psychotropic medications nor has had any since 2005.     \"My issue is I can't concentrate, I start something and cant finish something, forgets things often\".  Patient recalls while in school always \"fidgety\" crossing legs, moving often in seat.  Admits to interrupting people, gets distracted easily, daughter noticing.    Admits to over the past 2 yrs began to feel ADHD may be a possibility, \"I am tired of being like this, forgetting stuff, going in and out of the house, tired of interrupting people, it seems to be bothering me more.\"  Patient uses reminders, lists which was started while kids in high school and has continued " "to have  self add items needed to List,\" I walk out without my list from the fridge, and I have to have him take a picture of it and send it to me\".  Difficulty sitting still when you should be sitting still in [employer] or meetings. Impulsive with shopping tends to purchase items that may be on sale or those that she may not need with the idea of \"I can always return it\".     Symptoms include fidgeting, difficulty remaining seated, easy distractability, blurting out answers prematurely, inability to complete tasks, difficulty sustaining attention, shifting from one uncompleted activity to another, talking excessively, interrupting others, ineffective listening, frequently losing items, and impulsivity.    ADHD:   Elementary school:   Grades:A's and B's  Special classes or failures:no  Got in trouble:no  Referral for ADHD testing:no  Fhx:son, diagnosed officially in 7th grade, took medications for 1-2 yrs, restarted while in college only, \"he probably should be taking something, I have suggested it to him\"  Presently:  Problems with attention to detail:yes  Problems with sustained attention:Yes  Problems listening when spoken to directly:Yes, unable to concentrate on what others are saying, \"I have to get my point across, I know I have to wait for the break, but I can't wait for the break.\"  Failure to finish tasks:yes, \"I will lay my husbandsTshirts on couch intending to hang up and they will be there for 2-3 days\" house hold tasks-dusting, mopping, find need to focus on floor boards  Avoids tasks that require sustained mental effort:yes, \"I excelled at work,  I could multi-task, 5-6 projects at desk, however, would wait until the last week to update credentials, I put off stuff and procrastinate all the time\"  Easily distracted:yes  Forgetting things:yes  Losing things:yes  Hard to organize:yes  Talks a lot and cutting people off:yes  Drifts off during conversations:yes, \"I have to concentrate what I need to " "say to not forget what I have say, then I blurt it out, I am trying really hard, it's even hard with you to not stop and say stuff\"  Difficulty with Reading:yes, \"I used to read a lot, has been using Audio books to listen in car or while out with friends or walking, has to rewind at times because my mind drifts off.\"  Difficulty watching TV/Movies:\"not really, we hardly ever have the TV on anymore.\"       PHQ-9 Depression Screening  PHQ-9 Total Score:   3/19/2024 1 (PHQ2-0)    Little interest or pleasure in doing things?     Feeling down, depressed, or hopeless?     Trouble falling or staying asleep, or sleeping too much?     Feeling tired or having little energy?     Poor appetite or overeating?     Feeling bad about yourself - or that you are a failure or have let yourself or your family down?     Trouble concentrating on things, such as reading the newspaper or watching television?     Moving or speaking so slowly that other people could have noticed? Or the opposite - being so fidgety or restless that you have been moving around a lot more than usual?     Thoughts that you would be better off dead, or of hurting yourself in some way?     PHQ-9 Total Score       SUZANNE-7    3/19/2024 3    The following portions of the patient's history were reviewed and updated as appropriate: allergies, current medications, past family history, past medical history, past social history, and past surgical history.     Past Surgical History:  Past Surgical History:   Procedure Laterality Date    BLADDER SUSPENSION  2008    COLONOSCOPY      ESSURE TUBAL LIGATION  1999    JOINT REPLACEMENT  Jan 2017    Right hip replacement    KIDNEY SURGERY      OTHER SURGICAL HISTORY      metal implant    TUBAL ABDOMINAL LIGATION         Problem List:  Patient Active Problem List   Diagnosis    Primary osteoarthritis of right hip    Status post right hip replacement    Hypothyroidism (acquired)    Paroxysmal atrial fibrillation    Anxiety    Deep " venous thrombosis    Disorder of vocal cord    Edema of lower extremity    Hyperlipidemia    Hyperthyroidism    Onychomycosis    Pain of right hip joint    Laryngitis due to gastroesophageal reflux    Sensation of heaviness in extremities    Varicose veins of lower extremity    Vitamin deficiency       Allergy:   Allergies   Allergen Reactions    Sulfa Antibiotics Rash        Discontinued Medications:  There are no discontinued medications.      Current Medications:   Current Outpatient Medications   Medication Sig Dispense Refill    amphetamine-dextroamphetamine XR (Adderall XR) 15 MG 24 hr capsule Take 1 capsule by mouth Every Morning Indications: Attention Deficit Hyperactivity Disorder 30 capsule 0    azelastine (ASTELIN) 0.1 % nasal spray USE 1 TO 2 SPRAYS IN EACH NOSTRIL TWICE DAILY AS NEEDED FOR RUNNY NOSE OR SNEEZING OR POST NASAL DRIP      cetirizine (zyrTEC) 10 MG tablet Take 1 tablet by mouth Daily.      Cyanocobalamin (Vitamin B 12) 250 MCG lozenge Take 2,500 mcg by mouth.      EPINEPHrine (EPIPEN) 0.3 MG/0.3ML solution auto-injector injection ADMINISTER 0.3 ML UNDER THE SKIN 1 TIME FOR 1 DOSE AS DIRECTED      levothyroxine (SYNTHROID, LEVOTHROID) 25 MCG tablet Take 1 tablet by mouth Daily. 30 tablet 0    Magnesium Oxide (MAGNESIUM EXTRA STRENGTH PO) Take  by mouth.      Milk Thistle 150 MG capsule Take  by mouth.      montelukast (SINGULAIR) 10 MG tablet Take 1 tablet by mouth Every Night.      sertraline (Zoloft) 50 MG tablet Take 1 tablet by mouth Every Morning for 90 days. Indications: Generalized Anxiety Disorder, Major Depressive Disorder 90 tablet 0    Vitamin D-Vitamin K (K2 Plus D3) 100-1000 MCG-UNIT tablet Take  by mouth Daily. Patient takes drops (not tablets) due to GI upset with tablet       No current facility-administered medications for this visit.       Past Medical History:  Past Medical History:   Diagnosis Date    ADHD (attention deficit hyperactivity disorder) Sep/22    Now on  "adderrall    Allergic Whole life    Anxiety     Cervical cancer screening 2109    Chronic allergic rhinitis     Deep vein thrombosis 2017    Caused after my hip replacement.    Depression     Hyperlipemia 03/15/2017    Hypothyroidism 03/15/2017    Kidney stones     Limb pain     Limb swelling        Past Psychiatric History:  Began Treatment: started in  with therapy due to spouse having an affair  Diagnoses:Depression and Anxiety ADHD inattentive type diagnosed 22 per Neuropsychological testing with KEYLA Vargas,L.P.P.  Psychiatrist: last seen from [id number]  Therapist: last seen from [id number]  Admission History:Denies  Medication Trials: Prozac--for one year \"felt like i was floating;\" Zoloft for 1 yr -, then started Effexor with divorce in  for 1 yr-during time having increased stress with divorce as pt had lost hair and stomach problems and asked to be placed on meds; tolerated well; Lexapro -  excessive drowsiness, couldn't get out of bed  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  Kids:Yes or If so, how many 2 children and 2 step children  House:Lives in a house   History: Denies  Access to Guns: no    Social History     Socioeconomic History    Marital status:      Spouse name: Abdias    Number of children: 2    Highest education level: Some college, no degree   Tobacco Use    Smoking status: Never    Smokeless tobacco: Never   Vaping Use    Vaping status: Never Used   Substance and Sexual Activity    Alcohol use: Yes     Alcohol/week: 3.0 - 4.0 standard drinks of alcohol     Types: 3 - " 4 Glasses of wine per week     Comment: drinks daily, wine, beer and lquor    Drug use: Never     Types: Marijuana     Comment: In teenage years    Sexual activity: Yes     Partners: Male     Birth control/protection: Surgical       Family History:   Suicide Attempts: Denies  Suicide Completions:Denies      Family History   Problem Relation Age of Onset    Dementia Mother     Diabetes type II Mother     Cancer Mother 70        Kidney cancer (left kidney removed) and endometrial cancer (hysterectomy)    Diabetes Mother         Type 2    Stroke Mother         TIA    Prostate cancer Father     Hearing loss Father         Hearing aids    Depression Father     Colon cancer Maternal Grandmother 70    Diabetes Paternal Grandmother         Type 2    ADD / ADHD Son     Anxiety disorder Daughter        Developmental History:   Born: [place], lived in [place] since age 1  Siblings:1 sister, 1 brother-both younger  Childhood: Denies Abuse  High School:Completed  College: 2 yrs, no degree    Mental Status Exam:   Hygiene:   good  Cooperation:  Cooperative  Eye Contact:  Good  Psychomotor Behavior:  Appropriate  Affect:  Appropriate  Mood: euthymic   Speech:  Normal  Thought Process:  Goal directed  Thought Content:  Mood congruent  Suicidal:  None  Homicidal:  None  Hallucinations:  None  Delusion:  None  Memory:  Intact  Orientation:  Grossly intact  Reliability:  good  Insight:  Good  Judgement:  Good  Impulse Control:  Good  Physical/Medical Issues:  Yes Hypothyroidism,OA rt hip,paroxysmal afib, HLD      Review of Systems:  Review of Systems   Constitutional:  Negative for appetite change, diaphoresis and fatigue.   HENT:  Negative for drooling.    Eyes:  Negative for visual disturbance.   Respiratory:  Negative for cough and shortness of breath.    Cardiovascular:  Negative for chest pain, palpitations and leg swelling.   Gastrointestinal:  Negative for nausea and vomiting.   Endocrine: Negative for cold intolerance and heat  "intolerance.   Genitourinary:  Negative for difficulty urinating.   Musculoskeletal:  Negative for joint swelling.   Allergic/Immunologic: Negative for immunocompromised state.   Neurological:  Negative for dizziness, seizures, speech difficulty and numbness.   Psychiatric/Behavioral:  Negative for agitation, decreased concentration, self-injury, sleep disturbance and suicidal ideas. The patient is nervous/anxious. The patient is not hyperactive.          Physical Exam:  Physical Exam  Psychiatric:         Attention and Perception: Attention and perception normal.         Mood and Affect: Mood and affect normal.         Speech: Speech normal.         Behavior: Behavior normal. Behavior is cooperative.         Thought Content: Thought content normal. Thought content does not include suicidal ideation. Thought content does not include suicidal plan.         Cognition and Memory: Cognition and memory normal.         Judgment: Judgment normal.         Vital Signs:   /78   Pulse 78   Ht 170.2 cm (67\")   Wt 79.3 kg (174 lb 14.4 oz)   SpO2 100%   BMI 27.39 kg/m²        Lab Results: REVIEWED  Office Visit on 08/22/2024   Component Date Value Ref Range Status    Amphetamine Screen, Urine 08/22/2024 Negative  Negative Final    Barbiturates Screen, Urine 08/22/2024 Negative  Negative Final    Buprenorphine, Screen, Urine 08/22/2024 Negative  Negative Final    Benzodiazepine Screen, Urine 08/22/2024 Negative  Negative Final    Cocaine Screen, Urine 08/22/2024 Negative  Negative Final    MDMA (ECSTASY) 08/22/2024 Negative  Negative Final    Methamphetamine, Ur 08/22/2024 Negative  Negative Final    Morphine/Opiates Screen, Urine 08/22/2024 Negative  Negative Final    Methadone Screen, Urine 08/22/2024 Negative  Negative Final    Oxycodone Screen, Urine 08/22/2024 Negative  Negative Final    Phencyclidine (PCP), Urine 08/22/2024 Negative  Negative Final    THC, Screen, Urine 08/22/2024 Negative  Negative Final    Lot " Number 08/22/2024 [id number]   Final    Expiration Date 08/22/2024 11-   Final   Lab on 07/24/2024   Component Date Value Ref Range Status    TSH 07/24/2024 2.180  0.270 - 4.200 uIU/mL Final       EKG Results:  No orders to display       Imaging Results:  No Images in the past 120 days found..      Assessment & Plan   Diagnoses and all orders for this visit:    1. ADHD (attention deficit hyperactivity disorder), inattentive type (Primary)    2. Adjustment disorder with mixed anxiety and depressed mood    3. High risk medication use  -     POC Medline 12 Panel Urine Drug Screen  -     Amphetamine Urine Confirmation - Urine, Clean Catch; Future    4. Controlled substance agreement signed    5. Medication management                    Visit Diagnoses:    ICD-10-CM ICD-9-CM   1. ADHD (attention deficit hyperactivity disorder), inattentive type  F90.0 314.00   2. Adjustment disorder with mixed anxiety and depressed mood  F43.23 309.28   3. High risk medication use  Z79.899 V58.69   4. Controlled substance agreement signed  Z79.899 V58.69   5. Medication management  Z79.899 V58.69           PLAN:  1.   Safety: No acute safety concerns  Therapy: None  Risk Assessment: Risk of self-harm acutely is low.  Risk factors include anxiety disorder, mood disorder, and recent psychosocial stressors (pandemic). Protective factors include no family history, denies access to guns/weapons, no present SI, no history of suicide attempts or self-harm in the past, minimal AODA, healthcare seeking, future orientation, willingness to engage in care.  Risk of self-harm chronically is also low, but could be further elevated in the event of treatment noncompliance and/or AODA.  Meds:    -Continue Adderall XR 15 mg by mouth daily in the morning to target symptoms of ADHD including:  Inattentiveness & impaired concentration.  Risks, benefits, side effects discussed with patient including elevated heart rate, elevated blood pressure,  irritability, insomnia, sexual dysfunction, appetite suppressing properties, psychosis.  After discussion of these risks and benefits, the patient voiced understanding and agreed to proceed. Cali reviewed, LAST DISPENSED 8/7/24 #30/30 days. Patient to request refills when needed, will send for a 90 day supply, however, patient was instructed to contact provider if the pharmacy will not fill due to possible shortage of supply.   Controlled substance documentation: Cali reviewed; prior urine drug screen consistent obtained 9/7/23, obtained today 8/22/24; consent is up to date, signed, witnessed and in EHR, dated today 8/22/24, which will be updated annually per policy. Patient is aware of risk of addiction on this medication, understands need to follow up for a review every 3 months and medications will be adjusted or decreased as deemed appropriate at each visit. No history of drug or alcohol abuse.  No concerns about diversion or abuse. Patient denies side effects related to the medication.  Patient is aware of random urine drug screens and pill counts. The dosing of this medication will be reviewed on a regular basis and reduced if possible. Ongoing use of a controlled substance is necessary for this patient to have a normal quality of life.   Continue Zoloft 50 mg by mouth daily in the morning to target depression and anxiety. Discussed all risks, benefits, alternatives, and side effects of Zoloft (Sertraline) including but not limited to GI upset, sexual dysfunction, bleeding risk, seizure risk, insomnia, sedation, dizziness, fatigue, drowsiness, activation of temi or hypomania, increased fragility fracture risk, hyponatremia, ocular effects, withdrawal syndrome following abrupt discontinuation, serotonin syndrome, and activation of suicidal ideation and behavior.      Discussed the need for patient to immediately call the office for any new or worsening symptoms, such as worsening depression; feeling  nervous or restless; suicidal thoughts or actions; or other changes in mood or behavior, and all other concerns. Patient  educated on medication compliance and the risks of suddenly stopping this medication or missing doses. Patient verbalized understanding and is agreeable to taking Sertraline. Addressed all questions and concerns.      Labs: POC UDS today in clinic, results reviewed though not as expected as results were negative for amphetamine, new order placed for confirmation as patient admits to adherence, last dose this morning.   Patient instructed to request refills when appropriate, when down to 5-7 days remaining via pharmacy or via MyChart.       Symptoms of anxiety, depression, ADHD are under good control with current medication regimen.    The plan was discussed with the patient. The patient was given time to ask questions and these questions were answered. At the conclusion of their visit they had no additional questions or concerns and all questions were answered to their satisfaction.   Patient was given instructions and counseling regarding condition and for health maintenance advice. Please see specific information pulled into the AVS if appropriate.    Patient to contact provider if symptoms worsen or fail to improve.      6/19/24:  -Continue Adderall XR 15 mg by mouth daily in the morning to target symptoms of ADHD including:  Inattentiveness & impaired concentration.  LAST DISPENSED 6/10/24 #30/30 days.  -Controlled substance documentation: Cali reviewed; prior urine drug screen consistent obtained 9/7/23;; consent is up to date, signed, witnessed and in EHR, dated 9/7/23, which will be updated annually per policy. Patient is aware of risk of addiction on this medication, understands need to follow up for a review every 3 months and medications will be adjusted or decreased as deemed appropriate at each visit. No history of drug or alcohol abuse.  No concerns about diversion or abuse.  Patient denies side effects related to the medication.  Patient is aware of random urine drug screens and pill counts. The dosing of this medication will be reviewed on a regular basis and reduced if possible. Ongoing use of a controlled substance is necessary for this patient to have a normal quality of life. Next visit will be in the office for annual CSA requirements.   -Continue Zoloft 50 mg by mouth daily in the morning to target depression and anxiety.    Symptoms of anxiety, depression, ADHD are under good control with current medication regimen.  Encouraged patient to continue to send posts and messages to spouse as there may be one post that he reacts to and realize importance of increasing endurance for transplant. Patient prefers to come to office early August as  surgery would be as soon early August and he will be in the hospital for 20-30 days which will be best time to schedule.  The plan was discussed with the patient. The patient was given time to ask questions and these questions were answered. At the conclusion of their visit they had no additional questions or concerns and all questions were answered to their satisfaction.   Patient was given instructions and counseling regarding condition and for health maintenance advice. Please see specific information pulled into the AVS if appropriate.    Patient to contact provider if symptoms worsen or fail to improve.      5/9/24:  TELEPHONE  -Patient called [facility] that she is at the [facility] and they are closed for the afternoon however her insurance did cover the Trintellix but the cost is still $250.00 and she wants to know if she can try something else? Patient asked her daughter and her daughter is on Zoloft and she advises she would like to try that again.. Please advise  Please inform patient I have ordered the Zoloft as follows:   -Start Zoloft 25 mg by mouth daily for 8 days, THEN increase to 50 mg thereafter, instruct patient to  break 4 of the 50 mg tab in half to equal 25 mg for the first 8 days, Instruct patient dose can be switched to nightly the following day, if drowsiness is felt approximately 2-3 hrs after taking the medication in the morning.   -Provider will inform staff at the [place] office of cancelling the Trintellix samples due to cost of medication with insurance coverage.   -Called patient and advised her of the Zoloft instructions      5/7/24:    -Continue Adderall XR 15 mg by mouth daily in the morning to target symptoms of ADHD including:  Inattentiveness & impaired concentration.  Risks, benefits, side effects discussed with patient including elevated heart rate, elevated blood pressure, irritability, insomnia, sexual dysfunction, appetite suppressing properties, psychosis.  After discussion of these risks and benefits, the patient voiced understanding and agreed to proceed. Cali reviewed, LAST DISPENSED 4/12/24 #30/30 days .  Informed patient order would be sent to Dr. Harkins in separate entry for signature due to EMR system, and will not see as refilled on AVS today, first fill date of Friday 5/10/24 due to pharmacy closed on the weekends and patient will take last dose Sunday 5/9/24.    Start Trintellix 10 mg by mouth nightly with food to target anxiety and depression.  Risks, benefits, alternatives discussed with patient including nausea, vomiting, constipation, sexual dysfunction, increased risk of bleeding especially when combined with anticoagulants (NSAIDS, blood thinners), when combined with triptans could theoretically cause weakness, hyperreflexia, and incoordination, caution with history of seizures.  Onset of action is usually in 2 weeks. GI symptoms can last up to 14 days, may take at night if nausea persists.  After discussion of these risks and benefits, the patient voiced understanding and agreed to proceed. Included past failures in order to help speed up PA process.  Sample ordered as well for  "patient to  from [place] office tomorrow. Patient given contact information and advised to call [place] office prior to arrival to ensure medication is ready.  Secure chat message sent to staff in the [place] office and informed of patient plan to pickup tomorrow for 7 day supply.   Patient informed medication may require a prior authorization (PA) from insurance company, which may delay start date of medication, as PA process can vary.  Patient would be contacted when medication has been approved if a PA is required.      Symptoms of anxiety, depression, are present which have been exacerbated due to stress and adjustment reaction with  illness. Patient has experienced care giver role strain, worsened anxiety, and informed patient a dose increase of Adderall XR could worsened anxiety and recommended a daily maintenance medication.  Patient is willing to try Trintellix prefers to not take a medication that could potentially cause weight gain due to patient working so hard to lose weight over the last year.    Patient was given instructions and counseling regarding condition and for health maintenance advice. Please see specific information pulled into the AVS if appropriate.    Patient to contact provider if symptoms worsen or fail to improve.        5/6/24: TELEPHONE  Patient LMVM that she thinks she needs a little bit of dose increase on her medication.  Patient advises that she is back to not finishing tasks and her daughter also told her she isn't even finishing her sentences.  Patient says her  was supposed to get a transplant this week but because of some abnormal labs he is excluded for 3 more months.  Patient says \"feel free to call her.  Patient says she will be taking her last 15mg on Sunday so will need her medication refilled by Friday (pharmacy is closed on the weekend.).     -Patient will need to be seen for further evaluation before Adderall XR can be increased. "   -Called patient to advise per Adelaida would need to be seen first to discuss increase in Adderall XR.  Patient is now scheduled for Tuesday 05/07/2024 to discuss via video    3/20/24:   -Continue Adderall XR 15 mg by mouth daily in the morning to target symptoms of ADHD including:  Inattentiveness & impaired concentration.  Risks, benefits, side effects discussed with patient including elevated heart rate, elevated blood pressure, irritability, insomnia, sexual dysfunction, appetite suppressing properties, psychosis.  After discussion of these risks and benefits, the patient voiced understanding and agreed to proceed. Cali reviewed, LAST DISPENSED 3/15/24 #30/30 DAYS, patient to request refill when needed. Will plan for next refill for 90 days if pharmacy will allow, due to duration of therapy, compliance, and upcoming surgery with isolation precautions of spouse in May, to limit frequent visits to the pharmacy.      Symptoms of stress related to spouses illness and ADHD are under good control with current medication regimen.  Overall, patient improving with stress, positive outlook.  Will plan for next visit in 3 months via video.   Patient was given instructions and counseling regarding condition and for health maintenance advice. Please see specific information pulled into the AVS if appropriate.    Patient to contact provider if symptoms worsen or fail to improve.        1/24/24:  Continue Adderall XR 15 mg by mouth daily in the morning to target symptoms of ADHD including:  Inattentiveness & impaired concentration. LAST DISPENSED 1/15/24 #30/30 DAYS, patient to request refill when needed.    Symptoms of ADHD are under good control with current medication regimen.  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  Emotional support given, suggested having  daughter whom is a nurse to discuss  feeding tube options, necessity of following doctor instructions.  Patient was given instructions and counseling regarding condition and for health maintenance advice. Please see specific information pulled into the AVS if appropriate.    Patient to contact provider if symptoms worsen or fail to improve.       12/7/23:  Continue Adderall XR 15 mg by mouth daily in the morning to target symptoms of ADHD including:  Inattentiveness & impaired concentration.  Risks, benefits, side effects discussed with patient including elevated heart rate, elevated blood pressure, irritability, insomnia, sexual dysfunction, appetite suppressing properties, psychosis.  After discussion of these risks and benefits, the patient voiced understanding and agreed to proceed. Cali reviewed, LAST DISPENSED 11/17/23 #30/30 DAYS, Informed patient order would be sent to Dr. Harkins in separate entry for signature due to EMR system, and will not see as refilled on AVS today. First fill date for Friday 12/15/23 as pharmacy is not open on weekends. [facility] location.    Controlled substance documentation: Cali reviewed; prior urine drug screen consistent obtained 9/7/23;; consent is up to date, signed, witnessed and in EHR, dated 9/7/23, which will be updated annually per policy. Patient is aware of risk of addiction on this medication, understands need to follow up for a review every 3 months and medications will be adjusted or decreased as deemed appropriate at each visit.  No history of drug or alcohol abuse.  No concerns about diversion or abuse. Patient denies side effects related to the medication.  Patient is aware of random urine drug screens and pill counts. The dosing of this medication will be reviewed on a regular basis and reduced if possible. Ongoing use of a controlled substance is necessary for this patient to have a normal quality of life.    Symptoms of ADHD are under good control with current medication regimen.  However, due to new cancer diagnosis with spouse, holidays, increased care giving tasks, as spouse is unable to help with simple tasks.  Patient wishes to remain on current  dose of Adderall as current stressors are situational which are negatively impacting day to day activities, however, patient does not want to depend on a medication.  Will re-evaluate in 7 weeks.   Patient was given instructions and counseling regarding condition and for health maintenance advice. Please see specific information pulled into the AVS if appropriate.    Patient to contact provider if symptoms worsen or fail to improve.        9/7/23:   Continue Adderall XR 15 mg by mouth daily in the morning to target symptoms of ADHD including:  Inattentiveness & impaired concentration.  LAST DISPENSED 8/7/23 #30/30 DAYS  Informed patient order would be sent to Dr. Harkins in separate entry for signature due to EMR system, and will not see as refilled on AVS today.  Due to positive UDS, will send a prescription to Dr. Harkins today for a 7  day supply, patient has adequate supply through Monday 9/11/23. Instructed patient to contact provider on Monday 9/18/23 to request refill.     Controlled substance documentation: Cali reviewed; prior urine drug screen consistent obtained 9/16/22, ordered today and results discussed with patient ; consent is up to date, signed, witnessed and in EHR, dated today 9/7/23, which will be updated annually per policy. Patient is aware of risk of addiction on this medication, understands need to follow up for a review every 3 months and medications will be adjusted or decreased as deemed appropriate at each visit.  No history of drug or alcohol abuse.  No concerns about diversion or abuse. Patient denies side effects related to the medication.  Patient is aware of random urine drug screens and pill counts. The dosing of this medication will be reviewed on a regular basis and reduced if possible. Ongoing use of a  controlled substance is necessary for this patient to have a normal quality of life.    Labs: POC UDS today in clinic resulted as +THC, which patient reported continues to take CBD oil in the evening for muscle relaxation to help with sleep, takes 3 drops. Obtains from [place] from a retired .     Symptoms of  ADHD are under good control with current medication regimen.  Reminded patient to practice mindfulness and behavioral modifications to help with shifting from one uncompleted activity to another, to continue with alarms, reminders. Patient informed that CBD oil is not regulated by the FDA and each bottle could contain various amounts of THC, however, if confirmation deems positive for THC, patient has been advised to stop CBD oil.     Patient will be traveling to [place] 11/2-11/16/23 and concerned with ability to obtain medication timely, instructed patient to remind provider with refill in Oct or early Nov. So the supply can be extended to cover days of travel.   Patient was given instructions and counseling regarding condition and for health maintenance advice. Please see specific information pulled into the AVS if appropriate.    Patient to contact provider if symptoms worsen or fail to improve.        7/5/23:   Continue Adderall XR 15 mg by mouth daily in the morning to target symptoms of ADHD including:  Inattentiveness & impaired concentration.  Risks, benefits, side effects discussed with patient including elevated heart rate, elevated blood pressure, irritability, insomnia, sexual dysfunction, appetite suppressing properties, psychosis.  After discussion of these risks and benefits, the patient voiced understanding and agreed to proceed. Cali reviewed, LAST DISPENSED 6/8/23 #30/30 DAYS  Informed patient order would be sent to Dr. Harkins in separate entry for signature due to EMR system, and will not see as refilled on AVS today.  First available fill date of 7/7/23.     Symptoms of ADHD are under  good control with current medication regimen.  Informed patient next visit will be scheduled for in the office for annual CSA and UDS.  Scheduled for Thurs 9/7/23 at 10 am, instructed patient to arrive by 945 am to complete UDS prior to start of visit to allow ample time for results to show by end of visit.   Patient was given instructions and counseling regarding condition and for health maintenance advice. Please see specific information pulled into the AVS if appropriate.    Patient to contact provider if symptoms worsen or fail to improve.       4/4/23:  Continue Adderall XR 15 mg by mouth daily in the morning to target symptoms of ADHD including:  Inattentiveness & impaired concentration.  Risks, benefits, side effects discussed with patient including elevated heart rate, elevated blood pressure, irritability, insomnia, sexual dysfunction, appetite suppressing properties, psychosis.  After discussion of these risks and benefits, the patient voiced understanding and agreed to proceed. Cali reviewed, LAST DISPENSED 3/15/23 #30/30 DAYS  Patient to request refill when appropriate. Patient has 9 pills remaining after taking this morning dose, instructed patient to try to refill via pharmacy merced or Dexcom merced on Monday 4/10/23, and provider will send refill in on Tues. 4/11/23 with first allowed fill date of Thurs 4/13/23.     Controlled substance documentation: Cali reviewed; prior urine drug screen consistent obtained 9/16/22; consent is up to date, signed, witnessed and in EHR, dated 9/16/22, which will be updated annually per policy. Patient is aware of risk of addiction on this medication, understands need to follow up for a review every 3 months and medications will be adjusted or decreased as deemed appropriate at each visit.  No history of drug or alcohol abuse.  No concerns about diversion or abuse. Patient denies side effects related to the medication.  Patient is aware of random urine drug screens and  pill counts. The dosing of this medication will be reviewed on a regular basis and reduced if possible..  Ongoing use of a controlled substance is necessary for this patient to have a normal quality of life.  Symptoms of ADHD are under good control with Adderall XR 15 mg daily. Patient to contact provider if symptoms worsen or fail to improve.       3/10/23:  TELEPHONE  Patient called to check in re:her medication (per Adelaida's request)  Patient says she  Has 5 days worth of medication left.  Please send refill when appropriate and please advise      2/10/23:  TELPHONE  Patient called to report that the [facility] [place] and [facility] are now out of the Adderall XR and she says she did call the [facility] at [facility] and [place] (I did change in this request.).  Please resubmit.  Med not pended      2/8/23:  Continue Adderall XR 15 mg by mouth daily in the morning to target symptoms of ADHD including:  Inattentiveness & impaired concentration.  Risks, benefits, side effects discussed with patient including elevated heart rate, elevated blood pressure, irritability, insomnia, sexual dysfunction, appetite suppressing properties, psychosis.  After discussion of these risks and benefits, the patient voiced understanding and agreed to proceed. Cali reviewed, LAST DISPENSED 1/11/23 #30/30 DAYS  Informed patient order would be sent to Dr. Harkins in separate entry for signature due to EMR system, and will not see as refilled on AVS today.    Symptoms of ADHD are under good control with Adderall XR 15 mg daily. Instructed to request refill via pharmacy when appropriate.  Will coordinate with staff to ensure refill requests are sent to this provider.  Due to pandemic state of emergency  ending 5/11/23, discussed upcoming travel as patient is aware telehealth cannot be provided across state lines nor can prescription of controlled substances, patient has plans to go to [place] for 2 weeks in November 2023 at this time, and will  update provider for any other travels to ensure patient has adequate supply of medication.  Patient to contact provider if symptoms worsen or fail to improve.       1/4/23:  Continue Adderall XR 15 mg by mouth daily in the morning to target symptoms of ADHD including:  Inattentiveness & impaired concentration.  Risks, benefits, side effects discussed with patient including elevated heart rate, elevated blood pressure, irritability, insomnia, sexual dysfunction, appetite suppressing properties, psychosis.  After discussion of these risks and benefits, the patient voiced understanding and agreed to proceed. Cali reviewed, LAST DISPENSED 12/12/22 #30/30 DAYS  Patient instructed to call [facility] Monday 1/9/23 to determine medication availability due to nationwide shortage of Adderall, and to then contact provider MA directly to inform as patient will have to transfer all medications to [facility] if continued to use that pharmacy.  Patient also instructed to inquire with pharmacy of earliest dispense date as most pharmacies are 24-48 hrs for controlled substances.   Symptoms of ADHD are under good control with Adderall XR 15 mg, denies side effects, though has noted decreased appetite with weight loss, which patient has also increased daily activity, exercising, and has been under some increased stress with parents care.  Will continue to check in with patient regarding weight loss.  Lengthy discussion with patient today regarding telehealth services as patient and provider must both be located in the same state of [place], and would not be able to provide telehealth services while visiting daughter in [place].  Patient verbalized understanding.  Patient to contact provider if symptoms worsen or fail to improve.         11/22/22:  Continue Adderall XR 10 mg by mouth daily in the morning to target symptoms of ADHD including:  Inattentiveness & impaired concentration.  Risks, benefits, side effects discussed with  patient including elevated heart rate, elevated blood pressure, irritability, insomnia, sexual dysfunction, appetite suppressing properties, psychosis.  After discussion of these risks and benefits, the patient voiced understanding and agreed to proceed. Cali reviewed, LAST DISPENSED 10/28/22 #42/42 DAYS, reported #20 remain in bottle.    Will send in order for Adderall XR 5 mg by mouth daily in the morning for 20 days (through 12/12/22), patient instructed to add the 5 mg dose to the 10 mg to equal 15 mg total daily in the morning.  Patient instructed to contact provider in office when down to 3-5 days remaining of supply. Call 12/8/22.  Informed patient order would be sent to Dr. Harkins in separate entry for signature due to EMR system, and will not see as refilled on AVS today.    Controlled substance documentation: Cali reviewed; prior urine drug screen consistent obtained 9/16/22; consent is up to date, signed, witnessed and in EHR, dated 9/16/22, which will be updated annually per policy. Patient is aware of risk of addiction on this medication, understands need to follow up for a review every 3 months and medications will be adjusted or decreased as deemed appropriate at each visit.  No history of drug or alcohol abuse.  No concerns about diversion or abuse. Patient denies side effects related to the medication.  Patient is aware of random urine drug screens and pill counts. The dosing of this medication will be reviewed on a regular basis and reduced if possible..  Ongoing use of a controlled substance is necessary for this patient to have a normal quality of life.    ADHD symptoms are under fair control with Adderall XR 10 mg, will increase to 15 mg.   Patient to contact provider if symptoms worsen or fail to improve.       10/31/22:  TELEPHONE  Patient called and VM that the [facility] in [place] did fill the Rx for her Adderall XR 10mg.  Patient just wanted to let Adelaida know.  Patient said she was  "\"happy about it\"   Prescription was verified as dispensed with pharmacy in [place].     10/28/22:  TELEPHONE  Please determine when she will return to [place], as she was supposed to return 10/25/22 and was originally given adequate supply through 10/28/22.    Called and spoke with patient, she states that she will be back on Wednesday Nov.2,2022 and will take her last pill tomorrow.  Patient does say that the [facility] in [place] has now reopened but they say they are very backed up and someone even told her that they will probably not fill it because Dr. Harkins is not licensed in [place].  Patient says she will wait to see if they do fill it over the weekend and she will call me back on Monday and let me know.  If they don't fill it she will just have it sent to the [facility] in [place], [place].   I will forward message to  so he is updated on status, Since she will be returning next Wed. 11/2/22, a new order will not be sent into local pharmacy until confirmed dispense from [place] pharmacy, as [place] was not an option to add with MARLYS report attempted today.  We will have to contact the pharmacy to verify dispense, and patient needs to be reminded to not wait until down to 1 pill remaining to request refill or problem with refill, as this refill was sent in per patient request Monday 10/24/22, and this information was relayed to office today.   Patient called and LMVM that the [facility] in [place] did fill the Rx for her Adderall XR 10mg.  Patient just wanted to let Adelaida know.  Patient said she was \"happy about it\"       10/20/22:   Continue Adderall XR 10 mg by mouth daily in the morning to target symptoms of ADHD including:  Inattentiveness & impaired concentration.  Risks, benefits, side effects discussed with patient including elevated heart rate, elevated blood pressure, irritability, insomnia, sexual dysfunction, appetite suppressing properties, psychosis.  After discussion of these " risks and benefits, the patient voiced understanding and agreed to proceed. Cali reviewed, LAST DISPENSED 9/16/22 #42/42 DAYS  Patient instructed to request refill 10/25/22 upon return from [place]. Explained process for refills.   Controlled substance documentation: Cali reviewed; prior urine drug screen consistent obtained 9/16/22; consent is up to date, signed, witnessed and in EHR, dated 9/16/22, which will be updated annually per policy. Patient is aware of risk of addiction on this medication, understands need to follow up for a review every 3 months and medications will be adjusted or decreased as deemed appropriate at each visit.  No history of drug or alcohol abuse.  No concerns about diversion or abuse. Patient denies side effects related to the medication.  Patient is aware of random urine drug screens and pill counts. The dosing of this medication will be reviewed on a regular basis and reduced if possible..  Ongoing use of a controlled substance is necessary for this patient to have a normal quality of life.  Symptoms of ADHD are managed well with current dose of Adderall XR 10 mg without side effects.      9/16/22:   Declines therapy  Will potentially start Adderall XR 10 mg by mouth daily in the morning to target symptoms of ADHD including:  Inattentiveness & impaired concentration.  Risks, benefits, side effects discussed with patient including elevated heart rate, elevated blood pressure, irritability, insomnia, sexual dysfunction, appetite suppressing properties, psychosis.  After discussion of these risks and benefits, the patient voiced understanding and agreed to proceed. Cali reviewed, UDS ordered, and controlled substance agreement signed & witnessed. Patient informed this medication would not be ordered until UDS resulted and was negative, at that time a Prescription will be sent to  for signature.  Labs: POC UDS today in clinic    Lengthy discussion held with patient regarding CSA  and medication education.   Bottle of Hemp oil had a QRS code to scan, which was done per patient request, which indicated percentages of ingredients as each bottle varies.  Certificate of Analysis, [facility], Cannabinoid Results: Total THC 0.231%, Total CBD 6.101%, Total Cannabinoids 6.534%. Will scan certificate to EHR.   Patient will be in [place] in October, will check with  in regards to prescribing CS sending to [place].   Will contact patient if UDS needs to be sent for confirmation. Otherwise will proceed with ordering Adderall XR.     9/15/22:   ADHD testing completed by Dr. Greg Bennett on 9/1/22 confirming a diagnosis of ADHD. (total time of review 9 mins)    Discussed ADHD treatment options of non-stimulants vs stimulant medications, after discussion patient wishes to proceed with Adderall.  Informed patient of policy requirements prior to starting Adderall, patient will plan on coming in tomorrow due to available appointment at 8 am.  Discussed current CBD oil which patient reports is hemp based as the UDS may show positive for THC. Patient takes CBD oil for sleep and muscle aches, which has been effective.  IF UDS positive patient was informed Adderall would not be prescribed, and will have to send for a confirmation.  Patient verbalized understanding.       7/14/22:   No Medications, Declines therapy at this time.  Referral for ADHD testing with   Dr. Greg Bennett, Psychologist, MS, LPP  [address], [address]  [place] [id number] [phone]   Currently only accepting referrals for psychological testing services.  Patient to contact provider and set up appointment.  And to contact office if unable to get an appointment scheduled.   -Attached list of several other sites for ADHD testing. Patient instructed to contact providers on list to determine availability of appointments, instructed patient to take notes while calling places indicating first available appointment, and to  ask to be placed on waiting list.  If an office is chosen and requests the referral order please contact my Medical Assistant, Hanh, directly at [phone] informing of chosen office and the information will be sent.    Patient with high suspicion of having ADHD, will send for formal testing and have patient return in 2 weeks to discuss medication options as if patient is able to be tested in the next 1-2 months, may wait on starting a non-stimulant medication.     Patient screened positive for depression based on a PHQ-9 score of 1 on 3/19/2024. Follow-up recommendations include: Suicide Risk Assessment performed.    TREATMENT PLAN/GOALS: Continue supportive psychotherapy efforts and medications as indicated. Treatment and medication options discussed during today's visit. Patient ackowledged and verbally consented to continue with current treatment plan and was educated on the importance of compliance with treatment and follow-up appointments.    MEDICATION ISSUES:  MARLYS reviewed as expected.    Discussed medication options and treatment plan of prescribed medication as well as the risks, benefits, and side effects including potential falls, possible impaired driving and metabolic adversities among others. Patient is agreeable to call the office with any worsening of symptoms or onset of side effects. Patient is agreeable to call 911 or go to the nearest ER should he/she begin having SI/HI. No medication side effects or related complaints today.     MEDS ORDERED DURING VISIT:  No orders of the defined types were placed in this encounter.      Return in about 3 months (around 11/22/2024) for Video visit, medication check.         I spent 32 minutes caring for Jenn on this date of service. This time includes time spent by me in the following activities: preparing for the visit, reviewing tests, performing a medically appropriate examination and/or evaluation, counseling and educating the  patient/family/caregiver, ordering medications, tests, or procedures, referring and communicating with other health care professionals, documenting information in the medical record, independently interpreting results and communicating that information with the patient/family/caregiver, care coordination, and scheduling      This document has been electronically signed by CHELSI Burnett  August 22, 2024 12:38 EDT      Part of this note may be an electronic transcription/translation of spoken language to printed text using the Dragon Dictation System.

## 2024-08-25 LAB
AMPHET UR CFM-MCNC: 674 NG/ML
AMPHET UR QL CFM: POSITIVE
AMPHETAMINES UR QL: POSITIVE
LABORATORY COMMENT REPORT: ABNORMAL
METHAMPHET UR QL CFM: NEGATIVE

## 2024-08-27 DIAGNOSIS — E03.9 ACQUIRED HYPOTHYROIDISM: ICD-10-CM

## 2024-08-27 RX ORDER — LEVOTHYROXINE SODIUM 25 UG/1
25 TABLET ORAL DAILY
Qty: 90 TABLET | Refills: 1 | Status: SHIPPED | OUTPATIENT
Start: 2024-08-27

## 2024-08-29 ENCOUNTER — TELEPHONE (OUTPATIENT)
Dept: PSYCHIATRY | Facility: CLINIC | Age: 59
End: 2024-08-29
Payer: COMMERCIAL

## 2024-08-29 NOTE — TELEPHONE ENCOUNTER
.Called patient and informed them that the prior authorization for their medication had been approved by their insurance.

## 2024-09-07 DIAGNOSIS — F90.0 ADHD (ATTENTION DEFICIT HYPERACTIVITY DISORDER), INATTENTIVE TYPE: ICD-10-CM

## 2024-09-09 RX ORDER — DEXTROAMPHETAMINE SACCHARATE, AMPHETAMINE ASPARTATE MONOHYDRATE, DEXTROAMPHETAMINE SULFATE AND AMPHETAMINE SULFATE 3.75; 3.75; 3.75; 3.75 MG/1; MG/1; MG/1; MG/1
15 CAPSULE, EXTENDED RELEASE ORAL EVERY MORNING
Qty: 90 CAPSULE | Refills: 0 | Status: SHIPPED | OUTPATIENT
Start: 2024-09-09

## 2024-09-09 NOTE — TELEPHONE ENCOUNTER
amphetamine-dextroamphetamine XR (Adderall XR) 15 MG 24 hr capsule   Last ordered: 1 month ago (8/7/2024) by Hans Harkins MD   Follow up 11/21/2024

## 2024-10-03 DIAGNOSIS — F43.23 ADJUSTMENT DISORDER WITH MIXED ANXIETY AND DEPRESSED MOOD: ICD-10-CM

## 2024-10-25 ENCOUNTER — HOSPITAL ENCOUNTER (OUTPATIENT)
Dept: MAMMOGRAPHY | Facility: HOSPITAL | Age: 59
Discharge: HOME | End: 2024-10-25
Admitting: NURSE PRACTITIONER
Payer: COMMERCIAL

## 2024-10-25 DIAGNOSIS — Z12.31 SCREENING MAMMOGRAM FOR BREAST CANCER: ICD-10-CM

## 2024-10-25 PROCEDURE — 77063 BREAST TOMOSYNTHESIS BI: CPT

## 2024-10-25 PROCEDURE — 77067 SCR MAMMO BI INCL CAD: CPT

## 2024-11-22 ENCOUNTER — TELEMEDICINE (OUTPATIENT)
Dept: BEHAVIORAL HEALTH | Facility: CLINIC | Age: 59
End: 2024-11-22
Payer: COMMERCIAL

## 2024-11-22 DIAGNOSIS — Z79.899 MEDICATION MANAGEMENT: ICD-10-CM

## 2024-11-22 DIAGNOSIS — F43.29 STRESS AND ADJUSTMENT REACTION: ICD-10-CM

## 2024-11-22 DIAGNOSIS — F90.0 ADHD (ATTENTION DEFICIT HYPERACTIVITY DISORDER), INATTENTIVE TYPE: ICD-10-CM

## 2024-11-22 DIAGNOSIS — F43.23 ADJUSTMENT DISORDER WITH MIXED ANXIETY AND DEPRESSED MOOD: Primary | ICD-10-CM

## 2024-11-22 RX ORDER — AMOXICILLIN 500 MG/1
TABLET, FILM COATED ORAL
COMMUNITY
Start: 2024-10-03

## 2024-11-22 NOTE — PATIENT INSTRUCTIONS
"SPECIFIC RECOMMENDATIONS:     1.      Medications discussed at this encounter:                   -  Request refill for Adderall Monday 12/9/24 and Zoloft when needed.     2.      Psychotherapy recommendations:Declined     3.     Return to clinic: 3 months Wed. 2/26/25 at 8am via video    Please arrive at least 15 minutes before your scheduled appointment time to complete check in process.      IF you are scheduled for a [employer] VIDEO visit, YOU MUST COMPLETE THE \"E-CHECK IN\" PROCESS PRIOR TO BEGINNING THE VISIT, You may still complete the E-Check in for in office visits prior to appointment, you will receive multiple text/email reminders which will direct you further if needed.            "

## 2024-11-22 NOTE — PROGRESS NOTES
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          1/4/23:  Patient presents today via [employer] Video visit from home, Adderall XR was increased from 10 to 15 mg at last visit, for which patient reports the first few days of the increase felt \"I don't know if I can handle this, just a little spaced out, I don't know, but it was fine in a few days.\"  Denies increased heart rate, difficulty sleeping.   Patient feels she may be decreased weight of 15 lb, though patient has been busy with mother in the hospital and getting things together for the holidays.  Reports mother was admitted to [facility] and had medication adjustments and plan to get mother into an adult day care.  Tomorrow patient is taking father to see a specialist at [facility] to help him cope with mother's mental illness- \"alzheimers and delusional dementia\" per the neurologist.  Reports mother can get argumentative and paranoid.       Reports home scale weight on 1/1/3 of 183.6 lbs.  Patient reports increased walking with friends, not eating as much in the " "morning, \"constantly on the go.\"  Had wanted to do some fasting and weight loss.  Denies having to purchase new clothing, though noticed face was slimmer and clothes were looser.  Patient had been on weight watchers in the past before COVID and was down to 167 lbs, as patient was working out in the gym, and gym's were closed during COVID and weight returned.  Reports eating at 1130-12 noon for lunch, and 7 pm for dinner as spouse likes to eat at 8 pm.  Will also snack during the day on skinny pop popcorn.  Reports at last office visit recalled telling MA to not tell her what the weight was, however, felt \"good\" seeing the scale weight a few days ago.  Plans to start going back to the gym with friend as they would go every morning, \"that was my life.\"     ADHD symptoms are improving, as patient reports ability to listen more effectively, more patient with others is noticed the most since dose increase.  Upon feeling organized with holiday decorations, noticed was able to stay on task, only bring up one box at a time to decorate instead of having all boxes out and not being able to organize.    Patient reports having enough supply of Adderall XR 15 mg through next Wed. 1/11/23.  Reports after speaking with pharmacist at [facility] was told all medications would have to be transferred from [facility] and [facility] will not only fill the Adderall prescription. [facility] had [facility] due to [facility] not having Adderall XR available.  Patient is also concerned due to frequent travels of having adequate supply and ability to fill medication at outlying pharmacies.       11/22/22:  Patient presents today via [employer] visit from patient home.  Patient reports would like to increase dose possibly, reports spouse notices \"I am drifting some.\" Patient further explains will frequently jump to other topics during conversation, though improved with Adderall XR 10 mg.  Patient reports daughter was able to notice " "improvement, though now that patient has returned to home from travels, she is noticing elevation in symptoms.      Patient reports some difficulty with sleeping, as last night had minimal sleep as patient is worried about mother whom has dementia and father is caring for mother, has had  involved.  Believes temporary, situational anxiety is reason for sleep difficulty.  Patient reports mother's dementia symptoms are worsening and difficult to deal with, though hopeful as  has set up for assistant to come to home several days a week to help with house cleaning, meal prep, and care.       10/20/22:  Patient presents today via [employer] visit from daughter's home in [place].   Patient was started on Adderall XR 10 mg at last visit for which patient reports the first few days had increased energy, which had company, had difficulty sleeping the first 2 nights, however, no longer having difficulty.  Patient reports taking medication at 7 am, one day she took it later at 930 am and had difficulty sleeping that night. Patient has been taking thyroid medication upon awakening to use the bathroom around 5-530 am and upon waking up around 7 takes the Adderall.      \"I think it's a lot better, my daughter said she seems to notice, I still have a tendency to interrupt but that's better, I don't seem to repeat questions, as it was an issue before because I was not really listening. My mind was somewhere else.\"   Denies side effects, patient was surprised she was able to sit still after a few days, now able to complete tasks as less distraction, able to continue with task at hand before switching to another task.     Patient will be returning from [place] 10/25/22.     9/16/22:  Patient presents today in office today to further discuss ADHD treatment with a controlled substance and to complete CSA and obtain POC UDS today per policy.     Patient brought in bottle of Hemp CBD oil 3200mg/2 oz, " "1-3 drops 3 times daily on bottle, however, patient only takes in the evening for post menopausal symptoms, muscle aches, and vocal cord dysfunction, \"my neck tenses up , had to do physical therapy in past, and oil helps, I found out I am Allergic to mold.\"    9/15/22:  Patient presents today via MyChart Video visit from home, reports received ADHD test results though has not discussed.  Patient continues to have symptoms of inttentiveness, concentration difficulty, difficulty completing tasks, and switching from one uncompleted task to another.     Patient does take 3 drops of CBD oil, hemp based, at night for sleep, relaxes muscles as patient started taking while going through menopause.   Patient reports  is allergic to Wellbutrin and daughter tried Wellbutrin and did not do well, as patient would prefer to try Adderall first rather than a non-stimulant medication.  Patient will be out of town for 2 weeks in October visiting daughter in [place], likely early October.       7/14/22:  INITIAL EVAL  Patient presents today via MyChart Video visit from home with a history of depression and anxiety for which treatment was started in late 1995 with therapy, and medications from [id number] due to divorce.     Patient is currently not taking any psychotropic medications nor has had any since 2005.     \"My issue is I can't concentrate, I start something and cant finish something, forgets things often\".  Patient recalls while in school always \"fidgety\" crossing legs, moving often in seat.  Admits to interrupting people, gets distracted easily, daughter noticing.    Admits to over the past 2 yrs began to feel ADHD may be a possibility, \"I am tired of being like this, forgetting stuff, going in and out of the house, tired of interrupting people, it seems to be bothering me more.\"  Patient uses reminders, lists which was started while kids in high school and has continued to have  self add items needed to " "List,\" I walk out without my list from the fridge, and I have to have him take a picture of it and send it to me\".  Difficulty sitting still when you should be sitting still in Baptism or meetings. Impulsive with shopping tends to purchase items that may be on sale or those that she may not need with the idea of \"I can always return it\".     Symptoms include fidgeting, difficulty remaining seated, easy distractability, blurting out answers prematurely, inability to complete tasks, difficulty sustaining attention, shifting from one uncompleted activity to another, talking excessively, interrupting others, ineffective listening, frequently losing items, and impulsivity.    ADHD:   Elementary school:   Grades:A's and B's  Special classes or failures:no  Got in trouble:no  Referral for ADHD testing:no  Fhx:son, diagnosed officially in 7th grade, took medications for 1-2 yrs, restarted while in college only, \"he probably should be taking something, I have suggested it to him\"  Presently:  Problems with attention to detail:yes  Problems with sustained attention:Yes  Problems listening when spoken to directly:Yes, unable to concentrate on what others are saying, \"I have to get my point across, I know I have to wait for the break, but I can't wait for the break.\"  Failure to finish tasks:yes, \"I will lay my husbandsTshirts on couch intending to hang up and they will be there for 2-3 days\" house hold tasks-dusting, mopping, find need to focus on floor boards  Avoids tasks that require sustained mental effort:yes, \"I excelled at work,  I could multi-task, 5-6 projects at desk, however, would wait until the last week to update credentials, I put off stuff and procrastinate all the time\"  Easily distracted:yes  Forgetting things:yes  Losing things:yes  Hard to organize:yes  Talks a lot and cutting people off:yes  Drifts off during conversations:yes, \"I have to concentrate what I need to say to not forget what I have say, then I " "blurt it out, I am trying really hard, it's even hard with you to not stop and say stuff\"  Difficulty with Reading:yes, \"I used to read a lot, has been using Audio books to listen in car or while out with friends or walking, has to rewind at times because my mind drifts off.\"  Difficulty watching TV/Movies:\"not really, we hardly ever have the TV on anymore.\"       PHQ-9 Depression Screening  PHQ-9 Total Score:   11/20/2024 1 (PHQ2-0)    Little interest or pleasure in doing things?     Feeling down, depressed, or hopeless?     Trouble falling or staying asleep, or sleeping too much?     Feeling tired or having little energy?     Poor appetite or overeating?     Feeling bad about yourself - or that you are a failure or have let yourself or your family down?     Trouble concentrating on things, such as reading the newspaper or watching television?     Moving or speaking so slowly that other people could have noticed? Or the opposite - being so fidgety or restless that you have been moving around a lot more than usual?     Thoughts that you would be better off dead, or of hurting yourself in some way?     PHQ-9 Total Score       SUZANNE-7  Feeling nervous, anxious or on edge: (Patient-Rptd) Several days  Not being able to stop or control worrying: (Patient-Rptd) Not at all  Worrying too much about different things: (Patient-Rptd) Several days  Trouble Relaxing: (Patient-Rptd) Not at all  Being so restless that it is hard to sit still: (Patient-Rptd) Not at all  Feeling afraid as if something awful might happen: (Patient-Rptd) Several days  Becoming easily annoyed or irritable: (Patient-Rptd) Not at all  SUZANNE 7 Total Score: (Patient-Rptd) 3  If you checked any problems, how difficult have these problems made it for you to do your work, take care of things at home, or get along with other people: (Patient-Rptd) Not difficult at all 11/20/2024 3    The following portions of the patient's history were reviewed and updated as " appropriate: allergies, current medications, past family history, past medical history, past social history, and past surgical history.     Past Surgical History:  Past Surgical History:   Procedure Laterality Date    BLADDER SUSPENSION  2008    COLONOSCOPY      ESSURE TUBAL LIGATION  1999    JOINT REPLACEMENT  Jan 2017    Right hip replacement    KIDNEY SURGERY      OTHER SURGICAL HISTORY      metal implant    TUBAL ABDOMINAL LIGATION         Problem List:  Patient Active Problem List   Diagnosis    Primary osteoarthritis of right hip    Status post right hip replacement    Hypothyroidism (acquired)    Paroxysmal atrial fibrillation    Anxiety    Deep venous thrombosis    Disorder of vocal cord    Edema of lower extremity    Hyperlipidemia    Hyperthyroidism    Onychomycosis    Pain of right hip joint    Laryngitis due to gastroesophageal reflux    Sensation of heaviness in extremities    Varicose veins of lower extremity    Vitamin deficiency       Allergy:   Allergies   Allergen Reactions    Sulfa Antibiotics Rash        Discontinued Medications:  There are no discontinued medications.      Current Medications:   Current Outpatient Medications   Medication Sig Dispense Refill    amoxicillin (AMOXIL) 500 MG tablet TAKE 4 TABLETS BY MOUTH 1 HOUR PRIOR TO APPOINTMENT FOR PREMED      amphetamine-dextroamphetamine XR (Adderall XR) 15 MG 24 hr capsule Take 1 capsule by mouth Every Morning Indications: Attention Deficit Hyperactivity Disorder 90 capsule 0    azelastine (ASTELIN) 0.1 % nasal spray USE 1 TO 2 SPRAYS IN EACH NOSTRIL TWICE DAILY AS NEEDED FOR RUNNY NOSE OR SNEEZING OR POST NASAL DRIP      cetirizine (zyrTEC) 10 MG tablet Take 1 tablet by mouth Daily.      Cyanocobalamin (Vitamin B 12) 250 MCG lozenge Take 2,500 mcg by mouth.      EPINEPHrine (EPIPEN) 0.3 MG/0.3ML solution auto-injector injection ADMINISTER 0.3 ML UNDER THE SKIN 1 TIME FOR 1 DOSE AS DIRECTED      levothyroxine (SYNTHROID, LEVOTHROID) 25 MCG  "tablet TAKE 1 TABLET BY MOUTH DAILY 90 tablet 1    Magnesium Oxide (MAGNESIUM EXTRA STRENGTH PO) Take  by mouth.      Milk Thistle 150 MG capsule Take  by mouth.      montelukast (SINGULAIR) 10 MG tablet Take 1 tablet by mouth Every Night.      sertraline (ZOLOFT) 50 MG tablet TAKE 1 TABLET BY MOUTH EVERY MORNING FOR ANXIETY OR MAJOR DEPRESSION 90 tablet 0    Vitamin D-Vitamin K (K2 Plus D3) 100-1000 MCG-UNIT tablet Take  by mouth Daily. Patient takes drops (not tablets) due to GI upset with tablet       No current facility-administered medications for this visit.       Past Medical History:  Past Medical History:   Diagnosis Date    ADHD (attention deficit hyperactivity disorder) Sep/22    Now on adderrall    Allergic Whole life    Anxiety     Cervical cancer screening 2109    Chronic allergic rhinitis     Deep vein thrombosis     Caused after my hip replacement.    Depression     Hyperlipemia 03/15/2017    Hypothyroidism 03/15/2017    Kidney stones     Limb pain     Limb swelling        Past Psychiatric History:  Began Treatment: started in  with therapy due to spouse having an affair  Diagnoses:Depression and Anxiety ADHD inattentive type diagnosed 20 per Neuropsychological testing with KEYLA Vargas,L.P.P.  Psychiatrist: last seen from [id number]  Therapist: last seen from [id number]  Admission History:Denies  Medication Trials: Prozac--for one year \"felt like i was floating;\" Zoloft for 1 yr -, then started Effexor with divorce in  for 1 yr-during time having increased stress with divorce as pt had lost hair and stomach problems and asked to be placed on meds; tolerated well; Lexapro -  excessive drowsiness, couldn't get out of bed  Self Harm: Denies  Suicide Attempts:Denies   Psychosis, Anxiety, Depression: Denies    Substance Abuse History:   Types: Alcohol daily in the evening to relax, 1-2 glasses of wine, or 1 beer or gin and tonic  Withdrawal " Symptoms:Denies  Longest Period Sober:Not Applicable   AA: Not applicable     Social History:  Martial Status:  Employed:No 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  Kids:Yes or If so, how many 2 children and 2 step children  House:Lives in a house   History: Denies  Access to Guns: no    Social History     Socioeconomic History    Marital status:      Spouse name: Abdias    Number of children: 2    Highest education level: Some college, no degree   Tobacco Use    Smoking status: Never    Smokeless tobacco: Never   Vaping Use    Vaping status: Never Used   Substance and Sexual Activity    Alcohol use: Yes     Alcohol/week: 3.0 - 4.0 standard drinks of alcohol     Types: 3 - 4 Glasses of wine per week     Comment: drinks daily, wine, beer and lquor    Drug use: Never     Types: Marijuana     Comment: In teenage years    Sexual activity: Yes     Partners: Male     Birth control/protection: Surgical       Family History:   Suicide Attempts: Denies  Suicide Completions:Denies      Family History   Problem Relation Age of Onset    Dementia Mother     Diabetes type II Mother     Cancer Mother 70        Kidney cancer (left kidney removed) and endometrial cancer (hysterectomy)    Diabetes Mother         Type 2    Stroke Mother         TIA    Prostate cancer Father     Hearing loss Father         Hearing aids    Depression Father     Colon cancer Maternal Grandmother 70    Diabetes Paternal Grandmother         Type 2    ADD / ADHD Son     Anxiety disorder Daughter        Developmental History:   Born: [place], lived in [place] since age 1  Siblings:1 sister, 1 brother-both younger  Childhood: Denies Abuse  High School:Completed  College: 2 yrs, no degree    Mental Status Exam:   Hygiene:   good  Cooperation:  Cooperative  Eye Contact:  Good  Psychomotor Behavior:  Appropriate  Affect:  Appropriate  Mood: euthymic   Speech:  Normal  Thought Process:  Goal directed  Thought Content:  Mood  congruent  Suicidal:  None  Homicidal:  None  Hallucinations:  None  Delusion:  None  Memory:  Intact  Orientation:  Grossly intact  Reliability:  good  Insight:  Good  Judgement:  Good  Impulse Control:  Good  Physical/Medical Issues:  Yes Hypothyroidism,OA rt hip,paroxysmal afib, HLD      Review of Systems:  Review of Systems   Constitutional:  Negative for appetite change, diaphoresis and fatigue.   HENT:  Negative for drooling.    Eyes:  Negative for visual disturbance.   Respiratory:  Negative for cough and shortness of breath.    Cardiovascular:  Negative for chest pain, palpitations and leg swelling.   Gastrointestinal:  Negative for nausea and vomiting.   Endocrine: Negative for cold intolerance and heat intolerance.   Genitourinary:  Negative for difficulty urinating.   Musculoskeletal:  Negative for joint swelling.   Allergic/Immunologic: Negative for immunocompromised state.   Neurological:  Negative for dizziness, seizures, speech difficulty and numbness.   Psychiatric/Behavioral:  Negative for agitation, decreased concentration, self-injury, sleep disturbance and suicidal ideas. The patient is nervous/anxious. The patient is not hyperactive.          Physical Exam:  Physical Exam  Psychiatric:         Attention and Perception: Attention and perception normal.         Mood and Affect: Mood and affect normal.         Speech: Speech normal.         Behavior: Behavior normal. Behavior is cooperative.         Thought Content: Thought content normal. Thought content does not include suicidal ideation. Thought content does not include suicidal plan.         Cognition and Memory: Cognition and memory normal.         Judgment: Judgment normal.         Vital Signs:   There were no vitals taken for this visit.       Lab Results:   Orders Only on 08/22/2024   Component Date Value Ref Range Status    Amphetamine, Urine 08/22/2024 Positive (A)   Final    Amphetamine, Urine 08/22/2024 Positive (A)   Final     Amphetamine Urine, GC/MS 08/22/2024 674  Mzlayj=886 ng/mL Final    Amphetamine detected; this finding can be consistent with use of  medications that include Adderall, Benzedrine, Dexadrine, Vyvanse, or  generic formulations. This drug may also be detected from use of  prescriptions that contain or metabolize to Methamphetamine, or from  use of illicit Methamphetamine. Drugs listed are representative of  common sources of the compound detected and are not intended to  include all possible sources.    Methamphetamine, Urine 08/22/2024 Negative  [id number] Final    Please note 08/22/2024 Comment   Final    Drug test results should be interpreted in the context of clinical  information. Patient metabolic variables, specific drug chemistry, and  specimen characteristics can affect test outcome. Technical  consultation is available if a test result is inconsistent with an  expected outcome.    Email:  [email or web address]    Phone:  [phone]  Drug brands, if listed herein, are trademarks of their respective  owners.   Office Visit on 08/22/2024   Component Date Value Ref Range Status    Amphetamine Screen, Urine 08/22/2024 Negative  Negative Final    Barbiturates Screen, Urine 08/22/2024 Negative  Negative Final    Buprenorphine, Screen, Urine 08/22/2024 Negative  Negative Final    Benzodiazepine Screen, Urine 08/22/2024 Negative  Negative Final    Cocaine Screen, Urine 08/22/2024 Negative  Negative Final    MDMA (ECSTASY) 08/22/2024 Negative  Negative Final    Methamphetamine, Ur 08/22/2024 Negative  Negative Final    Morphine/Opiates Screen, Urine 08/22/2024 Negative  Negative Final    Methadone Screen, Urine 08/22/2024 Negative  Negative Final    Oxycodone Screen, Urine 08/22/2024 Negative  Negative Final    Phencyclidine (PCP), Urine 08/22/2024 Negative  Negative Final    THC, Screen, Urine 08/22/2024 Negative  Negative Final    Lot Number 08/22/2024 [id number]   Final    Expiration Date 08/22/2024  11-   Final   Lab on 07/24/2024   Component Date Value Ref Range Status    TSH 07/24/2024 2.180  0.270 - 4.200 uIU/mL Final       EKG Results:  No orders to display       Imaging Results:  No Images in the past 120 days found..      Assessment & Plan   Diagnoses and all orders for this visit:    1. Adjustment disorder with mixed anxiety and depressed mood (Primary)    2. ADHD (attention deficit hyperactivity disorder), inattentive type    3. Stress and adjustment reaction    4. Medication management                      Visit Diagnoses:    ICD-10-CM ICD-9-CM   1. Adjustment disorder with mixed anxiety and depressed mood  F43.23 309.28   2. ADHD (attention deficit hyperactivity disorder), inattentive type  F90.0 314.00   3. Stress and adjustment reaction  F43.29 309.89   4. Medication management  Z79.899 V58.69             PLAN:  1.   Safety: No acute safety concerns  Therapy: None  Risk Assessment: Risk of self-harm acutely is low.  Risk factors include anxiety disorder, mood disorder, and recent psychosocial stressors (pandemic). Protective factors include no family history, denies access to guns/weapons, no present SI, no history of suicide attempts or self-harm in the past, minimal AODA, healthcare seeking, future orientation, willingness to engage in care.  Risk of self-harm chronically is also low, but could be further elevated in the event of treatment noncompliance and/or AODA.  Meds:    -Continue Adderall XR 15 mg by mouth daily in the morning to target symptoms of ADHD including:  Inattentiveness & impaired concentration.  Risks, benefits, side effects discussed with patient including elevated heart rate, elevated blood pressure, irritability, insomnia, sexual dysfunction, appetite suppressing properties, psychosis.  After discussion of these risks and benefits, the patient voiced understanding and agreed to proceed. [place]i reviewed, LAST DISPENSED9/10/24 #90/90 days.  Instructed to request refill on  12/8 or 12/9/24.   -Controlled substance documentation: [place] reviewed; prior urine drug screen consistent obtained 8/22/24; consent is up to date, signed, witnessed and in EHR, dated 8/22/24, which will be updated annually per policy. Patient is aware of risk of addiction on this medication, understands need to follow up for a review every 3 months and medications will be adjusted or decreased as deemed appropriate at each visit. No history of drug or alcohol abuse.  No concerns about diversion or abuse. Patient denies side effects related to the medication.  Patient is aware of random urine drug screens and pill counts. The dosing of this medication will be reviewed on a regular basis and reduced if possible. Ongoing use of a controlled substance is necessary for this patient to have a normal quality of life.   Continue Zoloft 50 mg by mouth daily in the morning to target depression and anxiety. Discussed all risks, benefits, alternatives, and side effects of Zoloft (Sertraline) including but not limited to GI upset, sexual dysfunction, bleeding risk, seizure risk, insomnia, sedation, dizziness, fatigue, drowsiness, activation of temi or hypomania, increased fragility fracture risk, hyponatremia, ocular effects, withdrawal syndrome following abrupt discontinuation, serotonin syndrome, and activation of suicidal ideation and behavior. Discussed the need for patient to immediately call the office for any new or worsening symptoms, such as worsening depression; feeling nervous or restless; suicidal thoughts or actions; or other changes in mood or behavior, and all other concerns. Patient  educated on medication compliance and the risks of suddenly stopping this medication or missing doses. Patient verbalized understanding and is agreeable to taking Sertraline. Addressed all questions and concerns.      Labs: n/a   Patient instructed to request refills when appropriate, when down to 5-7 days remaining via pharmacy  or via [facility].       Symptoms of anxiety, depression, ADHD are under good control with current medication regimen. Patient has had several stressors due to learning how to administer tube feeding and IV medications for spouse.  Though patient has been able to adjust fairly well, has good family and friend support, and is making time for self care.    The plan was discussed with the patient. The patient was given time to ask questions and these questions were answered. At the conclusion of their visit they had no additional questions or concerns and all questions were answered to their satisfaction.   Patient was given instructions and counseling regarding condition and for health maintenance advice. Please see specific information pulled into the AVS if appropriate.    Patient to contact provider if symptoms worsen or fail to improve.        8/22/24:  -Continue Adderall XR 15 mg by mouth daily in the morning to target symptoms of ADHD including:  Inattentiveness & impaired concentration.  Risks, benefits, side effects discussed with patient including elevated heart rate, elevated blood pressure, irritability, insomnia, sexual dysfunction, appetite suppressing properties, psychosis.  After discussion of these risks and benefits, the patient voiced understanding and agreed to proceed. Cali reviewed, LAST DISPENSED 8/7/24 #30/30 days. Patient to request refills when needed, will send for a 90 day supply, however, patient was instructed to contact provider if the pharmacy will not fill due to possible shortage of supply.   -Controlled substance documentation: Cali reviewed; prior urine drug screen consistent obtained 9/7/23, obtained today 8/22/24; consent is up to date, signed, witnessed and in EHR, dated today 8/22/24, which will be updated annually per policy. Patient is aware of risk of addiction on this medication, understands need to follow up for a review every 3 months and medications will be adjusted or  decreased as deemed appropriate at each visit. No history of drug or alcohol abuse.  No concerns about diversion or abuse. Patient denies side effects related to the medication.  Patient is aware of random urine drug screens and pill counts. The dosing of this medication will be reviewed on a regular basis and reduced if possible. Ongoing use of a controlled substance is necessary for this patient to have a normal quality of life.   -Continue Zoloft 50 mg by mouth daily in the morning to target depression and anxiety.  -Labs: POC UDS today in clinic, results reviewed though not as expected as results were negative for amphetamine, new order placed for confirmation as patient admits to adherence, last dose this morning.   -Patient instructed to request refills when appropriate, when down to 5-7 days remaining via pharmacy or via MyChart.     -Symptoms of anxiety, depression, ADHD are under good control with current medication regimen. The plan was discussed with the patient. The patient was given time to ask questions and these questions were answered. At the conclusion of their visit they had no additional questions or concerns and all questions were answered to their satisfaction.   Patient was given instructions and counseling regarding condition and for health maintenance advice. Please see specific information pulled into the AVS if appropriate.    Patient to contact provider if symptoms worsen or fail to improve.      6/19/24:  -Continue Adderall XR 15 mg by mouth daily in the morning to target symptoms of ADHD including:  Inattentiveness & impaired concentration.  LAST DISPENSED 6/10/24 #30/30 days.  -Controlled substance documentation: Cali reviewed; prior urine drug screen consistent obtained 9/7/23;; consent is up to date, signed, witnessed and in EHR, dated 9/7/23, which will be updated annually per policy. Patient is aware of risk of addiction on this medication, understands need to follow up for a  review every 3 months and medications will be adjusted or decreased as deemed appropriate at each visit. No history of drug or alcohol abuse.  No concerns about diversion or abuse. Patient denies side effects related to the medication.  Patient is aware of random urine drug screens and pill counts. The dosing of this medication will be reviewed on a regular basis and reduced if possible. Ongoing use of a controlled substance is necessary for this patient to have a normal quality of life. Next visit will be in the office for annual CSA requirements.   -Continue Zoloft 50 mg by mouth daily in the morning to target depression and anxiety.    Symptoms of anxiety, depression, ADHD are under good control with current medication regimen.  Encouraged patient to continue to send posts and messages to spouse as there may be one post that he reacts to and realize importance of increasing endurance for transplant. Patient prefers to come to office early August as  surgery would be as soon early August and he will be in the hospital for 20-30 days which will be best time to schedule.  The plan was discussed with the patient. The patient was given time to ask questions and these questions were answered. At the conclusion of their visit they had no additional questions or concerns and all questions were answered to their satisfaction.   Patient was given instructions and counseling regarding condition and for health maintenance advice. Please see specific information pulled into the AVS if appropriate.    Patient to contact provider if symptoms worsen or fail to improve.      5/9/24:  TELEPHONE  -Patient called [facility] that she is at the [facility] and they are closed for the afternoon however her insurance did cover the Trintellix but the cost is still $250.00 and she wants to know if she can try something else? Patient asked her daughter and her daughter is on Zoloft and she advises she would like to try that  again.. Please advise  Please inform patient I have ordered the Zoloft as follows:   -Start Zoloft 25 mg by mouth daily for 8 days, THEN increase to 50 mg thereafter, instruct patient to break 4 of the 50 mg tab in half to equal 25 mg for the first 8 days, Instruct patient dose can be switched to nightly the following day, if drowsiness is felt approximately 2-3 hrs after taking the medication in the morning.   -Provider will inform staff at the [place] office of cancelling the Trintellix samples due to cost of medication with insurance coverage.   -Called patient and advised her of the Zoloft instructions      5/7/24:    -Continue Adderall XR 15 mg by mouth daily in the morning to target symptoms of ADHD including:  Inattentiveness & impaired concentration.  Risks, benefits, side effects discussed with patient including elevated heart rate, elevated blood pressure, irritability, insomnia, sexual dysfunction, appetite suppressing properties, psychosis.  After discussion of these risks and benefits, the patient voiced understanding and agreed to proceed. Cali reviewed, LAST DISPENSED 4/12/24 #30/30 days .  Informed patient order would be sent to Dr. Harkins in separate entry for signature due to EMR system, and will not see as refilled on AVS today, first fill date of Friday 5/10/24 due to pharmacy closed on the weekends and patient will take last dose Sunday 5/9/24.    Start Trintellix 10 mg by mouth nightly with food to target anxiety and depression.  Risks, benefits, alternatives discussed with patient including nausea, vomiting, constipation, sexual dysfunction, increased risk of bleeding especially when combined with anticoagulants (NSAIDS, blood thinners), when combined with triptans could theoretically cause weakness, hyperreflexia, and incoordination, caution with history of seizures.  Onset of action is usually in 2 weeks. GI symptoms can last up to 14 days, may take at night if nausea persists.  After  "discussion of these risks and benefits, the patient voiced understanding and agreed to proceed. Included past failures in order to help speed up PA process.  Sample ordered as well for patient to  from [place] office tomorrow. Patient given contact information and advised to call [place] office prior to arrival to ensure medication is ready.  Secure chat message sent to staff in the [place] office and informed of patient plan to pickup tomorrow for 7 day supply.   Patient informed medication may require a prior authorization (PA) from insurance company, which may delay start date of medication, as PA process can vary.  Patient would be contacted when medication has been approved if a PA is required.      Symptoms of anxiety, depression, are present which have been exacerbated due to stress and adjustment reaction with  illness. Patient has experienced care giver role strain, worsened anxiety, and informed patient a dose increase of Adderall XR could worsened anxiety and recommended a daily maintenance medication.  Patient is willing to try Trintellix prefers to not take a medication that could potentially cause weight gain due to patient working so hard to lose weight over the last year.    Patient was given instructions and counseling regarding condition and for health maintenance advice. Please see specific information pulled into the AVS if appropriate.    Patient to contact provider if symptoms worsen or fail to improve.        5/6/24: TELEPHONE  Patient LMVM that she thinks she needs a little bit of dose increase on her medication.  Patient advises that she is back to not finishing tasks and her daughter also told her she isn't even finishing her sentences.  Patient says her  was supposed to get a transplant this week but because of some abnormal labs he is excluded for 3 more months.  Patient says \"feel free to call her.  Patient says she will be taking her last 15mg on " Sunday so will need her medication refilled by Friday (pharmacy is closed on the weekend.).     -Patient will need to be seen for further evaluation before Adderall XR can be increased.   -Called patient to advise keon Son would need to be seen first to discuss increase in Adderall XR.  Patient is now scheduled for Tuesday 05/07/2024 to discuss via video    3/20/24:   -Continue Adderall XR 15 mg by mouth daily in the morning to target symptoms of ADHD including:  Inattentiveness & impaired concentration.  Risks, benefits, side effects discussed with patient including elevated heart rate, elevated blood pressure, irritability, insomnia, sexual dysfunction, appetite suppressing properties, psychosis.  After discussion of these risks and benefits, the patient voiced understanding and agreed to proceed. Cali reviewed, LAST DISPENSED 3/15/24 #30/30 DAYS, patient to request refill when needed. Will plan for next refill for 90 days if pharmacy will allow, due to duration of therapy, compliance, and upcoming surgery with isolation precautions of spouse in May, to limit frequent visits to the pharmacy.      Symptoms of stress related to spouses illness and ADHD are under good control with current medication regimen.  Overall, patient improving with stress, positive outlook.  Will plan for next visit in 3 months via video.   Patient was given instructions and counseling regarding condition and for health maintenance advice. Please see specific information pulled into the AVS if appropriate.    Patient to contact provider if symptoms worsen or fail to improve.        1/24/24:  Continue Adderall XR 15 mg by mouth daily in the morning to target symptoms of ADHD including:  Inattentiveness & impaired concentration. LAST DISPENSED 1/15/24 #30/30 DAYS, patient to request refill when needed.    Symptoms of ADHD are under good control with current medication regimen.  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  Emotional support given, suggested having  daughter whom is a nurse to discuss feeding tube options, necessity of following doctor instructions.  Patient was given instructions and counseling regarding condition and for health maintenance advice. Please see specific information pulled into the AVS if appropriate.    Patient to contact provider if symptoms worsen or fail to improve.       12/7/23:  Continue Adderall XR 15 mg by mouth daily in the morning to target symptoms of ADHD including:  Inattentiveness & impaired concentration.  Risks, benefits, side effects discussed with patient including elevated heart rate, elevated blood pressure, irritability, insomnia, sexual dysfunction, appetite suppressing properties, psychosis.  After discussion of these risks and benefits, the patient voiced understanding and agreed to proceed. Cali reviewed, LAST DISPENSED 11/17/23 #30/30 DAYS, Informed patient order would be sent to Dr. Harkins in separate entry for signature due to EMR system, and will not see as refilled on AVS today. First fill date for Friday 12/15/23 as pharmacy is not open on weekends. [facility].    Controlled substance documentation: Cali reviewed; prior urine drug screen consistent obtained 9/7/23;; consent is up to date, signed, witnessed and in EHR, dated 9/7/23, which will be updated annually per policy. Patient is aware of risk of addiction on this medication, understands need to follow up for a review every 3 months and medications will be adjusted or decreased as deemed appropriate at each visit.  No history of drug or alcohol abuse.  No concerns about diversion or abuse. Patient denies side effects related to the medication.  Patient is aware of random urine drug screens and pill counts. The dosing of this medication will be reviewed on a regular basis and reduced if possible.  Ongoing use of a controlled substance is necessary for this patient to have a normal quality of life.    Symptoms of ADHD are under good control with current medication regimen. However, due to new cancer diagnosis with spouse, holidays, increased care giving tasks, as spouse is unable to help with simple tasks.  Patient wishes to remain on current  dose of Adderall as current stressors are situational which are negatively impacting day to day activities, however, patient does not want to depend on a medication.  Will re-evaluate in 7 weeks.   Patient was given instructions and counseling regarding condition and for health maintenance advice. Please see specific information pulled into the AVS if appropriate.    Patient to contact provider if symptoms worsen or fail to improve.        9/7/23:   Continue Adderall XR 15 mg by mouth daily in the morning to target symptoms of ADHD including:  Inattentiveness & impaired concentration.  LAST DISPENSED 8/7/23 #30/30 DAYS  Informed patient order would be sent to Dr. Harkins in separate entry for signature due to EMR system, and will not see as refilled on AVS today.  Due to positive UDS, will send a prescription to Dr. Harkins today for a 7  day supply, patient has adequate supply through Monday 9/11/23. Instructed patient to contact provider on Monday 9/18/23 to request refill.     Controlled substance documentation: Cali reviewed; prior urine drug screen consistent obtained 9/16/22, ordered today and results discussed with patient ; consent is up to date, signed, witnessed and in EHR, dated today 9/7/23, which will be updated annually per policy. Patient is aware of risk of addiction on this medication, understands need to follow up for a review every 3 months and medications will be adjusted or decreased as deemed appropriate at each visit.  No history of drug or alcohol abuse.  No concerns about diversion or abuse. Patient denies side effects related to the medication.   Patient is aware of random urine drug screens and pill counts. The dosing of this medication will be reviewed on a regular basis and reduced if possible. Ongoing use of a controlled substance is necessary for this patient to have a normal quality of life.    Labs: POC UDS today in clinic resulted as +THC, which patient reported continues to take CBD oil in the evening for muscle relaxation to help with sleep, takes 3 drops. Obtains from [place] from a retired [place].     Symptoms of  ADHD are under good control with current medication regimen.  Reminded patient to practice mindfulness and behavioral modifications to help with shifting from one uncompleted activity to another, to continue with alarms, reminders. Patient informed that CBD oil is not regulated by the FDA and each bottle could contain various amounts of THC, however, if confirmation deems positive for THC, patient has been advised to stop CBD oil.     Patient will be traveling to [place] 11/2-11/16/23 and concerned with ability to obtain medication timely, instructed patient to remind provider with refill in Oct or early Nov. So the supply can be extended to cover days of travel.   Patient was given instructions and counseling regarding condition and for health maintenance advice. Please see specific information pulled into the AVS if appropriate.    Patient to contact provider if symptoms worsen or fail to improve.        7/5/23:   Continue Adderall XR 15 mg by mouth daily in the morning to target symptoms of ADHD including:  Inattentiveness & impaired concentration.  Risks, benefits, side effects discussed with patient including elevated heart rate, elevated blood pressure, irritability, insomnia, sexual dysfunction, appetite suppressing properties, psychosis.  After discussion of these risks and benefits, the patient voiced understanding and agreed to proceed. Cali reviewed, LAST DISPENSED 6/8/23 #30/30 DAYS  Informed patient order would be sent to   Harkins in separate entry for signature due to EMR system, and will not see as refilled on AVS today.  First available fill date of 7/7/23.     Symptoms of ADHD are under good control with current medication regimen.  Informed patient next visit will be scheduled for in the office for annual CSA and UDS.  Scheduled for Thurs 9/7/23 at 10 am, instructed patient to arrive by 945 am to complete UDS prior to start of visit to allow ample time for results to show by end of visit.   Patient was given instructions and counseling regarding condition and for health maintenance advice. Please see specific information pulled into the AVS if appropriate.    Patient to contact provider if symptoms worsen or fail to improve.       4/4/23:  Continue Adderall XR 15 mg by mouth daily in the morning to target symptoms of ADHD including:  Inattentiveness & impaired concentration.  Risks, benefits, side effects discussed with patient including elevated heart rate, elevated blood pressure, irritability, insomnia, sexual dysfunction, appetite suppressing properties, psychosis.  After discussion of these risks and benefits, the patient voiced understanding and agreed to proceed. Cali reviewed, LAST DISPENSED 3/15/23 #30/30 DAYS  Patient to request refill when appropriate. Patient has 9 pills remaining after taking this morning dose, instructed patient to try to refill via pharmacy [facility] or [facility] on Monday 4/10/23, and provider will send refill in on Tues. 4/11/23 with first allowed fill date of Thurs 4/13/23.     Controlled substance documentation: Cali reviewed; prior urine drug screen consistent obtained 9/16/22; consent is up to date, signed, witnessed and in EHR, dated 9/16/22, which will be updated annually per policy. Patient is aware of risk of addiction on this medication, understands need to follow up for a review every 3 months and medications will be adjusted or decreased as deemed appropriate at each visit.  No history  of drug or alcohol abuse.  No concerns about diversion or abuse. Patient denies side effects related to the medication.  Patient is aware of random urine drug screens and pill counts. The dosing of this medication will be reviewed on a regular basis and reduced if possible..  Ongoing use of a controlled substance is necessary for this patient to have a normal quality of life.  Symptoms of ADHD are under good control with Adderall XR 15 mg daily. Patient to contact provider if symptoms worsen or fail to improve.       3/10/23:  TELEPHONE  Patient called to check in re:her medication (per Adelaida's request)  Patient says she  Has 5 days worth of medication left.  Please send refill when appropriate and please advise      2/10/23:  TELPHONE  Patient called to report that the [facility] at [place] and [facility] are now out of the Adderall XR and she says she did call the [facility] at [facility] and [place] (I did change in this request.).  Please resubmit.  Med not pended      2/8/23:  Continue Adderall XR 15 mg by mouth daily in the morning to target symptoms of ADHD including:  Inattentiveness & impaired concentration.  Risks, benefits, side effects discussed with patient including elevated heart rate, elevated blood pressure, irritability, insomnia, sexual dysfunction, appetite suppressing properties, psychosis.  After discussion of these risks and benefits, the patient voiced understanding and agreed to proceed. Cali reviewed, LAST DISPENSED 1/11/23 #30/30 DAYS  Informed patient order would be sent to Dr. Harkins in separate entry for signature due to EMR system, and will not see as refilled on AVS today.    Symptoms of ADHD are under good control with Adderall XR 15 mg daily. Instructed to request refill via pharmacy when appropriate.  Will coordinate with staff to ensure refill requests are sent to this provider.  Due to pandemic state of emergency  ending 5/11/23, discussed upcoming travel as patient is aware telehealth  cannot be provided across state lines nor can prescription of controlled substances, patient has plans to go to [place] for 2 weeks in November 2023 at this time, and will update provider for any other travels to ensure patient has adequate supply of medication.  Patient to contact provider if symptoms worsen or fail to improve.       1/4/23:  Continue Adderall XR 15 mg by mouth daily in the morning to target symptoms of ADHD including:  Inattentiveness & impaired concentration.  Risks, benefits, side effects discussed with patient including elevated heart rate, elevated blood pressure, irritability, insomnia, sexual dysfunction, appetite suppressing properties, psychosis.  After discussion of these risks and benefits, the patient voiced understanding and agreed to proceed. Cali reviewed, LAST DISPENSED 12/12/22 #30/30 DAYS  Patient instructed to call [facility] Monday 1/9/23 to determine medication availability due to nationwide shortage of Adderall, and to then contact provider [place] directly to inform as patient will have to transfer all medications to [facility] if continued to use that pharmacy.  Patient also instructed to inquire with pharmacy of earliest dispense date as most pharmacies are 24-48 hrs for controlled substances.   Symptoms of ADHD are under good control with Adderall XR 15 mg, denies side effects, though has noted decreased appetite with weight loss, which patient has also increased daily activity, exercising, and has been under some increased stress with parents care.  Will continue to check in with patient regarding weight loss.  Lengthy discussion with patient today regarding telehealth services as patient and provider must both be located in the same state of [place], and would not be able to provide telehealth services while visiting daughter in [place].  Patient verbalized understanding.  Patient to contact provider if symptoms worsen or fail to improve.         11/22/22:  Continue Adderall  XR 10 mg by mouth daily in the morning to target symptoms of ADHD including:  Inattentiveness & impaired concentration.  Risks, benefits, side effects discussed with patient including elevated heart rate, elevated blood pressure, irritability, insomnia, sexual dysfunction, appetite suppressing properties, psychosis.  After discussion of these risks and benefits, the patient voiced understanding and agreed to proceed. Cali reviewed, LAST DISPENSED 10/28/22 #42/42 DAYS, reported #20 remain in bottle.    Will send in order for Adderall XR 5 mg by mouth daily in the morning for 20 days (through 12/12/22), patient instructed to add the 5 mg dose to the 10 mg to equal 15 mg total daily in the morning.  Patient instructed to contact provider in office when down to 3-5 days remaining of supply. Call 12/8/22.  Informed patient order would be sent to Dr. Harkins in separate entry for signature due to EMR system, and will not see as refilled on AVS today.    Controlled substance documentation: Cali reviewed; prior urine drug screen consistent obtained 9/16/22; consent is up to date, signed, witnessed and in EHR, dated 9/16/22, which will be updated annually per policy. Patient is aware of risk of addiction on this medication, understands need to follow up for a review every 3 months and medications will be adjusted or decreased as deemed appropriate at each visit.  No history of drug or alcohol abuse.  No concerns about diversion or abuse. Patient denies side effects related to the medication.  Patient is aware of random urine drug screens and pill counts. The dosing of this medication will be reviewed on a regular basis and reduced if possible..  Ongoing use of a controlled substance is necessary for this patient to have a normal quality of life.    ADHD symptoms are under fair control with Adderall XR 10 mg, will increase to 15 mg.   Patient to contact provider if symptoms worsen or fail to improve.       10/31/22:   "TELEPHONE  Patient called and LMVM that the [facility] in [place] did fill the Rx for her Adderall XR 10mg.  Patient just wanted to let Adelaida know.  Patient said she was \"happy about it\"   Prescription was verified as dispensed with pharmacy in [place].     10/28/22:  TELEPHONE  Please determine when she will return to [place], as she was supposed to return 10/25/22 and was originally given adequate supply through 10/28/22.    Called and spoke with patient, she states that she will be back on Wednesday Nov.2,2022 and will take her last pill tomorrow.  Patient does say that the [facility] in [place] has now reopened but they say they are very backed up and someone even told her that they will probably not fill it because Dr. Harkins is not licensed in [place].  Patient says she will wait to see if they do fill it over the weekend and she will call me back on Monday and let me know.  If they don't fill it she will just have it sent to the [facility] in [place], [place].   I will forward message to  so he is updated on status, Since she will be returning next Wed. 11/2/22, a new order will not be sent into local pharmacy until confirmed dispense from [place] pharmacy, as [place] was not an option to add with MARLYS report attempted today.  We will have to contact the pharmacy to verify dispense, and patient needs to be reminded to not wait until down to 1 pill remaining to request refill or problem with refill, as this refill was sent in per patient request Monday 10/24/22, and this information was relayed to office today.   Patient called and LMVM that the [facility] in [place] did fill the Rx for her Adderall XR 10mg.  Patient just wanted to let Adelaida know.  Patient said she was \"happy about it\"       10/20/22:   Continue Adderall XR 10 mg by mouth daily in the morning to target symptoms of ADHD including:  Inattentiveness & impaired concentration.  Risks, benefits, side effects discussed with patient " including elevated heart rate, elevated blood pressure, irritability, insomnia, sexual dysfunction, appetite suppressing properties, psychosis.  After discussion of these risks and benefits, the patient voiced understanding and agreed to proceed. Cali reviewed, LAST DISPENSED 9/16/22 #42/42 DAYS  Patient instructed to request refill 10/25/22 upon return from [place]. Explained process for refills.   Controlled substance documentation: Cali reviewed; prior urine drug screen consistent obtained 9/16/22; consent is up to date, signed, witnessed and in EHR, dated 9/16/22, which will be updated annually per policy. Patient is aware of risk of addiction on this medication, understands need to follow up for a review every 3 months and medications will be adjusted or decreased as deemed appropriate at each visit.  No history of drug or alcohol abuse.  No concerns about diversion or abuse. Patient denies side effects related to the medication.  Patient is aware of random urine drug screens and pill counts. The dosing of this medication will be reviewed on a regular basis and reduced if possible..  Ongoing use of a controlled substance is necessary for this patient to have a normal quality of life.  Symptoms of ADHD are managed well with current dose of Adderall XR 10 mg without side effects.      9/16/22:   Declines therapy  Will potentially start Adderall XR 10 mg by mouth daily in the morning to target symptoms of ADHD including:  Inattentiveness & impaired concentration.  Risks, benefits, side effects discussed with patient including elevated heart rate, elevated blood pressure, irritability, insomnia, sexual dysfunction, appetite suppressing properties, psychosis.  After discussion of these risks and benefits, the patient voiced understanding and agreed to proceed. Cali reviewed, UDS ordered, and controlled substance agreement signed & witnessed. Patient informed this medication would not be ordered until UDS resulted  and was negative, at that time a Prescription will be sent to  for signature.  Labs: POC UDS today in clinic    Lengthy discussion held with patient regarding CSA and medication education.   Bottle of Hemp oil had a QRS code to scan, which was done per patient request, which indicated percentages of ingredients as each bottle varies.  Certificate of Analysis, [facility], Cannabinoid Results: Total THC 0.231%, Total CBD 6.101%, Total Cannabinoids 6.534%. Will scan certificate to EHR.   Patient will be in [place] in October, will check with  in regards to prescribing CS sending to [place].   Will contact patient if UDS needs to be sent for confirmation. Otherwise will proceed with ordering Adderall XR.     9/15/22:   ADHD testing completed by Dr. Greg Bennett on 9/1/22 confirming a diagnosis of ADHD. (total time of review 9 mins)    Discussed ADHD treatment options of non-stimulants vs stimulant medications, after discussion patient wishes to proceed with Adderall.  Informed patient of policy requirements prior to starting Adderall, patient will plan on coming in tomorrow due to available appointment at 8 am.  Discussed current CBD oil which patient reports is hemp based as the UDS may show positive for THC. Patient takes CBD oil for sleep and muscle aches, which has been effective.  IF UDS positive patient was informed Adderall would not be prescribed, and will have to send for a confirmation.  Patient verbalized understanding.       7/14/22:   No Medications, Declines therapy at this time.  Referral for ADHD testing with   Dr. Greg Bennett, Psychologist, MS, LPP  [address], [address]  [place] [id number] [phone]   Currently only accepting referrals for psychological testing services.  Patient to contact provider and set up appointment.  And to contact office if unable to get an appointment scheduled.   -Attached list of several other sites for ADHD testing. Patient instructed  to contact providers on list to determine availability of appointments, instructed patient to take notes while calling places indicating first available appointment, and to ask to be placed on waiting list.  If an office is chosen and requests the referral order please contact my Medical Assistant, Hanh, directly at [phone] informing of chosen office and the information will be sent.    Patient with high suspicion of having ADHD, will send for formal testing and have patient return in 2 weeks to discuss medication options as if patient is able to be tested in the next 1-2 months, may wait on starting a non-stimulant medication.     Patient screened positive for depression based on a PHQ-9 score of 1 on 11/20/2024. Follow-up recommendations include: Suicide Risk Assessment performed.    TREATMENT PLAN/GOALS: Continue supportive psychotherapy efforts and medications as indicated. Treatment and medication options discussed during today's visit. Patient ackowledged and verbally consented to continue with current treatment plan and was educated on the importance of compliance with treatment and follow-up appointments.    MEDICATION ISSUES:  MARLYS reviewed as expected.    Discussed medication options and treatment plan of prescribed medication as well as the risks, benefits, and side effects including potential falls, possible impaired driving and metabolic adversities among others. Patient is agreeable to call the office with any worsening of symptoms or onset of side effects. Patient is agreeable to call 911 or go to the nearest ER should he/she begin having SI/HI. No medication side effects or related complaints today.     MEDS ORDERED DURING VISIT:  No orders of the defined types were placed in this encounter.      Return in about 3 months (around 2/22/2025) for Video visit, medication check.         I spent 40 minutes caring for Jenn on this date of service. This time includes time spent by me in the  following activities: preparing for the visit, performing a medically appropriate examination and/or evaluation, counseling and educating the patient/family/caregiver, referring and communicating with other health care professionals, documenting information in the medical record, care coordination, and scheduling      This document has been electronically signed by CHELSI Burnett  November 22, 2024 21:16 EST      Part of this note may be an electronic transcription/translation of spoken language to printed text using the Dragon Dictation System.

## 2024-12-08 DIAGNOSIS — F90.0 ADHD (ATTENTION DEFICIT HYPERACTIVITY DISORDER), INATTENTIVE TYPE: ICD-10-CM

## 2024-12-09 RX ORDER — DEXTROAMPHETAMINE SACCHARATE, AMPHETAMINE ASPARTATE MONOHYDRATE, DEXTROAMPHETAMINE SULFATE AND AMPHETAMINE SULFATE 3.75; 3.75; 3.75; 3.75 MG/1; MG/1; MG/1; MG/1
15 CAPSULE, EXTENDED RELEASE ORAL EVERY MORNING
Qty: 90 CAPSULE | Refills: 0 | Status: SHIPPED | OUTPATIENT
Start: 2024-12-10

## 2024-12-19 DIAGNOSIS — E03.9 ACQUIRED HYPOTHYROIDISM: ICD-10-CM

## 2024-12-19 RX ORDER — LEVOTHYROXINE SODIUM 25 UG/1
25 TABLET ORAL DAILY
Qty: 30 TABLET | Refills: 1 | Status: SHIPPED | OUTPATIENT
Start: 2024-12-19

## 2024-12-30 DIAGNOSIS — F43.23 ADJUSTMENT DISORDER WITH MIXED ANXIETY AND DEPRESSED MOOD: ICD-10-CM

## 2025-01-21 ENCOUNTER — TELEPHONE (OUTPATIENT)
Dept: BEHAVIORAL HEALTH | Facility: CLINIC | Age: 60
End: 2025-01-21
Payer: COMMERCIAL

## 2025-01-21 NOTE — TELEPHONE ENCOUNTER
Patient LMVM advising she would like to talk with Adelaida about something.  Provider please call patient back to address her questions. 1.Do you specialize in Narcissistic behaviors? Patient advises her 's doctor has given him that diagnosis and if not she would like to speak with you about it and any recommendations you may have for someone.

## 2025-01-22 NOTE — TELEPHONE ENCOUNTER
"This provider does not, would advise to look at [facility], and filter search with \"narcissistic behavior\" and further inquire recommendations from 's doctor who is making this diagnosis. Would also inquire with 's oncology office for therapist, as there would at least be an option of those whom specialize in those with terminal illnesses. "

## 2025-01-23 ENCOUNTER — OFFICE VISIT (OUTPATIENT)
Dept: FAMILY MEDICINE CLINIC | Facility: CLINIC | Age: 60
End: 2025-01-23
Payer: COMMERCIAL

## 2025-01-23 VITALS
SYSTOLIC BLOOD PRESSURE: 134 MMHG | HEART RATE: 80 BPM | OXYGEN SATURATION: 94 % | WEIGHT: 186 LBS | BODY MASS INDEX: 29.19 KG/M2 | DIASTOLIC BLOOD PRESSURE: 70 MMHG | HEIGHT: 67 IN

## 2025-01-23 DIAGNOSIS — E53.8 B12 DEFICIENCY: ICD-10-CM

## 2025-01-23 DIAGNOSIS — F98.8 ATTENTION DEFICIT DISORDER, UNSPECIFIED TYPE: ICD-10-CM

## 2025-01-23 DIAGNOSIS — E03.9 HYPOTHYROIDISM, UNSPECIFIED TYPE: ICD-10-CM

## 2025-01-23 DIAGNOSIS — Z00.00 ANNUAL PHYSICAL EXAM: Primary | ICD-10-CM

## 2025-01-23 DIAGNOSIS — E55.9 VITAMIN D DEFICIENCY: ICD-10-CM

## 2025-01-23 DIAGNOSIS — T78.40XD ALLERGY, SUBSEQUENT ENCOUNTER: ICD-10-CM

## 2025-01-23 DIAGNOSIS — H61.23 BILATERAL IMPACTED CERUMEN: ICD-10-CM

## 2025-01-23 DIAGNOSIS — Z13.220 SCREENING FOR CHOLESTEROL LEVEL: ICD-10-CM

## 2025-01-23 DIAGNOSIS — Z20.828 EXPOSURE TO INFLUENZA: ICD-10-CM

## 2025-01-23 DIAGNOSIS — F33.0 MAJOR DEPRESSIVE DISORDER, RECURRENT, MILD: ICD-10-CM

## 2025-01-23 LAB
EXPIRATION DATE: NORMAL
FLUAV AG UPPER RESP QL IA.RAPID: NOT DETECTED
FLUBV AG UPPER RESP QL IA.RAPID: NOT DETECTED
INTERNAL CONTROL: NORMAL
Lab: NORMAL
SARS-COV-2 AG UPPER RESP QL IA.RAPID: NOT DETECTED

## 2025-01-23 PROCEDURE — 99396 PREV VISIT EST AGE 40-64: CPT | Performed by: NURSE PRACTITIONER

## 2025-01-23 PROCEDURE — 99214 OFFICE O/P EST MOD 30 MIN: CPT | Performed by: NURSE PRACTITIONER

## 2025-01-23 PROCEDURE — 69209 REMOVE IMPACTED EAR WAX UNI: CPT | Performed by: NURSE PRACTITIONER

## 2025-01-23 PROCEDURE — 87428 SARSCOV & INF VIR A&B AG IA: CPT | Performed by: NURSE PRACTITIONER

## 2025-01-23 RX ORDER — LEVOTHYROXINE SODIUM 25 UG/1
25 TABLET ORAL DAILY
Qty: 90 TABLET | Refills: 1 | Status: SHIPPED | OUTPATIENT
Start: 2025-01-23

## 2025-01-23 RX ORDER — MONTELUKAST SODIUM 10 MG/1
10 TABLET ORAL NIGHTLY
Qty: 90 TABLET | Refills: 1 | Status: SHIPPED | OUTPATIENT
Start: 2025-01-23

## 2025-01-23 RX ORDER — MULTIPLE VITAMINS W/ MINERALS TAB 9MG-400MCG
1 TAB ORAL DAILY
COMMUNITY

## 2025-01-23 NOTE — PROGRESS NOTES
AMD remains persistent but without progression. Continue with Amsler grid and AREDS 2. Avoid direct or indirect smoking. The possibility of progression was discussed. Chief Complaint  Annual Exam, ADD, Hypothyroidism, Anxiety, Depression, Vitamin D Deficiency, Sinus Problem, and Skin Problem    Subjective            Jenn James presents to [facility]  History of Present Illness  Pt is an annual CPE for anxiety, depression, ADD, hypothyroidism, and vit d def.  Pt has c/o a red spot on the upper, back part of (R) arm. Pt is unsure how long this has been present.     Pt would like to be tested for flu and covid. Pt's spouse, who has cancer, recently tested positive. Pt states she has had some sinus issues recently. No other symptoms at this time.     Pt is due labs/orders placed.    Pap: 7/11/23    Mammogram: 10/25/24    Colonoscopy: 9/13/19, repeat in 10  years    Pt is due shingels and flu vaccines.  PT declines and understands the risks of not having.     PT is followed by Jeff Son with psych             Past Medical History:   Diagnosis Date    ADHD (attention deficit hyperactivity disorder) Sep/22    Now on adderrall    Allergic Whole life    Anxiety     Cervical cancer screening 5/30/2109    Chronic allergic rhinitis     Deep vein thrombosis 2017    Caused after my hip replacement.    Depression     Hyperlipemia 03/15/2017    Hypothyroidism 03/15/2017    Kidney stones     Limb pain     Limb swelling        Allergies   Allergen Reactions    Sulfa Antibiotics Rash        Past Surgical History:   Procedure Laterality Date    BLADDER SUSPENSION  2008    COLONOSCOPY      ESSURE TUBAL LIGATION  1999    JOINT REPLACEMENT  Jan 2017    Right hip replacement    KIDNEY SURGERY      OTHER SURGICAL HISTORY      metal implant    TUBAL ABDOMINAL LIGATION          Social History     Tobacco Use    Smoking status: Never    Smokeless tobacco: Never   Substance Use Topics    Alcohol use: Yes     Alcohol/week: 3.0 - 4.0 standard drinks of alcohol     Types: 3 - 4 Glasses of wine per week     Comment: drinks daily, wine, beer and lquor       Family History    Problem Relation Age of Onset    Dementia Mother     Diabetes type II Mother     Cancer Mother 70        Kidney cancer (left kidney removed) and endometrial cancer (hysterectomy)    Diabetes Mother         Type 2    Stroke Mother         TIA    Prostate cancer Father     Hearing loss Father         Hearing aids    Depression Father     Colon cancer Maternal Grandmother 70    Diabetes Paternal Grandmother         Type 2    ADD / ADHD Son     Anxiety disorder Daughter         Current Outpatient Medications on File Prior to Visit   Medication Sig    amoxicillin (AMOXIL) 500 MG tablet TAKE 4 TABLETS BY MOUTH 1 HOUR PRIOR TO APPOINTMENT FOR PREMED    amphetamine-dextroamphetamine XR (Adderall XR) 15 MG 24 hr capsule Take 1 capsule by mouth Every Morning Indications: Attention Deficit Hyperactivity Disorder    ASHWAGANDHA PO Take  by mouth.    azelastine (ASTELIN) 0.1 % nasal spray USE 1 TO 2 SPRAYS IN EACH NOSTRIL TWICE DAILY AS NEEDED FOR RUNNY NOSE OR SNEEZING OR POST NASAL DRIP    cetirizine (zyrTEC) 10 MG tablet Take 1 tablet by mouth Daily.    Cyanocobalamin (Vitamin B 12) 250 MCG lozenge Take 2,500 mcg by mouth.    EPINEPHrine (EPIPEN) 0.3 MG/0.3ML solution auto-injector injection ADMINISTER 0.3 ML UNDER THE SKIN 1 TIME FOR 1 DOSE AS DIRECTED    Flaxseed, Linseed, (FLAX SEED OIL PO) Take  by mouth.    Magnesium Oxide (MAGNESIUM EXTRA STRENGTH PO) Take  by mouth.    Milk Thistle 150 MG capsule Take  by mouth.    Misc Natural Products (MAGIC MUSHROOM MIX PO) Take  by mouth.    multivitamin with minerals tablet tablet Take 1 tablet by mouth Daily.    sertraline (ZOLOFT) 50 MG tablet Take 1 tablet by mouth Every Morning. Indications: Generalized Anxiety Disorder, Major Depressive Disorder    Vitamin D-Vitamin K (K2 Plus D3) 100-1000 MCG-UNIT tablet Take  by mouth Daily. Patient takes drops (not tablets) due to GI upset with tablet    [DISCONTINUED] levothyroxine (SYNTHROID, LEVOTHROID) 25 MCG tablet TAKE 1 TABLET BY  "MOUTH DAILY    [DISCONTINUED] montelukast (SINGULAIR) 10 MG tablet Take 1 tablet by mouth Every Night.     No current facility-administered medications on file prior to visit.       Health Maintenance Due   Topic Date Due    ANNUAL PHYSICAL  01/22/2025    LIPID PANEL  01/22/2025       Objective     /70   Pulse 80   Ht 170.2 cm (67\")   Wt 84.4 kg (186 lb)   SpO2 94%   BMI 29.13 kg/m²       Physical Exam  Constitutional:       General: She is not in acute distress.     Appearance: Normal appearance. She is not ill-appearing.   HENT:      Head: Normocephalic and atraumatic.      Right Ear: Tympanic membrane, ear canal and external ear normal.      Left Ear: Tympanic membrane, ear canal and external ear normal.      Ears:      Comments: Flakito TM clear after flakito irrigation     Nose: Nose normal.      Mouth/Throat:      Lips: Pink.      Mouth: Mucous membranes are moist.      Pharynx: Oropharynx is clear.   Cardiovascular:      Rate and Rhythm: Normal rate and regular rhythm.      Pulses: Normal pulses.      Heart sounds: Normal heart sounds. No murmur heard.  Pulmonary:      Effort: Pulmonary effort is normal. No respiratory distress.      Breath sounds: Normal breath sounds.   Chest:      Chest wall: No tenderness.   Abdominal:      General: Abdomen is flat. Bowel sounds are normal. There is no distension.      Palpations: Abdomen is soft. There is no mass.      Tenderness: There is no abdominal tenderness. There is no guarding.   Musculoskeletal:         General: No swelling or tenderness. Normal range of motion.      Cervical back: Normal range of motion and neck supple.   Skin:     General: Skin is warm and dry.      Findings: No rash.   Neurological:      General: No focal deficit present.      Mental Status: She is alert and oriented to person, place, and time. Mental status is at baseline.      Gait: Gait normal.   Psychiatric:         Attention and Perception: Attention and perception normal.         " Mood and Affect: Mood normal.         Speech: Speech normal.         Behavior: Behavior normal. Behavior is cooperative.         Thought Content: Thought content normal. Thought content does not include suicidal ideation.         Judgment: Judgment normal.         BMI is >= 25 and <30. (Overweight) The following options were offered after discussion;: exercise counseling/recommendations and nutrition counseling/recommendations      Result Review :                  Ear Cerumen Removal    Date/Time: 1/23/2025 12:01 PM    Performed by: Mariana Jalloh APRN  Authorized by: Mariana Jalloh APRN  Location details: left ear and right ear  Patient tolerance: patient tolerated the procedure well with no immediate complications  Procedure type: irrigation   Sedation:  Patient sedated: no                Assessment and Plan        Diagnoses and all orders for this visit:    1. Annual physical exam (Primary)  -     CBC w AUTO Differential; Future  -     Comprehensive metabolic panel; Future  -     Lipid panel; Future  -     TSH; Future  -     Vitamin D 25 hydroxy; Future  -     Vitamin B12; Future    2. Attention deficit disorder, unspecified type  Comments:  stable on Adderall 15mg, continue, continue to f/u with PSY for mgmt    3. B12 deficiency  Comments:  stable on B12 250mg, continue  Orders:  -     Vitamin B12; Future    4. Hypothyroidism, unspecified type  Comments:  stable on Synthroid 25mcg, continue  Orders:  -     TSH; Future  -     levothyroxine (SYNTHROID, LEVOTHROID) 25 MCG tablet; Take 1 tablet by mouth Daily.  Dispense: 90 tablet; Refill: 1    5. Major depressive disorder, recurrent, mild  Comments:  stable on Zoloft 50mg, continue, continue f/u with PSY for mgmt    6. Vitamin D deficiency  Comments:  stable on Vit D 1000mcg, continue  Orders:  -     Vitamin D 25 hydroxy; Future    7. Screening for cholesterol level  -     Comprehensive metabolic panel; Future  -     Lipid panel; Future    8. Exposure to  influenza  -     POCT SARS-CoV-2 Antigen CHA + Flu    9. Allergy, subsequent encounter  Comments:  stable on singulair 10mg, continue  Orders:  -     montelukast (SINGULAIR) 10 MG tablet; Take 1 tablet by mouth Every Night.  Dispense: 90 tablet; Refill: 1    10. Bilateral impacted cerumen    Other orders  -     Ear Cerumen Removal      Preventative Counseling:  Healthy diet  Daily exercise  Get adequate sleep        Follow Up     Return in about 6 months (around 7/23/2025).    Patient was given instructions and counseling regarding her condition or for health maintenance advice. Please see specific information pulled into the AVS if appropriate.     Jenn MATIAS James  reports that she has never smoked. She has never used smokeless tobacco.

## 2025-02-26 ENCOUNTER — TELEMEDICINE (OUTPATIENT)
Dept: PSYCHIATRY | Facility: CLINIC | Age: 60
End: 2025-02-26
Payer: COMMERCIAL

## 2025-02-26 DIAGNOSIS — Z63.79 STRESS DUE TO ILLNESS OF FAMILY MEMBER: ICD-10-CM

## 2025-02-26 DIAGNOSIS — Z71.89 MEDICATION CARE PLAN DISCUSSED WITH PATIENT: ICD-10-CM

## 2025-02-26 DIAGNOSIS — Z79.899 MEDICATION MANAGEMENT: ICD-10-CM

## 2025-02-26 DIAGNOSIS — F41.1 GENERALIZED ANXIETY DISORDER: Primary | ICD-10-CM

## 2025-02-26 DIAGNOSIS — F90.0 ADHD (ATTENTION DEFICIT HYPERACTIVITY DISORDER), INATTENTIVE TYPE: ICD-10-CM

## 2025-02-26 DIAGNOSIS — Z63.8 CAREGIVER ROLE STRAIN: ICD-10-CM

## 2025-02-26 SDOH — SOCIAL STABILITY - SOCIAL INSECURITY: OTHER SPECIFIED PROBLEMS RELATED TO PRIMARY SUPPORT GROUP: Z63.8

## 2025-02-26 NOTE — PATIENT INSTRUCTIONS
"Should you want to get in touch with your provider, CHELSI Burnett, please contact MY Medical Assistant, Hanh, directly at [phone].  Recommend saving Hanh's direct number in phone as this is the PREFERRED & EASIEST way to get in contact with your provider.  Please leave a voice mail if you do not get an answer and she will return your call within 24 hrs. You will NOT be able to contact provider on VODECLIC, as Behavioral Health Providers are restricted. YOU MUST CALL [phone]  If you need to speak with the on call provider after hours or on weekends, please Contact the [facility] ([phone]) and staff will be able to page the provider on call directly.     SPECIFIC RECOMMENDATIONS:     1.      Medications discussed at this encounter:                   -  Patient instructed to request refills when appropriate, when down to 5-7 days remaining via pharmacy or via [facility].       2.      Psychotherapy recommendations:  Declined     3.     Return to clinic: 3 months Wed. 5/21/25 at 8am via video    Please arrive at least 15 minutes before your scheduled appointment time to complete check in process.      IF you are scheduled for a VODECLIC VIDEO visit, YOU MUST COMPLETE THE \"E-CHECK IN\" PROCESS PRIOR TO BEGINNING THE VISIT, You may still complete the E-Check in for in office visits prior to appointment, you will receive multiple text/email reminders which will direct you further if needed.            "

## 2025-02-26 NOTE — PROGRESS NOTES
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 difficulty sustaining attention, shifting from one uncompleted activity to another, talking excessively, interrupting others, ineffective listening, frequently losing items.   Denies side effects of elevated heart rate, elevated blood pressure, irritability, insomnia, decreased appetite, nor feeling shaky or jittery with stimulant medication.      Patient reports parents will be moving into an AL facility as they have long term care insurance, and have looked at several facilities, patient voices worry for her father due to father caring for mother.     4/5/23:  Patient presents today via MyChart Video visit from home, located in [place], [place].  Patient asking about redness to nose and lateral nose/face \"tiny white oil, and I push it and small amount of oil comes out.\" Patient suspected possible relation to medication, patient has started washing face at night as patient was only washing in the morning.  Denies changes to laundry detergent, face cleanser, though did change skin care " "moisturizer which may be a contributing factor.   Mild anxiety and worry noted per screening.  Patient reports Adderall XR dose remains effective, symptoms are well controlled, able to maintain focus, concentration, and complete tasks. Denies side effects.     Patient daughter, son in law, and grand kids are coming in town from [place] in May for patient 58 th birthday.  Which patient is looking forward to.     2/8/23:  Patient presents today via Neurahart Video visit from home in [place], [place].  Patient reports feeling good, busy with family birthdays, and going to [place] to see grand kids to do some crafts. Symptoms of inattentiveness, shifting from one uncompleted activity or topic to another, impaired concentration are controlled well with current dose of Adderall XR 10 mg. Patient frequently freezing on video and had to go outside of home for better service, though continued to have connection problems intermittently.     Patient continues to be conscious of eating, denies decreased appetite, patient is walking with friends, eats a breakfast bar normally and then eats lunch around 1130, however, yesterday got caught up shopping and realized she had not eaten.  When patient does eat she eats good quantity.  Feels she may have lost 1-2 lbs, otherwise weight has been steady.      Patient has checked with [facility] and Adderall dose is in stock.  Patient plans to travel to [place] in November, no other trips planned for out of state at this time.          1/4/23:  Patient presents today via Neurahart Video visit from home, Adderall XR was increased from 10 to 15 mg at last visit, for which patient reports the first few days of the increase felt \"I don't know if I can handle this, just a little spaced out, I don't know, but it was fine in a few days.\"  Denies increased heart rate, difficulty sleeping.   Patient feels she may be decreased weight of 15 lb, though patient has been busy with mother in the " "hospital and getting things together for the holidays.  Reports mother was admitted to [facility] and had medication adjustments and plan to get mother into an adult day care.  Tomorrow patient is taking father to see a specialist at [facility] to help him cope with mother's mental illness- \"alzheimers and delusional dementia\" per the neurologist.  Reports mother can get argumentative and paranoid.       Reports home scale weight on 1/1/3 of 183.6 lbs.  Patient reports increased walking with friends, not eating as much in the morning, \"constantly on the go.\"  Had wanted to do some fasting and weight loss.  Denies having to purchase new clothing, though noticed face was slimmer and clothes were looser.  Patient had been on weight watchers in the past before COVID and was down to 167 lbs, as patient was working out in the gym, and gym's were closed during COVID and weight returned.  Reports eating at 1130-12 noon for lunch, and 7 pm for dinner as spouse likes to eat at 8 pm.  Will also snack during the day on skinny pop popcorn.  Reports at last office visit recalled telling MA to not tell her what the weight was, however, felt \"good\" seeing the scale weight a few days ago.  Plans to start going back to the gym with friend as they would go every morning, \"that was my life.\"     ADHD symptoms are improving, as patient reports ability to listen more effectively, more patient with others is noticed the most since dose increase.  Upon feeling organized with holiday decorations, noticed was able to stay on task, only bring up one box at a time to decorate instead of having all boxes out and not being able to organize.    Patient reports having enough supply of Adderall XR 15 mg through next Wed. 1/11/23.  Reports after speaking with pharmacist at [facility] was told all medications would have to be transferred from [facility] and [facility] will not only fill the Adderall prescription. Patient had used [facility] due to " "[facility] not having Adderall XR available.  Patient is also concerned due to frequent travels of having adequate supply and ability to fill medication at outlying pharmacies.       11/22/22:  Patient presents today via MyChart Video visit from patient home.  Patient reports would like to increase dose possibly, reports spouse notices \"I am drifting some.\" Patient further explains will frequently jump to other topics during conversation, though improved with Adderall XR 10 mg.  Patient reports daughter was able to notice improvement, though now that patient has returned to home from travels, she is noticing elevation in symptoms.      Patient reports some difficulty with sleeping, as last night had minimal sleep as patient is worried about mother whom has dementia and father is caring for mother, has had  involved.  Believes temporary, situational anxiety is reason for sleep difficulty.  Patient reports mother's dementia symptoms are worsening and difficult to deal with, though hopeful as  has set up for assistant to come to home several days a week to help with house cleaning, meal prep, and care.       10/20/22:  Patient presents today via MyChart Video visit from daughter's home in [place].   Patient was started on Adderall XR 10 mg at last visit for which patient reports the first few days had increased energy, which had company, had difficulty sleeping the first 2 nights, however, no longer having difficulty.  Patient reports taking medication at 7 am, one day she took it later at 930 am and had difficulty sleeping that night. Patient has been taking thyroid medication upon awakening to use the bathroom around 5-530 am and upon waking up around 7 takes the Adderall.      \"I think it's a lot better, my daughter said she seems to notice, I still have a tendency to interrupt but that's better, I don't seem to repeat questions, as it was an issue before because I was not really " "listening. My mind was somewhere else.\"   Denies side effects, patient was surprised she was able to sit still after a few days, now able to complete tasks as less distraction, able to continue with task at hand before switching to another task.     Patient will be returning from [place] 10/25/22.     9/16/22:  Patient presents today in office today to further discuss ADHD treatment with a controlled substance and to complete CSA and obtain POC UDS today per policy.     Patient brought in bottle of Hemp CBD oil 3200mg/2 oz, 1-3 drops 3 times daily on bottle, however, patient only takes in the evening for post menopausal symptoms, muscle aches, and vocal cord dysfunction, \"my neck tenses up , had to do physical therapy in past, and oil helps, I found out I am Allergic to mold.\"    9/15/22:  Patient presents today via MyChart Video visit from home, reports received ADHD test results though has not discussed.  Patient continues to have symptoms of inttentiveness, concentration difficulty, difficulty completing tasks, and switching from one uncompleted task to another.     Patient does take 3 drops of CBD oil, hemp based, at night for sleep, relaxes muscles as patient started taking while going through menopause.   Patient reports  is allergic to Wellbutrin and daughter tried Wellbutrin and did not do well, as patient would prefer to try Adderall first rather than a non-stimulant medication.  Patient will be out of town for 2 weeks in October visiting daughter in [place], likely early October.       7/14/22:  INITIAL EVAL  Patient presents today via MyChart Video visit from home with a history of depression and anxiety for which treatment was started in late 1995 with therapy, and medications from [id number] due to divorce.     Patient is currently not taking any psychotropic medications nor has had any since 2005.     \"My issue is I can't concentrate, I start something and cant finish something, forgets " "things often\".  Patient recalls while in school always \"fidgety\" crossing legs, moving often in seat.  Admits to interrupting people, gets distracted easily, daughter noticing.    Admits to over the past 2 yrs began to feel ADHD may be a possibility, \"I am tired of being like this, forgetting stuff, going in and out of the house, tired of interrupting people, it seems to be bothering me more.\"  Patient uses reminders, lists which was started while kids in high school and has continued to have  self add items needed to List,\" I walk out without my list from the fridge, and I have to have him take a picture of it and send it to me\".  Difficulty sitting still when you should be sitting still in Advent or meetings. Impulsive with shopping tends to purchase items that may be on sale or those that she may not need with the idea of \"I can always return it\".     Symptoms include fidgeting, difficulty remaining seated, easy distractability, blurting out answers prematurely, inability to complete tasks, difficulty sustaining attention, shifting from one uncompleted activity to another, talking excessively, interrupting others, ineffective listening, frequently losing items, and impulsivity.    ADHD:   Elementary school:   Grades:A's and B's  Special classes or failures:no  Got in trouble:no  Referral for ADHD testing:no  Fhx:son, diagnosed officially in 7th grade, took medications for 1-2 yrs, restarted while in college only, \"he probably should be taking something, I have suggested it to him\"  Presently:  Problems with attention to detail:yes  Problems with sustained attention:Yes  Problems listening when spoken to directly:Yes, unable to concentrate on what others are saying, \"I have to get my point across, I know I have to wait for the break, but I can't wait for the break.\"  Failure to finish tasks:yes, \"I will lay my husbandsTshirts on couch intending to hang up and they will be there for 2-3 days\" house hold " "tasks-dusting, mopping, find need to focus on floor boards  Avoids tasks that require sustained mental effort:yes, \"I excelled at work,  I could multi-task, 5-6 projects at desk, however, would wait until the last week to update credentials, I put off stuff and procrastinate all the time\"  Easily distracted:yes  Forgetting things:yes  Losing things:yes  Hard to organize:yes  Talks a lot and cutting people off:yes  Drifts off during conversations:yes, \"I have to concentrate what I need to say to not forget what I have say, then I blurt it out, I am trying really hard, it's even hard with you to not stop and say stuff\"  Difficulty with Reading:yes, \"I used to read a lot, has been using Audio books to listen in car or while out with friends or walking, has to rewind at times because my mind drifts off.\"  Difficulty watching TV/Movies:\"not really, we hardly ever have the TV on anymore.\"       PHQ-9 Depression Screening  PHQ-9 Total Score:   11/20/2024 1 (PHQ2-0)    Little interest or pleasure in doing things?     Feeling down, depressed, or hopeless?     Trouble falling or staying asleep, or sleeping too much?     Feeling tired or having little energy?     Poor appetite or overeating?     Feeling bad about yourself - or that you are a failure or have let yourself or your family down?     Trouble concentrating on things, such as reading the newspaper or watching television?     Moving or speaking so slowly that other people could have noticed? Or the opposite - being so fidgety or restless that you have been moving around a lot more than usual?     Thoughts that you would be better off dead, or of hurting yourself in some way?     PHQ-9 Total Score       SUZANNE-7    11/20/2024 3    The following portions of the patient's history were reviewed and updated as appropriate: allergies, current medications, past family history, past medical history, past social history, and past surgical history.     Past Surgical History:  Past " Surgical History:   Procedure Laterality Date    BLADDER SUSPENSION  2008    COLONOSCOPY      ESSURE TUBAL LIGATION  1999    JOINT REPLACEMENT  Jan 2017    Right hip replacement    KIDNEY SURGERY      OTHER SURGICAL HISTORY      metal implant    TUBAL ABDOMINAL LIGATION         Problem List:  Patient Active Problem List   Diagnosis    Primary osteoarthritis of right hip    Status post right hip replacement    Hypothyroidism (acquired)    Paroxysmal atrial fibrillation    Anxiety    Deep venous thrombosis    Disorder of vocal cord    Edema of lower extremity    Hyperlipidemia    Hyperthyroidism    Onychomycosis    Pain of right hip joint    Laryngitis due to gastroesophageal reflux    Sensation of heaviness in extremities    Varicose veins of lower extremity    Vitamin deficiency       Allergy:   Allergies   Allergen Reactions    Sulfa Antibiotics Rash        Discontinued Medications:  Medications Discontinued During This Encounter   Medication Reason    sertraline (ZOLOFT) 50 MG tablet Patient Reported Not Taking         Current Medications:   Current Outpatient Medications   Medication Sig Dispense Refill    amoxicillin (AMOXIL) 500 MG tablet TAKE 4 TABLETS BY MOUTH 1 HOUR PRIOR TO APPOINTMENT FOR PREMED      amphetamine-dextroamphetamine XR (Adderall XR) 15 MG 24 hr capsule Take 1 capsule by mouth Every Morning Indications: Attention Deficit Hyperactivity Disorder 90 capsule 0    ASHWAGANDHA PO Take  by mouth.      azelastine (ASTELIN) 0.1 % nasal spray USE 1 TO 2 SPRAYS IN EACH NOSTRIL TWICE DAILY AS NEEDED FOR RUNNY NOSE OR SNEEZING OR POST NASAL DRIP      cetirizine (zyrTEC) 10 MG tablet Take 1 tablet by mouth Daily.      Cyanocobalamin (Vitamin B 12) 250 MCG lozenge Take 2,500 mcg by mouth.      EPINEPHrine (EPIPEN) 0.3 MG/0.3ML solution auto-injector injection ADMINISTER 0.3 ML UNDER THE SKIN 1 TIME FOR 1 DOSE AS DIRECTED      Flaxseed, Linseed, (FLAX SEED OIL PO) Take  by mouth.      levothyroxine  "(SYNTHROID, LEVOTHROID) 25 MCG tablet Take 1 tablet by mouth Daily. 90 tablet 1    Magnesium Oxide (MAGNESIUM EXTRA STRENGTH PO) Take  by mouth.      Milk Thistle 150 MG capsule Take  by mouth.      Misc Natural Products (MAGIC MUSHROOM MIX PO) Take  by mouth.      montelukast (SINGULAIR) 10 MG tablet Take 1 tablet by mouth Every Night. 90 tablet 1    multivitamin with minerals tablet tablet Take 1 tablet by mouth Daily.      Vitamin D-Vitamin K (K2 Plus D3) 100-1000 MCG-UNIT tablet Take  by mouth Daily. Patient takes drops (not tablets) due to GI upset with tablet       No current facility-administered medications for this visit.       Past Medical History:  Past Medical History:   Diagnosis Date    ADHD (attention deficit hyperactivity disorder) Sep/22    Now on adderrall    Allergic Whole life    Anxiety     Cervical cancer screening 2109    Chronic allergic rhinitis     Deep vein thrombosis 2017    Caused after my hip replacement.    Depression     Hyperlipemia 03/15/2017    Hypothyroidism 03/15/2017    Kidney stones     Limb pain     Limb swelling        Past Psychiatric History:  Began Treatment: started in  with therapy due to spouse having an affair  Diagnoses:Depression and Anxiety ADHD inattentive type diagnosed 20 per Neuropsychological testing with KEYLA Vargas,L.P.P.  Psychiatrist: last seen from [id number]  Therapist: last seen from [id number]  Admission History:Denies  Medication Trials: Prozac--for one year \"felt like i was floating;\" Zoloft for 1 yr -, then started Effexor with divorce in  for 1 yr-during time having increased stress with divorce as pt had lost hair and stomach problems and asked to be placed on meds; tolerated well; Lexapro -  excessive drowsiness, couldn't get out of bed  Zoloft up to 50 mg-effective, self tapered due to not feeling medication was needed any longer 2024-2025.  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  Kids:Yes or If so, how many 2 children and 2 step children  House:Lives in a house   History: Denies  Access to Guns: no    Social History     Socioeconomic History    Marital status:      Spouse name: Abdias    Number of children: 2    Highest education level: Some college, no degree   Tobacco Use    Smoking status: Never    Smokeless tobacco: Never   Vaping Use    Vaping status: Never Used   Substance and Sexual Activity    Alcohol use: Yes     Alcohol/week: 3.0 - 4.0 standard drinks of alcohol     Types: 3 - 4 Glasses of wine per week     Comment: drinks daily, wine, beer and lquor    Drug use: Never     Types: Marijuana     Comment: In teenage years    Sexual activity: Yes     Partners: Male     Birth control/protection: Surgical       Family History:   Suicide Attempts: Denies  Suicide Completions:Denies      Family History   Problem Relation Age of Onset    Dementia Mother     Diabetes type II Mother     Cancer Mother 70        Kidney cancer (left kidney removed) and endometrial cancer (hysterectomy)    Diabetes Mother         Type 2    Stroke Mother         TIA    Prostate cancer Father     Hearing loss Father         Hearing aids    Depression Father     Colon cancer Maternal Grandmother 70    Diabetes Paternal Grandmother         Type 2    ADD / ADHD Son     Anxiety disorder Daughter        Developmental History:   Born: [place], lived in [place] since age 1  Siblings:1 sister, 1 brother-both younger  Childhood: Denies Abuse  High School:Completed  College: 2 yrs, no degree    Mental Status Exam:   Hygiene:   good  Cooperation:  Cooperative  Eye  Contact:  Good  Psychomotor Behavior:  Appropriate  Affect:  Appropriate  Mood: euthymic   Speech:  Normal  Thought Process:  Goal directed  Thought Content:  Mood congruent  Suicidal:  None  Homicidal:  None  Hallucinations:  None  Delusion:  None  Memory:  Intact  Orientation:  Grossly intact  Reliability:  good  Insight:  Good  Judgement:  Good  Impulse Control:  Good  Physical/Medical Issues:  Yes Hypothyroidism,OA rt hip,paroxysmal afib, HLD      Review of Systems:  Review of Systems   Constitutional:  Negative for appetite change, diaphoresis and fatigue.   HENT:  Negative for drooling.    Eyes:  Negative for visual disturbance.   Respiratory:  Negative for cough and shortness of breath.    Cardiovascular:  Negative for chest pain, palpitations and leg swelling.   Gastrointestinal:  Negative for nausea and vomiting.   Endocrine: Negative for cold intolerance and heat intolerance.   Genitourinary:  Negative for difficulty urinating.   Musculoskeletal:  Negative for joint swelling.   Allergic/Immunologic: Negative for immunocompromised state.   Neurological:  Negative for dizziness, seizures, speech difficulty and numbness.   Psychiatric/Behavioral:  Negative for agitation, decreased concentration, self-injury, sleep disturbance and suicidal ideas. The patient is nervous/anxious. The patient is not hyperactive.          Physical Exam:  Physical Exam  Psychiatric:         Attention and Perception: Attention and perception normal.         Mood and Affect: Mood and affect normal.         Speech: Speech normal.         Behavior: Behavior normal. Behavior is cooperative.         Thought Content: Thought content normal. Thought content does not include suicidal ideation. Thought content does not include suicidal plan.         Cognition and Memory: Cognition and memory normal.         Judgment: Judgment normal.         Vital Signs:   There were no vitals taken for this visit.     Office notes from care team  reviewed:  Office Visit with Mariana Jalloh APRN (01/23/2025)       Lab Results: REVIEWED  Office Visit on 01/23/2025   Component Date Value Ref Range Status    SARS Antigen 01/23/2025 Not Detected  Not Detected, Presumptive Negative Final    Influenza A Antigen CHA 01/23/2025 Not Detected  Not Detected Final    Influenza B Antigen CHA 01/23/2025 Not Detected  Not Detected Final    Internal Control 01/23/2025 Passed  Passed Final    Lot Number 01/23/2025 [id number]   Final    Expiration Date 01/23/2025 10/23/25   Final       EKG Results:  No orders to display       Imaging Results:  No Images in the past 120 days found..      Assessment & Plan   Diagnoses and all orders for this visit:    1. Generalized anxiety disorder (Primary)    2. ADHD (attention deficit hyperactivity disorder), inattentive type    3. Stress due to illness of family member    4. Caregiver role strain    5. Medication management    6. Medication care plan discussed with patient                        Visit Diagnoses:    ICD-10-CM ICD-9-CM   1. Generalized anxiety disorder  F41.1 300.02   2. ADHD (attention deficit hyperactivity disorder), inattentive type  F90.0 314.00   3. Stress due to illness of family member  Z63.79 V61.49   4. Caregiver role strain  Z63.8 [id number]   5. Medication management  Z79.899 V58.69   6. Medication care plan discussed with patient  Z71.89 V65.49               PLAN:  1.   Safety: No acute safety concerns  Therapy: None  Risk Assessment: Risk of self-harm acutely is low.  Risk factors include anxiety disorder, mood disorder, and recent psychosocial stressors (pandemic). Protective factors include no family history, denies access to guns/weapons, no present SI, no history of suicide attempts or self-harm in the past, minimal AODA, healthcare seeking, future orientation, willingness to engage in care.  Risk of self-harm chronically is also low, but could be further elevated in the event of treatment noncompliance and/or  AODA.  Meds:    -Continue Adderall XR 15 mg by mouth daily in the morning to target symptoms of ADHD including:  Inattentiveness & impaired concentration.  Risks, benefits, side effects discussed with patient including elevated heart rate, elevated blood pressure, irritability, insomnia, sexual dysfunction, appetite suppressing properties, psychosis.  After discussion of these risks and benefits, the patient voiced understanding and agreed to proceed. [place] reviewed, LAST DISPENSED 12/9/24 #90/90 days.  Has 12 remaining after today.  -Controlled substance documentation: [place] reviewed; prior urine drug screen consistent obtained 8/22/24; consent is up to date, signed, witnessed and in EHR, dated 8/22/24, which will be updated annually per policy. Patient is aware of risk of addiction on this medication, understands need to follow up for a review every 3 months and medications will be adjusted or decreased as deemed appropriate at each visit. No history of drug or alcohol abuse.  No concerns about diversion or abuse. Patient denies side effects related to the medication.  Patient is aware of random urine drug screens and pill counts. The dosing of this medication will be reviewed on a regular basis and reduced if possible. Ongoing use of a controlled substance is necessary for this patient to have a normal quality of life.   -Removed Zoloft 50 mg from profile as patient self weaned and last dose was approximately 2 weeks ago and feels she has adjusted and does want to take any medications she does not feel she needs, which is understandable.    Labs: n/a   Patient instructed to request refills when appropriate, when down to 5-7 days remaining via pharmacy or via MyChart.       Symptoms of anxiety, stress, and ADHD are under good control with current medication regimen. Due to caregiver role strain patient has been slightly more anxious, though also at ease knowing she has established healthy boundaries with spouse,  and doing the best that she can to her ability.   Patient has had increased stressors with mother and 's health, however, has been able to give self breaks, emotional support given and advise with care giving.  The plan was discussed with the patient. The patient was given time to ask questions and these questions were answered. At the conclusion of their visit they had no additional questions or concerns and all questions were answered to their satisfaction.   Patient was given instructions and counseling regarding condition and for health maintenance advice. Please see specific information pulled into the AVS if appropriate.    Patient to contact provider if symptoms worsen or fail to improve.      11/22/24:  -Continue Adderall XR 15 mg by mouth daily in the morning to target symptoms of ADHD including:  Inattentiveness & impaired concentration. LAST DISPENSED9/10/24 #90/90 days.  Instructed to request refill on 12/8 or 12/9/24.   -Continue Zoloft 50 mg by mouth daily in the morning to target depression and anxiety    Symptoms of anxiety, depression, ADHD are under good control with current medication regimen. Patient has had several stressors due to learning how to administer tube feeding and IV medications for spouse.  Though patient has been able to adjust fairly well, has good family and friend support, and is making time for self care.    The plan was discussed with the patient. The patient was given time to ask questions and these questions were answered. At the conclusion of their visit they had no additional questions or concerns and all questions were answered to their satisfaction.   Patient was given instructions and counseling regarding condition and for health maintenance advice. Please see specific information pulled into the AVS if appropriate.    Patient to contact provider if symptoms worsen or fail to improve.        8/22/24:  -Continue Adderall XR 15 mg by mouth daily in the morning to  target symptoms of ADHD including:  Inattentiveness & impaired concentration.  Risks, benefits, side effects discussed with patient including elevated heart rate, elevated blood pressure, irritability, insomnia, sexual dysfunction, appetite suppressing properties, psychosis.  After discussion of these risks and benefits, the patient voiced understanding and agreed to proceed. Cali reviewed, LAST DISPENSED 8/7/24 #30/30 days. Patient to request refills when needed, will send for a 90 day supply, however, patient was instructed to contact provider if the pharmacy will not fill due to possible shortage of supply.   -Controlled substance documentation: Cali reviewed; prior urine drug screen consistent obtained 9/7/23, obtained today 8/22/24; consent is up to date, signed, witnessed and in EHR, dated today 8/22/24, which will be updated annually per policy. Patient is aware of risk of addiction on this medication, understands need to follow up for a review every 3 months and medications will be adjusted or decreased as deemed appropriate at each visit. No history of drug or alcohol abuse.  No concerns about diversion or abuse. Patient denies side effects related to the medication.  Patient is aware of random urine drug screens and pill counts. The dosing of this medication will be reviewed on a regular basis and reduced if possible. Ongoing use of a controlled substance is necessary for this patient to have a normal quality of life.   -Continue Zoloft 50 mg by mouth daily in the morning to target depression and anxiety.  -Labs: POC UDS today in clinic, results reviewed though not as expected as results were negative for amphetamine, new order placed for confirmation as patient admits to adherence, last dose this morning.   -Patient instructed to request refills when appropriate, when down to 5-7 days remaining via pharmacy or via MyChart.     -Symptoms of anxiety, depression, ADHD are under good control with current  medication regimen. The plan was discussed with the patient. The patient was given time to ask questions and these questions were answered. At the conclusion of their visit they had no additional questions or concerns and all questions were answered to their satisfaction.   Patient was given instructions and counseling regarding condition and for health maintenance advice. Please see specific information pulled into the AVS if appropriate.    Patient to contact provider if symptoms worsen or fail to improve.      6/19/24:  -Continue Adderall XR 15 mg by mouth daily in the morning to target symptoms of ADHD including:  Inattentiveness & impaired concentration.  LAST DISPENSED 6/10/24 #30/30 days.  -Controlled substance documentation: Cali reviewed; prior urine drug screen consistent obtained 9/7/23;; consent is up to date, signed, witnessed and in EHR, dated 9/7/23, which will be updated annually per policy. Patient is aware of risk of addiction on this medication, understands need to follow up for a review every 3 months and medications will be adjusted or decreased as deemed appropriate at each visit. No history of drug or alcohol abuse.  No concerns about diversion or abuse. Patient denies side effects related to the medication.  Patient is aware of random urine drug screens and pill counts. The dosing of this medication will be reviewed on a regular basis and reduced if possible. Ongoing use of a controlled substance is necessary for this patient to have a normal quality of life. Next visit will be in the office for annual CSA requirements.   -Continue Zoloft 50 mg by mouth daily in the morning to target depression and anxiety.    Symptoms of anxiety, depression, ADHD are under good control with current medication regimen.  Encouraged patient to continue to send posts and messages to spouse as there may be one post that he reacts to and realize importance of increasing endurance for transplant. Patient prefers  to come to office early August as  surgery would be as soon early August and he will be in the hospital for 20-30 days which will be best time to schedule.  The plan was discussed with the patient. The patient was given time to ask questions and these questions were answered. At the conclusion of their visit they had no additional questions or concerns and all questions were answered to their satisfaction.   Patient was given instructions and counseling regarding condition and for health maintenance advice. Please see specific information pulled into the AVS if appropriate.    Patient to contact provider if symptoms worsen or fail to improve.      5/9/24:  TELEPHONE  -Patient called [facility] that she is at the [place] office and they are closed for the afternoon however her insurance did cover the Trintellix but the cost is still $250.00 and she wants to know if she can try something else? Patient asked her daughter and her daughter is on Zoloft and she advises she would like to try that again.. Please advise  Please inform patient I have ordered the Zoloft as follows:   -Start Zoloft 25 mg by mouth daily for 8 days, THEN increase to 50 mg thereafter, instruct patient to break 4 of the 50 mg tab in half to equal 25 mg for the first 8 days, Instruct patient dose can be switched to nightly the following day, if drowsiness is felt approximately 2-3 hrs after taking the medication in the morning.   -Provider will inform staff at the [place] office of cancelling the Trintellix samples due to cost of medication with insurance coverage.   -Called patient and advised her of the Zoloft instructions      5/7/24:    -Continue Adderall XR 15 mg by mouth daily in the morning to target symptoms of ADHD including:  Inattentiveness & impaired concentration.  Risks, benefits, side effects discussed with patient including elevated heart rate, elevated blood pressure, irritability, insomnia, sexual dysfunction, appetite  suppressing properties, psychosis.  After discussion of these risks and benefits, the patient voiced understanding and agreed to proceed. Cali reviewed, LAST DISPENSED 4/12/24 #30/30 days .  Informed patient order would be sent to Dr. Harkins in separate entry for signature due to EMR system, and will not see as refilled on AVS today, first fill date of Friday 5/10/24 due to pharmacy closed on the weekends and patient will take last dose Sunday 5/9/24.    Start Trintellix 10 mg by mouth nightly with food to target anxiety and depression.  Risks, benefits, alternatives discussed with patient including nausea, vomiting, constipation, sexual dysfunction, increased risk of bleeding especially when combined with anticoagulants (NSAIDS, blood thinners), when combined with triptans could theoretically cause weakness, hyperreflexia, and incoordination, caution with history of seizures.  Onset of action is usually in 2 weeks. GI symptoms can last up to 14 days, may take at night if nausea persists.  After discussion of these risks and benefits, the patient voiced understanding and agreed to proceed. Included past failures in order to help speed up PA process.  Sample ordered as well for patient to  from [place] office tomorrow. Patient given contact information and advised to call [place] office prior to arrival to ensure medication is ready.  Secure chat message sent to staff in the [place] office and informed of patient plan to pickup tomorrow for 7 day supply.   Patient informed medication may require a prior authorization (PA) from insurance company, which may delay start date of medication, as PA process can vary.  Patient would be contacted when medication has been approved if a PA is required.      Symptoms of anxiety, depression, are present which have been exacerbated due to stress and adjustment reaction with  illness. Patient has experienced care giver role strain, worsened anxiety,  "and informed patient a dose increase of Adderall XR could worsened anxiety and recommended a daily maintenance medication.  Patient is willing to try Trintellix prefers to not take a medication that could potentially cause weight gain due to patient working so hard to lose weight over the last year.    Patient was given instructions and counseling regarding condition and for health maintenance advice. Please see specific information pulled into the AVS if appropriate.    Patient to contact provider if symptoms worsen or fail to improve.        5/6/24: TELEPHONE  Patient LMVM that she thinks she needs a little bit of dose increase on her medication.  Patient advises that she is back to not finishing tasks and her daughter also told her she isn't even finishing her sentences.  Patient says her  was supposed to get a transplant this week but because of some abnormal labs he is excluded for 3 more months.  Patient says \"feel free to call her.  Patient says she will be taking her last 15mg on Sunday so will need her medication refilled by Friday (pharmacy is closed on the weekend.).     -Patient will need to be seen for further evaluation before Adderall XR can be increased.   -Called patient to advise keon Son would need to be seen first to discuss increase in Adderall XR.  Patient is now scheduled for Tuesday 05/07/2024 to discuss via video    3/20/24:   -Continue Adderall XR 15 mg by mouth daily in the morning to target symptoms of ADHD including:  Inattentiveness & impaired concentration.  Risks, benefits, side effects discussed with patient including elevated heart rate, elevated blood pressure, irritability, insomnia, sexual dysfunction, appetite suppressing properties, psychosis.  After discussion of these risks and benefits, the patient voiced understanding and agreed to proceed. Cali reviewed, LAST DISPENSED 3/15/24 #30/30 DAYS, patient to request refill when needed. Will plan for next refill for 90 " days if pharmacy will allow, due to duration of therapy, compliance, and upcoming surgery with isolation precautions of spouse in May, to limit frequent visits to the pharmacy.      Symptoms of stress related to spouses illness and ADHD are under good control with current medication regimen.  Overall, patient improving with stress, positive outlook.  Will plan for next visit in 3 months via video.   Patient was given instructions and counseling regarding condition and for health maintenance advice. Please see specific information pulled into the AVS if appropriate.    Patient to contact provider if symptoms worsen or fail to improve.        1/24/24:  Continue Adderall XR 15 mg by mouth daily in the morning to target symptoms of ADHD including:  Inattentiveness & impaired concentration. LAST DISPENSED 1/15/24 #30/30 DAYS, patient to request refill when needed.    Symptoms of ADHD are under good control with current medication regimen.  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  Emotional support given, suggested having  daughter whom is a nurse to discuss feeding tube options, necessity of following doctor instructions.  Patient was given instructions and counseling regarding condition and for health maintenance advice. Please see specific information pulled into the AVS if appropriate.    Patient to contact provider if symptoms worsen or fail to improve.       12/7/23:  Continue Adderall XR 15 mg by mouth daily in the morning to target symptoms of ADHD including:  Inattentiveness & impaired concentration.  Risks, benefits, side effects discussed with patient including elevated heart rate, elevated blood pressure, irritability, insomnia, sexual dysfunction, appetite suppressing properties, psychosis.  After discussion of these risks and benefits, the patient voiced understanding and agreed to  proceed. Cali reviewed, LAST DISPENSED 11/17/23 #30/30 DAYS, Informed patient order would be sent to Dr. Harkins in separate entry for signature due to EMR system, and will not see as refilled on AVS today. First fill date for Friday 12/15/23 as pharmacy is not open on weekends. [facility] location.    Controlled substance documentation: Cali reviewed; prior urine drug screen consistent obtained 9/7/23;; consent is up to date, signed, witnessed and in EHR, dated 9/7/23, which will be updated annually per policy. Patient is aware of risk of addiction on this medication, understands need to follow up for a review every 3 months and medications will be adjusted or decreased as deemed appropriate at each visit.  No history of drug or alcohol abuse.  No concerns about diversion or abuse. Patient denies side effects related to the medication.  Patient is aware of random urine drug screens and pill counts. The dosing of this medication will be reviewed on a regular basis and reduced if possible. Ongoing use of a controlled substance is necessary for this patient to have a normal quality of life.    Symptoms of ADHD are under good control with current medication regimen. However, due to new cancer diagnosis with spouse, holidays, increased care giving tasks, as spouse is unable to help with simple tasks.  Patient wishes to remain on current  dose of Adderall as current stressors are situational which are negatively impacting day to day activities, however, patient does not want to depend on a medication.  Will re-evaluate in 7 weeks.   Patient was given instructions and counseling regarding condition and for health maintenance advice. Please see specific information pulled into the AVS if appropriate.    Patient to contact provider if symptoms worsen or fail to improve.        9/7/23:   Continue Adderall XR 15 mg by mouth daily in the morning to target symptoms of ADHD including:  Inattentiveness & impaired  concentration.  LAST DISPENSED 8/7/23 #30/30 DAYS  Informed patient order would be sent to Dr. Harkins in separate entry for signature due to EMR system, and will not see as refilled on AVS today.  Due to positive UDS, will send a prescription to Dr. Harkins today for a 7  day supply, patient has adequate supply through Monday 9/11/23. Instructed patient to contact provider on Monday 9/18/23 to request refill.     Controlled substance documentation: Cali reviewed; prior urine drug screen consistent obtained 9/16/22, ordered today and results discussed with patient ; consent is up to date, signed, witnessed and in EHR, dated today 9/7/23, which will be updated annually per policy. Patient is aware of risk of addiction on this medication, understands need to follow up for a review every 3 months and medications will be adjusted or decreased as deemed appropriate at each visit.  No history of drug or alcohol abuse.  No concerns about diversion or abuse. Patient denies side effects related to the medication.  Patient is aware of random urine drug screens and pill counts. The dosing of this medication will be reviewed on a regular basis and reduced if possible. Ongoing use of a controlled substance is necessary for this patient to have a normal quality of life.    Labs: POC UDS today in clinic resulted as +THC, which patient reported continues to take CBD oil in the evening for muscle relaxation to help with sleep, takes 3 drops. Obtains from [place] from a retired .     Symptoms of  ADHD are under good control with current medication regimen.  Reminded patient to practice mindfulness and behavioral modifications to help with shifting from one uncompleted activity to another, to continue with alarms, reminders. Patient informed that CBD oil is not regulated by the FDA and each bottle could contain various amounts of THC, however, if confirmation deems positive for THC, patient has been advised to stop CBD oil.      Patient will be traveling to [place] 11/2-11/16/23 and concerned with ability to obtain medication timely, instructed patient to remind provider with refill in Oct or early Nov. So the supply can be extended to cover days of travel.   Patient was given instructions and counseling regarding condition and for health maintenance advice. Please see specific information pulled into the AVS if appropriate.    Patient to contact provider if symptoms worsen or fail to improve.        7/5/23:   Continue Adderall XR 15 mg by mouth daily in the morning to target symptoms of ADHD including:  Inattentiveness & impaired concentration.  Risks, benefits, side effects discussed with patient including elevated heart rate, elevated blood pressure, irritability, insomnia, sexual dysfunction, appetite suppressing properties, psychosis.  After discussion of these risks and benefits, the patient voiced understanding and agreed to proceed. Cali reviewed, LAST DISPENSED 6/8/23 #30/30 DAYS  Informed patient order would be sent to Dr. Harkins in separate entry for signature due to EMR system, and will not see as refilled on AVS today.  First available fill date of 7/7/23.     Symptoms of ADHD are under good control with current medication regimen.  Informed patient next visit will be scheduled for in the office for annual CSA and UDS.  Scheduled for Thurs 9/7/23 at 10 am, instructed patient to arrive by 945 am to complete UDS prior to start of visit to allow ample time for results to show by end of visit.   Patient was given instructions and counseling regarding condition and for health maintenance advice. Please see specific information pulled into the AVS if appropriate.    Patient to contact provider if symptoms worsen or fail to improve.       4/4/23:  Continue Adderall XR 15 mg by mouth daily in the morning to target symptoms of ADHD including:  Inattentiveness & impaired concentration.  Risks, benefits, side effects discussed with  patient including elevated heart rate, elevated blood pressure, irritability, insomnia, sexual dysfunction, appetite suppressing properties, psychosis.  After discussion of these risks and benefits, the patient voiced understanding and agreed to proceed. Cali reviewed, LAST DISPENSED 3/15/23 #30/30 DAYS  Patient to request refill when appropriate. Patient has 9 pills remaining after taking this morning dose, instructed patient to try to refill via pharmacy [facility] or [facility] on Monday 4/10/23, and provider will send refill in on Tues. 4/11/23 with first allowed fill date of Thurs 4/13/23.     Controlled substance documentation: Cali reviewed; prior urine drug screen consistent obtained 9/16/22; consent is up to date, signed, witnessed and in EHR, dated 9/16/22, which will be updated annually per policy. Patient is aware of risk of addiction on this medication, understands need to follow up for a review every 3 months and medications will be adjusted or decreased as deemed appropriate at each visit.  No history of drug or alcohol abuse.  No concerns about diversion or abuse. Patient denies side effects related to the medication.  Patient is aware of random urine drug screens and pill counts. The dosing of this medication will be reviewed on a regular basis and reduced if possible..  Ongoing use of a controlled substance is necessary for this patient to have a normal quality of life.  Symptoms of ADHD are under good control with Adderall XR 15 mg daily. Patient to contact provider if symptoms worsen or fail to improve.       3/10/23:  TELEPHONE  Patient called to check in re:her medication (per Adelaida's request)  Patient says she  Has 5 days worth of medication left.  Please send refill when appropriate and please advise      2/10/23:  TELPHONE  Patient called to report that the [facility] at [place] and [facility] are now out of the Adderall XR and she says she did call the [facility] at [facility] and [place] (I did  change in this request.).  Please resubmit.  Med not pended      2/8/23:  Continue Adderall XR 15 mg by mouth daily in the morning to target symptoms of ADHD including:  Inattentiveness & impaired concentration.  Risks, benefits, side effects discussed with patient including elevated heart rate, elevated blood pressure, irritability, insomnia, sexual dysfunction, appetite suppressing properties, psychosis.  After discussion of these risks and benefits, the patient voiced understanding and agreed to proceed. Cali reviewed, LAST DISPENSED 1/11/23 #30/30 DAYS  Informed patient order would be sent to Dr. Harkins in separate entry for signature due to EMR system, and will not see as refilled on AVS today.    Symptoms of ADHD are under good control with Adderall XR 15 mg daily. Instructed to request refill via pharmacy when appropriate.  Will coordinate with staff to ensure refill requests are sent to this provider.  Due to pandemic state of emergency  ending 5/11/23, discussed upcoming travel as patient is aware telehealth cannot be provided across state lines nor can prescription of controlled substances, patient has plans to go to [place] for 2 weeks in November 2023 at this time, and will update provider for any other travels to ensure patient has adequate supply of medication.  Patient to contact provider if symptoms worsen or fail to improve.       1/4/23:  Continue Adderall XR 15 mg by mouth daily in the morning to target symptoms of ADHD including:  Inattentiveness & impaired concentration.  Risks, benefits, side effects discussed with patient including elevated heart rate, elevated blood pressure, irritability, insomnia, sexual dysfunction, appetite suppressing properties, psychosis.  After discussion of these risks and benefits, the patient voiced understanding and agreed to proceed. Cali reviewed, LAST DISPENSED 12/12/22 #30/30 DAYS  Patient instructed to call [facility] Monday 1/9/23 to determine  medication availability due to nationwide shortage of Adderall, and to then contact provider MA directly to inform as patient will have to transfer all medications to [facility] if continued to use that pharmacy.  Patient also instructed to inquire with pharmacy of earliest dispense date as most pharmacies are 24-48 hrs for controlled substances.   Symptoms of ADHD are under good control with Adderall XR 15 mg, denies side effects, though has noted decreased appetite with weight loss, which patient has also increased daily activity, exercising, and has been under some increased stress with parents care.  Will continue to check in with patient regarding weight loss.  Lengthy discussion with patient today regarding telehealth services as patient and provider must both be located in the same state of [place], and would not be able to provide telehealth services while visiting daughter in [place].  Patient verbalized understanding.  Patient to contact provider if symptoms worsen or fail to improve.         11/22/22:  Continue Adderall XR 10 mg by mouth daily in the morning to target symptoms of ADHD including:  Inattentiveness & impaired concentration.  Risks, benefits, side effects discussed with patient including elevated heart rate, elevated blood pressure, irritability, insomnia, sexual dysfunction, appetite suppressing properties, psychosis.  After discussion of these risks and benefits, the patient voiced understanding and agreed to proceed. Cali reviewed, LAST DISPENSED 10/28/22 #42/42 DAYS, reported #20 remain in bottle.    Will send in order for Adderall XR 5 mg by mouth daily in the morning for 20 days (through 12/12/22), patient instructed to add the 5 mg dose to the 10 mg to equal 15 mg total daily in the morning.  Patient instructed to contact provider in office when down to 3-5 days remaining of supply. Call 12/8/22.  Informed patient order would be sent to Dr. Harkins in separate entry for signature due to EMR  "system, and will not see as refilled on AVS today.    Controlled substance documentation: Cali reviewed; prior urine drug screen consistent obtained 9/16/22; consent is up to date, signed, witnessed and in EHR, dated 9/16/22, which will be updated annually per policy. Patient is aware of risk of addiction on this medication, understands need to follow up for a review every 3 months and medications will be adjusted or decreased as deemed appropriate at each visit.  No history of drug or alcohol abuse.  No concerns about diversion or abuse. Patient denies side effects related to the medication.  Patient is aware of random urine drug screens and pill counts. The dosing of this medication will be reviewed on a regular basis and reduced if possible..  Ongoing use of a controlled substance is necessary for this patient to have a normal quality of life.    ADHD symptoms are under fair control with Adderall XR 10 mg, will increase to 15 mg.   Patient to contact provider if symptoms worsen or fail to improve.       10/31/22:  TELEPHONE  Patient called and LMVM that the [facility] in [place] did fill the Rx for her Adderall XR 10mg.  Patient just wanted to let Adelaida know.  Patient said she was \"happy about it\"   Prescription was verified as dispensed with pharmacy in [place].     10/28/22:  TELEPHONE  Please determine when she will return to [place], as she was supposed to return 10/25/22 and was originally given adequate supply through 10/28/22.    Called and spoke with patient, she states that she will be back on Wednesday Nov.2,2022 and will take her last pill tomorrow.  Patient does say that the [facility] in [place] has now reopened but they say they are very backed up and someone even told her that they will probably not fill it because Dr. Harkins is not licensed in [place].  Patient says she will wait to see if they do fill it over the weekend and she will call me back on Monday and let me know.  If they don't " "fill it she will just have it sent to the [facility] in [place], [place].   I will forward message to  so he is updated on status, Since she will be returning next Wed. 11/2/22, a new order will not be sent into local pharmacy until confirmed dispense from [place] pharmacy, as [place] was not an option to add with CALI report attempted today.  We will have to contact the pharmacy to verify dispense, and patient needs to be reminded to not wait until down to 1 pill remaining to request refill or problem with refill, as this refill was sent in per patient request Monday 10/24/22, and this information was relayed to office today.   Patient called and VM that the [facility] in [place] did fill the Rx for her Adderall XR 10mg.  Patient just wanted to let Adelaida know.  Patient said she was \"happy about it\"       10/20/22:   Continue Adderall XR 10 mg by mouth daily in the morning to target symptoms of ADHD including:  Inattentiveness & impaired concentration.  Risks, benefits, side effects discussed with patient including elevated heart rate, elevated blood pressure, irritability, insomnia, sexual dysfunction, appetite suppressing properties, psychosis.  After discussion of these risks and benefits, the patient voiced understanding and agreed to proceed. Cali reviewed, LAST DISPENSED 9/16/22 #42/42 DAYS  Patient instructed to request refill 10/25/22 upon return from [place]. Explained process for refills.   Controlled substance documentation: Cali reviewed; prior urine drug screen consistent obtained 9/16/22; consent is up to date, signed, witnessed and in EHR, dated 9/16/22, which will be updated annually per policy. Patient is aware of risk of addiction on this medication, understands need to follow up for a review every 3 months and medications will be adjusted or decreased as deemed appropriate at each visit.  No history of drug or alcohol abuse.  No concerns about diversion or abuse. Patient denies side " effects related to the medication.  Patient is aware of random urine drug screens and pill counts. The dosing of this medication will be reviewed on a regular basis and reduced if possible..  Ongoing use of a controlled substance is necessary for this patient to have a normal quality of life.  Symptoms of ADHD are managed well with current dose of Adderall XR 10 mg without side effects.      9/16/22:   Declines therapy  Will potentially start Adderall XR 10 mg by mouth daily in the morning to target symptoms of ADHD including:  Inattentiveness & impaired concentration.  Risks, benefits, side effects discussed with patient including elevated heart rate, elevated blood pressure, irritability, insomnia, sexual dysfunction, appetite suppressing properties, psychosis.  After discussion of these risks and benefits, the patient voiced understanding and agreed to proceed. Cali reviewed, UDS ordered, and controlled substance agreement signed & witnessed. Patient informed this medication would not be ordered until UDS resulted and was negative, at that time a Prescription will be sent to  for signature.  Labs: POC UDS today in clinic    Lengthy discussion held with patient regarding CSA and medication education.   Bottle of Hemp oil had a QRS code to scan, which was done per patient request, which indicated percentages of ingredients as each bottle varies.  Certificate of Analysis, [employer] [facility], Cannabinoid Results: Total THC 0.231%, Total CBD 6.101%, Total Cannabinoids 6.534%. Will scan certificate to EHR.   Patient will be in [place] in October, will check with  in regards to prescribing CS sending to [place].   Will contact patient if UDS needs to be sent for confirmation. Otherwise will proceed with ordering Adderall XR.     9/15/22:   ADHD testing completed by Dr. Greg Bennett on 9/1/22 confirming a diagnosis of ADHD. (total time of review 9 mins)    Discussed ADHD treatment options of non-stimulants vs  stimulant medications, after discussion patient wishes to proceed with Adderall.  Informed patient of policy requirements prior to starting Adderall, patient will plan on coming in tomorrow due to available appointment at 8 am.  Discussed current CBD oil which patient reports is hemp based as the UDS may show positive for THC. Patient takes CBD oil for sleep and muscle aches, which has been effective.  IF UDS positive patient was informed Adderall would not be prescribed, and will have to send for a confirmation.  Patient verbalized understanding.       7/14/22:   No Medications, Declines therapy at this time.  Referral for ADHD testing with   Dr. Greg Bennett, Psychologist, MS, LPP  [address] [facility], [address]  [place] [id number] [phone]   Currently only accepting referrals for psychological testing services.  Patient to contact provider and set up appointment.  And to contact office if unable to get an appointment scheduled.   -Attached list of several other sites for ADHD testing. Patient instructed to contact providers on list to determine availability of appointments, instructed patient to take notes while calling places indicating first available appointment, and to ask to be placed on waiting list.  If an office is chosen and requests the referral order please contact my Medical Assistant, Hanh, directly at [phone] informing of chosen office and the information will be sent.    Patient with high suspicion of having ADHD, will send for formal testing and have patient return in 2 weeks to discuss medication options as if patient is able to be tested in the next 1-2 months, may wait on starting a non-stimulant medication.     Patient screened positive for depression based on a PHQ-9 score of 1 on 11/20/2024. Follow-up recommendations include: Suicide Risk Assessment performed.(PHQ2-0)    TREATMENT PLAN/GOALS: Continue supportive psychotherapy efforts and medications as indicated.  Treatment and medication options discussed during today's visit. Patient ackowledged and verbally consented to continue with current treatment plan and was educated on the importance of compliance with treatment and follow-up appointments.    MEDICATION ISSUES:  MARLYS reviewed as expected.    Discussed medication options and treatment plan of prescribed medication as well as the risks, benefits, and side effects including potential falls, possible impaired driving and metabolic adversities among others. Patient is agreeable to call the office with any worsening of symptoms or onset of side effects. Patient is agreeable to call 911 or go to the nearest ER should he/she begin having SI/HI. No medication side effects or related complaints today.     MEDS ORDERED DURING VISIT:  No orders of the defined types were placed in this encounter.      Return in about 1 month (around 3/26/2025) for Video visit, medication check.         I spent 58 minutes caring for Jenn on this date of service. This time includes time spent by me in the following activities: preparing for the visit, reviewing tests, obtaining and/or reviewing a separately obtained history, performing a medically appropriate examination and/or evaluation, counseling and educating the patient/family/caregiver, referring and communicating with other health care professionals, documenting information in the medical record, care coordination, and scheduling      This document has been electronically signed by CHELSI Burnett  February 26, 2025 15:42 EST      Part of this note may be an electronic transcription/translation of spoken language to printed text using the Dragon Dictation System.

## 2025-03-07 DIAGNOSIS — F90.0 ADHD (ATTENTION DEFICIT HYPERACTIVITY DISORDER), INATTENTIVE TYPE: ICD-10-CM

## 2025-03-07 RX ORDER — DEXTROAMPHETAMINE SACCHARATE, AMPHETAMINE ASPARTATE MONOHYDRATE, DEXTROAMPHETAMINE SULFATE AND AMPHETAMINE SULFATE 3.75; 3.75; 3.75; 3.75 MG/1; MG/1; MG/1; MG/1
15 CAPSULE, EXTENDED RELEASE ORAL EVERY MORNING
Qty: 90 CAPSULE | Refills: 0 | Status: SHIPPED | OUTPATIENT
Start: 2025-03-10

## 2025-03-24 ENCOUNTER — LAB (OUTPATIENT)
Dept: LAB | Facility: HOSPITAL | Age: 60
End: 2025-03-24
Payer: COMMERCIAL

## 2025-03-24 DIAGNOSIS — E55.9 VITAMIN D DEFICIENCY: ICD-10-CM

## 2025-03-24 DIAGNOSIS — E03.9 HYPOTHYROIDISM, UNSPECIFIED TYPE: ICD-10-CM

## 2025-03-24 DIAGNOSIS — E53.8 B12 DEFICIENCY: ICD-10-CM

## 2025-03-24 DIAGNOSIS — Z13.220 SCREENING FOR CHOLESTEROL LEVEL: ICD-10-CM

## 2025-03-24 DIAGNOSIS — Z00.00 ANNUAL PHYSICAL EXAM: ICD-10-CM

## 2025-03-24 LAB
25(OH)D3 SERPL-MCNC: 102 NG/ML (ref 30–100)
ALBUMIN SERPL-MCNC: 4 G/DL (ref 3.5–5.2)
ALBUMIN/GLOB SERPL: 1.7 G/DL
ALP SERPL-CCNC: 68 U/L (ref 39–117)
ALT SERPL W P-5'-P-CCNC: 15 U/L (ref 1–33)
ANION GAP SERPL CALCULATED.3IONS-SCNC: 9.7 MMOL/L (ref 5–15)
AST SERPL-CCNC: 18 U/L (ref 1–32)
BASOPHILS # BLD AUTO: 0.08 10*3/MM3 (ref 0–0.2)
BASOPHILS NFR BLD AUTO: 1.8 % (ref 0–1.5)
BILIRUB SERPL-MCNC: 0.5 MG/DL (ref 0–1.2)
BUN SERPL-MCNC: 12 MG/DL (ref 8–23)
BUN/CREAT SERPL: 13.6 (ref 7–25)
CALCIUM SPEC-SCNC: 9.2 MG/DL (ref 8.6–10.5)
CHLORIDE SERPL-SCNC: 105 MMOL/L (ref 98–107)
CHOLEST SERPL-MCNC: 213 MG/DL (ref 0–200)
CO2 SERPL-SCNC: 28.3 MMOL/L (ref 22–29)
CREAT SERPL-MCNC: 0.88 MG/DL (ref 0.57–1)
DEPRECATED RDW RBC AUTO: 42.4 FL (ref 37–54)
EGFRCR SERPLBLD CKD-EPI 2021: 74.9 ML/MIN/1.73
EOSINOPHIL # BLD AUTO: 0.2 10*3/MM3 (ref 0–0.4)
EOSINOPHIL NFR BLD AUTO: 4.5 % (ref 0.3–6.2)
ERYTHROCYTE [DISTWIDTH] IN BLOOD BY AUTOMATED COUNT: 12.3 % (ref 12.3–15.4)
GLOBULIN UR ELPH-MCNC: 2.4 GM/DL
GLUCOSE SERPL-MCNC: 99 MG/DL (ref 65–99)
HCT VFR BLD AUTO: 44.7 % (ref 34–46.6)
HDLC SERPL-MCNC: 67 MG/DL (ref 40–60)
HGB BLD-MCNC: 15.2 G/DL (ref 12–15.9)
IMM GRANULOCYTES # BLD AUTO: 0.01 10*3/MM3 (ref 0–0.05)
IMM GRANULOCYTES NFR BLD AUTO: 0.2 % (ref 0–0.5)
LDLC SERPL CALC-MCNC: 134 MG/DL (ref 0–100)
LDLC/HDLC SERPL: 1.98 {RATIO}
LYMPHOCYTES # BLD AUTO: 1.55 10*3/MM3 (ref 0.7–3.1)
LYMPHOCYTES NFR BLD AUTO: 34.9 % (ref 19.6–45.3)
MCH RBC QN AUTO: 32.1 PG (ref 26.6–33)
MCHC RBC AUTO-ENTMCNC: 34 G/DL (ref 31.5–35.7)
MCV RBC AUTO: 94.3 FL (ref 79–97)
MONOCYTES # BLD AUTO: 0.4 10*3/MM3 (ref 0.1–0.9)
MONOCYTES NFR BLD AUTO: 9 % (ref 5–12)
NEUTROPHILS NFR BLD AUTO: 2.2 10*3/MM3 (ref 1.7–7)
NEUTROPHILS NFR BLD AUTO: 49.6 % (ref 42.7–76)
NRBC BLD AUTO-RTO: 0 /100 WBC (ref 0–0.2)
PLATELET # BLD AUTO: 245 10*3/MM3 (ref 140–450)
PMV BLD AUTO: 10 FL (ref 6–12)
POTASSIUM SERPL-SCNC: 4.2 MMOL/L (ref 3.5–5.2)
PROT SERPL-MCNC: 6.4 G/DL (ref 6–8.5)
RBC # BLD AUTO: 4.74 10*6/MM3 (ref 3.77–5.28)
SODIUM SERPL-SCNC: 143 MMOL/L (ref 136–145)
TRIGL SERPL-MCNC: 66 MG/DL (ref 0–150)
TSH SERPL DL<=0.05 MIU/L-ACNC: 3.29 UIU/ML (ref 0.27–4.2)
VIT B12 BLD-MCNC: 566 PG/ML (ref 211–946)
VLDLC SERPL-MCNC: 12 MG/DL (ref 5–40)
WBC NRBC COR # BLD AUTO: 4.44 10*3/MM3 (ref 3.4–10.8)

## 2025-03-24 PROCEDURE — 80061 LIPID PANEL: CPT

## 2025-03-24 PROCEDURE — 82607 VITAMIN B-12: CPT

## 2025-03-24 PROCEDURE — 36415 COLL VENOUS BLD VENIPUNCTURE: CPT

## 2025-03-24 PROCEDURE — 82306 VITAMIN D 25 HYDROXY: CPT

## 2025-03-24 PROCEDURE — 80050 GENERAL HEALTH PANEL: CPT

## 2025-05-21 ENCOUNTER — TELEMEDICINE (OUTPATIENT)
Dept: PSYCHIATRY | Facility: CLINIC | Age: 60
End: 2025-05-21
Payer: COMMERCIAL

## 2025-05-21 DIAGNOSIS — Z79.899 MEDICATION MANAGEMENT: ICD-10-CM

## 2025-05-21 DIAGNOSIS — F43.29 STRESS AND ADJUSTMENT REACTION: Primary | ICD-10-CM

## 2025-05-21 DIAGNOSIS — F90.0 ADHD (ATTENTION DEFICIT HYPERACTIVITY DISORDER), INATTENTIVE TYPE: ICD-10-CM

## 2025-05-21 DIAGNOSIS — Z63.79 STRESS DUE TO ILLNESS OF FAMILY MEMBER: ICD-10-CM

## 2025-05-21 DIAGNOSIS — Z71.89 MEDICATION CARE PLAN DISCUSSED WITH PATIENT: ICD-10-CM

## 2025-05-21 RX ORDER — AMOXICILLIN 500 MG/1
CAPSULE ORAL
COMMUNITY
Start: 2025-04-18

## 2025-05-21 NOTE — PROGRESS NOTES
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 reports weight has been steady low 170's, continues to walk a lot though not able to exercise due to vocal cord dysfunction which effects nerves.  8 yrs ago patient had to go to speech therapy, which was caused by hyperventilating, due to tension in neck feels soreness which extends to left arm, though has had symptoms on right arm. Doing physical therapy currently and noted improvement, was told the symptoms will subside and are currently flared due to allergy to mold.     In regards to ADHD, patient reports current dose of Adderall XR 15 mg remains effective. However,  patient noticed while relatives were to come to house, in preparation patient found self jumping from task to task though was anxious about getting house prepared for their visit. Patient continues to use visual reminders. Since relatives have departed symptoms have improved. Patient does find self forgetful of keeping sunglasses in house, and is concerned if the dose needs to be adjusted. Reports " the symptoms are minimal.     Denies side effects of elevated heart rate, elevated blood pressure, irritability, insomnia, decreased appetite, nor feeling shaky or jittery with stimulant medication.      Patient reports having 4 Adderall XR 15 mg remaining.   Every 2 yrs patient goes to [place] for 2 weeks, plans to leave Nov. 2, 2023 and to return 11/16/23.   Patient reports continues to take CBD oil in the evening for muscle relaxation and to help with sleep, takes 3 drops. Obtains from [place] from a retired .     7/5/23:  Patient presents today via [employer] visit from home, located in [place], [place].    Patient reports having adequate supply through Thurs 7/13/23.  Patient reports pharmacy filled last Adderall XR 15 mg early which actually took the pressure of worrying as [facility] location is not open on the weekend.    Patient reports positive outcome with current dose of Adderall XR, as family has been visiting from out of town.  Patient feels she did well with various company visiting for Memorial Day and Fourth of July, was able to complete tasks without shifting from one task to another.  Patient has had company in home since May 19, 2023 with the exception of 1 week.     Patient denies current symptoms include fidgeting, difficulty remaining seated, easy distractability, blurting out answers prematurely, inability to complete tasks, difficulty sustaining attention, shifting from one uncompleted activity to another, talking excessively, interrupting others, ineffective listening, frequently losing items.   Denies side effects of elevated heart rate, elevated blood pressure, irritability, insomnia, decreased appetite, nor feeling shaky or jittery with stimulant medication.      Patient reports parents will be moving into an AL facility as they have long term care insurance, and have looked at several facilities, patient voices worry for her father due to father caring for mother.     4/5/23:   "Patient presents today via [employer] Video visit from home, located in [place], [place].  Patient asking about redness to nose and lateral nose/face \"tiny white oil, and I push it and small amount of oil comes out.\" Patient suspected possible relation to medication, patient has started washing face at night as patient was only washing in the morning.  Denies changes to laundry detergent, face cleanser, though did change skin care moisturizer which may be a contributing factor.   Mild anxiety and worry noted per screening.  Patient reports Adderall XR dose remains effective, symptoms are well controlled, able to maintain focus, concentration, and complete tasks. Denies side effects.     Patient daughter, son in law, and grand kids are coming in town from [place] in May for patient 58 th birthday.  Which patient is looking forward to.     2/8/23:  Patient presents today via [employer] visit from home in [place], [place].  Patient reports feeling good, busy with family birthdays, and going to [place] to see grand kids to do some crafts. Symptoms of inattentiveness, shifting from one uncompleted activity or topic to another, impaired concentration are controlled well with current dose of Adderall XR 10 mg. Patient frequently freezing on video and had to go outside of home for better service, though continued to have connection problems intermittently.     Patient continues to be conscious of eating, denies decreased appetite, patient is walking with friends, eats a breakfast bar normally and then eats lunch around 1130, however, yesterday got caught up shopping and realized she had not eaten.  When patient does eat she eats good quantity.  Feels she may have lost 1-2 lbs, otherwise weight has been steady.      Patient has checked with [facility] and Adderall dose is in stock.  Patient plans to travel to [place] in November, no other trips planned for out of state at this time.          1/4/23:  Patient presents " "today via MyChart Video visit from home, Adderall XR was increased from 10 to 15 mg at last visit, for which patient reports the first few days of the increase felt \"I don't know if I can handle this, just a little spaced out, I don't know, but it was fine in a few days.\"  Denies increased heart rate, difficulty sleeping.   Patient feels she may be decreased weight of 15 lb, though patient has been busy with mother in the hospital and getting things together for the holidays.  Reports mother was admitted to [facility] and had medication adjustments and plan to get mother into an adult day care.  Tomorrow patient is taking father to see a specialist at [facility] to help him cope with mother's mental illness- \"alzheimers and delusional dementia\" per the neurologist.  Reports mother can get argumentative and paranoid.       Reports home scale weight on 1/1/3 of 183.6 lbs.  Patient reports increased walking with friends, not eating as much in the morning, \"constantly on the go.\"  Had wanted to do some fasting and weight loss.  Denies having to purchase new clothing, though noticed face was slimmer and clothes were looser.  Patient had been on weight watchers in the past before COVID and was down to 167 lbs, as patient was working out in the gym, and gym's were closed during COVID and weight returned.  Reports eating at 1130-12 noon for lunch, and 7 pm for dinner as spouse likes to eat at 8 pm.  Will also snack during the day on skinny pop popcorn.  Reports at last office visit recalled telling MA to not tell her what the weight was, however, felt \"good\" seeing the scale weight a few days ago.  Plans to start going back to the gym with friend as they would go every morning, \"that was my life.\"     ADHD symptoms are improving, as patient reports ability to listen more effectively, more patient with others is noticed the most since dose increase.  Upon feeling organized with holiday decorations, noticed was able to stay on " "task, only bring up one box at a time to decorate instead of having all boxes out and not being able to organize.    Patient reports having enough supply of Adderall XR 15 mg through next Wed. 1/11/23.  Reports after speaking with pharmacist at [facility] was told all medications would have to be transferred from [facility] and [facility] will not only fill the Adderall prescription. Patient had used [facility] due to [facility] not having Adderall XR available.  Patient is also concerned due to frequent travels of having adequate supply and ability to fill medication at outlying pharmacies.       11/22/22:  Patient presents today via MyChart Video visit from patient home.  Patient reports would like to increase dose possibly, reports spouse notices \"I am drifting some.\" Patient further explains will frequently jump to other topics during conversation, though improved with Adderall XR 10 mg.  Patient reports daughter was able to notice improvement, though now that patient has returned to home from travels, she is noticing elevation in symptoms.      Patient reports some difficulty with sleeping, as last night had minimal sleep as patient is worried about mother whom has dementia and father is caring for mother, has had  involved.  Believes temporary, situational anxiety is reason for sleep difficulty.  Patient reports mother's dementia symptoms are worsening and difficult to deal with, though hopeful as  has set up for assistant to come to home several days a week to help with house cleaning, meal prep, and care.       10/20/22:  Patient presents today via MyChart Video visit from daughter's home in [place].   Patient was started on Adderall XR 10 mg at last visit for which patient reports the first few days had increased energy, which had company, had difficulty sleeping the first 2 nights, however, no longer having difficulty.  Patient reports taking medication at 7 am, one day she took " "it later at 930 am and had difficulty sleeping that night. Patient has been taking thyroid medication upon awakening to use the bathroom around 5-530 am and upon waking up around 7 takes the Adderall.      \"I think it's a lot better, my daughter said she seems to notice, I still have a tendency to interrupt but that's better, I don't seem to repeat questions, as it was an issue before because I was not really listening. My mind was somewhere else.\"   Denies side effects, patient was surprised she was able to sit still after a few days, now able to complete tasks as less distraction, able to continue with task at hand before switching to another task.     Patient will be returning from [place] 10/25/22.     9/16/22:  Patient presents today in office today to further discuss ADHD treatment with a controlled substance and to complete CSA and obtain POC UDS today per policy.     Patient brought in bottle of Hemp CBD oil 3200mg/2 oz, 1-3 drops 3 times daily on bottle, however, patient only takes in the evening for post menopausal symptoms, muscle aches, and vocal cord dysfunction, \"my neck tenses up , had to do physical therapy in past, and oil helps, I found out I am Allergic to mold.\"    9/15/22:  Patient presents today via MyChart Video visit from home, reports received ADHD test results though has not discussed.  Patient continues to have symptoms of inttentiveness, concentration difficulty, difficulty completing tasks, and switching from one uncompleted task to another.     Patient does take 3 drops of CBD oil, hemp based, at night for sleep, relaxes muscles as patient started taking while going through menopause.   Patient reports  is allergic to Wellbutrin and daughter tried Wellbutrin and did not do well, as patient would prefer to try Adderall first rather than a non-stimulant medication.  Patient will be out of town for 2 weeks in October visiting daughter in [place], likely early October. " "      7/14/22:  INITIAL EVAL  Patient presents today via MyChart Video visit from home with a history of depression and anxiety for which treatment was started in late 1995 with therapy, and medications from [id number] due to divorce.     Patient is currently not taking any psychotropic medications nor has had any since 2005.     \"My issue is I can't concentrate, I start something and cant finish something, forgets things often\".  Patient recalls while in school always \"fidgety\" crossing legs, moving often in seat.  Admits to interrupting people, gets distracted easily, daughter noticing.    Admits to over the past 2 yrs began to feel ADHD may be a possibility, \"I am tired of being like this, forgetting stuff, going in and out of the house, tired of interrupting people, it seems to be bothering me more.\"  Patient uses reminders, lists which was started while kids in high school and has continued to have  self add items needed to List,\" I walk out without my list from the fridge, and I have to have him take a picture of it and send it to me\".  Difficulty sitting still when you should be sitting still in Buddhist or meetings. Impulsive with shopping tends to purchase items that may be on sale or those that she may not need with the idea of \"I can always return it\".     Symptoms include fidgeting, difficulty remaining seated, easy distractability, blurting out answers prematurely, inability to complete tasks, difficulty sustaining attention, shifting from one uncompleted activity to another, talking excessively, interrupting others, ineffective listening, frequently losing items, and impulsivity.    ADHD:   Elementary school:   Grades:A's and B's  Special classes or failures:no  Got in trouble:no  Referral for ADHD testing:no  Fhx:son, diagnosed officially in 7th grade, took medications for 1-2 yrs, restarted while in college only, \"he probably should be taking something, I have suggested it to " "him\"  Presently:  Problems with attention to detail:yes  Problems with sustained attention:Yes  Problems listening when spoken to directly:Yes, unable to concentrate on what others are saying, \"I have to get my point across, I know I have to wait for the break, but I can't wait for the break.\"  Failure to finish tasks:yes, \"I will lay my husbandsTshirts on couch intending to hang up and they will be there for 2-3 days\" house hold tasks-dusting, mopping, find need to focus on floor boards  Avoids tasks that require sustained mental effort:yes, \"I excelled at work,  I could multi-task, 5-6 projects at desk, however, would wait until the last week to update credentials, I put off stuff and procrastinate all the time\"  Easily distracted:yes  Forgetting things:yes  Losing things:yes  Hard to organize:yes  Talks a lot and cutting people off:yes  Drifts off during conversations:yes, \"I have to concentrate what I need to say to not forget what I have say, then I blurt it out, I am trying really hard, it's even hard with you to not stop and say stuff\"  Difficulty with Reading:yes, \"I used to read a lot, has been using Audio books to listen in car or while out with friends or walking, has to rewind at times because my mind drifts off.\"  Difficulty watching TV/Movies:\"not really, we hardly ever have the TV on anymore.\"       PHQ-9 Depression Screening  PHQ-9 Total Score:   5/20/2025 1 (PHQ2-0)  Little interest or pleasure in doing things? Not at all   Feeling down, depressed, or hopeless? Not at all   PHQ-2 Total Score 0   Trouble falling or staying asleep, or sleeping too much? Several days   Feeling tired or having little energy? Not at all   Poor appetite or overeating? Not at all   Feeling bad about yourself - or that you are a failure or have let yourself or your family down? Not at all   Trouble concentrating on things, such as reading the newspaper or watching television? Not at all   Moving or speaking so slowly that " other people could have noticed? Or the opposite - being so fidgety or restless that you have been moving around a lot more than usual? Not at all     Thoughts that you would be better off dead, or of hurting yourself in some way? Not at all   PHQ-9 Total Score 1   If you checked off any problems, how difficult have these problems made it for you to do your work, take care of things at home, or get along with other people? Somewhat difficult          SUZANNE-7  Feeling nervous, anxious or on edge: Not at all  Not being able to stop or control worrying: Not at all  Worrying too much about different things: Not at all  Trouble Relaxing: Not at all  Being so restless that it is hard to sit still: Not at all  Feeling afraid as if something awful might happen: Not at all  Becoming easily annoyed or irritable: Not at all  SUZANNE 7 Total Score: 0  If you checked any problems, how difficult have these problems made it for you to do your work, take care of things at home, or get along with other people: Not difficult at all 5/20/2025     The following portions of the patient's history were reviewed and updated as appropriate: allergies, current medications, past family history, past medical history, past social history, and past surgical history.     Past Surgical History:  Past Surgical History:   Procedure Laterality Date    BLADDER SUSPENSION  2008    COLONOSCOPY      ESSURE TUBAL LIGATION  1999    JOINT REPLACEMENT  Jan 2017    Right hip replacement    KIDNEY SURGERY      OTHER SURGICAL HISTORY      metal implant    TUBAL ABDOMINAL LIGATION         Problem List:  Patient Active Problem List   Diagnosis    Primary osteoarthritis of right hip    Status post right hip replacement    Hypothyroidism (acquired)    Paroxysmal atrial fibrillation    Anxiety    Deep venous thrombosis    Disorder of vocal cord    Edema of lower extremity    Hyperlipidemia    Hyperthyroidism    Onychomycosis    Pain of right hip joint    Laryngitis due to  gastroesophageal reflux    Sensation of heaviness in extremities    Varicose veins of lower extremity    Vitamin deficiency       Allergy:   Allergies   Allergen Reactions    Sulfa Antibiotics Rash        Discontinued Medications:  Medications Discontinued During This Encounter   Medication Reason    amoxicillin (AMOXIL) 500 MG tablet Historical Med - Therapy completed    Flaxseed, Linseed, (FLAX SEED OIL PO) Patient Reported Not Taking           Current Medications:   Current Outpatient Medications   Medication Sig Dispense Refill    amoxicillin (AMOXIL) 500 MG capsule TAKE 4 CAPSULES BY MOUTH 1 HOUR BEFORE APPOINTMENT      amphetamine-dextroamphetamine XR (Adderall XR) 15 MG 24 hr capsule Take 1 capsule by mouth Every Morning Indications: Attention Deficit Hyperactivity Disorder 90 capsule 0    ASHWAGANDHA PO Take  by mouth.      azelastine (ASTELIN) 0.1 % nasal spray USE 1 TO 2 SPRAYS IN EACH NOSTRIL TWICE DAILY AS NEEDED FOR RUNNY NOSE OR SNEEZING OR POST NASAL DRIP      cetirizine (zyrTEC) 10 MG tablet Take 1 tablet by mouth Daily.      Cyanocobalamin (Vitamin B 12) 250 MCG lozenge Take 2,500 mcg by mouth.      EPINEPHrine (EPIPEN) 0.3 MG/0.3ML solution auto-injector injection ADMINISTER 0.3 ML UNDER THE SKIN 1 TIME FOR 1 DOSE AS DIRECTED      levothyroxine (SYNTHROID, LEVOTHROID) 25 MCG tablet Take 1 tablet by mouth Daily. 90 tablet 1    Magnesium Oxide (MAGNESIUM EXTRA STRENGTH PO) Take  by mouth.      Milk Thistle 150 MG capsule Take  by mouth.      Misc Natural Products (MAGIC MUSHROOM MIX PO) Take  by mouth.      montelukast (SINGULAIR) 10 MG tablet Take 1 tablet by mouth Every Night. 90 tablet 1    multivitamin with minerals tablet tablet Take 1 tablet by mouth Daily.      Vitamin D-Vitamin K (K2 Plus D3) 100-1000 MCG-UNIT tablet Take  by mouth Daily. Patient takes drops (not tablets) due to GI upset with tablet       No current facility-administered medications for this visit.       Past Medical  "History:  Past Medical History:   Diagnosis Date    ADHD (attention deficit hyperactivity disorder) Sep/22    Now on adderrall    Allergic Whole life    Anxiety     Cervical cancer screening 2109    Chronic allergic rhinitis     Deep vein thrombosis     Caused after my hip replacement.    Depression     Hyperlipemia 03/15/2017    Hypothyroidism 03/15/2017    Kidney stones     Limb pain     Limb swelling        Past Psychiatric History:  Began Treatment: started in  with therapy due to spouse having an affair  Diagnoses:Depression and Anxiety ADHD inattentive type diagnosed 20 per Neuropsychological testing with KEYLA Vargas,L.P.P.  Psychiatrist: last seen from [id number]  Therapist: last seen from [id number]  Admission History:Denies  Medication Trials: Prozac--for one year \"felt like i was floating;\" Zoloft for 1 yr -, then started Effexor with divorce in  for 1 yr-during time having increased stress with divorce as pt had lost hair and stomach problems and asked to be placed on meds; tolerated well; Lexapro -  excessive drowsiness, couldn't get out of bed  Zoloft up to 50 mg-effective, self tapered due to not feeling medication was needed any longer 2024-2025.  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  Kids:Yes or If so, how many 2 children and 2 step children  House:Lives in a house   History: Denies  Access to Guns: no    Social History     Socioeconomic History    Marital status:      Spouse name: Abdias    Number of children: 2    Highest education level: Some college, no degree "   Tobacco Use    Smoking status: Never    Smokeless tobacco: Never   Vaping Use    Vaping status: Never Used   Substance and Sexual Activity    Alcohol use: Yes     Alcohol/week: 3.0 - 4.0 standard drinks of alcohol     Types: 3 - 4 Glasses of wine per week     Comment: drinks daily, wine, beer and lquor    Drug use: Never     Types: Marijuana     Comment: In teenage years    Sexual activity: Yes     Partners: Male     Birth control/protection: Surgical       Family History:   Suicide Attempts: Denies  Suicide Completions:Denies      Family History   Problem Relation Age of Onset    Dementia Mother     Diabetes type II Mother     Cancer Mother 70        Kidney cancer (left kidney removed) and endometrial cancer (hysterectomy)    Diabetes Mother         Type 2    Stroke Mother         TIA    Prostate cancer Father     Hearing loss Father         Hearing aids    Depression Father     Colon cancer Maternal Grandmother 70    Diabetes Paternal Grandmother         Type 2    ADD / ADHD Son     Anxiety disorder Daughter        Developmental History:   Born: [place], lived in [place] since age 1  Siblings:1 sister, 1 brother-both younger  Childhood: Denies Abuse  High School:Completed  College: 2 yrs, no degree    Mental Status Exam:   Hygiene:   good  Cooperation:  Cooperative  Eye Contact:  Good  Psychomotor Behavior:  Appropriate  Affect:  Appropriate  Mood: euthymic   Speech:  Normal  Thought Process:  Goal directed  Thought Content:  Mood congruent  Suicidal:  None  Homicidal:  None  Hallucinations:  None  Delusion:  None  Memory:  Intact  Orientation:  Grossly intact  Reliability:  good  Insight:  Good  Judgement:  Good  Impulse Control:  Good  Physical/Medical Issues:  Yes Hypothyroidism,OA rt hip,paroxysmal afib, HLD      Review of Systems:  Review of Systems   Constitutional:  Positive for fatigue. Negative for appetite change, chills, diaphoresis and fever.   HENT:  Negative for congestion, drooling and sore throat.    Eyes:   Negative for visual disturbance.   Respiratory:  Negative for cough and shortness of breath.    Cardiovascular:  Negative for chest pain, palpitations and leg swelling.   Gastrointestinal:  Negative for nausea and vomiting.   Endocrine: Negative for cold intolerance and heat intolerance.   Genitourinary:  Negative for difficulty urinating and dysuria.   Musculoskeletal:  Negative for joint swelling, myalgias and neck pain.   Skin:  Negative for rash.   Allergic/Immunologic: Negative for immunocompromised state.   Neurological:  Negative for dizziness, seizures, speech difficulty, weakness, numbness and headaches.   Psychiatric/Behavioral:  Negative for agitation, decreased concentration, self-injury, sleep disturbance and suicidal ideas. The patient is not nervous/anxious and is not hyperactive.          Physical Exam:  Physical Exam  Psychiatric:         Attention and Perception: Attention and perception normal.         Mood and Affect: Mood and affect normal.         Speech: Speech normal.         Behavior: Behavior normal. Behavior is cooperative.         Thought Content: Thought content normal. Thought content does not include suicidal ideation. Thought content does not include suicidal plan.         Cognition and Memory: Cognition and memory normal.         Judgment: Judgment normal.         Vital Signs:   There were no vitals taken for this visit.       Lab Results: REVIEWED  Lab on 03/24/2025   Component Date Value Ref Range Status    Glucose 03/24/2025 99  65 - 99 mg/dL Final    BUN 03/24/2025 12  8 - 23 mg/dL Final    Creatinine 03/24/2025 0.88  0.57 - 1.00 mg/dL Final    Sodium 03/24/2025 143  136 - 145 mmol/L Final    Potassium 03/24/2025 4.2  3.5 - 5.2 mmol/L Final    Chloride 03/24/2025 105  98 - 107 mmol/L Final    CO2 03/24/2025 28.3  22.0 - 29.0 mmol/L Final    Calcium 03/24/2025 9.2  8.6 - 10.5 mg/dL Final    Total Protein 03/24/2025 6.4  6.0 - 8.5 g/dL Final    Albumin 03/24/2025 4.0  3.5 - 5.2  g/dL Final    ALT (SGPT) 03/24/2025 15  1 - 33 U/L Final    AST (SGOT) 03/24/2025 18  1 - 32 U/L Final    Alkaline Phosphatase 03/24/2025 68  39 - 117 U/L Final    Total Bilirubin 03/24/2025 0.5  0.0 - 1.2 mg/dL Final    Globulin 03/24/2025 2.4  gm/dL Final    A/G Ratio 03/24/2025 1.7  g/dL Final    BUN/Creatinine Ratio 03/24/2025 13.6  7.0 - 25.0 Final    Anion Gap 03/24/2025 9.7  5.0 - 15.0 mmol/L Final    eGFR 03/24/2025 74.9  >60.0 mL/min/1.73 Final    Total Cholesterol 03/24/2025 213 (H)  0 - 200 mg/dL Final    Triglycerides 03/24/2025 66  0 - 150 mg/dL Final    HDL Cholesterol 03/24/2025 67 (H)  40 - 60 mg/dL Final    LDL Cholesterol  03/24/2025 134 (H)  0 - 100 mg/dL Final    VLDL Cholesterol 03/24/2025 12  5 - 40 mg/dL Final    LDL/HDL Ratio 03/24/2025 1.98   Final    TSH 03/24/2025 3.290  0.270 - 4.200 uIU/mL Final    25 Hydroxy, Vitamin D 03/24/2025 102.0 (H)  30.0 - 100.0 ng/ml Final    Vitamin B-12 03/24/2025 566  211 - 946 pg/mL Final    WBC 03/24/2025 4.44  3.40 - 10.80 10*3/mm3 Final    RBC 03/24/2025 4.74  3.77 - 5.28 10*6/mm3 Final    Hemoglobin 03/24/2025 15.2  12.0 - 15.9 g/dL Final    Hematocrit 03/24/2025 44.7  34.0 - 46.6 % Final    MCV 03/24/2025 94.3  79.0 - 97.0 fL Final    MCH 03/24/2025 32.1  26.6 - 33.0 pg Final    MCHC 03/24/2025 34.0  31.5 - 35.7 g/dL Final    RDW 03/24/2025 12.3  12.3 - 15.4 % Final    RDW-SD 03/24/2025 42.4  37.0 - 54.0 fl Final    MPV 03/24/2025 10.0  6.0 - 12.0 fL Final    Platelets 03/24/2025 245  140 - 450 10*3/mm3 Final    Neutrophil % 03/24/2025 49.6  42.7 - 76.0 % Final    Lymphocyte % 03/24/2025 34.9  19.6 - 45.3 % Final    Monocyte % 03/24/2025 9.0  5.0 - 12.0 % Final    Eosinophil % 03/24/2025 4.5  0.3 - 6.2 % Final    Basophil % 03/24/2025 1.8 (H)  0.0 - 1.5 % Final    Immature Grans % 03/24/2025 0.2  0.0 - 0.5 % Final    Neutrophils, Absolute 03/24/2025 2.20  1.70 - 7.00 10*3/mm3 Final    Lymphocytes, Absolute 03/24/2025 1.55  0.70 - 3.10 10*3/mm3 Final     Monocytes, Absolute 03/24/2025 0.40  0.10 - 0.90 10*3/mm3 Final    Eosinophils, Absolute 03/24/2025 0.20  0.00 - 0.40 10*3/mm3 Final    Basophils, Absolute 03/24/2025 0.08  0.00 - 0.20 10*3/mm3 Final    Immature Grans, Absolute 03/24/2025 0.01  0.00 - 0.05 10*3/mm3 Final    nRBC 03/24/2025 0.0  0.0 - 0.2 /100 WBC Final   Office Visit on 01/23/2025   Component Date Value Ref Range Status    SARS Antigen 01/23/2025 Not Detected  Not Detected, Presumptive Negative Final    Influenza A Antigen CHA 01/23/2025 Not Detected  Not Detected Final    Influenza B Antigen CHA 01/23/2025 Not Detected  Not Detected Final    Internal Control 01/23/2025 Passed  Passed Final    Lot Number 01/23/2025 [id number]   Final    Expiration Date 01/23/2025 10/23/25   Final       EKG Results:  No orders to display       Imaging Results:  No Images in the past 120 days found..      Assessment & Plan   Diagnoses and all orders for this visit:    1. Stress and adjustment reaction (Primary)    2. ADHD (attention deficit hyperactivity disorder), inattentive type    3. Stress due to illness of family member    4. Medication management    5. Medication care plan discussed with patient        Visit Diagnoses:    ICD-10-CM ICD-9-CM   1. Stress and adjustment reaction  F43.29 309.89   2. ADHD (attention deficit hyperactivity disorder), inattentive type  F90.0 314.00   3. Stress due to illness of family member  Z63.79 V61.49   4. Medication management  Z79.899 V58.69   5. Medication care plan discussed with patient  Z71.89 V65.49         PLAN:  1.   Safety: No acute safety concerns  Therapy: None  Risk Assessment: Risk of self-harm acutely is low.  Risk factors include anxiety disorder, mood disorder, and recent psychosocial stressors (pandemic). Protective factors include no family history, denies access to guns/weapons, no present SI, no history of suicide attempts or self-harm in the past, minimal AODA, healthcare seeking, future orientation,  willingness to engage in care.  Risk of self-harm chronically is also low, but could be further elevated in the event of treatment noncompliance and/or AODA.  Meds:    -Continue Adderall XR 15 mg by mouth daily in the morning to target symptoms of ADHD including:  Inattentiveness & impaired concentration.  Risks, benefits, side effects discussed with patient including elevated heart rate, elevated blood pressure, irritability, insomnia, sexual dysfunction, appetite suppressing properties, psychosis.  After discussion of these risks and benefits, the patient voiced understanding and agreed to proceed. Cali reviewed, LAST DISPENSED 3/10/25 #90/90 days. Has 18 pills remaining after today.  -Controlled substance documentation: Cali reviewed; prior urine drug screen consistent obtained 8/22/24; consent is up to date, signed, witnessed and in EHR, dated 8/22/24, which will be updated annually per policy. Patient is aware of risk of addiction on this medication, understands need to follow up for a review every 3 months and medications will be adjusted or decreased as deemed appropriate at each visit. No history of drug or alcohol abuse.  No concerns about diversion or abuse. Patient denies side effects related to the medication.  Patient is aware of random urine drug screens and pill counts. The dosing of this medication will be reviewed on a regular basis and reduced if possible. Ongoing use of a controlled substance is necessary for this patient to have a normal quality of life. Next visit will be in person for annual requirements.    Labs: n/a   Patient instructed to request refills when appropriate, when down to 5-7 days remaining via pharmacy or via MyChart.       Symptoms of ADHD and stress are under good control with current medication regimen.    The plan was discussed with the patient. The patient was given time to ask questions and these questions were answered. At the conclusion of their visit they had no  additional questions or concerns and all questions were answered to their satisfaction.   Patient was given instructions and counseling regarding condition and for health maintenance advice. Please see specific information pulled into the AVS if appropriate.    Patient to contact provider if symptoms worsen or fail to improve.      2/26/25:  -Continue Adderall XR 15 mg by mouth daily in the morning to target symptoms of ADHD including:  Inattentiveness & impaired concentration.  Cali reviewed, LAST DISPENSED 12/9/24 #90/90 days.  Has 12 remaining after today.  -Removed Zoloft 50 mg from profile as patient self weaned and last dose was approximately 2 weeks ago and feels she has adjusted and does want to take any medications she does not feel she needs, which is understandable.    Symptoms of anxiety, stress, and ADHD are under good control with current medication regimen. Due to caregiver role strain patient has been slightly more anxious, though also at ease knowing she has established healthy boundaries with spouse, and doing the best that she can to her ability.   Patient has had increased stressors with mother and 's health, however, has been able to give self breaks, emotional support given and advise with care giving.  The plan was discussed with the patient. The patient was given time to ask questions and these questions were answered. At the conclusion of their visit they had no additional questions or concerns and all questions were answered to their satisfaction.   Patient was given instructions and counseling regarding condition and for health maintenance advice. Please see specific information pulled into the AVS if appropriate.    Patient to contact provider if symptoms worsen or fail to improve.      11/22/24:  -Continue Adderall XR 15 mg by mouth daily in the morning to target symptoms of ADHD including:  Inattentiveness & impaired concentration. LAST DISPENSED9/10/24 #90/90 days.  Instructed  to request refill on 12/8 or 12/9/24.   -Continue Zoloft 50 mg by mouth daily in the morning to target depression and anxiety    Symptoms of anxiety, depression, ADHD are under good control with current medication regimen. Patient has had several stressors due to learning how to administer tube feeding and IV medications for spouse.  Though patient has been able to adjust fairly well, has good family and friend support, and is making time for self care.    The plan was discussed with the patient. The patient was given time to ask questions and these questions were answered. At the conclusion of their visit they had no additional questions or concerns and all questions were answered to their satisfaction.   Patient was given instructions and counseling regarding condition and for health maintenance advice. Please see specific information pulled into the AVS if appropriate.    Patient to contact provider if symptoms worsen or fail to improve.        8/22/24:  -Continue Adderall XR 15 mg by mouth daily in the morning to target symptoms of ADHD including:  Inattentiveness & impaired concentration.  Risks, benefits, side effects discussed with patient including elevated heart rate, elevated blood pressure, irritability, insomnia, sexual dysfunction, appetite suppressing properties, psychosis.  After discussion of these risks and benefits, the patient voiced understanding and agreed to proceed. Cali reviewed, LAST DISPENSED 8/7/24 #30/30 days. Patient to request refills when needed, will send for a 90 day supply, however, patient was instructed to contact provider if the pharmacy will not fill due to possible shortage of supply.   -Controlled substance documentation: Cali reviewed; prior urine drug screen consistent obtained 9/7/23, obtained today 8/22/24; consent is up to date, signed, witnessed and in EHR, dated today 8/22/24, which will be updated annually per policy. Patient is aware of risk of addiction on this  medication, understands need to follow up for a review every 3 months and medications will be adjusted or decreased as deemed appropriate at each visit. No history of drug or alcohol abuse.  No concerns about diversion or abuse. Patient denies side effects related to the medication.  Patient is aware of random urine drug screens and pill counts. The dosing of this medication will be reviewed on a regular basis and reduced if possible. Ongoing use of a controlled substance is necessary for this patient to have a normal quality of life.   -Continue Zoloft 50 mg by mouth daily in the morning to target depression and anxiety.  -Labs: POC UDS today in clinic, results reviewed though not as expected as results were negative for amphetamine, new order placed for confirmation as patient admits to adherence, last dose this morning.   -Patient instructed to request refills when appropriate, when down to 5-7 days remaining via pharmacy or via [facility].     -Symptoms of anxiety, depression, ADHD are under good control with current medication regimen. The plan was discussed with the patient. The patient was given time to ask questions and these questions were answered. At the conclusion of their visit they had no additional questions or concerns and all questions were answered to their satisfaction.   Patient was given instructions and counseling regarding condition and for health maintenance advice. Please see specific information pulled into the AVS if appropriate.    Patient to contact provider if symptoms worsen or fail to improve.      6/19/24:  -Continue Adderall XR 15 mg by mouth daily in the morning to target symptoms of ADHD including:  Inattentiveness & impaired concentration.  LAST DISPENSED 6/10/24 #30/30 days.  -Controlled substance documentation: Cali reviewed; prior urine drug screen consistent obtained 9/7/23;; consent is up to date, signed, witnessed and in EHR, dated 9/7/23, which will be updated annually per  policy. Patient is aware of risk of addiction on this medication, understands need to follow up for a review every 3 months and medications will be adjusted or decreased as deemed appropriate at each visit. No history of drug or alcohol abuse.  No concerns about diversion or abuse. Patient denies side effects related to the medication.  Patient is aware of random urine drug screens and pill counts. The dosing of this medication will be reviewed on a regular basis and reduced if possible. Ongoing use of a controlled substance is necessary for this patient to have a normal quality of life. Next visit will be in the office for annual CSA requirements.   -Continue Zoloft 50 mg by mouth daily in the morning to target depression and anxiety.    Symptoms of anxiety, depression, ADHD are under good control with current medication regimen.  Encouraged patient to continue to send posts and messages to spouse as there may be one post that he reacts to and realize importance of increasing endurance for transplant. Patient prefers to come to office early August as  surgery would be as soon early August and he will be in the hospital for 20-30 days which will be best time to schedule.  The plan was discussed with the patient. The patient was given time to ask questions and these questions were answered. At the conclusion of their visit they had no additional questions or concerns and all questions were answered to their satisfaction.   Patient was given instructions and counseling regarding condition and for health maintenance advice. Please see specific information pulled into the AVS if appropriate.    Patient to contact provider if symptoms worsen or fail to improve.      5/9/24:  TELEPHONE  -Patient called [facility] that she is at the [facility] and they are closed for the afternoon however her insurance did cover the Trintellix but the cost is still $250.00 and she wants to know if she can try something else?  Patient asked her daughter and her daughter is on Zoloft and she advises she would like to try that again.. Please advise  Please inform patient I have ordered the Zoloft as follows:   -Start Zoloft 25 mg by mouth daily for 8 days, THEN increase to 50 mg thereafter, instruct patient to break 4 of the 50 mg tab in half to equal 25 mg for the first 8 days, Instruct patient dose can be switched to nightly the following day, if drowsiness is felt approximately 2-3 hrs after taking the medication in the morning.   -Provider will inform staff at the [facility] office of cancelling the Trintellix samples due to cost of medication with insurance coverage.   -Called patient and advised her of the Zoloft instructions      5/7/24:    -Continue Adderall XR 15 mg by mouth daily in the morning to target symptoms of ADHD including:  Inattentiveness & impaired concentration.  Risks, benefits, side effects discussed with patient including elevated heart rate, elevated blood pressure, irritability, insomnia, sexual dysfunction, appetite suppressing properties, psychosis.  After discussion of these risks and benefits, the patient voiced understanding and agreed to proceed. Cali reviewed, LAST DISPENSED 4/12/24 #30/30 days .  Informed patient order would be sent to Dr. Harkins in separate entry for signature due to EMR system, and will not see as refilled on AVS today, first fill date of Friday 5/10/24 due to pharmacy closed on the weekends and patient will take last dose Sunday 5/9/24.    Start Trintellix 10 mg by mouth nightly with food to target anxiety and depression.  Risks, benefits, alternatives discussed with patient including nausea, vomiting, constipation, sexual dysfunction, increased risk of bleeding especially when combined with anticoagulants (NSAIDS, blood thinners), when combined with triptans could theoretically cause weakness, hyperreflexia, and incoordination, caution with history of seizures.  Onset of action is  usually in 2 weeks. GI symptoms can last up to 14 days, may take at night if nausea persists.  After discussion of these risks and benefits, the patient voiced understanding and agreed to proceed. Included past failures in order to help speed up PA process.  Sample ordered as well for patient to  from [facility] office tomorrow. Patient given contact information and advised to call [facility] office prior to arrival to ensure medication is ready.  Secure chat message sent to staff in the [facility] office and informed of patient plan to pickup tomorrow for 7 day supply.   Patient informed medication may require a prior authorization (PA) from insurance company, which may delay start date of medication, as PA process can vary.  Patient would be contacted when medication has been approved if a PA is required.      Symptoms of anxiety, depression, are present which have been exacerbated due to stress and adjustment reaction with  illness. Patient has experienced care giver role strain, worsened anxiety, and informed patient a dose increase of Adderall XR could worsened anxiety and recommended a daily maintenance medication.  Patient is willing to try Trintellix prefers to not take a medication that could potentially cause weight gain due to patient working so hard to lose weight over the last year.    Patient was given instructions and counseling regarding condition and for health maintenance advice. Please see specific information pulled into the AVS if appropriate.    Patient to contact provider if symptoms worsen or fail to improve.        5/6/24: TELEPHONE  Patient LMVM that she thinks she needs a little bit of dose increase on her medication.  Patient advises that she is back to not finishing tasks and her daughter also told her she isn't even finishing her sentences.  Patient says her  was supposed to get a transplant this week but because of some abnormal labs he is excluded for 3  "more months.  Patient says \"feel free to call her.  Patient says she will be taking her last 15mg on Sunday so will need her medication refilled by Friday (pharmacy is closed on the weekend.).     -Patient will need to be seen for further evaluation before Adderall XR can be increased.   -Called patient to advise keon Son would need to be seen first to discuss increase in Adderall XR.  Patient is now scheduled for Tuesday 05/07/2024 to discuss via video    3/20/24:   -Continue Adderall XR 15 mg by mouth daily in the morning to target symptoms of ADHD including:  Inattentiveness & impaired concentration.  Risks, benefits, side effects discussed with patient including elevated heart rate, elevated blood pressure, irritability, insomnia, sexual dysfunction, appetite suppressing properties, psychosis.  After discussion of these risks and benefits, the patient voiced understanding and agreed to proceed. Cali reviewed, LAST DISPENSED 3/15/24 #30/30 DAYS, patient to request refill when needed. Will plan for next refill for 90 days if pharmacy will allow, due to duration of therapy, compliance, and upcoming surgery with isolation precautions of spouse in May, to limit frequent visits to the pharmacy.      Symptoms of stress related to spouses illness and ADHD are under good control with current medication regimen.  Overall, patient improving with stress, positive outlook.  Will plan for next visit in 3 months via video.   Patient was given instructions and counseling regarding condition and for health maintenance advice. Please see specific information pulled into the AVS if appropriate.    Patient to contact provider if symptoms worsen or fail to improve.        1/24/24:  Continue Adderall XR 15 mg by mouth daily in the morning to target symptoms of ADHD including:  Inattentiveness & impaired concentration. LAST DISPENSED 1/15/24 #30/30 DAYS, patient to request refill when needed.    Symptoms of ADHD are under good " control with current medication regimen.  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  Emotional support given, suggested having  daughter whom is a nurse to discuss feeding tube options, necessity of following doctor instructions.  Patient was given instructions and counseling regarding condition and for health maintenance advice. Please see specific information pulled into the AVS if appropriate.    Patient to contact provider if symptoms worsen or fail to improve.       12/7/23:  Continue Adderall XR 15 mg by mouth daily in the morning to target symptoms of ADHD including:  Inattentiveness & impaired concentration.  Risks, benefits, side effects discussed with patient including elevated heart rate, elevated blood pressure, irritability, insomnia, sexual dysfunction, appetite suppressing properties, psychosis.  After discussion of these risks and benefits, the patient voiced understanding and agreed to proceed. Cali reviewed, LAST DISPENSED 11/17/23 #30/30 DAYS, Informed patient order would be sent to Dr. Harkins in separate entry for signature due to EMR system, and will not see as refilled on AVS today. First fill date for Friday 12/15/23 as pharmacy is not open on weekends. [facility] location.    Controlled substance documentation: Cali reviewed; prior urine drug screen consistent obtained 9/7/23;; consent is up to date, signed, witnessed and in EHR, dated 9/7/23, which will be updated annually per policy. Patient is aware of risk of addiction on this medication, understands need to follow up for a review every 3 months and medications will be adjusted or decreased as deemed appropriate at each visit.  No history of drug or alcohol abuse.  No concerns about diversion or abuse. Patient denies side effects related to the medication.  Patient is aware of random urine drug screens and  pill counts. The dosing of this medication will be reviewed on a regular basis and reduced if possible. Ongoing use of a controlled substance is necessary for this patient to have a normal quality of life.    Symptoms of ADHD are under good control with current medication regimen. However, due to new cancer diagnosis with spouse, holidays, increased care giving tasks, as spouse is unable to help with simple tasks.  Patient wishes to remain on current  dose of Adderall as current stressors are situational which are negatively impacting day to day activities, however, patient does not want to depend on a medication.  Will re-evaluate in 7 weeks.   Patient was given instructions and counseling regarding condition and for health maintenance advice. Please see specific information pulled into the AVS if appropriate.    Patient to contact provider if symptoms worsen or fail to improve.        9/7/23:   Continue Adderall XR 15 mg by mouth daily in the morning to target symptoms of ADHD including:  Inattentiveness & impaired concentration.  LAST DISPENSED 8/7/23 #30/30 DAYS  Informed patient order would be sent to Dr. Harkins in separate entry for signature due to EMR system, and will not see as refilled on AVS today.  Due to positive UDS, will send a prescription to Dr. Harkins today for a 7  day supply, patient has adequate supply through Monday 9/11/23. Instructed patient to contact provider on Monday 9/18/23 to request refill.     Controlled substance documentation: Cali reviewed; prior urine drug screen consistent obtained 9/16/22, ordered today and results discussed with patient ; consent is up to date, signed, witnessed and in EHR, dated today 9/7/23, which will be updated annually per policy. Patient is aware of risk of addiction on this medication, understands need to follow up for a review every 3 months and medications will be adjusted or decreased as deemed appropriate at each visit.  No history of drug or alcohol  abuse.  No concerns about diversion or abuse. Patient denies side effects related to the medication.  Patient is aware of random urine drug screens and pill counts. The dosing of this medication will be reviewed on a regular basis and reduced if possible. Ongoing use of a controlled substance is necessary for this patient to have a normal quality of life.    Labs: POC UDS today in clinic resulted as +THC, which patient reported continues to take CBD oil in the evening for muscle relaxation to help with sleep, takes 3 drops. Obtains from [place] from a retired [place].     Symptoms of  ADHD are under good control with current medication regimen.  Reminded patient to practice mindfulness and behavioral modifications to help with shifting from one uncompleted activity to another, to continue with alarms, reminders. Patient informed that CBD oil is not regulated by the FDA and each bottle could contain various amounts of THC, however, if confirmation deems positive for THC, patient has been advised to stop CBD oil.     Patient will be traveling to [place] 11/2-11/16/23 and concerned with ability to obtain medication timely, instructed patient to remind provider with refill in Oct or early Nov. So the supply can be extended to cover days of travel.   Patient was given instructions and counseling regarding condition and for health maintenance advice. Please see specific information pulled into the AVS if appropriate.    Patient to contact provider if symptoms worsen or fail to improve.        7/5/23:   Continue Adderall XR 15 mg by mouth daily in the morning to target symptoms of ADHD including:  Inattentiveness & impaired concentration.  Risks, benefits, side effects discussed with patient including elevated heart rate, elevated blood pressure, irritability, insomnia, sexual dysfunction, appetite suppressing properties, psychosis.  After discussion of these risks and benefits, the patient voiced understanding and agreed to  proceed. Cali reviewed, LAST DISPENSED 6/8/23 #30/30 DAYS  Informed patient order would be sent to Dr. Harkins in separate entry for signature due to EMR system, and will not see as refilled on AVS today.  First available fill date of 7/7/23.     Symptoms of ADHD are under good control with current medication regimen.  Informed patient next visit will be scheduled for in the office for annual CSA and UDS.  Scheduled for Thurs 9/7/23 at 10 am, instructed patient to arrive by 945 am to complete UDS prior to start of visit to allow ample time for results to show by end of visit.   Patient was given instructions and counseling regarding condition and for health maintenance advice. Please see specific information pulled into the AVS if appropriate.    Patient to contact provider if symptoms worsen or fail to improve.       4/4/23:  Continue Adderall XR 15 mg by mouth daily in the morning to target symptoms of ADHD including:  Inattentiveness & impaired concentration.  Risks, benefits, side effects discussed with patient including elevated heart rate, elevated blood pressure, irritability, insomnia, sexual dysfunction, appetite suppressing properties, psychosis.  After discussion of these risks and benefits, the patient voiced understanding and agreed to proceed. Cali reviewed, LAST DISPENSED 3/15/23 #30/30 DAYS  Patient to request refill when appropriate. Patient has 9 pills remaining after taking this morning dose, instructed patient to [facility] to refill [facility] on Monday 4/10/23, and provider will send refill in on Tues. 4/11/23 with first allowed fill date of Thurs 4/13/23.     Controlled substance documentation: Cali reviewed; prior urine drug screen consistent obtained 9/16/22; consent is up to date, signed, witnessed and in EHR, dated 9/16/22, which will be updated annually per policy. Patient is aware of risk of addiction on this medication, understands need to follow up for a review every  3 months and medications will be adjusted or decreased as deemed appropriate at each visit.  No history of drug or alcohol abuse.  No concerns about diversion or abuse. Patient denies side effects related to the medication.  Patient is aware of random urine drug screens and pill counts. The dosing of this medication will be reviewed on a regular basis and reduced if possible..  Ongoing use of a controlled substance is necessary for this patient to have a normal quality of life.  Symptoms of ADHD are under good control with Adderall XR 15 mg daily. Patient to contact provider if symptoms worsen or fail to improve.       3/10/23:  TELEPHONE  Patient called to check in re:her medication (per Adelaida's request)  Patient says she  Has 5 days worth of medication left.  Please send refill when appropriate and please advise      2/10/23:  TELPHONE  Patient called to report that the [facility] [place] and [facility] are now out of the Adderall XR and she says she did [facility] [place] and [place] (I did change in this request.).  Please resubmit.  Med not pended      2/8/23:  Continue Adderall XR 15 mg by mouth daily in the morning to target symptoms of ADHD including:  Inattentiveness & impaired concentration.  Risks, benefits, side effects discussed with patient including elevated heart rate, elevated blood pressure, irritability, insomnia, sexual dysfunction, appetite suppressing properties, psychosis.  After discussion of these risks and benefits, the patient voiced understanding and agreed to proceed. Cali reviewed, LAST DISPENSED 1/11/23 #30/30 DAYS  Informed patient order would be sent to Dr. Harkins in separate entry for signature due to EMR system, and will not see as refilled on AVS today.    Symptoms of ADHD are under good control with Adderall XR 15 mg daily. Instructed to request refill via pharmacy when appropriate.  Will coordinate with staff to ensure refill requests are sent to this provider.  Due to  pandemic state of emergency  ending 5/11/23, discussed upcoming travel as patient is aware telehealth cannot be provided across state lines nor can prescription of controlled substances, patient has plans to go to [place] for 2 weeks in November 2023 at this time, and will update provider for any other travels to ensure patient has adequate supply of medication.  Patient to contact provider if symptoms worsen or fail to improve.       1/4/23:  Continue Adderall XR 15 mg by mouth daily in the morning to target symptoms of ADHD including:  Inattentiveness & impaired concentration.  Risks, benefits, side effects discussed with patient including elevated heart rate, elevated blood pressure, irritability, insomnia, sexual dysfunction, appetite suppressing properties, psychosis.  After discussion of these risks and benefits, the patient voiced understanding and agreed to proceed. Cali reviewed, LAST DISPENSED 12/12/22 #30/30 DAYS  Patient instructed to call [facility] Monday 1/9/23 to determine medication availability due to nationwide shortage of Adderall, and to then contact provider MA directly to inform as patient will have to transfer all medications to [facility] if continued to use that pharmacy.  Patient also instructed to inquire with pharmacy of earliest dispense date as most pharmacies are 24-48 hrs for controlled substances.   Symptoms of ADHD are under good control with Adderall XR 15 mg, denies side effects, though has noted decreased appetite with weight loss, which patient has also increased daily activity, exercising, and has been under some increased stress with parents care.  Will continue to check in with patient regarding weight loss.  Lengthy discussion with patient today regarding telehealth services as patient and provider must both be located in the same state of [place], and would not be able to provide telehealth services while visiting daughter in [place].  Patient verbalized understanding.   Patient to contact provider if symptoms worsen or fail to improve.         11/22/22:  Continue Adderall XR 10 mg by mouth daily in the morning to target symptoms of ADHD including:  Inattentiveness & impaired concentration.  Risks, benefits, side effects discussed with patient including elevated heart rate, elevated blood pressure, irritability, insomnia, sexual dysfunction, appetite suppressing properties, psychosis.  After discussion of these risks and benefits, the patient voiced understanding and agreed to proceed. Cali reviewed, LAST DISPENSED 10/28/22 #42/42 DAYS, reported #20 remain in bottle.    Will send in order for Adderall XR 5 mg by mouth daily in the morning for 20 days (through 12/12/22), patient instructed to add the 5 mg dose to the 10 mg to equal 15 mg total daily in the morning.  Patient instructed to contact provider in office when down to 3-5 days remaining of supply. Call 12/8/22.  Informed patient order would be sent to Dr. Harkins in separate entry for signature due to EMR system, and will not see as refilled on AVS today.    Controlled substance documentation: Cali reviewed; prior urine drug screen consistent obtained 9/16/22; consent is up to date, signed, witnessed and in EHR, dated 9/16/22, which will be updated annually per policy. Patient is aware of risk of addiction on this medication, understands need to follow up for a review every 3 months and medications will be adjusted or decreased as deemed appropriate at each visit.  No history of drug or alcohol abuse.  No concerns about diversion or abuse. Patient denies side effects related to the medication.  Patient is aware of random urine drug screens and pill counts. The dosing of this medication will be reviewed on a regular basis and reduced if possible..  Ongoing use of a controlled substance is necessary for this patient to have a normal quality of life.    ADHD symptoms are under fair control with Adderall XR 10 mg, will  "increase to 15 mg.   Patient to contact provider if symptoms worsen or fail to improve.       10/31/22:  TELEPHONE  Patient called and LMVM that the [facility] [place] did fill the Rx for her Adderall XR 10mg.  Patient just wanted to let Adelaida know.  Patient said she was \"happy about it\"   Prescription was verified as dispensed with pharmacy in [place].     10/28/22:  TELEPHONE  Please determine when she will return to [place], as she was supposed to return 10/25/22 and was originally given adequate supply through 10/28/22.    Called and spoke with patient, she states that she will be back on Wednesday Nov.2,2022 and will take her last pill tomorrow.  Patient does say that the [facility] in [place] has now reopened but they say they are very backed up and someone even told her that they will probably not fill it because Dr. Harkins is not licensed in [place].  Patient says she will wait to see if they do fill it over the weekend and she will call me back on Monday and let me know.  If they don't fill it she will just have it sent to the [facility] in [place], [place].   I will forward message to  so he is updated on status, Since she will be returning next Wed. 11/2/22, a new order will not be sent into local pharmacy until confirmed dispense from [place] pharmacy, as [place] was not an option to add with MARLYS report attempted today.  We will have to contact the pharmacy to verify dispense, and patient needs to be reminded to not wait until down to 1 pill remaining to request refill or problem with refill, as this refill was sent in per patient request Monday 10/24/22, and this information was relayed to office today.   Patient called and LMVM that the [facility] in [place] did fill the Rx for her Adderall XR 10mg.  Patient just wanted to let Adelaida know.  Patient said she was \"happy about it\"       10/20/22:   Continue Adderall XR 10 mg by mouth daily in the morning to target symptoms of ADHD " including:  Inattentiveness & impaired concentration.  Risks, benefits, side effects discussed with patient including elevated heart rate, elevated blood pressure, irritability, insomnia, sexual dysfunction, appetite suppressing properties, psychosis.  After discussion of these risks and benefits, the patient voiced understanding and agreed to proceed. Cali reviewed, LAST DISPENSED 9/16/22 #42/42 DAYS  Patient instructed to request refill 10/25/22 upon return from [place]. Explained process for refills.   Controlled substance documentation: Cali reviewed; prior urine drug screen consistent obtained 9/16/22; consent is up to date, signed, witnessed and in EHR, dated 9/16/22, which will be updated annually per policy. Patient is aware of risk of addiction on this medication, understands need to follow up for a review every 3 months and medications will be adjusted or decreased as deemed appropriate at each visit.  No history of drug or alcohol abuse.  No concerns about diversion or abuse. Patient denies side effects related to the medication.  Patient is aware of random urine drug screens and pill counts. The dosing of this medication will be reviewed on a regular basis and reduced if possible..  Ongoing use of a controlled substance is necessary for this patient to have a normal quality of life.  Symptoms of ADHD are managed well with current dose of Adderall XR 10 mg without side effects.      9/16/22:   Declines therapy  Will potentially start Adderall XR 10 mg by mouth daily in the morning to target symptoms of ADHD including:  Inattentiveness & impaired concentration.  Risks, benefits, side effects discussed with patient including elevated heart rate, elevated blood pressure, irritability, insomnia, sexual dysfunction, appetite suppressing properties, psychosis.  After discussion of these risks and benefits, the patient voiced understanding and agreed to proceed. Cali reviewed, UDS ordered, and controlled  substance agreement signed & witnessed. Patient informed this medication would not be ordered until UDS resulted and was negative, at that time a Prescription will be sent to  for signature.  Labs: POC UDS today in clinic    Lengthy discussion held with patient regarding CSA and medication education.   Bottle of Hemp oil had a QRS code to scan, which was done per patient request, which indicated percentages of ingredients as each bottle varies.  Certificate of Analysis, [facility], Cannabinoid Results: Total THC 0.231%, Total CBD 6.101%, Total Cannabinoids 6.534%. Will scan certificate to EHR.   Patient will be in [place] in October, will check with  in regards to prescribing CS sending to [place].   Will contact patient if UDS needs to be sent for confirmation. Otherwise will proceed with ordering Adderall XR.     9/15/22:   ADHD testing completed by Dr. Greg Bennett on 9/1/22 confirming a diagnosis of ADHD. (total time of review 9 mins)    Discussed ADHD treatment options of non-stimulants vs stimulant medications, after discussion patient wishes to proceed with Adderall.  Informed patient of policy requirements prior to starting Adderall, patient will plan on coming in tomorrow due to available appointment at 8 am.  Discussed current CBD oil which patient reports is hemp based as the UDS may show positive for THC. Patient takes CBD oil for sleep and muscle aches, which has been effective.  IF UDS positive patient was informed Adderall would not be prescribed, and will have to send for a confirmation.  Patient verbalized understanding.       7/14/22:   No Medications, Declines therapy at this time.  Referral for ADHD testing with   Dr. Greg Bennett, Psychologist, MS, LPP  [address], [address]  [place] [address] [phone]   Currently only accepting referrals for psychological testing services.  Patient to contact provider and set up appointment.  And to contact office if  unable to get an appointment scheduled.   -Attached list of several other sites for ADHD testing. Patient instructed to contact providers on list to determine availability of appointments, instructed patient to take notes while calling places indicating first available appointment, and to ask to be placed on waiting list.  If an office is chosen and requests the referral order please contact my Medical Assistant, Hanh, directly at [phone] informing of chosen office and the information will be sent.    Patient with high suspicion of having ADHD, will send for formal testing and have patient return in 2 weeks to discuss medication options as if patient is able to be tested in the next 1-2 months, may wait on starting a non-stimulant medication.     Patient screened positive for depression based on a PHQ-9 score of 1 on 5/20/2025. Follow-up recommendations include: Suicide Risk Assessment performed.(PHQ2-0)    TREATMENT PLAN/GOALS: Continue supportive psychotherapy efforts and medications as indicated. Treatment and medication options discussed during today's visit. Patient ackowledged and verbally consented to continue with current treatment plan and was educated on the importance of compliance with treatment and follow-up appointments.    MEDICATION ISSUES:  MARLYS reviewed as expected.    Discussed medication options and treatment plan of prescribed medication as well as the risks, benefits, and side effects including potential falls, possible impaired driving and metabolic adversities among others. Patient is agreeable to call the office with any worsening of symptoms or onset of side effects. Patient is agreeable to call 911 or go to the nearest ER should he/she begin having SI/HI. No medication side effects or related complaints today.     MEDS ORDERED DURING VISIT:  No orders of the defined types were placed in this encounter.      Return in about 3 months (around 8/21/2025) for medication check.         I  spent 45 minutes caring for Jenn on this date of service. This time includes time spent by me in the following activities: preparing for the visit, reviewing tests, performing a medically appropriate examination and/or evaluation, counseling and educating the patient/family/caregiver, referring and communicating with other health care professionals, documenting information in the medical record, care coordination, and completing GAD7 questionnaire with patient, reconciling medications, scheduling      This document has been electronically signed by CHELSI Burnett  May 21, 2025 08:27 EDT      Part of this note may be an electronic transcription/translation of spoken language to printed text using the Dragon Dictation System.

## 2025-05-21 NOTE — PATIENT INSTRUCTIONS
"Should you want to get in touch with your provider, CHELSI Burnett, please contact MY Medical Assistant, Hanh, directly at [phone].  Recommend saving Hanh's direct number in phone as this is the PREFERRED & EASIEST way to get in contact with your provider.  Please leave a voice mail if you do not get an answer and she will return your call within 24 hrs. You will NOT be able to contact provider on [facility], as Behavioral Health Providers are restricted. YOU MUST CALL [phone]  If you need to speak with the on call provider after hours or on weekends, please Contact the [facility] ([phone]) and staff will be able to page the provider on call directly.     SPECIFIC RECOMMENDATIONS:     1.      Medications discussed at this encounter:                   -  request refill when needed    2.      Psychotherapy recommendations: . Declined     3.     Return to clinic: 3 months, Tues.8/26/25 at 10am in office    Please arrive at least 15 minutes before your scheduled appointment time to complete check in process.      IF you are scheduled for a [facility] visit, YOU MUST COMPLETE THE \"E-CHECK IN\" PROCESS PRIOR TO BEGINNING THE VISIT, You may still complete the E-Check in for in office visits prior to appointment, you will receive multiple text/email reminders which will direct you further if needed.            "

## 2025-06-03 DIAGNOSIS — F90.0 ADHD (ATTENTION DEFICIT HYPERACTIVITY DISORDER), INATTENTIVE TYPE: ICD-10-CM

## 2025-06-03 NOTE — TELEPHONE ENCOUNTER
Med Refill Request patient's Pharmacy is not open on the weekends.  Will push request out to closer to refill time.

## 2025-06-05 RX ORDER — DEXTROAMPHETAMINE SACCHARATE, AMPHETAMINE ASPARTATE MONOHYDRATE, DEXTROAMPHETAMINE SULFATE AND AMPHETAMINE SULFATE 3.75; 3.75; 3.75; 3.75 MG/1; MG/1; MG/1; MG/1
15 CAPSULE, EXTENDED RELEASE ORAL EVERY MORNING
Qty: 90 CAPSULE | Refills: 0 | Status: SHIPPED | OUTPATIENT
Start: 2025-06-09

## 2025-07-21 ENCOUNTER — TELEPHONE (OUTPATIENT)
Dept: PSYCHIATRY | Facility: CLINIC | Age: 60
End: 2025-07-21
Payer: COMMERCIAL

## 2025-07-21 ENCOUNTER — PRIOR AUTHORIZATION (OUTPATIENT)
Dept: BEHAVIORAL HEALTH | Facility: CLINIC | Age: 60
End: 2025-07-21
Payer: COMMERCIAL

## 2025-07-21 NOTE — TELEPHONE ENCOUNTER
RAJI JACOBSEN (Key: [id number])  PA Case ID #: [id number]  Need Help? Call us at [phone]  Outcome  Approved today by [facility] 2017  Your PA request has been approved. Additional information will be provided in the approval communication. (Message [id number])  Effective Date: 6/21/2025  Authorization Expiration Date: 7/21/2026